# Patient Record
Sex: MALE | Race: WHITE | NOT HISPANIC OR LATINO | Employment: OTHER | URBAN - METROPOLITAN AREA
[De-identification: names, ages, dates, MRNs, and addresses within clinical notes are randomized per-mention and may not be internally consistent; named-entity substitution may affect disease eponyms.]

---

## 2017-01-04 RX ORDER — DILTIAZEM HYDROCHLORIDE 360 MG/1
TABLET, EXTENDED RELEASE ORAL
Qty: 90 TABLET | Refills: 3 | Status: SHIPPED | OUTPATIENT
Start: 2017-01-04 | End: 2019-04-11

## 2017-01-07 RX ORDER — ATORVASTATIN CALCIUM 10 MG/1
10 TABLET, FILM COATED ORAL DAILY
Qty: 90 TABLET | Refills: 3 | Status: SHIPPED | OUTPATIENT
Start: 2017-01-07 | End: 2017-12-13 | Stop reason: SDUPTHER

## 2017-01-12 ENCOUNTER — OFFICE VISIT (OUTPATIENT)
Dept: OPTOMETRY | Facility: CLINIC | Age: 77
End: 2017-01-12
Payer: MEDICARE

## 2017-01-12 DIAGNOSIS — H40.1131 PRIMARY OPEN ANGLE GLAUCOMA OF BOTH EYES, MILD STAGE: Primary | ICD-10-CM

## 2017-01-12 DIAGNOSIS — H25.13 NUCLEAR SCLEROSIS, BILATERAL: ICD-10-CM

## 2017-01-12 PROCEDURE — 99999 PR PBB SHADOW E&M-EST. PATIENT-LVL II: CPT | Mod: PBBFAC,,, | Performed by: OPTOMETRIST

## 2017-01-12 PROCEDURE — 92012 INTRM OPH EXAM EST PATIENT: CPT | Mod: S$PBB,,, | Performed by: OPTOMETRIST

## 2017-01-12 PROCEDURE — 99212 OFFICE O/P EST SF 10 MIN: CPT | Mod: PBBFAC,PO | Performed by: OPTOMETRIST

## 2017-01-12 NOTE — PROGRESS NOTES
HPI     DLS:  9/9/16  Will change drops if iop too high  Pt here for 4 month IOP check.  Pt states he does have allergies this   morning and has tearing OU.  Pt states he is bothered by glare OU at   times.     Latanoprost QHS OU       Last edited by Harjeet Irving, OD on 1/12/2017  9:37 AM.     ROS     Negative for: Constitutional, Gastrointestinal, Neurological, Skin,   Genitourinary, Musculoskeletal, HENT, Endocrine, Cardiovascular, Eyes,   Respiratory, Psychiatric, Allergic/Imm, Heme/Lymph    Last edited by Harjeet Irving, OD on 1/12/2017  9:28 AM. (History)        Assessment /Plan     For exam results, see Encounter Report.    Primary open angle glaucoma of both eyes, mild stage    Nuclear sclerosis, bilateral      1. IOP stable. Cont Latanaprost drops. Probably low tension. Continued good response to drops, Good response to Latanaprost.Mild glaucoma based on OCT changes, VF defects Ou and increased IOP. Pachy normal. Field may clean up at repeat. Pretreat IOP at 20. Family history of glaucoma. RTC 4 mos IOP ck  2. Educated pt on presence of cataracts and effects on vision. No surgery at this time. Recheck in one year.

## 2017-02-17 ENCOUNTER — TELEPHONE (OUTPATIENT)
Dept: GASTROENTEROLOGY | Facility: CLINIC | Age: 77
End: 2017-02-17

## 2017-02-20 ENCOUNTER — OFFICE VISIT (OUTPATIENT)
Dept: GASTROENTEROLOGY | Facility: CLINIC | Age: 77
End: 2017-02-20
Payer: MEDICARE

## 2017-02-20 VITALS
HEIGHT: 75 IN | BODY MASS INDEX: 25.99 KG/M2 | HEART RATE: 68 BPM | WEIGHT: 209 LBS | SYSTOLIC BLOOD PRESSURE: 137 MMHG | DIASTOLIC BLOOD PRESSURE: 81 MMHG

## 2017-02-20 DIAGNOSIS — K59.04 CHRONIC IDIOPATHIC CONSTIPATION: Primary | ICD-10-CM

## 2017-02-20 PROCEDURE — 99214 OFFICE O/P EST MOD 30 MIN: CPT | Mod: S$PBB,,, | Performed by: PHYSICIAN ASSISTANT

## 2017-02-20 PROCEDURE — 99999 PR PBB SHADOW E&M-EST. PATIENT-LVL III: CPT | Mod: PBBFAC,,, | Performed by: PHYSICIAN ASSISTANT

## 2017-02-20 PROCEDURE — 99213 OFFICE O/P EST LOW 20 MIN: CPT | Mod: PBBFAC | Performed by: PHYSICIAN ASSISTANT

## 2017-02-20 NOTE — MR AVS SNAPSHOT
[facility]  [address]  [facility] [address]  Phone: [phone]  Fax: [phone]                  Tung Chau   2017 10:00 AM   Office Visit    Description:  Male : 1940   Provider:  Gwen Ibarra PA-C   Department:  [facility]           Reason for Visit     Follow-up           Diagnoses this Visit        Comments    Chronic idiopathic constipation    -  Primary            To Do List           Future Appointments        Provider Department Dept Phone    3/21/2017 9:00 AM [facility] - Gastroenterology [phone]    2017 9:30 AM WALTER Sosa - Optometry [phone]      Goals (5 Years of Data)     None       These Medications        Disp Refills Start End    linaclotide (LINZESS) 145 mcg Cap capsule 30 capsule 2 2017     Take 1 capsule (145 mcg total) by mouth once daily. - Oral    Pharmacy: [id number] - [address] [facility] [address] AT [facility] OF [facility] [address] Ph #: [phone]         [facility] On Call     [facility] On Call Nurse [facility] Line -  Assistance  Registered nurses in the [facility] On Call Center provide clinical advisement, health education, appointment booking, and other advisory services.  Call for this free service at [phone].             Medications           Message regarding Medications     Verify the changes and/or additions to your medication regime listed below are the same as discussed with your clinician today.  If any of these changes or additions are incorrect, please notify your healthcare provider.        START taking these NEW medications        Refills    linaclotide (LINZESS) 145 mcg Cap capsule 2    Sig: Take 1 capsule (145 mcg total) by mouth once daily.    Class: Normal    Route: Oral      STOP taking these medications     lubiprostone (AMITIZA) 24 MCG Cap Take 1 capsule (24 mcg total) by mouth 2 (two) times daily with  "meals.           Verify that the below list of medications is an accurate representation of the medications you are currently taking.  If none reported, the list may be blank. If incorrect, please contact your healthcare provider. Carry this list with you in case of emergency.           Current Medications     atorvastatin (LIPITOR) 10 MG tablet Take 1 tablet (10 mg total) by mouth once daily.    benazepril (LOTENSIN) 10 MG tablet TAKE 1 TABLET BY MOUTH EVERY DAY    doxazosin (CARDURA) 4 MG tablet TAKE 1 TABLET BY MOUTH EVERY NIGHT    esomeprazole (NEXIUM) 40 MG capsule Take 1 capsule (40 mg total) by mouth once daily.    fluticasone (FLONASE) 50 mcg/actuation nasal spray INSTILL 2 SPRAYS IN EACH NOSTRIL EVERY DAY    GAVILYTE-G 236-22.74-6.74 -5.86 gram suspension     hydrochlorothiazide (HYDRODIURIL) 25 MG tablet TAKE 1 TABLET BY MOUTH EVERY DAY    latanoprost 0.005 % ophthalmic solution INSTILL 1 DROP IN BOTH EYES EVERY DAY    MATZIM  mg 24 hr tablet TAKE 1 TABLET BY MOUTH ONCE DAILY    peg-electrolyte soln (TRILYTE WITH FLAVOR PACKETS) 420 gram SolR Take 4,000 mLs by mouth as directed.    linaclotide (LINZESS) 145 mcg Cap capsule Take 1 capsule (145 mcg total) by mouth once daily.    tamsulosin (FLOMAX) 0.4 mg Cp24 Take 1 capsule (0.4 mg total) by mouth once daily.           Clinical Reference Information           Your Vitals Were     BP Pulse Height Weight BMI    137/81 68 6' 2.5" (1.892 m) 94.8 kg (208 lb 15.9 oz) 26.47 kg/m2      Blood Pressure          Most Recent Value    BP  137/81      Allergies as of 2/20/2017     No Known Allergies      Immunizations Administered on Date of Encounter - 2/20/2017     None      Instructions    Take sennakot daily until you get your prescription.    If the linzess is covered, take once daily in the morning. If no relief with increase to twice daily.    Get a squatty potty or use a stool under your feet when trying to have a BM       Language Assistance Services     " ATTENTION: Language assistance services are available, free of charge. Please call [phone].      ATENCIÓN: Si habla español, tiene a main disposición servicios gratuitos de asistencia lingüística. Llame al [phone].     CHÚ Ý: N?u b?n nói Ti?ng Vi?t, có các d?ch v? h? tr? ngôn ng? mi?n phí dành cho b?n. G?i s? [phone].         [facility] complies with applicable Federal civil rights laws and does not discriminate on the basis of race, color, national origin, age, disability, or sex.

## 2017-02-20 NOTE — PROGRESS NOTES
[facility] Consultation Note    Reason for Consult:  The encounter diagnosis was Chronic idiopathic constipation.    PCP:   Antonio Killian       Referring MD:  No referring provider defined for this encounter.    HPI:  This is a 76 y.o. male here to follow up on his constipation. Today he reports no relief with amitiza. Takes a herbal supplement that contains senna every 2-3 days. + incomplete emptying, having to strain. Also getting relief with MOM but he is worried about this or the herbal supplement every day.    Drinks 4-6 bottles of water a day. 8/2015 colonoscopy involved removal of an adenoma polyp and revealed diverticulosis.  Denies lactose intolerant. Denies rectal bleeding or melena.    ROS:  Constitutional: No fevers, chills, No weight loss  ENT: No allergies  CV: No chest pain  Pulm: No cough, No shortness of breath  Ophtho: No vision changes  GI: see HPI  Derm: No rash  Heme: No lymphadenopathy, No bruising  MSK: No arthritis  : No dysuria, No hematuria  Endo: No hot or cold intolerance  Neuro: No syncope, No seizure  Psych: No anxiety, No depression    Medical History:  has a past medical history of Cataract; Chronic constipation; GERD (gastroesophageal reflux disease); Hyperlipidemia; Hypertension; and LBBB (left bundle branch block).    Surgical History:  has a past surgical history that includes Inguinal hernia repair (Right); Colonoscopy (4/28/2008); and Esophagogastroduodenoscopy (2/1/2010).    Family History: family history includes Cancer in his father and sister; Cancer (age of onset: 60) in his paternal uncle; Heart disease in his daughter; Hypertension in his sister; Stroke in his father. There is no history of Amblyopia, Blindness, Cataracts, Diabetes, Glaucoma, Macular degeneration, Retinal detachment, Strabismus, or Thyroid disease..     Social History:  reports that he has never smoked. He has never used smokeless tobacco. He reports that he does not drink  "alcohol or use illicit drugs.    Review of patient's allergies indicates:  No Known Allergies    Current Outpatient Prescriptions on File Prior to Visit   Medication Sig Dispense Refill    atorvastatin (LIPITOR) 10 MG tablet Take 1 tablet (10 mg total) by mouth once daily. 90 tablet 3    benazepril (LOTENSIN) 10 MG tablet TAKE 1 TABLET BY MOUTH EVERY DAY 90 tablet 3    doxazosin (CARDURA) 4 MG tablet TAKE 1 TABLET BY MOUTH EVERY NIGHT 90 tablet 3    esomeprazole (NEXIUM) 40 MG capsule Take 1 capsule (40 mg total) by mouth once daily. 90 capsule 1    fluticasone (FLONASE) 50 mcg/actuation nasal spray INSTILL 2 SPRAYS IN EACH NOSTRIL EVERY DAY 48 g 5    GAVILYTE-G 236-22.74-6.74 -5.86 gram suspension   0    hydrochlorothiazide (HYDRODIURIL) 25 MG tablet TAKE 1 TABLET BY MOUTH EVERY DAY 90 tablet 3    latanoprost 0.005 % ophthalmic solution INSTILL 1 DROP IN BOTH EYES EVERY DAY 7.5 mL 12    MATZIM  mg 24 hr tablet TAKE 1 TABLET BY MOUTH ONCE DAILY 90 tablet 3    peg-electrolyte soln (TRILYTE WITH FLAVOR PACKETS) 420 gram SolR Take 4,000 mLs by mouth as directed. 1 Bottle 0    [DISCONTINUED] lubiprostone (AMITIZA) 24 MCG Cap Take 1 capsule (24 mcg total) by mouth 2 (two) times daily with meals. 60 capsule 0    tamsulosin (FLOMAX) 0.4 mg Cp24 Take 1 capsule (0.4 mg total) by mouth once daily. 10 capsule 0     No current facility-administered medications on file prior to visit.          Objective Findings:    Vital Signs:  Visit Vitals    /81    Pulse 68    Ht 6' 2.5" (1.892 m)    Wt 94.8 kg (208 lb 15.9 oz)    BMI 26.47 kg/m2     Body mass index is 26.47 kg/(m^2).    Physical Exam:  General Appearance: Well appearing in no acute distress  Head:   Normocephalic, without obvious abnormality  Eyes:    No scleral icterus  ENT: Neck supple, Lips, mucosa, and tongue normal  Lungs: CTA bilaterally in anterior and posterior fields, no wheezes, no crackles.  Heart:  Regular rate and rhythm, S1, S2 " normal, no murmurs heard  Abdomen: Soft, non tender, non distended with positive bowel sounds in all four quadrants.  Extremities: 2+ pulses, no edema  Skin: No rash  Neurologic: AAO x 3      Labs:  Lab Results   Component Value Date    WBC 5.08 09/16/2016    HGB 14.5 09/16/2016    HCT 42.1 09/16/2016     09/16/2016    CHOL 142 09/16/2016    TRIG 56 09/16/2016    HDL 44 09/16/2016    ALT 20 09/16/2016    AST 23 09/16/2016     09/16/2016    K 4.5 09/16/2016     09/16/2016    CREATININE 1.3 09/16/2016    BUN 18 09/16/2016    CO2 26 09/16/2016    TSH 2.122 03/17/2016    PSA 1.2 03/17/2016    INR 1.0 06/23/2013       Imaging:    Endoscopy:    colon 8/2015    Assessment:  1. Chronic idiopathic constipation      Constipation persists, no relief with amitiza    Recommendations:  1. stop amitiza. Start linzess 145mcg. If no relief he was told to take two linzess pills daily    2. Advise using a squatty potty    Consider anorectal manometry at next visit.     Return in about 1 month (around 3/20/2017).      Order summary:  Orders Placed This Encounter    linaclotide (LINZESS) 145 mcg Cap capsule         Thank you so much for allowing me to participate in the care of Tung Chau    Gwen Ibarra PA-C

## 2017-02-20 NOTE — PATIENT INSTRUCTIONS
Take sennakot daily until you get your prescription.    If the linzess is covered, take once daily in the morning. If no relief with increase to twice daily.    Get a squatty potty or use a stool under your feet when trying to have a BM

## 2017-03-03 ENCOUNTER — TELEPHONE (OUTPATIENT)
Dept: INTERNAL MEDICINE | Facility: CLINIC | Age: 77
End: 2017-03-03

## 2017-03-03 DIAGNOSIS — Z00.00 ANNUAL PHYSICAL EXAM: Primary | ICD-10-CM

## 2017-03-03 DIAGNOSIS — I10 ESSENTIAL HYPERTENSION: ICD-10-CM

## 2017-03-03 DIAGNOSIS — E78.00 PURE HYPERCHOLESTEROLEMIA: ICD-10-CM

## 2017-03-03 DIAGNOSIS — Z12.5 ENCOUNTER FOR PROSTATE CANCER SCREENING: ICD-10-CM

## 2017-03-03 NOTE — TELEPHONE ENCOUNTER
----- Message from Anna Sandoval sent at 3/3/2017 11:06 AM CST -----  Contact: self [phone] or [phone]  Type: Orders Request    What orders/ testing are being requested? Labs     Is there a future appointment scheduled for the patient with PCP?  Yes     When? 03/23/17      Comments: please advise , Thanks !

## 2017-03-17 ENCOUNTER — OFFICE VISIT (OUTPATIENT)
Dept: INTERNAL MEDICINE | Facility: CLINIC | Age: 77
End: 2017-03-17
Payer: MEDICARE

## 2017-03-17 ENCOUNTER — LAB VISIT (OUTPATIENT)
Dept: LAB | Facility: HOSPITAL | Age: 77
End: 2017-03-17
Attending: INTERNAL MEDICINE
Payer: MEDICARE

## 2017-03-17 VITALS
HEIGHT: 74 IN | DIASTOLIC BLOOD PRESSURE: 79 MMHG | SYSTOLIC BLOOD PRESSURE: 144 MMHG | HEART RATE: 61 BPM | BODY MASS INDEX: 26.44 KG/M2 | WEIGHT: 206 LBS

## 2017-03-17 DIAGNOSIS — E78.00 PURE HYPERCHOLESTEROLEMIA: ICD-10-CM

## 2017-03-17 DIAGNOSIS — K59.04 CHRONIC IDIOPATHIC CONSTIPATION: ICD-10-CM

## 2017-03-17 DIAGNOSIS — Z12.5 ENCOUNTER FOR PROSTATE CANCER SCREENING: ICD-10-CM

## 2017-03-17 DIAGNOSIS — J30.2 SEASONAL ALLERGIC RHINITIS, UNSPECIFIED ALLERGIC RHINITIS TRIGGER: ICD-10-CM

## 2017-03-17 DIAGNOSIS — N40.0 BENIGN NON-NODULAR PROSTATIC HYPERPLASIA WITHOUT LOWER URINARY TRACT SYMPTOMS: Chronic | ICD-10-CM

## 2017-03-17 DIAGNOSIS — I10 ESSENTIAL HYPERTENSION: Chronic | ICD-10-CM

## 2017-03-17 DIAGNOSIS — D12.6 ADENOVILLOUS POLYP OF COLON: ICD-10-CM

## 2017-03-17 DIAGNOSIS — I10 ESSENTIAL HYPERTENSION: ICD-10-CM

## 2017-03-17 DIAGNOSIS — Z00.00 ANNUAL PHYSICAL EXAM: ICD-10-CM

## 2017-03-17 DIAGNOSIS — Z00.00 ANNUAL PHYSICAL EXAM: Primary | ICD-10-CM

## 2017-03-17 DIAGNOSIS — K21.9 GERD WITHOUT ESOPHAGITIS: Chronic | ICD-10-CM

## 2017-03-17 LAB
ALBUMIN SERPL BCP-MCNC: 4.1 G/DL
ALP SERPL-CCNC: 77 U/L
ALT SERPL W/O P-5'-P-CCNC: 20 U/L
ANION GAP SERPL CALC-SCNC: 7 MMOL/L
AST SERPL-CCNC: 19 U/L
BASOPHILS # BLD AUTO: 0.03 K/UL
BASOPHILS NFR BLD: 0.6 %
BILIRUB SERPL-MCNC: 0.8 MG/DL
BUN SERPL-MCNC: 12 MG/DL
CALCIUM SERPL-MCNC: 10 MG/DL
CHLORIDE SERPL-SCNC: 106 MMOL/L
CHOLEST/HDLC SERPL: 2.8 {RATIO}
CO2 SERPL-SCNC: 28 MMOL/L
COMPLEXED PSA SERPL-MCNC: 0.99 NG/ML
CREAT SERPL-MCNC: 1.1 MG/DL
DIFFERENTIAL METHOD: ABNORMAL
EOSINOPHIL # BLD AUTO: 0.1 K/UL
EOSINOPHIL NFR BLD: 2.4 %
ERYTHROCYTE [DISTWIDTH] IN BLOOD BY AUTOMATED COUNT: 12.7 %
EST. GFR  (AFRICAN AMERICAN): >60 ML/MIN/1.73 M^2
EST. GFR  (NON AFRICAN AMERICAN): >60 ML/MIN/1.73 M^2
GLUCOSE SERPL-MCNC: 96 MG/DL
HCT VFR BLD AUTO: 43.3 %
HDL/CHOLESTEROL RATIO: 35.4 %
HDLC SERPL-MCNC: 130 MG/DL
HDLC SERPL-MCNC: 46 MG/DL
HGB BLD-MCNC: 15 G/DL
LDLC SERPL CALC-MCNC: 70.6 MG/DL
LYMPHOCYTES # BLD AUTO: 1.3 K/UL
LYMPHOCYTES NFR BLD: 26.5 %
MCH RBC QN AUTO: 31.1 PG
MCHC RBC AUTO-ENTMCNC: 34.6 %
MCV RBC AUTO: 90 FL
MONOCYTES # BLD AUTO: 0.5 K/UL
MONOCYTES NFR BLD: 10 %
NEUTROPHILS # BLD AUTO: 3 K/UL
NEUTROPHILS NFR BLD: 60.5 %
NONHDLC SERPL-MCNC: 84 MG/DL
PLATELET # BLD AUTO: 170 K/UL
PMV BLD AUTO: 11.2 FL
POTASSIUM SERPL-SCNC: 4.9 MMOL/L
PROT SERPL-MCNC: 7.3 G/DL
RBC # BLD AUTO: 4.82 M/UL
SODIUM SERPL-SCNC: 141 MMOL/L
TRIGL SERPL-MCNC: 67 MG/DL
TSH SERPL DL<=0.005 MIU/L-ACNC: 2.59 UIU/ML
WBC # BLD AUTO: 4.91 K/UL

## 2017-03-17 PROCEDURE — 99397 PER PM REEVAL EST PAT 65+ YR: CPT | Mod: S$PBB,,, | Performed by: INTERNAL MEDICINE

## 2017-03-17 PROCEDURE — 99213 OFFICE O/P EST LOW 20 MIN: CPT | Mod: PBBFAC,PO | Performed by: INTERNAL MEDICINE

## 2017-03-17 PROCEDURE — 99999 PR PBB SHADOW E&M-EST. PATIENT-LVL III: CPT | Mod: PBBFAC,,, | Performed by: INTERNAL MEDICINE

## 2017-03-17 RX ORDER — TRIAMCINOLONE ACETONIDE 0.25 MG/G
CREAM TOPICAL 2 TIMES DAILY
Qty: 30 G | Refills: 1 | Status: SHIPPED | OUTPATIENT
Start: 2017-03-17 | End: 2018-01-10

## 2017-03-17 NOTE — PROGRESS NOTES
PAST MEDICAL HISTORY:   Hypertension.  Hyperlipidemia.  Gastroesophageal reflux disease.  Chronic rhinitis.  BPH.  Left bundle-branch block.  Adenomatous colon polyp in the year  and in 2015.  Right inguinal hernia repair.  Epididymal cyst.  Fractured elbow and wrist.  Lung surgery at age 42 to remove an empyema.    SOCIAL HISTORY:  Tobacco and alcohol use - none.  .  Exercises by   walking, weight lifting, and working on his farm.  He volunteers regularly at   the [employer] and [employer].    FAMILY HISTORY:  Father is , complications from a fracture.  Mother is   , breast cancer and stroke.  One sister is alive, doing well, but   history of breast cancer and hypertension.    SCREENING:  Last colonoscopy in 2015.    MEDICATIONS:   Atorvastatin 10 mg.  Benazepril 10 mg.  Diltiazem 360 mg.  Doxazosin 4 mg.  Nexium 40 mg.  Hydrochlorothiazide 25 mg.    REASON FOR VISIT:  This is a 76-year-old male.  This is part of his annual   routine exam.  Overall in general, he feels good, feeling well.  He is now   volunteering at a local school, still volunteers at the [employer].  He still is   always involved in some type of community project and never rests.  Recent issue   is that he is having some nasal and head congestion, but being treated with   Flonase.  He recently  was seen by GI regarding constipation.  It has been a   chronic thing.  He is on now is Linzess that he might take one tab 145 mcg 2 or   3 times a week.  He says it works perfectly for him.  It is only the time he   will have a bowel movement.  The main thing is really in the act of defecating   where it might be hard for him to pass stool, but this has helped out and when   he does defecate then a lot of stool comes out.  He may be scheduled for an anal   manometry.  His past medical, social history, family history, screening history   and medications listed above with the exception now that he is on  Linzess 145   mcg a day.    REVIEW OF SYMPTOMS:  Reports no chest pain, palpitations, shortness of breath,   or abdominal pain.  Urination  is fine.  Urine flow is good.  No incomplete   emptying.  He is not having any heartburn or indigestion.  No headaches or   arthralgia.  Essentially, the issues that he has had gastroesophageal reflux   disease and BPH he is doing well with.    PHYSICAL EXAMINATION:  VITAL SIGNS:  Weight is 206 pounds, pulse 60, blood pressure by me 120/62.  HEENT:  Tympanic membranes normal.  Nasal mucosa is clear.  Oropharynx, no   abnormal findings.  NECK:  No thyromegaly.  LUNGS:  Clear.  HEART:  Regular rate and rhythm.  No murmurs.  ABDOMEN:  Active bowel sounds, soft, nontender.  No hepatosplenomegaly or   abdominal masses.  PULSES:  2+ carotid, 2+ pedal pulses.  EXTREMITIES:  No edema.  LYMPH GLAND:  No palpable adenopathy.  GENITALIA:  No scrotal masses, no hernias.  RECTAL:  Stool is brown, heme-negative.  Prostate minimally enlarged.    IMPRESSION:  1.  General exam.  2.  Hypertension.  3.  Hyperlipidemia.  4.  Gastroesophageal reflux disease.  5.  Allergic rhinitis.  6.  BPH.  7.  Adenomatous colon polyps.  8.  Constipation.    PLAN:    Continue to maintain proper diet and physical activity, which he is doing.    Increase water intake. Continue with his current medicine of Linzess.    We will arrange for routine labs.    Phone review to follow up.          /alex [id number] review        JAM/HN  dd: 03/17/2017 09:48:22 (CDT)  td: 03/18/2017 03:30:51 (CDT)  Doc ID   #[id number]  Job ID #[id number]    CC:

## 2017-03-17 NOTE — MR AVS SNAPSHOT
[facility] [address]  [address]  [address]  [facility] [address]  Phone: [phone]                  Tung Chau   3/17/2017 9:00 AM   Office Visit    Description:  Male : 1940   Provider:  Antonio Killian MD   Department:  [facility] [address]           Reason for Visit     Annual Exam           Diagnoses this Visit        Comments    Annual physical exam    -  Primary     Essential hypertension         Pure hypercholesterolemia         GERD without esophagitis         Chronic idiopathic constipation         Benign non-nodular prostatic hyperplasia without lower urinary tract symptoms         Seasonal allergic rhinitis, unspecified allergic rhinitis trigger         Adenovillous polyp of colon                To Do List           Future Appointments        Provider Department Dept Phone    3/21/2017 9:00 AM CASA Landers [facility] - Gastroenterology [phone]    2017 9:30 AM WALTER Sosairie - Optometry [phone]      Goals (5 Years of Data)     None      [facility] On [facility]     [facility] On Call Nurse Care Line -  Assistance  Registered nurses in the [facility] On Call Center provide clinical advisement, health education, appointment booking, and other advisory services.  Call for this free service at [phone].             Medications           Message regarding Medications     Verify the changes and/or additions to your medication regime listed below are the same as discussed with your clinician today.  If any of these changes or additions are incorrect, please notify your healthcare provider.        STOP taking these medications     tamsulosin (FLOMAX) 0.4 mg Cp24 Take 1 capsule (0.4 mg total) by mouth once daily.           Verify that the below list of medications is an accurate representation of the medications you are currently taking.  If none reported, the list may be blank. If incorrect, please contact  "your healthcare provider. Carry this list with you in case of emergency.           Current Medications     atorvastatin (LIPITOR) 10 MG tablet Take 1 tablet (10 mg total) by mouth once daily.    benazepril (LOTENSIN) 10 MG tablet TAKE 1 TABLET BY MOUTH EVERY DAY    doxazosin (CARDURA) 4 MG tablet TAKE 1 TABLET BY MOUTH EVERY NIGHT    esomeprazole (NEXIUM) 40 MG capsule Take 1 capsule (40 mg total) by mouth once daily.    fluticasone (FLONASE) 50 mcg/actuation nasal spray INSTILL 2 SPRAYS IN EACH NOSTRIL EVERY DAY    GAVILYTE-G 236-22.74-6.74 -5.86 gram suspension     hydrochlorothiazide (HYDRODIURIL) 25 MG tablet TAKE 1 TABLET BY MOUTH EVERY DAY    latanoprost 0.005 % ophthalmic solution INSTILL 1 DROP IN BOTH EYES EVERY DAY    linaclotide (LINZESS) 145 mcg Cap capsule Take 1 capsule (145 mcg total) by mouth once daily.    MATZIM  mg 24 hr tablet TAKE 1 TABLET BY MOUTH ONCE DAILY    peg-electrolyte soln (TRILYTE WITH FLAVOR PACKETS) 420 gram SolR Take 4,000 mLs by mouth as directed.           Clinical Reference Information           Your Vitals Were     BP Pulse Height Weight BMI    144/79 61 6' 2" (1.88 m) 93.4 kg (206 lb) 26.45 kg/m2      Blood Pressure          Most Recent Value    BP  (!)  144/79      Allergies as of 3/17/2017     No Known Allergies      Immunizations Administered on Date of Encounter - 3/17/2017     None      Language Assistance Services     ATTENTION: Language assistance services are available, free of charge. Please call [phone].      ATENCIÓN: Si gilla fabian, tiene a main disposición servicios gratuitos de asistencia lingüística. Llame al [phone].     [facility] Ý: N?u b?n nói Ti?ng Vi?t, có các d?ch v? h? tr? ngôn ng? mi?n phí dành cho b?n. G?i s? [phone].         [facility] [address] complies with applicable Federal civil rights laws and does not discriminate on the basis of race, color, national origin, age, disability, or sex.        "

## 2017-03-21 ENCOUNTER — OFFICE VISIT (OUTPATIENT)
Dept: GASTROENTEROLOGY | Facility: CLINIC | Age: 77
End: 2017-03-21
Payer: MEDICARE

## 2017-03-21 VITALS
BODY MASS INDEX: 26.71 KG/M2 | TEMPERATURE: 98 F | DIASTOLIC BLOOD PRESSURE: 83 MMHG | WEIGHT: 208.13 LBS | RESPIRATION RATE: 18 BRPM | HEIGHT: 74 IN | HEART RATE: 55 BPM | SYSTOLIC BLOOD PRESSURE: 145 MMHG

## 2017-03-21 DIAGNOSIS — K59.04 CHRONIC IDIOPATHIC CONSTIPATION: Primary | ICD-10-CM

## 2017-03-21 PROCEDURE — 99999 PR PBB SHADOW E&M-EST. PATIENT-LVL IV: CPT | Mod: PBBFAC,,, | Performed by: PHYSICIAN ASSISTANT

## 2017-03-21 PROCEDURE — 99214 OFFICE O/P EST MOD 30 MIN: CPT | Mod: PBBFAC | Performed by: PHYSICIAN ASSISTANT

## 2017-03-21 PROCEDURE — 99213 OFFICE O/P EST LOW 20 MIN: CPT | Mod: S$PBB,,, | Performed by: PHYSICIAN ASSISTANT

## 2017-03-21 NOTE — MR AVS SNAPSHOT
[facility]  [address]  [facility] [address]  Phone: [phone]  Fax: [phone]                  Tung Chau   3/21/2017 9:00 AM   Office Visit    Description:  Male : 1940   Provider:  Gwen Ibarra PA-C   Department:  [facility]           Reason for Visit     Follow-up           Diagnoses this Visit        Comments    Chronic idiopathic constipation    -  Primary            To Do List           Future Appointments        Provider Department Dept Phone    2017 9:30 AM WALTER Sosa - Optometry [phone]    2017 10:00 AM CASA [facility]ers - Gastroenterology [phone]      Goals (5 Years of Data)     None      Follow-Up and Disposition     Return in about 3 months (around 2017).       These Medications        Disp Refills Start End    linaclotide (LINZESS) 145 mcg Cap capsule 90 capsule 2 3/21/2017     Take 1 capsule (145 mcg total) by mouth once daily. - Oral    Pharmacy: [id number] - [address] [facility] [address] AT [facility] OF [facility] [address] Ph #: [phone]         [facility] On Call     [facility] On Call Nurse Care Line -  Assistance  Registered nurses in the [facility] On Call Center provide clinical advisement, health education, appointment booking, and other advisory services.  Call for this free service at [phone].             Medications           Message regarding Medications     Verify the changes and/or additions to your medication regime listed below are the same as discussed with your clinician today.  If any of these changes or additions are incorrect, please notify your healthcare provider.        STOP taking these medications     peg-electrolyte soln (TRILYTE WITH FLAVOR PACKETS) 420 gram SolR Take 4,000 mLs by mouth as directed.           Verify that the below list of medications is an accurate representation of the medications you are  "currently taking.  If none reported, the list may be blank. If incorrect, please contact your healthcare provider. Carry this list with you in case of emergency.           Current Medications     atorvastatin (LIPITOR) 10 MG tablet Take 1 tablet (10 mg total) by mouth once daily.    benazepril (LOTENSIN) 10 MG tablet TAKE 1 TABLET BY MOUTH EVERY DAY    doxazosin (CARDURA) 4 MG tablet TAKE 1 TABLET BY MOUTH EVERY NIGHT    fluticasone (FLONASE) 50 mcg/actuation nasal spray INSTILL 2 SPRAYS IN EACH NOSTRIL EVERY DAY    hydrochlorothiazide (HYDRODIURIL) 25 MG tablet TAKE 1 TABLET BY MOUTH EVERY DAY    latanoprost 0.005 % ophthalmic solution INSTILL 1 DROP IN BOTH EYES EVERY DAY    linaclotide (LINZESS) 145 mcg Cap capsule Take 1 capsule (145 mcg total) by mouth once daily.    MATZIM  mg 24 hr tablet TAKE 1 TABLET BY MOUTH ONCE DAILY    triamcinolone acetonide 0.025% (KENALOG) 0.025 % cream Apply topically 2 (two) times daily.    esomeprazole (NEXIUM) 40 MG capsule Take 1 capsule (40 mg total) by mouth once daily.    GAVILYTE-G 236-22.74-6.74 -5.86 gram suspension            Clinical Reference Information           Your Vitals Were     BP Pulse Temp Resp Height Weight    145/83 (BP Location: Left arm, Patient Position: Sitting) 55 97.9 °F (36.6 °C) (Oral) 18 6' 2" (1.88 m) 94.4 kg (208 lb 1.8 oz)    BMI                26.72 kg/m2          Blood Pressure          Most Recent Value    BP  (!)  145/83      Allergies as of 3/21/2017     No Known Allergies      Immunizations Administered on Date of Encounter - 3/21/2017     None      Language Assistance Services     ATTENTION: Language assistance services are available, free of charge. Please call [phone].      ATENCIÓN: Si gilla fabian, tiene a main disposición servicios gratuitos de asistencia lingüística. Llame al [phone].     CHÚ Ý: N?u b?n nói Ti?ng Vi?t, có các d?ch v? h? tr? ngôn ng? mi?n phí dành cho b?n. G?i s? [phone].         [facility] complies with applicable Federal civil rights laws and does not discriminate on the basis of race, color, national origin, age, disability, or sex.

## 2017-03-21 NOTE — PROGRESS NOTES
[facility] Consultation Note    Reason for Consult:  The encounter diagnosis was Chronic idiopathic constipation.    PCP:   Antonio Killian       Referring MD:  No referring provider defined for this encounter.    HPI:  This is a 76 y.o. male here to follow up on his constipation.  Today he is doing well  Taking the linzess once a week, otherwise takes sennakot  Having a BM daily     Failed meds: Amitiza     Drinks 4-6 bottles of water a day. 8/2015 colonoscopy involved removal of an adenoma polyp and revealed diverticulosis.  Denies lactose intolerant. Denies rectal bleeding or melena.    ROS:  Constitutional: No fevers, chills, No weight loss  ENT: No allergies  CV: No chest pain  Pulm: No cough, No shortness of breath  Ophtho: No vision changes  GI: see HPI  Derm: No rash  Heme: No lymphadenopathy, No bruising  MSK: No arthritis  : No dysuria, No hematuria  Endo: No hot or cold intolerance  Neuro: No syncope, No seizure  Psych: No anxiety, No depression    Medical History:  has a past medical history of Cataract; Chronic constipation; GERD (gastroesophageal reflux disease); Hyperlipidemia; Hypertension; and LBBB (left bundle branch block).    Surgical History:  has a past surgical history that includes Inguinal hernia repair (Right); Colonoscopy (4/28/2008); and Esophagogastroduodenoscopy (2/1/2010).    Family History: family history includes Cancer in his father; Cancer (age of onset: 60) in his paternal uncle; Colon polyps in his paternal uncle; Heart disease in his daughter; Hypertension in his sister; Stroke in his father. There is no history of Amblyopia, Blindness, Cataracts, Diabetes, Glaucoma, Macular degeneration, Retinal detachment, Strabismus, or Thyroid disease..     Social History:  reports that he has never smoked. He has never used smokeless tobacco. He reports that he does not drink alcohol or use illicit drugs.    Review of patient's allergies indicates:  No Known  "Allergies    Current Outpatient Prescriptions on File Prior to Visit   Medication Sig Dispense Refill    atorvastatin (LIPITOR) 10 MG tablet Take 1 tablet (10 mg total) by mouth once daily. 90 tablet 3    benazepril (LOTENSIN) 10 MG tablet TAKE 1 TABLET BY MOUTH EVERY DAY 90 tablet 3    doxazosin (CARDURA) 4 MG tablet TAKE 1 TABLET BY MOUTH EVERY NIGHT 90 tablet 3    fluticasone (FLONASE) 50 mcg/actuation nasal spray INSTILL 2 SPRAYS IN EACH NOSTRIL EVERY DAY 48 g 5    hydrochlorothiazide (HYDRODIURIL) 25 MG tablet TAKE 1 TABLET BY MOUTH EVERY DAY 90 tablet 3    latanoprost 0.005 % ophthalmic solution INSTILL 1 DROP IN BOTH EYES EVERY DAY 7.5 mL 12    MATZIM  mg 24 hr tablet TAKE 1 TABLET BY MOUTH ONCE DAILY 90 tablet 3    triamcinolone acetonide 0.025% (KENALOG) 0.025 % cream Apply topically 2 (two) times daily. 30 g 1    [DISCONTINUED] linaclotide (LINZESS) 145 mcg Cap capsule Take 1 capsule (145 mcg total) by mouth once daily. 30 capsule 2    esomeprazole (NEXIUM) 40 MG capsule Take 1 capsule (40 mg total) by mouth once daily. 90 capsule 1    GAVILYTE-G 236-22.74-6.74 -5.86 gram suspension   0    [DISCONTINUED] peg-electrolyte soln (TRILYTE WITH FLAVOR PACKETS) 420 gram SolR Take 4,000 mLs by mouth as directed. 1 Bottle 0     No current facility-administered medications on file prior to visit.          Objective Findings:    Vital Signs:  BP (!) 145/83 (BP Location: Left arm, Patient Position: Sitting)  Pulse (!) 55  Temp 97.9 °F (36.6 °C) (Oral)   Resp 18  Ht 6' 2" (1.88 m)  Wt 94.4 kg (208 lb 1.8 oz)  BMI 26.72 kg/m2  Body mass index is 26.72 kg/(m^2).    Physical Exam:  General Appearance: Well appearing in no acute distress  Head:   Normocephalic, without obvious abnormality  Eyes:    No scleral icterus  ENT: Neck supple, Lips, mucosa, and tongue normal  Lungs: CTA bilaterally in anterior and posterior fields, no wheezes, no crackles.  Heart:  Regular rate and rhythm, S1, S2 normal, no " murmurs heard  Abdomen: Soft, non tender, non distended with positive bowel sounds in all four quadrants.  Extremities: 2+ pulses, no edema  Skin: No rash  Neurologic: AAO x 3      Labs:  Lab Results   Component Value Date    WBC 4.91 03/17/2017    HGB 15.0 03/17/2017    HCT 43.3 03/17/2017     03/17/2017    CHOL 130 03/17/2017    TRIG 67 03/17/2017    HDL 46 03/17/2017    ALT 20 03/17/2017    AST 19 03/17/2017     03/17/2017    K 4.9 03/17/2017     03/17/2017    CREATININE 1.1 03/17/2017    BUN 12 03/17/2017    CO2 28 03/17/2017    TSH 2.588 03/17/2017    PSA 0.99 03/17/2017    INR 1.0 06/23/2013       Imaging:    Endoscopy:    colon 8/2015    Assessment:  1. Chronic idiopathic constipation      Stable on alternating sennakot and linzess 145mcg    Recommendations:  1.Refill linzess     Consider anorectal manometry if symptoms worsen    Return in about 3 months (around 6/21/2017).      Order summary:  Orders Placed This Encounter    linaclotide (LINZESS) 145 mcg Cap capsule         Thank you so much for allowing me to participate in the care of Tung Ibarra PA-C

## 2017-05-08 ENCOUNTER — HOSPITAL ENCOUNTER (EMERGENCY)
Facility: HOSPITAL | Age: 77
Discharge: HOME | End: 2017-05-08
Attending: EMERGENCY MEDICINE
Payer: MEDICARE

## 2017-05-08 VITALS
HEART RATE: 59 BPM | RESPIRATION RATE: 18 BRPM | BODY MASS INDEX: 26.31 KG/M2 | DIASTOLIC BLOOD PRESSURE: 64 MMHG | TEMPERATURE: 98 F | OXYGEN SATURATION: 98 % | HEIGHT: 74 IN | WEIGHT: 205 LBS | SYSTOLIC BLOOD PRESSURE: 132 MMHG

## 2017-05-08 DIAGNOSIS — K59.00 CONSTIPATION: ICD-10-CM

## 2017-05-08 DIAGNOSIS — K59.09 CHRONIC CONSTIPATION: Primary | ICD-10-CM

## 2017-05-08 PROCEDURE — 99283 EMERGENCY DEPT VISIT LOW MDM: CPT

## 2017-05-08 RX ORDER — DOCUSATE SODIUM 100 MG/1
100 CAPSULE, LIQUID FILLED ORAL 2 TIMES DAILY
Qty: 20 CAPSULE | Refills: 0 | Status: SHIPPED | OUTPATIENT
Start: 2017-05-08 | End: 2018-01-10

## 2017-05-08 NOTE — ED NOTES
Patient has had chronic constipation although had normal BM yesterday. States he has seen multiple providers for the same and has a Rx for Linzess and when he takes it is able to have BM with no problem. Denies any complaints at this time. States he just wants to know why he needs this medication to have BM and will he need it for the rest of his life. No n/v, no abdominal pain  APPEARANCE: Alert, oriented and in no acute distress.  CARDIAC: Normal rate and rhythm, no murmur heard.   PERIPHERAL VASCULAR: peripheral pulses present. Normal cap refill. No edema. Warm to touch.    RESPIRATORY:Normal rate and effort, breath sounds clear bilaterally throughout chest. Respirations are equal and unlabored no obvious signs of distress.  GASTRO: soft, bowel sounds normal, no tenderness, no abdominal distention.  MUSC: Full ROM. No bony tenderness or soft tissue tenderness. No obvious deformity.  SKIN: Skin is warm and dry, normal skin turgor, mucous membranes moist.  MENTAL STATUS: awake, alert and aware of environment.  EYE: PERRL, both eyes: pupils brisk and reactive to light. Normal size.  ENT: EARS: no obvious drainage. NOSE: no active bleeding.

## 2017-05-08 NOTE — DISCHARGE INSTRUCTIONS
Constipation (Adult)  Constipation means that you have bowel movements that are less frequent than usual. Stools often become very hard and difficult to pass.  Constipation is very common. At some point in life it affects almost everyone. Since everyone's bowel habits are different, what is constipation to one person may not be to another. Your healthcare provider may do tests to diagnose constipation. It depends on what he or she finds when evaluating you.    Symptoms of constipation include:  · Abdominal pain  · Bloating  · Vomiting  · Painful bowel movements  · Itching, swelling, bleeding, or pain around the anus  Causes  Constipation can have many causes. These include:  · Diet low in fiber  · Too much dairy  · Not drinking enough liquids  · Lack of exercise or physical activity. This is especially true for older adults.  · Changes in lifestyle or daily routine, including pregnancy, aging, work, and travel  · Frequent use or misuse of laxatives  · Ignoring the urge to have a bowel movement or delaying it until later  · Medicines, such as certain prescription pain medicines, iron supplements, antacids, certain antidepressants, and calcium supplements  · Diseases like irritable bowel syndrome, bowel obstructions, stroke, diabetes, thyroid disease, Parkinson disease, hemorrhoids, and colon cancer  Complications  Potential complications of constipation can include:  · Hemorrhoids  · Rectal bleeding from hemorrhoids or anal fissures (skin tears)  · Hernias  · Dependency on laxatives  · Chronic constipation  · Fecal impaction  · Bowel obstruction or perforation  Home care  All treatment should be done after talking with your healthcare provider. This is especially true if you have another medical problems, are taking prescription medicines, or are an older adult. Treatment most often involves lifestyle changes. You may also need medicines. Your healthcare provider will tell you which will work best for you. Follow  the advice below to help avoid this problem in the future.  Lifestyle changes  These lifestyle changes can help prevent constipation:  · Diet. Eat a high-fiber diet, with fresh fruit and vegetables, and reduce dairy intake, meats, and processed foods  · Fluids. It's important to get enough fluids each day. Drink plenty of water when you eat more fiber. If you are on diet that limits the amount of fluid you can have, talk about this with your healthcare provider.  · Regular exercise. Check with your healthcare provider first.  Medications  Take any medicines as directed. Some laxatives are safe to use only every now and then. Others can be taken on a regular basis. Talk with your doctor or pharmacist if you have questions.  Prescription pain medicines can cause constipation. If you are taking this kind of medicine, ask your healthcare provider if you should also take a stool softener.  Medicines you may take to treat constipation include:  · Fiber supplements  · Stool softeners  · Laxatives  · Enemas  · Rectal suppositories  Follow-up care  Follow up with your healthcare provider if symptoms don't get better in the next few days. You may need to have more tests or see a specialist.  Call 911  Call 911 if any of these occur:  · Trouble breathing  · Stiff, rigid abdomen that is severely painful to touch  · Confusion  · Fainting or loss of consciousness  · Rapid heart rate  · Chest pain  When to seek medical advice  Call your healthcare provider right away if any of these occur:  · Fever over 100.4°F (38°C)  · Failure to resume normal bowel movements  · Pain in your abdomen or back gets worse  · Nausea or vomiting  · Swelling in your abdomen  · Blood in the stool  · Black, tarry stool  · Involuntary weight loss  · Weakness  Date Last Reviewed: 12/30/2015  © 1721-7057 [employer]. [address] [place] [address], [place] [address]. All rights reserved. This information is not intended as a substitute for  professional medical care. Always follow your healthcare professional's instructions.          Treating Constipation    Constipation is a common and often uncomfortable problem. Constipation means you have bowel movements fewer than 3 times per week, or strain to pass hard, dry stool. It can last a short time. Or it can be a problem that never seems to go away. The good news is that it can often be treated and controlled.  Eat more fiber  One of the best ways to help treat constipation is to increase your fiber intake. You can do this either through diet or by using fiber supplements. Fiber (in whole grains, fruits, and vegetables) adds bulk and absorbs water to soften the stool. This helps the stool pass through the colon more easily. When you increase your fiber intake, do it slowly to avoid side effects such as bloating. Also increase the amount of water that you drink. Eating more of the following foods can add fiber to your diet.  · High-fiber cereals  · Whole grains, bran, and brown rice  · Vegetables such as carrots, broccoli, and greens  · Fresh fruits (especially apples, pears, and dried fruits like raisins and apricots)  · Nuts and legumes (especially beans such as lentils, kidney beans, and lima beans)  Get physically active  Exercise helps improve the working of your colon which helps ease constipation. Try to get some physical activity every day. If you havent been active for a while, talk to your healthcare provider before starting again.  Laxatives  Your healthcare provider may suggest an over-the-counter product to help ease your constipation. He or she may suggest the use of bulk-forming agents or laxatives. The use of laxatives, if used as directed, is common and safe. Follow directions carefully when using them. See your healthcare provider for new-onset constipation, or long-term constipation, to rule out other causes such as medicines or thyroid disease.  Date Last Reviewed: 7/1/2016  ©  3691-8548 [employer]. [address] [place] [address], [place] [address]. All rights reserved. This information is not intended as a substitute for professional medical care. Always follow your healthcare professional's instructions.

## 2017-05-08 NOTE — ED AVS SNAPSHOT
[facility] [address]  [place] [address]               Tung Chau   2017  7:52 AM   ED    Description:  Male : 1940   Department:  [facility]           Your Care was Coordinated By:     Provider Role From To    Sophy Glover MD Attending Provider 17 0802 --    HOSSEIN Mcintyre Nurse Practitioner 17 0800 --      Reason for Visit     Constipation           Diagnoses this Visit        Comments    Chronic constipation    -  Primary     Constipation           ED Disposition     None           To Do List           Follow-up Information     Follow up with Antonio Killian MD. Schedule an appointment as soon as possible for a visit in 3 days.    Specialty:  Internal Medicine    Contact information:    [address], [address]  [facility] [address] [phone]          Follow up with Your GI doctor. Schedule an appointment as soon as possible for a visit in 2 days.       These Medications        Disp Refills Start End    docusate sodium (COLACE) 100 MG capsule 20 capsule 0 2017     Take 1 capsule (100 mg total) by mouth 2 (two) times daily. - Oral    Pharmacy: [facility] [id number] AT [facility] [address] Ph #: [phone]         [facility]     [facility] Care [facility] -  Assistance  Unless otherwise directed by your provider, please contact [facility], our nurse care line that is available for  assistance.     Registered nurses in the [facility] provide: appointment scheduling, clinical advisement, health education, and other advisory services.  Call: [phone] (toll free)               Medications           Message regarding Medications     Verify the changes and/or additions to your medication regime listed below are the same as discussed with your clinician today.  If any of these changes or additions are  "incorrect, please notify your healthcare provider.        START taking these NEW medications        Refills    docusate sodium (COLACE) 100 MG capsule 0    Sig: Take 1 capsule (100 mg total) by mouth 2 (two) times daily.    Class: Print    Route: Oral           Verify that the below list of medications is an accurate representation of the medications you are currently taking.  If none reported, the list may be blank. If incorrect, please contact your healthcare provider. Carry this list with you in case of emergency.           Current Medications     atorvastatin (LIPITOR) 10 MG tablet Take 1 tablet (10 mg total) by mouth once daily.    benazepril (LOTENSIN) 10 MG tablet TAKE 1 TABLET BY MOUTH EVERY DAY    docusate sodium (COLACE) 100 MG capsule Take 1 capsule (100 mg total) by mouth 2 (two) times daily.    doxazosin (CARDURA) 4 MG tablet TAKE 1 TABLET BY MOUTH EVERY NIGHT    esomeprazole (NEXIUM) 40 MG capsule Take 1 capsule (40 mg total) by mouth once daily.    fluticasone (FLONASE) 50 mcg/actuation nasal spray INSTILL 2 SPRAYS IN EACH NOSTRIL EVERY DAY    GAVILYTE-G 236-22.74-6.74 -5.86 gram suspension     hydrochlorothiazide (HYDRODIURIL) 25 MG tablet TAKE 1 TABLET BY MOUTH EVERY DAY    latanoprost 0.005 % ophthalmic solution INSTILL 1 DROP IN BOTH EYES EVERY DAY    linaclotide (LINZESS) 145 mcg Cap capsule Take 1 capsule (145 mcg total) by mouth once daily.    MATZIM  mg 24 hr tablet TAKE 1 TABLET BY MOUTH ONCE DAILY    triamcinolone acetonide 0.025% (KENALOG) 0.025 % cream Apply topically 2 (two) times daily.           Clinical Reference Information           Your Vitals Were     BP Pulse Temp Resp Height Weight    133/92 (BP Location: Left arm, Patient Position: Sitting) 95 98.2 °F (36.8 °C) (Oral) 16 6' 2" (1.88 m) 93 kg (205 lb)    SpO2 BMI             99% 26.32 kg/m2         Allergies as of 5/8/2017     No Known Allergies      Immunizations Administered on Date of Encounter - 5/8/2017     None    "   ED Micro, Lab, POCT     None      ED Imaging Orders     Start Ordered       Status Ordering Provider    05/08/17 0824 05/08/17 0824  X-Ray Chest PA And Lateral  1 time imaging      Final result         Discharge Instructions         Constipation (Adult)  Constipation means that you have bowel movements that are less frequent than usual. Stools often become very hard and difficult to pass.  Constipation is very common. At some point in life it affects almost everyone. Since everyone's bowel habits are different, what is constipation to one person may not be to another. Your healthcare provider may do tests to diagnose constipation. It depends on what he or she finds when evaluating you.    Symptoms of constipation include:  · Abdominal pain  · Bloating  · Vomiting  · Painful bowel movements  · Itching, swelling, bleeding, or pain around the anus  Causes  Constipation can have many causes. These include:  · Diet low in fiber  · Too much dairy  · Not drinking enough liquids  · Lack of exercise or physical activity. This is especially true for older adults.  · Changes in lifestyle or daily routine, including pregnancy, aging, work, and travel  · Frequent use or misuse of laxatives  · Ignoring the urge to have a bowel movement or delaying it until later  · Medicines, such as certain prescription pain medicines, iron supplements, antacids, certain antidepressants, and calcium supplements  · Diseases like irritable bowel syndrome, bowel obstructions, stroke, diabetes, thyroid disease, Parkinson disease, hemorrhoids, and colon cancer  Complications  Potential complications of constipation can include:  · Hemorrhoids  · Rectal bleeding from hemorrhoids or anal fissures (skin tears)  · Hernias  · Dependency on laxatives  · Chronic constipation  · Fecal impaction  · Bowel obstruction or perforation  Home care  All treatment should be done after talking with your healthcare provider. This is especially true if you have  another medical problems, are taking prescription medicines, or are an older adult. Treatment most often involves lifestyle changes. You may also need medicines. Your healthcare provider will tell you which will work best for you. Follow the advice below to help avoid this problem in the future.  Lifestyle changes  These lifestyle changes can help prevent constipation:  · Diet. Eat a high-fiber diet, with fresh fruit and vegetables, and reduce dairy intake, meats, and processed foods  · Fluids. It's important to get enough fluids each day. Drink plenty of water when you eat more fiber. If you are on diet that limits the amount of fluid you can have, talk about this with your healthcare provider.  · Regular exercise. Check with your healthcare provider first.  Medications  Take any medicines as directed. Some laxatives are safe to use only every now and then. Others can be taken on a regular basis. Talk with your doctor or pharmacist if you have questions.  Prescription pain medicines can cause constipation. If you are taking this kind of medicine, ask your healthcare provider if you should also take a stool softener.  Medicines you may take to treat constipation include:  · Fiber supplements  · Stool softeners  · Laxatives  · Enemas  · Rectal suppositories  Follow-up care  Follow up with your healthcare provider if symptoms don't get better in the next few days. You may need to have more tests or see a specialist.  Call 911  Call 911 if any of these occur:  · Trouble breathing  · Stiff, rigid abdomen that is severely painful to touch  · Confusion  · Fainting or loss of consciousness  · Rapid heart rate  · Chest pain  When to seek medical advice  Call your healthcare provider right away if any of these occur:  · Fever over 100.4°F (38°C)  · Failure to resume normal bowel movements  · Pain in your abdomen or back gets worse  · Nausea or vomiting  · Swelling in your abdomen  · Blood in the stool  · Black, tarry  stool  · Involuntary weight loss  · Weakness  Date Last Reviewed: 12/30/2015  © 1132-0151 [employer]. [address] [place] [address], [place] [address]. All rights reserved. This information is not intended as a substitute for professional medical care. Always follow your healthcare professional's instructions.          Treating Constipation    Constipation is a common and often uncomfortable problem. Constipation means you have bowel movements fewer than 3 times per week, or strain to pass hard, dry stool. It can last a short time. Or it can be a problem that never seems to go away. The good news is that it can often be treated and controlled.  Eat more fiber  One of the best ways to help treat constipation is to increase your fiber intake. You can do this either through diet or by using fiber supplements. Fiber (in whole grains, fruits, and vegetables) adds bulk and absorbs water to soften the stool. This helps the stool pass through the colon more easily. When you increase your fiber intake, do it slowly to avoid side effects such as bloating. Also increase the amount of water that you drink. Eating more of the following foods can add fiber to your diet.  · High-fiber cereals  · Whole grains, bran, and brown rice  · Vegetables such as carrots, broccoli, and greens  · Fresh fruits (especially apples, pears, and dried fruits like raisins and apricots)  · Nuts and legumes (especially beans such as lentils, kidney beans, and lima beans)  Get physically active  Exercise helps improve the working of your colon which helps ease constipation. Try to get some physical activity every day. If you havent been active for a while, talk to your healthcare provider before starting again.  Laxatives  Your healthcare provider may suggest an over-the-counter product to help ease your constipation. He or she may suggest the use of bulk-forming agents or laxatives. The use of laxatives, if used as directed, is common and  safe. Follow directions carefully when using them. See your healthcare provider for new-onset constipation, or long-term constipation, to rule out other causes such as medicines or thyroid disease.  Date Last Reviewed: 7/1/2016  © 5206-1609 [employer]. [address] [place] [address], [place] [address]. All rights reserved. This information is not intended as a substitute for professional medical care. Always follow your healthcare professional's instructions.          Your Scheduled Appointments     May 12, 2017  9:30 AM CDT   Established Patient Visit with Harjeet Irving OD   [place] [facility] ([facility] [place])    [address]  [place] [address]   [phone]            Jun 20, 2017 10:00 AM CDT   GASTROENTEROLOGY ESTABLISHED PATIENT with [facility] - Gastroenterology ([facility] )    [address]  [facility] [id number] [phone]               [facility] complies with applicable Federal civil rights laws and does not discriminate on the basis of race, color, national origin, age, disability, or sex.        Language Assistance Services     ATTENTION: Language assistance services are available, free of charge. Please call [phone].      ATENCIÓN: Si habla fabian, tiene a main disposición servicios gratuitos de asistencia lingüística. Llame al [phone].     CHÚ Ý: N?u b?n nói Ti?ng Vi?t, có các d?ch v? h? tr? ngôn ng? mi?n phí dành cho b?n. G?i s? [phone].

## 2017-05-08 NOTE — ED PROVIDER NOTES
"Encounter Date: 5/8/2017       History     Chief Complaint   Patient presents with    Constipation     off/on for months.  Last BM was yesterday     Review of patient's allergies indicates:  No Known Allergies  HPI Comments: 75yo male with pmhx of HTN, HLD, LBB, GERD, cataract, and chronic constipation is here for constipation.  Pt reports his last BM was yesterday which was "normal."  Pt has been seen by GI for chronic constipation and has been given linzess, which the pt takes occasionally because he is "scared."  Pt denies abd pain, vomiting, diarrhea, rectal bleeding.  He reports feeling his stomach "bubble and turn" but he cannot have a BM today.  He has not taken his Linzess today which normally allows him to have a BM. No difficulty with urination, no trauma, or fever.  Pt states, "I am just here to make sure everything looks good.  Maybe I can get an x-ray?  The Linzess works great, but I just need to come to the realization that I need to take it everyday.  I know that if I take the medicine, everything will be released." Pt is passing gas as usual.  He reports that he is eating and drinking as normal.      Patient is a 76 y.o. male presenting with the following complaint: constipation. The history is provided by the patient.   Constipation    Illness onset: On and off for months. The problem occurs frequently. The problem has been unchanged. The pain is at a severity of 0/10. The stool is described as hard. Treatments tried: Linzess. Prior unsuccessful therapies include stool softeners, fiber and diet changes. Pertinent negatives include no anorexia, no fever, no abdominal pain, no diarrhea, no hematemesis, no hemorrhoids, no nausea, no rectal pain, no vomiting, no hematuria, no chest pain, no headaches and no difficulty breathing. He has been eating and drinking normally. Urine output has been normal. The last void occurred less than 6 hours ago. His past medical history does not include developmental " delay, inflammatory bowel disease, recent abdominal injury or recent antibiotic use. There were sick contacts none. He has received no recent medical care.     Past Medical History:   Diagnosis Date    Cataract     Chronic constipation     GERD (gastroesophageal reflux disease)     Hyperlipidemia     Hypertension     LBBB (left bundle branch block)      Past Surgical History:   Procedure Laterality Date    COLONOSCOPY  4/28/2008    hemorrhoids    ESOPHAGOGASTRODUODENOSCOPY  2/1/2010    INGUINAL HERNIA REPAIR Right      Family History   Problem Relation Age of Onset    Stroke Father     Cancer Father      breast    Hypertension Sister     Heart disease Daughter     Cancer Paternal Uncle 60     stomach CA    Colon polyps Paternal Uncle     Amblyopia Neg Hx     Blindness Neg Hx     Cataracts Neg Hx     Diabetes Neg Hx     Glaucoma Neg Hx     Macular degeneration Neg Hx     Retinal detachment Neg Hx     Strabismus Neg Hx     Thyroid disease Neg Hx      Social History   Substance Use Topics    Smoking status: Never Smoker    Smokeless tobacco: Never Used    Alcohol use No     Review of Systems   Constitutional: Negative for activity change, appetite change and fever.   HENT: Negative for congestion and sore throat.    Respiratory: Negative for shortness of breath.    Cardiovascular: Negative for chest pain.   Gastrointestinal: Positive for constipation. Negative for abdominal pain, anorexia, blood in stool, diarrhea, hematemesis, hemorrhoids, nausea, rectal pain and vomiting.   Genitourinary: Negative for dysuria and hematuria.   Musculoskeletal: Negative for back pain, joint swelling, myalgias and neck pain.   Skin: Negative.    Allergic/Immunologic: Negative for immunocompromised state.   Neurological: Negative for weakness and headaches.   Psychiatric/Behavioral: Negative for confusion.   All other systems reviewed and are negative.      Physical Exam   Initial Vitals   BP Pulse Resp Temp  SpO2   05/08/17 0722 05/08/17 0722 05/08/17 0722 05/08/17 0722 05/08/17 0722   133/92 95 16 98.2 °F (36.8 °C) 99 %     Physical Exam    Nursing note and vitals reviewed.  Constitutional: Vital signs are normal. He appears well-developed and well-nourished. He is active and cooperative. He is easily aroused.  Non-toxic appearance. He does not have a sickly appearance. He does not appear ill. No distress.   HENT:   Head: Normocephalic and atraumatic.   Eyes: Conjunctivae are normal.   Neck: Normal range of motion.   Cardiovascular: Normal rate, regular rhythm and normal heart sounds.   Pulmonary/Chest: Effort normal and breath sounds normal.   Abdominal: Soft. Normal appearance and bowel sounds are normal. He exhibits no distension. There is no tenderness. There is no rigidity, no rebound, no guarding and no CVA tenderness.   Neurological: He is alert, oriented to person, place, and time and easily aroused. GCS eye subscore is 4. GCS verbal subscore is 5. GCS motor subscore is 6.   Skin: Skin is warm, dry and intact. No bruising and no rash noted.   Psychiatric: He has a normal mood and affect. His speech is normal and behavior is normal. Judgment and thought content normal. Cognition and memory are normal.         ED Course   Procedures  Labs Reviewed - No data to display      Imaging Results         X-Ray Abdomen Flat And Erect (Final result) Result time:  05/08/17 10:37:24    Final result by Anatoly Beasley MD (05/08/17 10:37:24)    Impression:       No acute process.      Electronically signed by: ANATOLY BEASLEY MD  Date:     05/08/17  Time:    10:37     Narrative:    [id number]  Accession # [id number]    Study:  XR ABDOMEN FLAT AND ERECT    Indication: Constipation.    Comparison: Plain film from 08/13/2015.    Findings:   Abdomen flat and erect.    The most superior portion of the abdomen is not included in the field-of-view.    No dilated loops of small bowel.  Mild amount of stool within the colon.   Degenerative changes of the thoracic spine.            X-Ray Chest PA And Lateral (Final result) Result time:  05/08/17 09:03:44    Final result by Anatoly Beasley MD (05/08/17 09:03:44)    Impression:       No acute intrathoracic process.          Electronically signed by: ANATOLY BEASLEY MD  Date:     05/08/17  Time:    09:03     Narrative:    [id number]  Accession # [id number]      Study:  XR CHEST PA AND LATERAL    Indication: Constipation.    Comparison: Chest radiograph from 06/23/2013.    Findings:     XR CHEST PA AND LATERAL.    Note the most inferior edge of the right costophrenic angle is not included in the field-of-view.    Cardiac silhouette is normal in appearance.  Pulmonary vasculature is within normal limits.    Lungs well-aerated.  No focal consolidation.   No pleural effusion.  Osseous structures demonstrate no significant abnormalities.                   Medical Decision Making:   Initial Assessment:   77yo male here for no BM since yesterday.  History of chronic constipation.  He has been prescribed Linzess, which he takes occasionally.  Pt denies abd pain, vomiting, rectal bleeding, decreased oral intake, dysuria, hematuria.  Pt appears well, nontoxic.  Vitals stable.  HR RRR, lungs CTA.  Abd soft, non-tender, no r/r/g, no distention, bowel sounds normoactive.  Skin warm, dry, intact without signs of trauma.   Differential Diagnosis:   Chronic constipation, Ileus, Obstruction, impaction  Clinical Tests:   Radiological Study: Ordered and Reviewed  ED Management:  CXR, KUB  Xray negative for obstruction.  I feel this is the pt's chronic constipation.  I feel the pt needs to take his Linzess every day as prescribed. Pt remains free of abd pain while in the ED. I encouraged use of Linzess as directed.  Will RX Colace in case pt does not want to take Linzess.  Advised f/u with GI within 2 days and return to the ED if condition changes, progresses, or if there are concerns.  Pt verbalized  understanding and compliance.                    ED Course     Clinical Impression:   The primary encounter diagnosis was Chronic constipation. A diagnosis of Constipation was also pertinent to this visit.          Mary Timmons, HOSSEIN  05/08/17 1048

## 2017-05-09 ENCOUNTER — OFFICE VISIT (OUTPATIENT)
Dept: GASTROENTEROLOGY | Facility: CLINIC | Age: 77
End: 2017-05-09
Payer: MEDICARE

## 2017-05-09 ENCOUNTER — TELEPHONE (OUTPATIENT)
Dept: ENDOSCOPY | Facility: HOSPITAL | Age: 77
End: 2017-05-09

## 2017-05-09 VITALS
HEIGHT: 74 IN | HEART RATE: 76 BPM | BODY MASS INDEX: 25.8 KG/M2 | DIASTOLIC BLOOD PRESSURE: 78 MMHG | WEIGHT: 201.06 LBS | SYSTOLIC BLOOD PRESSURE: 138 MMHG

## 2017-05-09 DIAGNOSIS — Z12.11 SPECIAL SCREENING FOR MALIGNANT NEOPLASMS, COLON: Primary | ICD-10-CM

## 2017-05-09 DIAGNOSIS — Z12.11 COLON CANCER SCREENING: ICD-10-CM

## 2017-05-09 DIAGNOSIS — K59.04 CHRONIC IDIOPATHIC CONSTIPATION: Primary | ICD-10-CM

## 2017-05-09 PROCEDURE — 99213 OFFICE O/P EST LOW 20 MIN: CPT | Mod: S$PBB,,, | Performed by: PHYSICIAN ASSISTANT

## 2017-05-09 PROCEDURE — 99999 PR PBB SHADOW E&M-EST. PATIENT-LVL III: CPT | Mod: PBBFAC,,, | Performed by: PHYSICIAN ASSISTANT

## 2017-05-09 PROCEDURE — 99213 OFFICE O/P EST LOW 20 MIN: CPT | Mod: PBBFAC | Performed by: PHYSICIAN ASSISTANT

## 2017-05-09 RX ORDER — SODIUM, POTASSIUM,MAG SULFATES 17.5-3.13G
1 SOLUTION, RECONSTITUTED, ORAL ORAL ONCE
Qty: 1 BOTTLE | Refills: 0 | Status: SHIPPED | OUTPATIENT
Start: 2017-05-09 | End: 2017-05-09

## 2017-05-09 NOTE — PROGRESS NOTES
[facility] Consultation Note    Reason for Consult:  The primary encounter diagnosis was Chronic idiopathic constipation. A diagnosis of Colon cancer screening was also pertinent to this visit.    PCP:   Antonio Killian       Referring MD:  No referring provider defined for this encounter.    HPI:  This is a 76 y.o. male here to follow up on his constipation.    Seen in the ED yesterday because he had not had a BM that day yet. He did reports having a BM the day prior. He wanted to get an abdominal x-ray to make sure he did not have an obstruction.   5/8/17 ABD x-ray - mild amt of stool in colon, no signs of obstruction    He is here today with his wife for an ED f/u appt.   Taking the linzess every other night. He says he is scared to take it every day.   He feels like he has trouble pushing the stool out.   Sennakot was also working well in the past but he says he has not taken it in a while.      Failed meds: Amitiza     Drinks 4-6 bottles of water a day. 8/2015 colonoscopy involved removal of an adenoma polyp and revealed diverticulosis.  Denies lactose intolerant. Denies rectal bleeding or melena.    ROS:  Constitutional: No fevers, chills, No weight loss  ENT: No allergies  CV: No chest pain  Pulm: No cough, No shortness of breath  Ophtho: No vision changes  GI: see HPI  Derm: No rash  Heme: No lymphadenopathy, No bruising  MSK: No arthritis  : No dysuria, No hematuria  Endo: No hot or cold intolerance  Neuro: No syncope, No seizure  Psych: No anxiety, No depression    Medical History:  has a past medical history of Cataract; Chronic constipation; GERD (gastroesophageal reflux disease); Hyperlipidemia; Hypertension; and LBBB (left bundle branch block).    Surgical History:  has a past surgical history that includes Inguinal hernia repair (Right); Colonoscopy (4/28/2008); and Esophagogastroduodenoscopy (2/1/2010).    Family History: family history includes Cancer in his father; Cancer  "(age of onset: 60) in his paternal uncle; Colon polyps in his paternal uncle; Heart disease in his daughter; Hypertension in his sister; Stroke in his father. There is no history of Amblyopia, Blindness, Cataracts, Diabetes, Glaucoma, Macular degeneration, Retinal detachment, Strabismus, or Thyroid disease..     Social History:  reports that he has never smoked. He has never used smokeless tobacco. He reports that he does not drink alcohol or use illicit drugs.    Review of patient's allergies indicates:  No Known Allergies    Current Outpatient Prescriptions on File Prior to Visit   Medication Sig Dispense Refill    atorvastatin (LIPITOR) 10 MG tablet Take 1 tablet (10 mg total) by mouth once daily. 90 tablet 3    benazepril (LOTENSIN) 10 MG tablet TAKE 1 TABLET BY MOUTH EVERY DAY 90 tablet 3    docusate sodium (COLACE) 100 MG capsule Take 1 capsule (100 mg total) by mouth 2 (two) times daily. 20 capsule 0    doxazosin (CARDURA) 4 MG tablet TAKE 1 TABLET BY MOUTH EVERY NIGHT 90 tablet 3    esomeprazole (NEXIUM) 40 MG capsule Take 1 capsule (40 mg total) by mouth once daily. 90 capsule 1    fluticasone (FLONASE) 50 mcg/actuation nasal spray INSTILL 2 SPRAYS IN EACH NOSTRIL EVERY DAY 48 g 5    GAVILYTE-G 236-22.74-6.74 -5.86 gram suspension   0    hydrochlorothiazide (HYDRODIURIL) 25 MG tablet TAKE 1 TABLET BY MOUTH EVERY DAY 90 tablet 3    latanoprost 0.005 % ophthalmic solution INSTILL 1 DROP IN BOTH EYES EVERY DAY 7.5 mL 12    linaclotide (LINZESS) 145 mcg Cap capsule Take 1 capsule (145 mcg total) by mouth once daily. 90 capsule 2    MATZIM  mg 24 hr tablet TAKE 1 TABLET BY MOUTH ONCE DAILY 90 tablet 3    triamcinolone acetonide 0.025% (KENALOG) 0.025 % cream Apply topically 2 (two) times daily. 30 g 1     No current facility-administered medications on file prior to visit.          Objective Findings:    Vital Signs:  /78  Pulse 76  Ht 6' 2" (1.88 m)  Wt 91.2 kg (201 lb 1 oz)  BMI " 25.81 kg/m2  Body mass index is 25.81 kg/(m^2).    Physical Exam:  General Appearance: Well appearing in no acute distress  Head:   Normocephalic, without obvious abnormality  Eyes:    No scleral icterus  ENT: Neck supple, Lips, mucosa, and tongue normal  Lungs: CTA bilaterally in anterior and posterior fields, no wheezes, no crackles.  Heart:  Regular rate and rhythm, S1, S2 normal, no murmurs heard  Abdomen: Soft, non tender, non distended with positive bowel sounds in all four quadrants.  Extremities: 2+ pulses, no edema  Skin: No rash  Neurologic: AAO x 3      Labs:  Lab Results   Component Value Date    WBC 4.91 03/17/2017    HGB 15.0 03/17/2017    HCT 43.3 03/17/2017     03/17/2017    CHOL 130 03/17/2017    TRIG 67 03/17/2017    HDL 46 03/17/2017    ALT 20 03/17/2017    AST 19 03/17/2017     03/17/2017    K 4.9 03/17/2017     03/17/2017    CREATININE 1.1 03/17/2017    BUN 12 03/17/2017    CO2 28 03/17/2017    TSH 2.588 03/17/2017    PSA 0.99 03/17/2017    INR 1.0 06/23/2013       Imaging:    Endoscopy:    colon 8/2015- 10mm polyp, f/u in 3yrs    Assessment:  1. Chronic idiopathic constipation    2. Colon cancer screening      Constipation improved taking linzess but he is not taking it daily.    Recommendations:  1. Continue linzess. Advise that he take as directed daily.     2. Schedule screening colonoscopy to rule out malignancies.     Consider anorectal manometry if symptoms worsen    No Follow-up on file.      Order summary:  Orders Placed This Encounter    Case request GI: COLONOSCOPY         Thank you so much for allowing me to participate in the care of Tung Chau    Gwen Ibarra PA-C

## 2017-05-12 ENCOUNTER — OFFICE VISIT (OUTPATIENT)
Dept: OPTOMETRY | Facility: CLINIC | Age: 77
End: 2017-05-12
Payer: MEDICARE

## 2017-05-12 DIAGNOSIS — H25.13 NUCLEAR SCLEROSIS, BILATERAL: ICD-10-CM

## 2017-05-12 DIAGNOSIS — H40.1131 PRIMARY OPEN ANGLE GLAUCOMA OF BOTH EYES, MILD STAGE: Primary | ICD-10-CM

## 2017-05-12 PROCEDURE — 99212 OFFICE O/P EST SF 10 MIN: CPT | Mod: PBBFAC,PO | Performed by: OPTOMETRIST

## 2017-05-12 PROCEDURE — 92014 COMPRE OPH EXAM EST PT 1/>: CPT | Mod: S$PBB,,, | Performed by: OPTOMETRIST

## 2017-05-12 PROCEDURE — 99999 PR PBB SHADOW E&M-EST. PATIENT-LVL II: CPT | Mod: PBBFAC,,, | Performed by: OPTOMETRIST

## 2017-05-12 NOTE — PROGRESS NOTES
HPI     Concerns About Ocular Health    Additional comments: 4 month IOP check            Comments   Patient here for 4 month IOP check, Reports good compliance with drops and   not SE or changes in vision.        Last edited by Kyle Escobar, OD on 5/12/2017  9:56 AM. (History)        ROS     Negative for: Constitutional, Gastrointestinal, Neurological, Skin,   Genitourinary, Musculoskeletal, HENT, Endocrine, Cardiovascular, Eyes,   Respiratory, Psychiatric, Allergic/Imm, Heme/Lymph    Last edited by Harjeet Irving, OD on 5/12/2017 10:13 AM. (History)        Assessment /Plan     For exam results, see Encounter Report.    Primary open angle glaucoma of both eyes, mild stage    Nuclear sclerosis, bilateral      1. IOP stable, nerve eval stable, due for HVF,  IOP stable. Cont Latanaprost drops. Probably low tension. Continued good response to drops, Good response to Latanaprost.Mild glaucoma based on OCT changes, prev VF defects Ou and increased IOP. Pachy normal. Field may clean up at repeat. Pretreat IOP at 20. Family history of glaucoma. RTC 4 mos IOP ck, HVf(24-2)kavita std and OCt.   2. Educated pt on presence of cataracts and effects on vision. No surgery at this time. Recheck in one year.

## 2017-05-15 ENCOUNTER — HOSPITAL ENCOUNTER (OUTPATIENT)
Facility: HOSPITAL | Age: 77
Discharge: HOME | End: 2017-05-15
Attending: INTERNAL MEDICINE | Admitting: INTERNAL MEDICINE
Payer: MEDICARE

## 2017-05-15 ENCOUNTER — ANESTHESIA EVENT (OUTPATIENT)
Dept: ENDOSCOPY | Facility: HOSPITAL | Age: 77
End: 2017-05-15
Payer: MEDICARE

## 2017-05-15 ENCOUNTER — SURGERY (OUTPATIENT)
Age: 77
End: 2017-05-15

## 2017-05-15 ENCOUNTER — ANESTHESIA (OUTPATIENT)
Dept: ENDOSCOPY | Facility: HOSPITAL | Age: 77
End: 2017-05-15
Payer: MEDICARE

## 2017-05-15 VITALS
RESPIRATION RATE: 12 BRPM | HEIGHT: 74 IN | BODY MASS INDEX: 26.31 KG/M2 | DIASTOLIC BLOOD PRESSURE: 72 MMHG | OXYGEN SATURATION: 98 % | TEMPERATURE: 99 F | WEIGHT: 205 LBS | SYSTOLIC BLOOD PRESSURE: 122 MMHG | HEART RATE: 86 BPM

## 2017-05-15 VITALS — RESPIRATION RATE: 81 BRPM

## 2017-05-15 DIAGNOSIS — Z86.010 HISTORY OF ADENOMATOUS POLYP OF COLON: ICD-10-CM

## 2017-05-15 PROBLEM — Z86.0101 HISTORY OF ADENOMATOUS POLYP OF COLON: Status: ACTIVE | Noted: 2017-05-15

## 2017-05-15 PROCEDURE — 63600175 PHARM REV CODE 636 W HCPCS: Performed by: NURSE ANESTHETIST, CERTIFIED REGISTERED

## 2017-05-15 PROCEDURE — D9220A PRA ANESTHESIA: Mod: PT,ANES,, | Performed by: ANESTHESIOLOGY

## 2017-05-15 PROCEDURE — 25000003 PHARM REV CODE 250: Performed by: INTERNAL MEDICINE

## 2017-05-15 PROCEDURE — 88305 TISSUE EXAM BY PATHOLOGIST: CPT | Performed by: PATHOLOGY

## 2017-05-15 PROCEDURE — C1773 RET DEV, INSERTABLE: HCPCS | Performed by: INTERNAL MEDICINE

## 2017-05-15 PROCEDURE — 88305 TISSUE EXAM BY PATHOLOGIST: CPT | Mod: 26,,, | Performed by: PATHOLOGY

## 2017-05-15 PROCEDURE — D9220A PRA ANESTHESIA: Mod: PT,CRNA,, | Performed by: NURSE ANESTHETIST, CERTIFIED REGISTERED

## 2017-05-15 PROCEDURE — 45380 COLONOSCOPY AND BIOPSY: CPT | Mod: ,,, | Performed by: INTERNAL MEDICINE

## 2017-05-15 PROCEDURE — 45380 COLONOSCOPY AND BIOPSY: CPT | Performed by: INTERNAL MEDICINE

## 2017-05-15 PROCEDURE — 25000003 PHARM REV CODE 250: Performed by: NURSE ANESTHETIST, CERTIFIED REGISTERED

## 2017-05-15 PROCEDURE — 37000009 HC ANESTHESIA EA ADD 15 MINS: Performed by: INTERNAL MEDICINE

## 2017-05-15 PROCEDURE — 37000008 HC ANESTHESIA 1ST 15 MINUTES: Performed by: INTERNAL MEDICINE

## 2017-05-15 RX ORDER — SODIUM CHLORIDE 9 MG/ML
INJECTION, SOLUTION INTRAVENOUS CONTINUOUS
Status: DISCONTINUED | OUTPATIENT
Start: 2017-05-15 | End: 2017-05-15 | Stop reason: HOSPADM

## 2017-05-15 RX ORDER — GLYCOPYRROLATE 0.2 MG/ML
INJECTION INTRAMUSCULAR; INTRAVENOUS
Status: DISCONTINUED | OUTPATIENT
Start: 2017-05-15 | End: 2017-05-15

## 2017-05-15 RX ORDER — PROPOFOL 10 MG/ML
VIAL (ML) INTRAVENOUS
Status: DISCONTINUED | OUTPATIENT
Start: 2017-05-15 | End: 2017-05-15

## 2017-05-15 RX ORDER — LIDOCAINE HCL/PF 100 MG/5ML
SYRINGE (ML) INTRAVENOUS
Status: DISCONTINUED | OUTPATIENT
Start: 2017-05-15 | End: 2017-05-15

## 2017-05-15 RX ORDER — PROPOFOL 10 MG/ML
VIAL (ML) INTRAVENOUS CONTINUOUS PRN
Status: DISCONTINUED | OUTPATIENT
Start: 2017-05-15 | End: 2017-05-15

## 2017-05-15 RX ADMIN — LIDOCAINE HYDROCHLORIDE 75 MG: 20 INJECTION, SOLUTION INTRAVENOUS at 02:05

## 2017-05-15 RX ADMIN — PROPOFOL 200 MCG/KG/MIN: 10 INJECTION, EMULSION INTRAVENOUS at 02:05

## 2017-05-15 RX ADMIN — SODIUM CHLORIDE: 0.9 INJECTION, SOLUTION INTRAVENOUS at 01:05

## 2017-05-15 RX ADMIN — PROPOFOL 80 MG: 10 INJECTION, EMULSION INTRAVENOUS at 02:05

## 2017-05-15 RX ADMIN — GLYCOPYRROLATE 0.2 MG: 0.2 INJECTION, SOLUTION INTRAMUSCULAR; INTRAVENOUS at 02:05

## 2017-05-15 NOTE — ANESTHESIA PREPROCEDURE EVALUATION
05/15/2017  Tung Chau is a 76 y.o., male.    Anesthesia Evaluation    I have reviewed the Patient Summary Reports.    I have reviewed the Nursing Notes.   I have reviewed the Medications.     Review of Systems  Anesthesia Hx:  No problems with previous Anesthesia  History of prior surgery of interest to airway management or planning:  Denies Personal Hx of Anesthesia complications.   Social:  Non-Smoker, No Alcohol Use    Hematology/Oncology:  Hematology Normal   Oncology Normal     EENT/Dental:EENT/Dental Normal   Cardiovascular:   Exercise tolerance: good Hypertension Denies CAD.    Denies Dysrhythmias.   Denies Angina.    Pulmonary:   Denies Shortness of breath.    Hepatic/GI:   Denies GERD.    Musculoskeletal:  Musculoskeletal Normal    Neurological:  Neurology Normal    Endocrine:  Endocrine Normal    Dermatological:  Skin Normal    Psych:  Psychiatric Normal           Physical Exam  General:  Well nourished    Airway/Jaw/Neck:  Airway Findings: Mouth Opening: Normal Tongue: Normal  General Airway Assessment: Adult  Mallampati: II  Jaw/Neck Findings:  Neck ROM: Normal ROM      Dental:  Dental Findings: In tact   Chest/Lungs:  Chest/Lungs Findings: Clear to auscultation, Normal Respiratory Rate     Heart/Vascular:  Heart Findings: Rate: Normal  Rhythm: Regular Rhythm  Sounds: Normal        Mental Status:  Mental Status Findings:  Cooperative, Alert and Oriented         Anesthesia Plan  Type of Anesthesia, risks & benefits discussed:  Anesthesia Type:  general, MAC  Patient's Preference:   Intra-op Monitoring Plan: standard ASA monitors  Intra-op Monitoring Plan Comments:   Post Op Pain Control Plan:   Post Op Pain Control Plan Comments:   Induction:   IV  Beta Blocker:  Patient is not currently on a Beta-Blocker (No further documentation required).       Informed Consent: Patient understands risks  and agrees with Anesthesia plan.  Questions answered. Anesthesia consent signed with patient.  ASA Score: 2     Day of Surgery Review of History & Physical:    H&P update referred to the surgeon.         Ready For Surgery From Anesthesia Perspective.

## 2017-05-15 NOTE — ANESTHESIA RELEASE NOTE
Anesthesia Release from PACU Note    Patient: Tung Chau    Procedure(s) Performed: Procedure(s) (LRB):  COLONOSCOPY (N/A)    Anesthesia type: General    Post pain: Adequate analgesia    Post assessment: no apparent anesthetic complications    Last Vitals:   Vitals:    05/15/17 1445   BP: 108/63   Pulse: 74   Resp: 12   Temp: 37.1 °C (98.7 °F)   SpO2: 99%       Post vital signs: stable    Level of consciousness: awake    Complications: none    Airway Patency: patent    Respiratory: spontaneous    Cardiovascular: stable    Hydration: euvolemic

## 2017-05-15 NOTE — DISCHARGE INSTRUCTIONS
Colonoscopy     A camera attached to a flexible tube with a viewing lens is used to take video pictures.     Colonoscopy is a test to view the inside of your lower digestive tract (colon and rectum). Sometimes it can show the last part of the small intestine (ileum). During the test, small pieces of tissue may be removed for testing. This is called a biopsy. Small growths, such as polyps, may also be removed.   Why is colonoscopy done?  The test is done to help look for colon cancer. And it can help find the source of abdominal pain, bleeding, and changes in bowel habits. It may be needed once a year, depending on factors such as your:  · Age  · Health history  · Family health history  · Symptoms  · Results from any prior colonoscopy  Risks and possible complications  These include:  · Bleeding               · A puncture or tear in the colon   · Risks of anesthesia  · A cancer lesion not being seen  Getting ready   To prepare for the test:  · Talk with your healthcare provider about the risks of the test (see below). Also ask your healthcare provider about alternatives to the test.  · Tell your healthcare provider about any medicines you take. Also tell him or her about any health conditions you may have.  · Make sure your rectum and colon are empty for the test. Follow the diet and bowel prep instructions exactly. If you dont, the test may need to be rescheduled.  · Plan for a friend or family member to drive you home after the test.     Colonoscopy provides an inside view of the entire colon.     You may discuss the results with your doctor right away or at a future visit.  During the test   The test is usually done in the hospital on an outpatient basis. This means you go home the same day. The procedure takes about 30 minutes. During that time:  · You are given relaxing (sedating) medicine through an IV line. You may be drowsy, or fully asleep.  · The healthcare provider will first give you a physical exam to  check for anal and rectal problems.  · Then the anus is lubricated and the scope inserted.  · If you are awake, you may have a feeling similar to needing to have a bowel movement. You may also feel pressure as air is pumped into the colon. Its OK to pass gas during the procedure.  · Biopsy, polyp removal, or other treatments may be done during the test.  After the test   You may have gas right after the test. It can help to try to pass it to help prevent later bloating. Your healthcare provider may discuss the results with you right away. Or you may need to schedule a follow-up visit to talk about the results. After the test, you can go back to your normal eating and other activities. You may be tired from the sedation and need to rest for a few hours.  Date Last Reviewed: 11/1/2016  © 3271-1105 [employer]. [address] [place] [address], [place] [address]. All rights reserved. This information is not intended as a substitute for professional medical care. Always follow your healthcare professional's instructions.

## 2017-05-15 NOTE — TRANSFER OF CARE
"Anesthesia Transfer of Care Note    Patient: Tung Chau    Procedure(s) Performed: Procedure(s) (LRB):  COLONOSCOPY (N/A)    Patient location: OPS    Anesthesia Type: general    Transport from OR: Transported from OR on room air with adequate spontaneous ventilation    Post pain: adequate analgesia    Post assessment: no apparent anesthetic complications    Post vital signs: stable    Level of consciousness: awake    Nausea/Vomiting: no nausea/vomiting    Complications: none    Transfer of care protocol was followed      Last vitals:   Visit Vitals    /63 (BP Location: Left arm, Patient Position: Lying, BP Method: Automatic)    Pulse 74    Temp 37.1 °C (98.7 °F) (Oral)    Resp 12    Ht 6' 2" (1.88 m)    Wt 93 kg (205 lb)    SpO2 99%    BMI 26.32 kg/m2     "

## 2017-05-15 NOTE — H&P
[facility]  History & Physical    Subjective:      Chief Complaint/Reason for Admission:     colonoscopy    Tung Chau is a 76 y.o. male.    Past Medical History:   Diagnosis Date    Cataract     Chronic constipation     GERD (gastroesophageal reflux disease)     Hyperlipidemia     Hypertension     LBBB (left bundle branch block)      Past Surgical History:   Procedure Laterality Date    COLONOSCOPY  4/28/2008    hemorrhoids    ESOPHAGOGASTRODUODENOSCOPY  2/1/2010    INGUINAL HERNIA REPAIR Right      Family History   Problem Relation Age of Onset    Stroke Father     Cancer Father      breast    Hypertension Sister     Heart disease Daughter     Cancer Paternal Uncle 60     stomach CA    Colon polyps Paternal Uncle     Amblyopia Neg Hx     Blindness Neg Hx     Cataracts Neg Hx     Diabetes Neg Hx     Glaucoma Neg Hx     Macular degeneration Neg Hx     Retinal detachment Neg Hx     Strabismus Neg Hx     Thyroid disease Neg Hx      Social History   Substance Use Topics    Smoking status: Never Smoker    Smokeless tobacco: Never Used    Alcohol use No       PTA Medications   Medication Sig    hydrochlorothiazide (HYDRODIURIL) 25 MG tablet TAKE 1 TABLET BY MOUTH EVERY DAY    latanoprost 0.005 % ophthalmic solution INSTILL 1 DROP IN BOTH EYES EVERY DAY    linaclotide (LINZESS) 145 mcg Cap capsule Take 1 capsule (145 mcg total) by mouth once daily.    MATZIM  mg 24 hr tablet TAKE 1 TABLET BY MOUTH ONCE DAILY    atorvastatin (LIPITOR) 10 MG tablet Take 1 tablet (10 mg total) by mouth once daily.    benazepril (LOTENSIN) 10 MG tablet TAKE 1 TABLET BY MOUTH EVERY DAY    docusate sodium (COLACE) 100 MG capsule Take 1 capsule (100 mg total) by mouth 2 (two) times daily.    doxazosin (CARDURA) 4 MG tablet TAKE 1 TABLET BY MOUTH EVERY NIGHT    esomeprazole (NEXIUM) 40 MG capsule Take 1 capsule (40 mg total) by mouth once daily.    fluticasone (FLONASE) 50  mcg/actuation nasal spray INSTILL 2 SPRAYS IN EACH NOSTRIL EVERY DAY    GAVILYTE-G 236-22.74-6.74 -5.86 gram suspension     triamcinolone acetonide 0.025% (KENALOG) 0.025 % cream Apply topically 2 (two) times daily.     Review of patient's allergies indicates:  No Known Allergies     Review of Systems   Constitutional: Negative for chills, fever and weight loss.   Respiratory: Negative for shortness of breath and wheezing.    Cardiovascular: Negative for chest pain.   Gastrointestinal: Positive for constipation. Negative for abdominal pain, blood in stool, diarrhea and melena.       Objective:      Vital Signs (Most Recent)  Temp: 97.5 °F (36.4 °C) (05/15/17 1327)  Pulse: 77 (05/15/17 1327)  Resp: 14 (05/15/17 1327)  BP: (!) 146/74 (05/15/17 1327)  SpO2: 98 % (05/15/17 1327)    Vital Signs Range (Last 24H):  Temp:  [97.5 °F (36.4 °C)]   Pulse:  [77]   Resp:  [14-84]   BP: (146)/(74)   SpO2:  [98 %]     Physical Exam   Constitutional: He is oriented to person, place, and time. He appears well-developed and well-nourished.   Cardiovascular: Normal rate.    Pulmonary/Chest: Effort normal and breath sounds normal.   Abdominal: Soft. Bowel sounds are normal. He exhibits no distension. There is no tenderness.   Neurological: He is alert and oriented to person, place, and time.   Skin: Skin is warm and dry.   Psychiatric: He has a normal mood and affect. Judgment and thought content normal.           Assessment:      Active Hospital Problems    Diagnosis  POA    History of adenomatous polyp of colon [Z86.010]  Not Applicable      Resolved Hospital Problems    Diagnosis Date Resolved POA   No resolved problems to display.       Plan:    Colonoscopy for constipation and history of colon adenoma.

## 2017-05-15 NOTE — PATIENT INSTRUCTIONS
Discharge Summary/Instructions for after Colonoscopy with   Biopsy/Polypectomy  Patient Name: Tung Chau  Patient MRN: [id number]  Patient YOB: 1940  Monday, May 15, 2017    Dandy Cifuentes MD  RESTRICTIONS ON ACTIVITY:  - Do not drive a car or operate machinery until the day after the procedure.      - The following day: return to full activity including work.  - For  3 days: No heavy lifting, straining or running.  - Diet: Eat and drink normally unless instructed otherwise.  TREATMENT FOR COMMON SIDE EFFECTS:  - Mild abdominal pain and bloating or excessive gas: rest, eat lightly and   use a heating pad.  SYMPTOMS TO WATCH FOR AND REPORT TO YOUR PHYSICIAN:  1. Severe abdominal pain.  2. Fever within 24 hours after a procedure.  3. A large amount of rectal bleeding. (A small amount of blood from the   rectum is not serious, especially if hemorrhoids are present.  4. Because air was put into your colon during the procedure, expelling large   amounts of air from your rectum is normal.  5. You may not have a bowel movement for 1-3 days because of the colonoscopy   prep.  This is normal.  6. Go directly to the emergency room if you notice any of the following:   Chills and/or fever over 101   Persistent vomiting   Severe abdominal pain, other than gas cramps   Severe chest pain   Black, tarry stools   Any bleeding - exceeding one tablespoon  Your doctor recommends these additional instructions:  If any biopsies were performed, my office will call you in 5 to 6 business   days with any results.  - Discharge patient to home.   - Await pathology results.   - Telephone endoscopist for pathology results in 2 weeks.   - Repeat colonoscopy in 3 years for surveillance based on pathology results.     - The findings and recommendations were discussed with the patient.   - Return to physician assistant.  You are being discharged to home.   We are waiting for your pathology results.   Telephone your endoscopist for  pathology results in two weeks.   Your physician has recommended a repeat colonoscopy in three years for   surveillance based on pathology results.   The findings and recommendations have been discussed with you.   Return to your physician assistant.  None  If you have any questions or problems, please call your physician - Dandy Cifuentes MD at Work:  [phone].    LAB RESULTS: [phone]  [facility], EMERGENCY ROOM PHONE NUMBER: [phone]  IF A COMPLICATION OR EMERGENCY SITUATION ARISES AND YOU ARE UNABLE TO REACH   YOUR PHYSICIAN - GO TO THE EMERGENCY ROOM.  Dandy Cifuentes MD  5/15/2017 2:49:15 PM  This report has been verified and signed electronically.

## 2017-05-15 NOTE — ANESTHESIA POSTPROCEDURE EVALUATION
"Anesthesia Post Evaluation    Patient: Tung Chau    Procedure(s) Performed: Procedure(s) (LRB):  COLONOSCOPY (N/A)    Final Anesthesia Type: general  Patient location during evaluation: PACU  Patient participation: Yes- Able to Participate  Level of consciousness: awake and alert  Post-procedure vital signs: reviewed and stable  Pain management: adequate  Airway patency: patent  PONV status at discharge: No PONV  Anesthetic complications: no      Cardiovascular status: blood pressure returned to baseline and stable  Respiratory status: unassisted and nasal cannula  Hydration status: euvolemic  Follow-up not needed.        Visit Vitals    /63 (BP Location: Left arm, Patient Position: Lying, BP Method: Automatic)    Pulse 74    Temp 37.1 °C (98.7 °F) (Oral)    Resp 12    Ht 6' 2" (1.88 m)    Wt 93 kg (205 lb)    SpO2 99%    BMI 26.32 kg/m2       Pain/Katie Score: Pain Assessment Performed: Yes (5/15/2017  2:46 PM)  Presence of Pain: non-verbal indicators absent (5/15/2017  2:46 PM)  Pain Rating Prior to Med Admin: 0 (5/15/2017  2:46 PM)  Katie Score: 8 (5/15/2017  2:45 PM)      "

## 2017-05-15 NOTE — IP AVS SNAPSHOT
[facility]  [address]  [facility] [address]  Phone: [phone]           Patient Discharge Instructions   Our goal is to set you up for success. This packet includes information on your condition, medications, and your home care.  It will help you care for yourself to prevent having to return to the hospital.     Please ask your nurse if you have any questions.      There are many details to remember when preparing to leave the hospital. Here is what you will need to do:    1. Take your medicine. If you are prescribed medications, review your Medication List on the following pages. You may have new medications to  at the pharmacy and others that you'll need to stop taking. Review the instructions for how and when to take your medications. Talk with your doctor or nurses if you are unsure of what to do.     2. Go to your follow-up appointments. Specific follow-up information is listed in the following pages. Your may be contacted by a nurse or clinical provider about future appointments. Be sure we have all of the phone numbers to reach you. Please contact your provider's office if you are unable to make an appointment.     3. Watch for warning signs. Your doctor or nurse will give you detailed warning signs to watch for and when to call for assistance. These instructions may also include educational information about your condition. If you experience any of warning signs to your health, call your doctor.           [facility]  Unless otherwise directed by your provider, please   contact [facility], our nurse care line   that is available for 24/7 assistance.     [phone] (toll-free)     Registered nurses in the [facility]   provide: appointment scheduling, clinical advisement, health education, and other advisory services.                  ** Verify the list of medication(s) below is accurate and up to date. Carry this with you in case of  emergency. If your medications have changed, please notify your healthcare provider.             Medication List      CONTINUE taking these medications        Additional Info                      atorvastatin 10 MG tablet   Commonly known as:  LIPITOR   Quantity:  90 tablet   Refills:  3   Dose:  10 mg    Instructions:  Take 1 tablet (10 mg total) by mouth once daily.     Begin Date    AM    Noon    PM    Bedtime       benazepril 10 MG tablet   Commonly known as:  LOTENSIN   Quantity:  90 tablet   Refills:  3    Instructions:  TAKE 1 TABLET BY MOUTH EVERY DAY     Begin Date    AM    Noon    PM    Bedtime       docusate sodium 100 MG capsule   Commonly known as:  COLACE   Quantity:  20 capsule   Refills:  0   Dose:  100 mg    Instructions:  Take 1 capsule (100 mg total) by mouth 2 (two) times daily.     Begin Date    AM    Noon    PM    Bedtime       doxazosin 4 MG tablet   Commonly known as:  CARDURA   Quantity:  90 tablet   Refills:  3    Instructions:  TAKE 1 TABLET BY MOUTH EVERY NIGHT     Begin Date    AM    Noon    PM    Bedtime       esomeprazole 40 MG capsule   Commonly known as:  NEXIUM   Quantity:  90 capsule   Refills:  1   Dose:  40 mg    Instructions:  Take 1 capsule (40 mg total) by mouth once daily.     Begin Date    AM    Noon    PM    Bedtime       fluticasone 50 mcg/actuation nasal spray   Commonly known as:  FLONASE   Quantity:  48 g   Refills:  5   Comments:  **Patient requests 90 days supply**    Instructions:  INSTILL 2 SPRAYS IN EACH NOSTRIL EVERY DAY     Begin Date    AM    Noon    PM    Bedtime       GAVILYTE-G 236-22.74-6.74 -5.86 gram suspension   Refills:  0   Generic drug:  polyethylene glycol      Begin Date    AM    Noon    PM    Bedtime       hydrochlorothiazide 25 MG tablet   Commonly known as:  HYDRODIURIL   Quantity:  90 tablet   Refills:  3    Instructions:  TAKE 1 TABLET BY MOUTH EVERY DAY     Begin Date    AM    Noon    PM    Bedtime       latanoprost 0.005 % ophthalmic solution    Quantity:  7.5 mL   Refills:  12   Comments:  **Patient requests 90 days supply**    Instructions:  INSTILL 1 DROP IN BOTH EYES EVERY DAY     Begin Date    AM    Noon    PM    Bedtime       linaclotide 145 mcg Cap capsule   Commonly known as:  LINZESS   Quantity:  90 capsule   Refills:  2   Dose:  145 mcg    Instructions:  Take 1 capsule (145 mcg total) by mouth once daily.     Begin Date    AM    Noon    PM    Bedtime       MATZIM  mg 24 hr tablet   Quantity:  90 tablet   Refills:  3   Generic drug:  diltiaZEM    Instructions:  TAKE 1 TABLET BY MOUTH ONCE DAILY     Begin Date    AM    Noon    PM    Bedtime       triamcinolone acetonide 0.025% 0.025 % cream   Commonly known as:  KENALOG   Quantity:  30 g   Refills:  1    Instructions:  Apply topically 2 (two) times daily.     Begin Date    AM    Noon    PM    Bedtime                  Please bring to all follow up appointments:    1. A copy of your discharge instructions.  2. All medicines you are currently taking in their original bottles.  3. Identification and insurance card.    Please arrive 15 minutes ahead of scheduled appointment time.    Please call 24 hours in advance if you must reschedule your appointment and/or time.        Your Scheduled Appointments     Jun 20, 2017 10:00 AM CDT   GASTROENTEROLOGY ESTABLISHED PATIENT with [facility] - Gastroenterology ([facility] )    [address]  [facility] [id number] [phone]            Sep 05, 2017  9:45 AM CDT   [place] [employer] with [place], VISUAL FIELDS   [place] - Ophthalmology ([facility] [place])    [address]  [place] [address] [phone]            Sep 05, 2017 10:15 AM CDT   OCT with [place], VISUAL FIELDS   [place] - [facility]hthalmology ([facility] [place])    [address]  [place] [id number] [phone]            Sep 05, 2017 10:30 AM CDT   Established Patient Visit with Harjeet Irivng, WALTER    [place] - Optometry ([facility] [place] [facility])    [address]  [place] [address] [phone]                Discharge Instructions     Future Orders    Activity as tolerated     Diet general     Questions:    Total calories:      Fat restriction, if any:      Protein restriction, if any:      Na restriction, if any:      Fluid restriction:      Additional restrictions:          Discharge Instructions         Colonoscopy     A camera attached to a flexible tube with a viewing lens is used to take video pictures.     Colonoscopy is a test to view the inside of your lower digestive tract (colon and rectum). Sometimes it can show the last part of the small intestine (ileum). During the test, small pieces of tissue may be removed for testing. This is called a biopsy. Small growths, such as polyps, may also be removed.   Why is colonoscopy done?  The test is done to help look for colon cancer. And it can help find the source of abdominal pain, bleeding, and changes in bowel habits. It may be needed once a year, depending on factors such as your:  · Age  · Health history  · Family health history  · Symptoms  · Results from any prior colonoscopy  Risks and possible complications  These include:  · Bleeding               · A puncture or tear in the colon   · Risks of anesthesia  · A cancer lesion not being seen  Getting ready   To prepare for the test:  · Talk with your healthcare provider about the risks of the test (see below). Also ask your healthcare provider about alternatives to the test.  · Tell your healthcare provider about any medicines you take. Also tell him or her about any health conditions you may have.  · Make sure your rectum and colon are empty for the test. Follow the diet and bowel prep instructions exactly. If you dont, the test may need to be rescheduled.  · Plan for a friend or family member to drive you home after the test.     Colonoscopy provides an inside view of the entire  colon.     You may discuss the results with your doctor right away or at a future visit.  During the test   The test is usually done in the hospital on an outpatient basis. This means you go home the same day. The procedure takes about 30 minutes. During that time:  · You are given relaxing (sedating) medicine through an IV line. You may be drowsy, or fully asleep.  · The healthcare provider will first give you a physical exam to check for anal and rectal problems.  · Then the anus is lubricated and the scope inserted.  · If you are awake, you may have a feeling similar to needing to have a bowel movement. You may also feel pressure as air is pumped into the colon. Its OK to pass gas during the procedure.  · Biopsy, polyp removal, or other treatments may be done during the test.  After the test   You may have gas right after the test. It can help to try to pass it to help prevent later bloating. Your healthcare provider may discuss the results with you right away. Or you may need to schedule a follow-up visit to talk about the results. After the test, you can go back to your normal eating and other activities. You may be tired from the sedation and need to rest for a few hours.  Date Last Reviewed: 11/1/2016 © 2000-2016 [employer]. [address] [place] [address]. All rights reserved. This information is not intended as a substitute for professional medical care. Always follow your healthcare professional's instructions.          Instructions    Discharge Summary/Instructions for after Colonoscopy with   Biopsy/Polypectomy  Patient Name: Tung Chau  Patient MRN: [id number]  Patient YOB: 1940  Monday, May 15, 2017    Dandy Cifuentes MD  RESTRICTIONS ON ACTIVITY:  - Do not drive a car or operate machinery until the day after the procedure.      - The following day: return to full activity including work.  - For  3 days: No heavy lifting, straining or running.  - Diet: Eat and  drink normally unless instructed otherwise.  TREATMENT FOR COMMON SIDE EFFECTS:  - Mild abdominal pain and bloating or excessive gas: rest, eat lightly and   use a heating pad.  SYMPTOMS TO WATCH FOR AND REPORT TO YOUR PHYSICIAN:  1. Severe abdominal pain.  2. Fever within 24 hours after a procedure.  3. A large amount of rectal bleeding. (A small amount of blood from the   rectum is not serious, especially if hemorrhoids are present.  4. Because air was put into your colon during the procedure, expelling large   amounts of air from your rectum is normal.  5. You may not have a bowel movement for 1-3 days because of the colonoscopy   prep.  This is normal.  6. Go directly to the emergency room if you notice any of the following:   Chills and/or fever over 101   Persistent vomiting   Severe abdominal pain, other than gas cramps   Severe chest pain   Black, tarry stools   Any bleeding - exceeding one tablespoon  Your doctor recommends these additional instructions:  If any biopsies were performed, my office will call you in 5 to 6 business   days with any results.  - Discharge patient to home.   - Await pathology results.   - Telephone endoscopist for pathology results in 2 weeks.   - Repeat colonoscopy in 3 years for surveillance based on pathology results.     - The findings and recommendations were discussed with the patient.   - Return to physician assistant.  You are being discharged to home.   We are waiting for your pathology results.   Telephone your endoscopist for pathology results in two weeks.   Your physician has recommended a repeat colonoscopy in three years for   surveillance based on pathology results.   The findings and recommendations have been discussed with you.   Return to your physician assistant.  None  If you have any questions or problems, please call your physician - Dandy Cifuentes MD at Work:  [phone].    LAB RESULTS: [phone]  [facility],  "EMERGENCY ROOM PHONE NUMBER: [phone]  IF A COMPLICATION OR EMERGENCY SITUATION ARISES AND YOU ARE UNABLE TO REACH   YOUR PHYSICIAN - GO TO THE EMERGENCY ROOM.  Dandy Cifuentes MD  5/15/2017 2:49:15 PM  This report has been verified and signed electronically.         Admission Information     Date & Time Provider Department CSN    5/15/2017  1:02 PM Dandy Cifuentes MD [facility] [id number]      Care Providers     Provider Role Specialty Primary office phone    Dandy Cifuentes MD Attending Provider Gastroenterology [phone]    Dandy Cifuentes MD Surgeon  Gastroenterology [phone]      Your Vitals Were     BP Pulse Temp Resp Height Weight    122/72 (BP Location: Left arm, Patient Position: Lying, BP Method: Automatic) 86 98.7 °F (37.1 °C) (Oral) 12 6' 2" (1.88 m) 93 kg (205 lb)    SpO2 BMI             98% 26.32 kg/m2         Recent Lab Values     No lab values to display.      Pending Labs     Order Current Status    Specimen to Pathology - Surgery Collected (05/15/17 1864)      Allergies as of 5/15/2017     No Known Allergies      Advance Directives     An advance directive is a document which, in the event you are no longer able to make decisions for yourself, tells your healthcare team what kind of treatment you do or do not want to receive, or who you would like to make those decisions for you.  If you do not currently have an advance directive, [facility] encourages you to create one.  For more information call:  [phone]), [phone] ([phone]),  or log on to [email or web address].        Language Assistance Services     ATTENTION: Language assistance services are available, free of charge. Please call [phone].      ATENCIÓN: Si habla español, tiene a main disposición servicios gratuitos de asistencia lingüística. Llame al [phone].     CHÚ Ý: N?u b?n nói Ti?ng Vi?t, có các d?ch v? h? tr? ngôn ng? mi?n phí dành cho b?n. G?i s? " [phone].         [facility] complies with applicable Federal civil rights laws and does not discriminate on the basis of race, color, national origin, age, disability, or sex.

## 2017-05-22 ENCOUNTER — TELEPHONE (OUTPATIENT)
Dept: GASTROENTEROLOGY | Facility: CLINIC | Age: 77
End: 2017-05-22

## 2017-05-22 ENCOUNTER — TELEPHONE (OUTPATIENT)
Dept: ENDOSCOPY | Facility: HOSPITAL | Age: 77
End: 2017-05-22

## 2017-05-22 NOTE — TELEPHONE ENCOUNTER
----- Message from Dandy Cifuentes MD sent at 5/22/2017 11:39 AM CDT -----  Estela - please tell Tung that his colon polyp was benign and no dyplasia.    SPECIMEN  1) Ascending colon, 1 mm polyp.  FINAL PATHOLOGIC DIAGNOSIS  Ascending colon polyp:  Normal colonic mucosa with lymphoid aggregates.  Diagnosed by: Yaniv Jackson M.D.

## 2017-06-18 ENCOUNTER — PATIENT MESSAGE (OUTPATIENT)
Dept: ADMINISTRATIVE | Facility: OTHER | Age: 77
End: 2017-06-18

## 2017-06-20 ENCOUNTER — OFFICE VISIT (OUTPATIENT)
Dept: GASTROENTEROLOGY | Facility: CLINIC | Age: 77
End: 2017-06-20
Payer: MEDICARE

## 2017-06-20 VITALS
SYSTOLIC BLOOD PRESSURE: 144 MMHG | BODY MASS INDEX: 26.59 KG/M2 | HEIGHT: 75 IN | HEART RATE: 76 BPM | DIASTOLIC BLOOD PRESSURE: 79 MMHG | WEIGHT: 213.88 LBS

## 2017-06-20 DIAGNOSIS — Z86.010 HX OF COLONIC POLYPS: Primary | ICD-10-CM

## 2017-06-20 PROCEDURE — 99999 PR PBB SHADOW E&M-EST. PATIENT-LVL IV: CPT | Mod: PBBFAC,,, | Performed by: PHYSICIAN ASSISTANT

## 2017-06-20 PROCEDURE — 99213 OFFICE O/P EST LOW 20 MIN: CPT | Mod: S$PBB,,, | Performed by: PHYSICIAN ASSISTANT

## 2017-06-20 PROCEDURE — 99214 OFFICE O/P EST MOD 30 MIN: CPT | Mod: PBBFAC | Performed by: PHYSICIAN ASSISTANT

## 2017-06-20 PROCEDURE — 1126F AMNT PAIN NOTED NONE PRSNT: CPT | Mod: ,,, | Performed by: PHYSICIAN ASSISTANT

## 2017-06-20 PROCEDURE — 1159F MED LIST DOCD IN RCRD: CPT | Mod: ,,, | Performed by: PHYSICIAN ASSISTANT

## 2017-06-20 NOTE — PROGRESS NOTES
[facility] Consultation Note    Reason for Consult:  The encounter diagnosis was Hx of colonic polyps.    PCP:   Antonio Killian       Referring MD:  No referring provider defined for this encounter.    HPI:  This is a 76 y.o. male here to follow up on his constipation.  His 5/2017 colonoscopy involved removal of two polyps, hemorrhoids, and melanosis coli    Today he is doing well.  After the colonoscopy linzess caused cramping so he discontinued it and his bowels have been moving without medication since.   Today he has no complaints.      Failed meds: Amitiza     Drinks 4-6 bottles of water a day.      Denies lactose intolerant. Denies rectal bleeding or melena.    ROS:  Constitutional: No fevers, chills, No weight loss  ENT: No allergies  CV: No chest pain  Pulm: No cough, No shortness of breath  Ophtho: No vision changes  GI: see HPI  Derm: No rash  Heme: No lymphadenopathy, No bruising  MSK: No arthritis  : No dysuria, No hematuria  Endo: No hot or cold intolerance  Neuro: No syncope, No seizure  Psych: No anxiety, No depression    Medical History:  has a past medical history of Cataract; Chronic constipation; GERD (gastroesophageal reflux disease); Hyperlipidemia; Hypertension; and LBBB (left bundle branch block).    Surgical History:  has a past surgical history that includes Inguinal hernia repair (Right); Colonoscopy (4/28/2008); Esophagogastroduodenoscopy (2/1/2010); and Colonoscopy (N/A, 5/15/2017).    Family History: family history includes Cancer in his father; Cancer (age of onset: 60) in his paternal uncle; Colon polyps in his paternal uncle; Heart disease in his daughter; Hypertension in his sister; Stroke in his father..     Social History:  reports that he has never smoked. He has never used smokeless tobacco. He reports that he does not drink alcohol or use drugs.    Review of patient's allergies indicates:  No Known Allergies    Current Outpatient Prescriptions on File  "Prior to Visit   Medication Sig Dispense Refill    atorvastatin (LIPITOR) 10 MG tablet Take 1 tablet (10 mg total) by mouth once daily. 90 tablet 3    benazepril (LOTENSIN) 10 MG tablet TAKE 1 TABLET BY MOUTH EVERY DAY 90 tablet 3    doxazosin (CARDURA) 4 MG tablet TAKE 1 TABLET BY MOUTH EVERY NIGHT 90 tablet 3    esomeprazole (NEXIUM) 40 MG capsule Take 1 capsule (40 mg total) by mouth once daily. 90 capsule 1    hydrochlorothiazide (HYDRODIURIL) 25 MG tablet TAKE 1 TABLET BY MOUTH EVERY DAY 90 tablet 3    latanoprost 0.005 % ophthalmic solution INSTILL 1 DROP IN BOTH EYES EVERY DAY 7.5 mL 12    MATZIM  mg 24 hr tablet TAKE 1 TABLET BY MOUTH ONCE DAILY 90 tablet 3    docusate sodium (COLACE) 100 MG capsule Take 1 capsule (100 mg total) by mouth 2 (two) times daily. 20 capsule 0    fluticasone (FLONASE) 50 mcg/actuation nasal spray INSTILL 2 SPRAYS IN EACH NOSTRIL EVERY DAY 48 g 5    GAVILYTE-G 236-22.74-6.74 -5.86 gram suspension   0    linaclotide (LINZESS) 145 mcg Cap capsule Take 1 capsule (145 mcg total) by mouth once daily. 90 capsule 2    triamcinolone acetonide 0.025% (KENALOG) 0.025 % cream Apply topically 2 (two) times daily. 30 g 1     No current facility-administered medications on file prior to visit.          Objective Findings:    Vital Signs:  BP (!) 144/79   Pulse 76   Ht 6' 3" (1.905 m)   Wt 97 kg (213 lb 13.5 oz)   BMI 26.73 kg/m²   Body mass index is 26.73 kg/m².    Physical Exam:  General Appearance: Well appearing in no acute distress  Head:   Normocephalic, without obvious abnormality  Eyes:    No scleral icterus  ENT: Neck supple, Lips, mucosa, and tongue normal  Lungs: CTA bilaterally in anterior and posterior fields, no wheezes, no crackles.  Heart:  Regular rate and rhythm, S1, S2 normal, no murmurs heard  Abdomen: Soft, non tender, non distended with positive bowel sounds in all four quadrants.  Extremities: 2+ pulses, no edema  Skin: No rash  Neurologic: AAO x " 3      Labs:  Lab Results   Component Value Date    WBC 4.91 03/17/2017    HGB 15.0 03/17/2017    HCT 43.3 03/17/2017     03/17/2017    CHOL 130 03/17/2017    TRIG 67 03/17/2017    HDL 46 03/17/2017    ALT 20 03/17/2017    AST 19 03/17/2017     03/17/2017    K 4.9 03/17/2017     03/17/2017    CREATININE 1.1 03/17/2017    BUN 12 03/17/2017    CO2 28 03/17/2017    TSH 2.588 03/17/2017    PSA 0.99 03/17/2017    INR 1.0 06/23/2013       Imaging:    Endoscopy:    colon 8/2015- 10mm polyp, f/u in 3yrs    Assessment:  1. Hx of colonic polyps      Hx of chronic constipation requiring linzess. Bowels are currently moving without medication at this time      Recommendations:  1. Advised restarting linzess if constipation restarts    Return if symptoms worsen or fail to improve.      Order summary:         Thank you so much for allowing me to participate in the care of Tung Ibarra PA-C

## 2017-08-02 RX ORDER — DOXAZOSIN 4 MG/1
TABLET ORAL
Qty: 90 TABLET | Refills: 1 | Status: SHIPPED | OUTPATIENT
Start: 2017-08-02 | End: 2018-01-28 | Stop reason: SDUPTHER

## 2017-08-02 RX ORDER — BENAZEPRIL HYDROCHLORIDE 10 MG/1
TABLET ORAL
Qty: 90 TABLET | Refills: 1 | Status: SHIPPED | OUTPATIENT
Start: 2017-08-02 | End: 2018-06-14 | Stop reason: SDUPTHER

## 2017-09-05 ENCOUNTER — OFFICE VISIT (OUTPATIENT)
Dept: OPTOMETRY | Facility: CLINIC | Age: 77
End: 2017-09-05
Payer: MEDICARE

## 2017-09-05 ENCOUNTER — CLINICAL SUPPORT (OUTPATIENT)
Dept: OPHTHALMOLOGY | Facility: CLINIC | Age: 77
End: 2017-09-05
Payer: MEDICARE

## 2017-09-05 DIAGNOSIS — H52.7 REFRACTIVE ERROR: ICD-10-CM

## 2017-09-05 DIAGNOSIS — H40.1131 PRIMARY OPEN ANGLE GLAUCOMA OF BOTH EYES, MILD STAGE: Primary | ICD-10-CM

## 2017-09-05 PROCEDURE — 92012 INTRM OPH EXAM EST PATIENT: CPT | Mod: S$PBB,,, | Performed by: OPTOMETRIST

## 2017-09-05 PROCEDURE — 92133 CPTRZD OPH DX IMG PST SGM ON: CPT | Mod: PBBFAC,PO | Performed by: OPTOMETRIST

## 2017-09-05 PROCEDURE — 92015 DETERMINE REFRACTIVE STATE: CPT | Mod: ,,, | Performed by: OPTOMETRIST

## 2017-09-05 PROCEDURE — 99999 PR PBB SHADOW E&M-EST. PATIENT-LVL II: CPT | Mod: PBBFAC,,, | Performed by: OPTOMETRIST

## 2017-09-05 PROCEDURE — 99212 OFFICE O/P EST SF 10 MIN: CPT | Mod: PBBFAC,PO | Performed by: OPTOMETRIST

## 2017-09-05 PROCEDURE — 92082 INTERMEDIATE VISUAL FIELD XM: CPT | Mod: PBBFAC,PO | Performed by: OPTOMETRIST

## 2017-09-05 NOTE — PROGRESS NOTES
ELMO VALERA 05/2017 Her today for HVF, OCT and IOP ck.  Using Latanaprost OU QHS.   Patient has seasonal allergies eyes water and are red, not taking any   allergy medicine.    Last edited by Claudia Stanley on 9/5/2017 11:03 AM. (History)            Assessment /Plan     For exam results, see Encounter Report.    Primary open angle glaucoma of both eyes, mild stage  -     [place] Visual Field - OU - Intermediate - Both Eyes  -     OCT - Optic Nerve    Refractive error      1. IOP lower today.  IOP still stable, nerve eval stable, HVF improved with od inf defect and os normal. OCt stable.  Cont Latanaprost drops. Probably low tension. Continued good response to drops,Mild glaucoma based on OCT changes, prev VF defects Ou and increased IOP. Pachy normal. Field did clean up at repeat. Pretreat IOP at 20. Family history of glaucoma. RTC 4 mos IOP ck.  2. Spec Rx given. Different lens options discussed with patient. RTC 1 year full exam.

## 2017-10-03 ENCOUNTER — OFFICE VISIT (OUTPATIENT)
Dept: URGENT CARE | Facility: CLINIC | Age: 77
End: 2017-10-03
Payer: MEDICARE

## 2017-10-03 VITALS
HEART RATE: 78 BPM | SYSTOLIC BLOOD PRESSURE: 138 MMHG | RESPIRATION RATE: 16 BRPM | TEMPERATURE: 97 F | WEIGHT: 208 LBS | HEIGHT: 74 IN | OXYGEN SATURATION: 97 % | DIASTOLIC BLOOD PRESSURE: 78 MMHG | BODY MASS INDEX: 26.69 KG/M2

## 2017-10-03 DIAGNOSIS — J30.9 CHRONIC ALLERGIC RHINITIS, UNSPECIFIED SEASONALITY, UNSPECIFIED TRIGGER: Primary | ICD-10-CM

## 2017-10-03 PROCEDURE — 96372 THER/PROPH/DIAG INJ SC/IM: CPT | Mod: S$GLB,,, | Performed by: FAMILY MEDICINE

## 2017-10-03 PROCEDURE — 99214 OFFICE O/P EST MOD 30 MIN: CPT | Mod: 25,S$GLB,, | Performed by: FAMILY MEDICINE

## 2017-10-03 RX ORDER — AZELASTINE 1 MG/ML
2 SPRAY, METERED NASAL 2 TIMES DAILY
Qty: 30 ML | Refills: 2 | Status: SHIPPED | OUTPATIENT
Start: 2017-10-03 | End: 2018-01-10

## 2017-10-03 RX ORDER — MOMETASONE FUROATE 50 UG/1
2 SPRAY, METERED NASAL DAILY
Qty: 17 G | Refills: 2 | Status: SHIPPED | OUTPATIENT
Start: 2017-10-03 | End: 2018-03-19

## 2017-10-03 RX ORDER — METHYLPREDNISOLONE 4 MG/1
TABLET ORAL
Qty: 1 PACKAGE | Refills: 0 | Status: SHIPPED | OUTPATIENT
Start: 2017-10-03 | End: 2017-10-24

## 2017-10-03 RX ORDER — BETAMETHASONE SODIUM PHOSPHATE AND BETAMETHASONE ACETATE 3; 3 MG/ML; MG/ML
9 INJECTION, SUSPENSION INTRA-ARTICULAR; INTRALESIONAL; INTRAMUSCULAR; SOFT TISSUE
Status: COMPLETED | OUTPATIENT
Start: 2017-10-03 | End: 2017-10-03

## 2017-10-03 RX ADMIN — BETAMETHASONE SODIUM PHOSPHATE AND BETAMETHASONE ACETATE 9 MG: 3; 3 INJECTION, SUSPENSION INTRA-ARTICULAR; INTRALESIONAL; INTRAMUSCULAR; SOFT TISSUE at 10:10

## 2017-10-03 NOTE — PROGRESS NOTES
"Subjective:       Patient ID: Tung Chau is a 77 y.o. male.    Vitals:  height is 6' 2" (1.88 m) and weight is 94.3 kg (208 lb). His temperature is 97 °F (36.1 °C). His blood pressure is 138/78 and his pulse is 78. His respiration is 16 and oxygen saturation is 97%.     Chief Complaint: Cough    Cough   This is a recurrent problem. The current episode started 1 to 4 weeks ago. The problem has been gradually worsening. The problem occurs hourly. The cough is productive of sputum. Associated symptoms include eye redness, nasal congestion and postnasal drip. Pertinent negatives include no chest pain, chills, fever, headaches, myalgias, sore throat, shortness of breath or wheezing. Nothing aggravates the symptoms. He has tried nothing for the symptoms.     Review of Systems   Constitution: Negative for chills, fever and malaise/fatigue.   HENT: Positive for congestion and postnasal drip. Negative for hoarse voice and sore throat.    Eyes: Positive for redness. Negative for discharge.   Cardiovascular: Negative for chest pain, dyspnea on exertion and leg swelling.   Respiratory: Positive for cough and sputum production. Negative for shortness of breath and wheezing.    Musculoskeletal: Negative for myalgias.   Gastrointestinal: Negative for abdominal pain and nausea.   Neurological: Negative for headaches.       Objective:      Physical Exam   Constitutional: He is oriented to person, place, and time. He appears well-developed and well-nourished. He is cooperative.  Non-toxic appearance. He does not appear ill. No distress.   HENT:   Head: Normocephalic and atraumatic.   Right Ear: Hearing, tympanic membrane, external ear and ear canal normal.   Left Ear: Hearing, tympanic membrane, external ear and ear canal normal.   Nose: Mucosal edema and rhinorrhea present. No nasal deformity. No epistaxis. Right sinus exhibits no maxillary sinus tenderness and no frontal sinus tenderness. Left sinus exhibits no maxillary sinus " tenderness and no frontal sinus tenderness.   Mouth/Throat: Uvula is midline and mucous membranes are normal. No trismus in the jaw. Normal dentition. No uvula swelling. Posterior oropharyngeal edema present. No posterior oropharyngeal erythema.   Eyes: Conjunctivae and lids are normal. No scleral icterus.   Sclera clear bilat   Neck: Trachea normal, full passive range of motion without pain and phonation normal. Neck supple.   Cardiovascular: Normal rate, regular rhythm, normal heart sounds, intact distal pulses and normal pulses.    Pulmonary/Chest: Effort normal and breath sounds normal. No respiratory distress.   Abdominal: Soft. Normal appearance. He exhibits no distension.   Musculoskeletal: Normal range of motion. He exhibits no edema or deformity.   Neurological: He is alert and oriented to person, place, and time. He exhibits normal muscle tone. Coordination normal.   Skin: Skin is warm, dry and intact. He is not diaphoretic. No pallor.   Psychiatric: He has a normal mood and affect. His speech is normal and behavior is normal. Judgment and thought content normal. Cognition and memory are normal.   Nursing note and vitals reviewed.      Assessment:       1. Chronic allergic rhinitis, unspecified seasonality, unspecified trigger        Plan:         Chronic allergic rhinitis, unspecified seasonality, unspecified trigger  -     betamethasone acetate-betamethasone sodium phosphate injection 9 mg; Inject 1.5 mLs (9 mg total) into the muscle one time.  -     methylPREDNISolone (MEDROL DOSEPACK) 4 mg tablet; use as directed  Dispense: 1 Package; Refill: 0  -     azelastine (ASTELIN) 137 mcg (0.1 %) nasal spray; 2 sprays (274 mcg total) by Nasal route 2 (two) times daily.  Dispense: 30 mL; Refill: 2  -     mometasone (NASONEX) 50 mcg/actuation nasal spray; 2 sprays by Nasal route once daily.  Dispense: 17 g; Refill: 2      Tung was seen today for cough.    Diagnoses and all orders for this visit:    Chronic  allergic rhinitis, unspecified seasonality, unspecified trigger  -     betamethasone acetate-betamethasone sodium phosphate injection 9 mg; Inject 1.5 mLs (9 mg total) into the muscle one time.  -     methylPREDNISolone (MEDROL DOSEPACK) 4 mg tablet; use as directed  -     azelastine (ASTELIN) 137 mcg (0.1 %) nasal spray; 2 sprays (274 mcg total) by Nasal route 2 (two) times daily.  -     mometasone (NASONEX) 50 mcg/actuation nasal spray; 2 sprays by Nasal route once daily.            Follow Up Comments   Make sure that you follow up with your primary care doctor in the next 2-5 days if needed .  Return to the Urgent Care if signs or symptoms change and certainly if you have worsening symptoms go to the nearest emergency department for further evaluation.     Eufemia Gale MD

## 2017-10-03 NOTE — PATIENT INSTRUCTIONS
Allergic Rhinitis  Allergic rhinitis is an allergic reaction that affects the nose, and often the eyes. Its often known as nasal allergies. Nasal allergies are often due to things in the environment that are breathed in. Depending what you are sensitive to, nasal allergies may occur only during certain seasons. Or they may occur year round. Common indoor allergens include house dust mites, mold, cockroaches, and pet dander. Outdoor allergens include pollen from trees, grasses, and weeds.   Symptoms include a drippy, stuffy, and itchy nose. They also include sneezing and red and itchy eyes. You may feel tired more often. Severe allergies may also affect your breathing and trigger a condition called asthma.   Tests can be done to see what allergens are affecting you. You may be referred to an allergy specialist for testing and further evaluation.  Home care  Your healthcare provider may prescribe medicines to help relieve allergy symptoms. These may include oral medicines, nasal sprays, or eye drops.  Ask your provider for advice on how to avoid substances that you are allergic to. Below are a few tips for each type of allergen.  Pet dander:  · Do not have pets with fur and feathers.  · If you can't avoid having a pet, keep it out of your bedroom and off upholstered furniture.  Pollen:  · When pollen counts are high, keep windows of your car and home closed. If possible, use an air conditioner instead.  · Wear a filter mask when mowing or doing yard work.  House dust mites:  · Wash bedding every week in warm water and detergent and dry on a hot setting.  · Cover the mattress, box spring, and pillows with allergy covers.   · If possible, sleep in a room with no carpet, curtains, or upholstered furniture.  Cockroaches:  · Store food in sealed containers.  · Remove garbage from the home promptly.  · Fix water leaks  Mold:  · Keep humidity low by using a dehumidifier or air conditioner. Keep the dehumidifier and air  conditioner clean and free of mold.  · Clean moldy areas with bleach and water.  In general:  · Vacuum once or twice a week. If possible, use a vacuum with a high-efficiency particulate air (HEPA) filter.  · Do not smoke. Avoid cigarette smoke. Cigarette smoke is an irritant that can make symptoms worse.  Follow-up care  Follow up as advised by the healthcare provider or our staff. If you were referred to an allergy specialist, make this appointment promptly.  When to seek medical advice  Call your healthcare provider right away if the following occur:  · Coughing or wheezing  · Fever greater than 100.4°F (38°C)  · Hives (raised red bumps)  · Continuing symptoms, new symptoms, or worsening symptoms  Call 911 right away if you have:  · Trouble breathing  · Severe swelling of the face or severe itching of the eyes or mouth  Date Last Reviewed: 3/1/2017  © 6857-7622 "[employer]. [address] [place] [address]. All rights reserved. This information is not intended as a substitute for professional medical care. Always follow your healthcare professional's instructions.      Tung was seen today for cough.    Diagnoses and all orders for this visit:    Chronic allergic rhinitis, unspecified seasonality, unspecified trigger  -     betamethasone acetate-betamethasone sodium phosphate injection 9 mg; Inject 1.5 mLs (9 mg total) into the muscle one time.  -     methylPREDNISolone (MEDROL DOSEPACK) 4 mg tablet; use as directed  -     azelastine (ASTELIN) 137 mcg (0.1 %) nasal spray; 2 sprays (274 mcg total) by Nasal route 2 (two) times daily.  -     mometasone (NASONEX) 50 mcg/actuation nasal spray; 2 sprays by Nasal route once daily.            Follow Up Comments   Make sure that you follow up with your primary care doctor in the next 2-5 days if needed .  Return to the Urgent Care if signs or symptoms change and certainly if you have worsening symptoms go to the nearest emergency department for  further evaluation.     Eufemia Gale MD

## 2017-10-05 ENCOUNTER — TELEPHONE (OUTPATIENT)
Dept: URGENT CARE | Facility: CLINIC | Age: 77
End: 2017-10-05

## 2017-11-03 RX ORDER — HYDROCHLOROTHIAZIDE 25 MG/1
TABLET ORAL
Qty: 90 TABLET | Refills: 3 | Status: SHIPPED | OUTPATIENT
Start: 2017-11-03 | End: 2018-06-14

## 2017-12-13 RX ORDER — ATORVASTATIN CALCIUM 10 MG/1
TABLET, FILM COATED ORAL
Qty: 90 TABLET | Refills: 3 | Status: SHIPPED | OUTPATIENT
Start: 2017-12-13 | End: 2018-08-23 | Stop reason: SDUPTHER

## 2018-01-08 ENCOUNTER — OFFICE VISIT (OUTPATIENT)
Dept: OPTOMETRY | Facility: CLINIC | Age: 78
End: 2018-01-08
Payer: MEDICARE

## 2018-01-08 DIAGNOSIS — H25.13 NUCLEAR SCLEROSIS, BILATERAL: ICD-10-CM

## 2018-01-08 DIAGNOSIS — H40.1131 PRIMARY OPEN ANGLE GLAUCOMA OF BOTH EYES, MILD STAGE: Primary | ICD-10-CM

## 2018-01-08 PROCEDURE — 92012 INTRM OPH EXAM EST PATIENT: CPT | Mod: S$PBB,,, | Performed by: OPTOMETRIST

## 2018-01-08 PROCEDURE — 99999 PR PBB SHADOW E&M-EST. PATIENT-LVL II: CPT | Mod: PBBFAC,,, | Performed by: OPTOMETRIST

## 2018-01-08 PROCEDURE — 92020 GONIOSCOPY: CPT | Mod: S$PBB,,, | Performed by: OPTOMETRIST

## 2018-01-08 PROCEDURE — 99212 OFFICE O/P EST SF 10 MIN: CPT | Mod: PBBFAC,PO,25 | Performed by: OPTOMETRIST

## 2018-01-08 PROCEDURE — 92020 GONIOSCOPY: CPT | Mod: PBBFAC,PO | Performed by: OPTOMETRIST

## 2018-01-08 RX ORDER — PENICILLIN V POTASSIUM 500 MG/1
TABLET, FILM COATED ORAL
Refills: 0 | COMMUNITY
Start: 2017-10-16 | End: 2018-01-10

## 2018-01-08 NOTE — PROGRESS NOTES
ELMO VALERA 09/2017  Here for IOP ck. Today.  Using Latanaprost OU QHS. Hasn't   noticed any vision changes.    Last edited by Claudia Stanley on 1/8/2018  9:47 AM. (History)            Assessment /Plan     For exam results, see Encounter Report.    Primary open angle glaucoma of both eyes, mild stage    Nuclear sclerosis, bilateral      1. IOP stable and fine for CD, gonio open,  IOP still stable, nerve eval stable, prev HVF improved with od inf defect and os normal. OCt stable.  Cont Latanaprost drops. Probably low tension. Continued good response to drops,Mild glaucoma based on OCT changes, prev VF defects Ou and increased IOP. Pachy normal. Field did clean up at repeat. Pretreat IOP at 20. Family history of glaucoma. RTC 4 mos IOP ck. With DFE.   2. Educated pt on presence of cataracts and effects on vision. No surgery at this time. Recheck in one year.

## 2018-01-10 ENCOUNTER — OFFICE VISIT (OUTPATIENT)
Dept: INTERNAL MEDICINE | Facility: CLINIC | Age: 78
End: 2018-01-10
Payer: MEDICARE

## 2018-01-10 ENCOUNTER — IMMUNIZATION (OUTPATIENT)
Dept: INTERNAL MEDICINE | Facility: CLINIC | Age: 78
End: 2018-01-10
Payer: MEDICARE

## 2018-01-10 ENCOUNTER — LAB VISIT (OUTPATIENT)
Dept: LAB | Facility: HOSPITAL | Age: 78
End: 2018-01-10
Attending: INTERNAL MEDICINE
Payer: MEDICARE

## 2018-01-10 VITALS
DIASTOLIC BLOOD PRESSURE: 68 MMHG | BODY MASS INDEX: 25.93 KG/M2 | SYSTOLIC BLOOD PRESSURE: 136 MMHG | HEIGHT: 75 IN | HEART RATE: 83 BPM | WEIGHT: 208.56 LBS

## 2018-01-10 DIAGNOSIS — N40.0 BENIGN NON-NODULAR PROSTATIC HYPERPLASIA WITHOUT LOWER URINARY TRACT SYMPTOMS: ICD-10-CM

## 2018-01-10 DIAGNOSIS — I10 ESSENTIAL HYPERTENSION: Primary | ICD-10-CM

## 2018-01-10 DIAGNOSIS — E78.00 PURE HYPERCHOLESTEROLEMIA: ICD-10-CM

## 2018-01-10 DIAGNOSIS — I10 ESSENTIAL HYPERTENSION: ICD-10-CM

## 2018-01-10 DIAGNOSIS — J30.9 ALLERGIC RHINITIS, UNSPECIFIED CHRONICITY, UNSPECIFIED SEASONALITY, UNSPECIFIED TRIGGER: ICD-10-CM

## 2018-01-10 LAB
ALBUMIN SERPL BCP-MCNC: 3.9 G/DL
ALP SERPL-CCNC: 81 U/L
ALT SERPL W/O P-5'-P-CCNC: 22 U/L
ANION GAP SERPL CALC-SCNC: 9 MMOL/L
AST SERPL-CCNC: 22 U/L
BASOPHILS # BLD AUTO: 0.05 K/UL
BASOPHILS NFR BLD: 1 %
BILIRUB SERPL-MCNC: 0.8 MG/DL
BUN SERPL-MCNC: 14 MG/DL
CALCIUM SERPL-MCNC: 9.6 MG/DL
CHLORIDE SERPL-SCNC: 100 MMOL/L
CO2 SERPL-SCNC: 27 MMOL/L
CREAT SERPL-MCNC: 1 MG/DL
DIFFERENTIAL METHOD: ABNORMAL
EOSINOPHIL # BLD AUTO: 0 K/UL
EOSINOPHIL NFR BLD: 0.8 %
ERYTHROCYTE [DISTWIDTH] IN BLOOD BY AUTOMATED COUNT: 11.9 %
EST. GFR  (AFRICAN AMERICAN): >60 ML/MIN/1.73 M^2
EST. GFR  (NON AFRICAN AMERICAN): >60 ML/MIN/1.73 M^2
GLUCOSE SERPL-MCNC: 90 MG/DL
HCT VFR BLD AUTO: 40.6 %
HGB BLD-MCNC: 14.1 G/DL
LYMPHOCYTES # BLD AUTO: 0.9 K/UL
LYMPHOCYTES NFR BLD: 17.4 %
MCH RBC QN AUTO: 31.4 PG
MCHC RBC AUTO-ENTMCNC: 34.7 G/DL
MCV RBC AUTO: 90 FL
MONOCYTES # BLD AUTO: 0.4 K/UL
MONOCYTES NFR BLD: 8.9 %
NEUTROPHILS # BLD AUTO: 3.6 K/UL
NEUTROPHILS NFR BLD: 71.9 %
NRBC BLD-RTO: 0 /100 WBC
PLATELET # BLD AUTO: 161 K/UL
PMV BLD AUTO: 12 FL
POTASSIUM SERPL-SCNC: 3.8 MMOL/L
PROT SERPL-MCNC: 7 G/DL
RBC # BLD AUTO: 4.49 M/UL
SODIUM SERPL-SCNC: 136 MMOL/L
TSH SERPL DL<=0.005 MIU/L-ACNC: 2.06 UIU/ML
WBC # BLD AUTO: 4.95 K/UL

## 2018-01-10 PROCEDURE — G0009 ADMIN PNEUMOCOCCAL VACCINE: HCPCS | Mod: PBBFAC,PO

## 2018-01-10 PROCEDURE — 90662 IIV NO PRSV INCREASED AG IM: CPT | Mod: PBBFAC,PO

## 2018-01-10 PROCEDURE — 99213 OFFICE O/P EST LOW 20 MIN: CPT | Mod: PBBFAC,25,PO | Performed by: INTERNAL MEDICINE

## 2018-01-10 PROCEDURE — 80053 COMPREHEN METABOLIC PANEL: CPT

## 2018-01-10 PROCEDURE — 99214 OFFICE O/P EST MOD 30 MIN: CPT | Mod: S$PBB,,, | Performed by: INTERNAL MEDICINE

## 2018-01-10 PROCEDURE — 36415 COLL VENOUS BLD VENIPUNCTURE: CPT | Mod: PO

## 2018-01-10 PROCEDURE — 85025 COMPLETE CBC W/AUTO DIFF WBC: CPT

## 2018-01-10 PROCEDURE — 99999 PR PBB SHADOW E&M-EST. PATIENT-LVL III: CPT | Mod: PBBFAC,,, | Performed by: INTERNAL MEDICINE

## 2018-01-10 PROCEDURE — 84443 ASSAY THYROID STIM HORMONE: CPT

## 2018-01-10 NOTE — PROGRESS NOTES
REASON FOR VISIT:  This is a 77-year-old male who comes in for his routine   follow-up visit.  Overall in general, he has felt well.  The only thing he   brings up is that on occasion, he will get a numb feeling in the left maxillary   area of his face.  It can last for days when it happens and it usually occurs   under the circumstances when his allergies act up where he is having watery   eyes, sneezing, and rhinorrhea.  He has been using Nasonex as needed, and it has   been effective.    PAST MEDICAL HISTORY:  Hypertension.  Hyperlipidemia.  Gastroesophageal reflux disease.  Chronic rhinitis.  BPH.  Colon polyps.    SOCIAL HISTORY:  Tobacco and alcohol use - none.  Exercises regularly with   walking, weight lifting, and working on his form.    MEDICATIONS:  Atorvastatin 10 mg.  Benazepril 10 mg.  Diltiazem 360 mg.  Doxazosin 4 mg.  Hydrochlorothiazide 25 mg.  He is no longer on Nexium.  A prescription for Linzess was given to him, but he rarely uses it.  He uses MiraLax maybe once or twice a week.  He has a prescription of Astelin, which he does not use.    REVIEW OF SYMPTOMS:  Reports no pains in the chest, palpitations, shortness of   breath, or abdominal pain.  Bowel function is regular, almost every day.  No   difficulty urinating.  Nocturia x1.    PHYSICAL EXAMINATION:  VITAL SIGNS:  Weight is 208 pounds, pulse 80, blood pressure 136/68.  HEENT:  Nasal mucosa is clear.  Sensation over the face is normal.  NECK:  No thyromegaly.  LUNGS:  Clear breath sounds.  HEART:  Regular rate and rhythm.  No murmurs or gallops.  ABDOMEN:  Active bowel sounds, soft, nontender.  No hepatosplenomegaly or   abdominal masses.  PULSES:  2+ carotid pulses.  No bruits.  EXTREMITIES:  No edema.    IMPRESSION:  1.  Hypertension.  2.  Hyperlipidemia.  3.  BPH.  4.  Allergic rhinitis.    PLAN:  We will get routine labs today.  Continue with attention to proper diet   and physical activity, and flu vaccine discussed.  Also, today we  will recommend   a pneumo 22.      JAM/HN  dd: 01/10/2018 13:47:34 (CST)  td: 01/11/2018 04:46:37 (CST)  Doc ID   #[id number]  Job ID #[id number]    CC:

## 2018-01-23 ENCOUNTER — TELEPHONE (OUTPATIENT)
Dept: INTERNAL MEDICINE | Facility: CLINIC | Age: 78
End: 2018-01-23

## 2018-01-23 DIAGNOSIS — I10 ESSENTIAL HYPERTENSION: Chronic | ICD-10-CM

## 2018-01-23 DIAGNOSIS — Z12.5 ENCOUNTER FOR SCREENING FOR MALIGNANT NEOPLASM OF PROSTATE: ICD-10-CM

## 2018-01-23 DIAGNOSIS — Z00.00 ANNUAL PHYSICAL EXAM: Primary | ICD-10-CM

## 2018-01-23 DIAGNOSIS — E78.00 PURE HYPERCHOLESTEROLEMIA: ICD-10-CM

## 2018-01-23 NOTE — TELEPHONE ENCOUNTER
----- Message from Lizeth Alvarez sent at 1/23/2018  8:03 AM CST -----  Contact: patient [phone] cell   Pt asked to speak with  to discuss his recent labs, I scheduled his physical and tried to schedule labs but pt refused and said that the nurse will do this.

## 2018-01-23 NOTE — TELEPHONE ENCOUNTER
Called pt back and he states that he needs blood orders placed for his annual and he would like the results of his latest blood tests. Please advise

## 2018-01-29 RX ORDER — BENAZEPRIL HYDROCHLORIDE 10 MG/1
TABLET ORAL
Qty: 90 TABLET | Refills: 3 | Status: SHIPPED | OUTPATIENT
Start: 2018-01-29 | End: 2018-06-14

## 2018-01-29 RX ORDER — DOXAZOSIN 4 MG/1
TABLET ORAL
Qty: 90 TABLET | Refills: 3 | Status: SHIPPED | OUTPATIENT
Start: 2018-01-29 | End: 2019-02-07 | Stop reason: SDUPTHER

## 2018-02-14 ENCOUNTER — OFFICE VISIT (OUTPATIENT)
Dept: URGENT CARE | Facility: CLINIC | Age: 78
End: 2018-02-14
Payer: MEDICARE

## 2018-02-14 VITALS
TEMPERATURE: 98 F | HEIGHT: 74 IN | DIASTOLIC BLOOD PRESSURE: 94 MMHG | HEART RATE: 81 BPM | SYSTOLIC BLOOD PRESSURE: 163 MMHG | BODY MASS INDEX: 26.95 KG/M2 | RESPIRATION RATE: 16 BRPM | WEIGHT: 210 LBS | OXYGEN SATURATION: 96 %

## 2018-02-14 DIAGNOSIS — S99.922A FOOT INJURY, LEFT, INITIAL ENCOUNTER: ICD-10-CM

## 2018-02-14 DIAGNOSIS — J32.9 SINUSITIS, UNSPECIFIED CHRONICITY, UNSPECIFIED LOCATION: Primary | ICD-10-CM

## 2018-02-14 PROCEDURE — 1159F MED LIST DOCD IN RCRD: CPT | Mod: S$GLB,,, | Performed by: NURSE PRACTITIONER

## 2018-02-14 PROCEDURE — 1126F AMNT PAIN NOTED NONE PRSNT: CPT | Mod: S$GLB,,, | Performed by: NURSE PRACTITIONER

## 2018-02-14 PROCEDURE — 99214 OFFICE O/P EST MOD 30 MIN: CPT | Mod: S$GLB,,, | Performed by: NURSE PRACTITIONER

## 2018-02-14 RX ORDER — IBUPROFEN 200 MG
200 TABLET ORAL EVERY 6 HOURS PRN
Qty: 100 TABLET | Refills: 2 | Status: SHIPPED | OUTPATIENT
Start: 2018-02-14 | End: 2018-03-19

## 2018-02-14 RX ORDER — AMOXICILLIN AND CLAVULANATE POTASSIUM 875; 125 MG/1; MG/1
1 TABLET, FILM COATED ORAL 2 TIMES DAILY
Qty: 20 TABLET | Refills: 0 | Status: SHIPPED | OUTPATIENT
Start: 2018-02-14 | End: 2018-02-24

## 2018-02-14 NOTE — PATIENT INSTRUCTIONS
Sinusitis (Antibiotic Treatment)    The sinuses are air-filled spaces within the bones of the face. They connect to the inside of the nose. Sinusitis is an inflammation of the tissue lining the sinus cavity. Sinus inflammation can occur during a cold. It can also be due to allergies to pollens and other particles in the air. Sinusitis can cause symptoms of sinus congestion and fullness. A sinus infection causes fever, headache and facial pain. There is often green or yellow drainage from the nose or into the back of the throat (post-nasal drip). You have been given antibiotics to treat this condition.  Home care:  · Take the full course of antibiotics as instructed. Do not stop taking them, even if you feel better.  · Drink plenty of water, hot tea, and other liquids. This may help thin mucus. It also may promote sinus drainage.  · Heat may help soothe painful areas of the face. Use a towel soaked in hot water. Or,  the shower and direct the hot spray onto your face. Using a vaporizer along with a menthol rub at night may also help.   · An expectorant containing guaifenesin may help thin the mucus and promote drainage from the sinuses.  · Over-the-counter decongestants may be used unless a similar medicine was prescribed. Nasal sprays work the fastest. Use one that contains phenylephrine or oxymetazoline. First blow the nose gently. Then use the spray. Do not use these medicines more often than directed on the label or symptoms may get worse. You may also use tablets containing pseudoephedrine. Avoid products that combine ingredients, because side effects may be increased. Read labels. You can also ask the pharmacist for help. (NOTE: Persons with high blood pressure should not use decongestants. They can raise blood pressure.)  · Over-the-counter antihistamines may help if allergies contributed to your sinusitis.    · Do not use nasal rinses or irrigation during an acute sinus infection, unless told to by  your health care provider. Rinsing may spread the infection to other sinuses.  · Use acetaminophen or ibuprofen to control pain, unless another pain medicine was prescribed. (If you have chronic liver or kidney disease or ever had a stomach ulcer, talk with your doctor before using these medicines. Aspirin should never be used in anyone under 18 years of age who is ill with a fever. It may cause severe liver damage.)  · Don't smoke. This can worsen symptoms.  Follow-up care  Follow up with your healthcare provider or our staff if you are not improving within the next week.  When to seek medical advice  Call your healthcare provider if any of these occur:  · Facial pain or headache becoming more severe  · Stiff neck  · Unusual drowsiness or confusion  · Swelling of the forehead or eyelids  · Vision problems, including blurred or double vision  · Fever of 100.4ºF (38ºC) or higher, or as directed by your healthcare provider  · Seizure  · Breathing problems  · Symptoms not resolving within 10 days  Date Last Reviewed: 4/13/2015  © 4636-8481 [employer]. [address] [place] [address]. All rights reserved. This information is not intended as a substitute for professional medical care. Always follow your healthcare professional's instructions.    Please follow up with your Primary care provider within 2-5 days if your signs and symptoms have not resolved or worsen.  The usual course of cold symptoms are 10-14 days.     If your condition worsens or fails to improve we recommend that you receive another evaluation at the emergency room immediately or contact your primary medical clinic to discuss your concerns.     You must understand that you have received an Urgent Care treatment only and that you may be released before all of your medical problems are known or treated.   You, the patient, will arrange for follow up care as instructed.     Tylenol or Ibuprofen can also be used as directed for  "pain/fever unless you have an allergy to them or medical condition such as stomach ulcers, kidney or liver disease or blood thinners etc for which you should not be taking these type of medications.     Take over the counter cough medication as directed as needed for cough.  You should avoid medications with pseudoephedrine or phenylephrine (any medication with "D") if you have high blood pressure as this can cause an elevation in your blood pressure. Instead consider Corcidin HBP as needed to prevent an elevated blood pressure.     Natural remedies of symptoms (as needed) include humidification, saline nasal sprays, and/or steamy showers.  Increase fluids, warm tea with honey, cough drops as needed.  You may also use salt water gargles for sore throat.    You have been given an antibiotic to treat your condition today.  Please complete the antibiotic as directed on the bottle.   If you are female and on BCP use additional methods to prevent pregnancy while on antibiotics and for one cycle after.                     "

## 2018-02-14 NOTE — PROGRESS NOTES
"Subjective:       Patient ID: Tung Chau is a 77 y.o. male.    Vitals:  height is 6' 2" (1.88 m) and weight is 95.3 kg (210 lb). His oral temperature is 98.4 °F (36.9 °C). His blood pressure is 163/94 (abnormal) and his pulse is 81. His respiration is 16 and oxygen saturation is 96%.     Chief Complaint: Cough and Foot Injury    Patient states that he twisted his left ankle a week ago and wants it checked. States he is having no issues, just wanted someone to look at it.  He is having no pain.      Cough   This is a new problem. Episode onset: 10 days. The problem has been unchanged. The problem occurs every few minutes. The cough is productive of sputum. Associated symptoms include nasal congestion and postnasal drip. Pertinent negatives include no chest pain, chills, ear pain, eye redness, fever, headaches, myalgias, sore throat, shortness of breath or wheezing. Nothing aggravates the symptoms. Treatments tried: Mucinex. The treatment provided mild relief. His past medical history is significant for bronchitis and pneumonia.   Foot Injury    Incident onset: 7 days. The incident occurred at home. The injury mechanism was a twisting injury and an inversion injury. The pain is present in the left ankle. The pain is at a severity of 0/10. The patient is experiencing no pain. Pertinent negatives include no numbness. Exacerbated by: Prolonged walking. He has tried nothing for the symptoms.     Review of Systems   Constitution: Negative for chills, fever, weakness and malaise/fatigue.   HENT: Positive for congestion and postnasal drip. Negative for ear pain, hoarse voice, nosebleeds and sore throat.    Eyes: Negative for discharge and redness.   Cardiovascular: Negative for chest pain, dyspnea on exertion, leg swelling and syncope.   Respiratory: Positive for cough and sputum production. Negative for shortness of breath and wheezing.    Musculoskeletal: Negative for back pain, joint pain, myalgias and neck pain. "   Gastrointestinal: Negative for abdominal pain and nausea.   Genitourinary: Negative for hematuria.   Neurological: Negative for dizziness, headaches and numbness.       Objective:      Physical Exam   Constitutional: He is oriented to person, place, and time. He appears well-developed and well-nourished. He is active and cooperative.  Non-toxic appearance. He does not appear ill. No distress.   HENT:   Head: Normocephalic and atraumatic.   Right Ear: Hearing, tympanic membrane, external ear and ear canal normal.   Left Ear: Hearing, tympanic membrane, external ear and ear canal normal.   Nose: Nose normal. No mucosal edema, rhinorrhea or nasal deformity. No epistaxis. Right sinus exhibits no maxillary sinus tenderness and no frontal sinus tenderness. Left sinus exhibits no maxillary sinus tenderness and no frontal sinus tenderness.   Mouth/Throat: Uvula is midline, oropharynx is clear and moist and mucous membranes are normal. No trismus in the jaw. Normal dentition. No uvula swelling. No posterior oropharyngeal erythema.   Eyes: Conjunctivae and lids are normal. No scleral icterus.   Sclera clear bilat   Neck: Trachea normal, full passive range of motion without pain and phonation normal. Neck supple.   Cardiovascular: Normal rate, regular rhythm, normal heart sounds, intact distal pulses and normal pulses.    Pulmonary/Chest: Effort normal and breath sounds normal. No respiratory distress. He has no decreased breath sounds. He has no wheezes. He has no rhonchi. He has no rales.   Abdominal: Soft. Normal appearance and bowel sounds are normal. He exhibits no distension. There is no tenderness.   Musculoskeletal: Normal range of motion. He exhibits no edema or deformity.        Right ankle: Normal.        Left ankle: Normal.        Right foot: Normal.        Left foot: Normal.   Lymphadenopathy:     He has no cervical adenopathy.        Right cervical: No superficial cervical, no deep cervical and no posterior  cervical adenopathy present.       Left cervical: No superficial cervical, no deep cervical and no posterior cervical adenopathy present.   Neurological: He is alert and oriented to person, place, and time. He exhibits normal muscle tone. Coordination normal.   Skin: Skin is warm, dry and intact. He is not diaphoretic. No pallor.   Psychiatric: He has a normal mood and affect. His speech is normal and behavior is normal. Judgment and thought content normal. Cognition and memory are normal.   Nursing note and vitals reviewed.      Assessment:       1. Sinusitis, unspecified chronicity, unspecified location    2. Foot injury, left, initial encounter        Plan:         Sinusitis, unspecified chronicity, unspecified location    Foot injury, left, initial encounter    Other orders  -     ibuprofen (ADVIL) 200 MG tablet; Take 1 tablet (200 mg total) by mouth every 6 (six) hours as needed for Pain.  Dispense: 100 tablet; Refill: 2  -     amoxicillin-clavulanate 875-125mg (AUGMENTIN) 875-125 mg per tablet; Take 1 tablet by mouth 2 (two) times daily.  Dispense: 20 tablet; Refill: 0      Patient Instructions     Sinusitis (Antibiotic Treatment)    The sinuses are air-filled spaces within the bones of the face. They connect to the inside of the nose. Sinusitis is an inflammation of the tissue lining the sinus cavity. Sinus inflammation can occur during a cold. It can also be due to allergies to pollens and other particles in the air. Sinusitis can cause symptoms of sinus congestion and fullness. A sinus infection causes fever, headache and facial pain. There is often green or yellow drainage from the nose or into the back of the throat (post-nasal drip). You have been given antibiotics to treat this condition.  Home care:  · Take the full course of antibiotics as instructed. Do not stop taking them, even if you feel better.  · Drink plenty of water, hot tea, and other liquids. This may help thin mucus. It also may promote  sinus drainage.  · Heat may help soothe painful areas of the face. Use a towel soaked in hot water. Or,  the shower and direct the hot spray onto your face. Using a vaporizer along with a menthol rub at night may also help.   · An expectorant containing guaifenesin may help thin the mucus and promote drainage from the sinuses.  · Over-the-counter decongestants may be used unless a similar medicine was prescribed. Nasal sprays work the fastest. Use one that contains phenylephrine or oxymetazoline. First blow the nose gently. Then use the spray. Do not use these medicines more often than directed on the label or symptoms may get worse. You may also use tablets containing pseudoephedrine. Avoid products that combine ingredients, because side effects may be increased. Read labels. You can also ask the pharmacist for help. (NOTE: Persons with high blood pressure should not use decongestants. They can raise blood pressure.)  · Over-the-counter antihistamines may help if allergies contributed to your sinusitis.    · Do not use nasal rinses or irrigation during an acute sinus infection, unless told to by your health care provider. Rinsing may spread the infection to other sinuses.  · Use acetaminophen or ibuprofen to control pain, unless another pain medicine was prescribed. (If you have chronic liver or kidney disease or ever had a stomach ulcer, talk with your doctor before using these medicines. Aspirin should never be used in anyone under 18 years of age who is ill with a fever. It may cause severe liver damage.)  · Don't smoke. This can worsen symptoms.  Follow-up care  Follow up with your healthcare provider or our staff if you are not improving within the next week.  When to seek medical advice  Call your healthcare provider if any of these occur:  · Facial pain or headache becoming more severe  · Stiff neck  · Unusual drowsiness or confusion  · Swelling of the forehead or eyelids  · Vision problems,  "including blurred or double vision  · Fever of 100.4ºF (38ºC) or higher, or as directed by your healthcare provider  · Seizure  · Breathing problems  · Symptoms not resolving within 10 days  Date Last Reviewed: 4/13/2015  © 9468-5789 [employer]. [address] [place] [id number]. All rights reserved. This information is not intended as a substitute for professional medical care. Always follow your healthcare professional's instructions.    Please follow up with your Primary care provider within 2-5 days if your signs and symptoms have not resolved or worsen.  The usual course of cold symptoms are 10-14 days.     If your condition worsens or fails to improve we recommend that you receive another evaluation at the emergency room immediately or contact your primary medical clinic to discuss your concerns.     You must understand that you have received an Urgent Care treatment only and that you may be released before all of your medical problems are known or treated.   You, the patient, will arrange for follow up care as instructed.     Tylenol or Ibuprofen can also be used as directed for pain/fever unless you have an allergy to them or medical condition such as stomach ulcers, kidney or liver disease or blood thinners etc for which you should not be taking these type of medications.     Take over the counter cough medication as directed as needed for cough.  You should avoid medications with pseudoephedrine or phenylephrine (any medication with "D") if you have high blood pressure as this can cause an elevation in your blood pressure. Instead consider Corcidin HBP as needed to prevent an elevated blood pressure.     Natural remedies of symptoms (as needed) include humidification, saline nasal sprays, and/or steamy showers.  Increase fluids, warm tea with honey, cough drops as needed.  You may also use salt water gargles for sore throat.    You have been given an antibiotic to treat your " condition today.  Please complete the antibiotic as directed on the bottle.   If you are female and on BCP use additional methods to prevent pregnancy while on antibiotics and for one cycle after.

## 2018-02-24 RX ORDER — DILTIAZEM HYDROCHLORIDE 360 MG/1
TABLET, EXTENDED RELEASE ORAL
Qty: 90 TABLET | Refills: 1 | Status: SHIPPED | OUTPATIENT
Start: 2018-02-24 | End: 2018-06-14

## 2018-03-09 ENCOUNTER — PATIENT OUTREACH (OUTPATIENT)
Dept: ADMINISTRATIVE | Facility: HOSPITAL | Age: 78
End: 2018-03-09

## 2018-03-09 NOTE — PROGRESS NOTES
VM sent as reminder of upcoming [employer] with his PCP ON 3-19-18 @ 1 pm and [facility] on 3-12-18 @ 8:30 am, he was instructed of need for immunizations as well.

## 2018-03-12 ENCOUNTER — LAB VISIT (OUTPATIENT)
Dept: LAB | Facility: HOSPITAL | Age: 78
End: 2018-03-12
Attending: INTERNAL MEDICINE
Payer: MEDICARE

## 2018-03-12 DIAGNOSIS — E78.00 PURE HYPERCHOLESTEROLEMIA: ICD-10-CM

## 2018-03-12 DIAGNOSIS — Z12.5 ENCOUNTER FOR SCREENING FOR MALIGNANT NEOPLASM OF PROSTATE: ICD-10-CM

## 2018-03-12 DIAGNOSIS — Z00.00 ANNUAL PHYSICAL EXAM: ICD-10-CM

## 2018-03-12 DIAGNOSIS — I10 ESSENTIAL HYPERTENSION: Chronic | ICD-10-CM

## 2018-03-12 LAB
ANION GAP SERPL CALC-SCNC: 9 MMOL/L
BASOPHILS # BLD AUTO: 0.04 K/UL
BASOPHILS NFR BLD: 0.8 %
BUN SERPL-MCNC: 14 MG/DL
CALCIUM SERPL-MCNC: 10.1 MG/DL
CHLORIDE SERPL-SCNC: 103 MMOL/L
CHOLEST SERPL-MCNC: 147 MG/DL
CHOLEST/HDLC SERPL: 2.8 {RATIO}
CO2 SERPL-SCNC: 28 MMOL/L
COMPLEXED PSA SERPL-MCNC: 1.4 NG/ML
CREAT SERPL-MCNC: 1.1 MG/DL
DIFFERENTIAL METHOD: NORMAL
EOSINOPHIL # BLD AUTO: 0.1 K/UL
EOSINOPHIL NFR BLD: 1.7 %
ERYTHROCYTE [DISTWIDTH] IN BLOOD BY AUTOMATED COUNT: 12.3 %
EST. GFR  (AFRICAN AMERICAN): >60 ML/MIN/1.73 M^2
EST. GFR  (NON AFRICAN AMERICAN): >60 ML/MIN/1.73 M^2
GLUCOSE SERPL-MCNC: 83 MG/DL
HCT VFR BLD AUTO: 44.9 %
HDLC SERPL-MCNC: 53 MG/DL
HDLC SERPL: 36.1 %
HGB BLD-MCNC: 15.1 G/DL
IMM GRANULOCYTES # BLD AUTO: 0.01 K/UL
IMM GRANULOCYTES NFR BLD AUTO: 0.2 %
LDLC SERPL CALC-MCNC: 81.6 MG/DL
LYMPHOCYTES # BLD AUTO: 1.1 K/UL
LYMPHOCYTES NFR BLD: 21.4 %
MCH RBC QN AUTO: 31 PG
MCHC RBC AUTO-ENTMCNC: 33.6 G/DL
MCV RBC AUTO: 92 FL
MONOCYTES # BLD AUTO: 0.5 K/UL
MONOCYTES NFR BLD: 8.6 %
NEUTROPHILS # BLD AUTO: 3.5 K/UL
NEUTROPHILS NFR BLD: 67.3 %
NONHDLC SERPL-MCNC: 94 MG/DL
NRBC BLD-RTO: 0 /100 WBC
PLATELET # BLD AUTO: 168 K/UL
PMV BLD AUTO: 11.6 FL
POTASSIUM SERPL-SCNC: 3.9 MMOL/L
RBC # BLD AUTO: 4.87 M/UL
SODIUM SERPL-SCNC: 140 MMOL/L
TRIGL SERPL-MCNC: 62 MG/DL
WBC # BLD AUTO: 5.24 K/UL

## 2018-03-12 PROCEDURE — 80061 LIPID PANEL: CPT

## 2018-03-12 PROCEDURE — 85025 COMPLETE CBC W/AUTO DIFF WBC: CPT

## 2018-03-12 PROCEDURE — 80048 BASIC METABOLIC PNL TOTAL CA: CPT

## 2018-03-12 PROCEDURE — 36415 COLL VENOUS BLD VENIPUNCTURE: CPT | Mod: PO

## 2018-03-12 PROCEDURE — 84153 ASSAY OF PSA TOTAL: CPT

## 2018-03-19 ENCOUNTER — OFFICE VISIT (OUTPATIENT)
Dept: INTERNAL MEDICINE | Facility: CLINIC | Age: 78
End: 2018-03-19
Payer: MEDICARE

## 2018-03-19 VITALS
HEART RATE: 68 BPM | BODY MASS INDEX: 26.79 KG/M2 | DIASTOLIC BLOOD PRESSURE: 62 MMHG | WEIGHT: 208.75 LBS | SYSTOLIC BLOOD PRESSURE: 130 MMHG | HEIGHT: 74 IN

## 2018-03-19 DIAGNOSIS — N40.1 BENIGN NON-NODULAR PROSTATIC HYPERPLASIA WITH LOWER URINARY TRACT SYMPTOMS: ICD-10-CM

## 2018-03-19 DIAGNOSIS — Z00.00 ANNUAL PHYSICAL EXAM: Primary | ICD-10-CM

## 2018-03-19 DIAGNOSIS — I10 ESSENTIAL HYPERTENSION: ICD-10-CM

## 2018-03-19 DIAGNOSIS — E78.00 PURE HYPERCHOLESTEROLEMIA: ICD-10-CM

## 2018-03-19 PROCEDURE — 99213 OFFICE O/P EST LOW 20 MIN: CPT | Mod: PBBFAC,PO | Performed by: INTERNAL MEDICINE

## 2018-03-19 PROCEDURE — 99999 PR PBB SHADOW E&M-EST. PATIENT-LVL III: CPT | Mod: PBBFAC,,, | Performed by: INTERNAL MEDICINE

## 2018-03-19 PROCEDURE — 99397 PER PM REEVAL EST PAT 65+ YR: CPT | Mod: S$PBB,,, | Performed by: INTERNAL MEDICINE

## 2018-03-19 NOTE — PROGRESS NOTES
PAST MEDICAL HISTORY:   Hypertension.  Hyperlipidemia.  Gastroesophageal reflux disease.  Chronic rhinitis.  BPH.  Left bundle-branch block.  Adenomatous colon polyp in the year  and in 2015.  Right inguinal hernia repair.  Epididymal cyst.  Fractured elbow and wrist.  Lung surgery at age 42 to remove an empyema.    SOCIAL HISTORY:  Tobacco and alcohol use - none.  .  Exercises by   walking, weight lifting, and working on his farm.  He volunteers regularly at   the [employer] and [employer].    FAMILY HISTORY:  Father is , complications from a fracture.  Mother is   , breast cancer and stroke.  One sister is alive, doing well, but   history of breast cancer and hypertension.    SCREENING:  Last colonoscopy in 2017   Benign polyp    MEDICATIONS:   Atorvastatin 10 mg.  Benazepril 10 mg.  Diltiazem 360 mg.  Doxazosin 4 mg.  Hydrochlorothiazide 25 mg.      REASON FOR VISIT:  This is a 77-year-old male who comes in for his annual   routine visit.  Overall in general, he feels well.  Still at times he will get   congested in his head and feel stuffy in his frontal aspect blowing out clear   mucus.    RECENT LABS:  Chemistry, CBC was normal.  PSA 1.4.  Cholesterol 147 with HDL 53,   LDL 81.    REVIEW OF SYMPTOMS:  Reports no pains in the chest, palpitations, shortness of   breath, or abdominal pain.  The patient has regular bowel function, although he   has to strain, but this is much improved.  No difficulty urinating.  Urine flow   is good.  No incontinence.  Nocturia x2.  No arthralgias, chronic headaches or   heartburn.  Actually the heartburn has now passed.    PHYSICAL EXAMINATION:  VITAL SIGNS:  Weight is 208 pounds, pulse 68, blood pressure by me 130/60.  HEENT:  Tympanic membranes normal.  Nasal mucosa is clear.  Oropharynx, no   abnormal findings.  NECK:  No thyromegaly.  No masses.  LUNGS:  Clear breath sounds, good effort.  HEART:  Regular rate and rhythm.  No  murmurs or gallops.  ABDOMEN:  Active bowel sounds, soft, nontender.  No hepatosplenomegaly or   abdominal masses.  PULSES:  2+ carotid, 2+ pedal pulses.  EXTREMITIES:  No edema.  LYMPH GLAND:  No palpable adenopathy.  GENITALIA:  No scrotal masses, no hernias.  DIGITAL RECTAL:  Stool is brown, heme-negative.  Prostate is moderately   enlarged.    IMPRESSION:  1.  General exam.  2.  Hypertension.  3.  Hyperlipidemia.  4.  BPH.  5.  Chronic rhinitis.    PLAN:  Continue with attention to proper diet and physical activity.  We will   make a return appointment again in six months.          /alex [id number] review        JAM/HN  dd: 03/19/2018 13:29:41 (CDT)  td: 03/20/2018 04:42:42 (CDT)  Doc ID   #[id number]  Job ID #[id number]    CC:

## 2018-05-09 ENCOUNTER — OFFICE VISIT (OUTPATIENT)
Dept: OPTOMETRY | Facility: CLINIC | Age: 78
End: 2018-05-09
Payer: MEDICARE

## 2018-05-09 DIAGNOSIS — H40.1131 PRIMARY OPEN ANGLE GLAUCOMA OF BOTH EYES, MILD STAGE: Primary | ICD-10-CM

## 2018-05-09 DIAGNOSIS — H25.13 NUCLEAR SCLEROSIS, BILATERAL: ICD-10-CM

## 2018-05-09 PROCEDURE — 99999 PR PBB SHADOW E&M-EST. PATIENT-LVL II: CPT | Mod: PBBFAC,,, | Performed by: OPTOMETRIST

## 2018-05-09 PROCEDURE — 92014 COMPRE OPH EXAM EST PT 1/>: CPT | Mod: S$PBB,,, | Performed by: OPTOMETRIST

## 2018-05-09 PROCEDURE — 99212 OFFICE O/P EST SF 10 MIN: CPT | Mod: PBBFAC,PO | Performed by: OPTOMETRIST

## 2018-05-09 NOTE — PROGRESS NOTES
ELMO VALERA 01/2018  Here for DFE today.  Using Latanoprost OU QHS.  Hasn't   noticed any vision changes.  Used drops last night    Last edited by Harjeet Irving, OD on 5/9/2018 10:53 AM. (History)            Assessment /Plan     For exam results, see Encounter Report.    Primary open angle glaucoma of both eyes, mild stage    Nuclear sclerosis, bilateral      1. IOP lowest today and fine for nerve, no nerve changes, prev gonio open, prev HVF improved with od inf defect and os normal. OCt stable.  Cont Latanaprost drops. Probably low tension. Continued good response to drops,Mild glaucoma based on OCT changes, prev VF defects Ou and increased IOP. Pachy normal. Field did clean up at repeat. Pretreat IOP at 20. Family history of glaucoma. RTC 4 mos IOP ck. With HVf(24-2)kavita std and OCT.  2. Educated pt on presence of cataracts and effects on vision. No surgery at this time. Recheck in one year.

## 2018-06-14 ENCOUNTER — OFFICE VISIT (OUTPATIENT)
Dept: URGENT CARE | Facility: CLINIC | Age: 78
End: 2018-06-14
Payer: MEDICARE

## 2018-06-14 VITALS
DIASTOLIC BLOOD PRESSURE: 77 MMHG | WEIGHT: 208 LBS | HEART RATE: 76 BPM | RESPIRATION RATE: 16 BRPM | TEMPERATURE: 98 F | SYSTOLIC BLOOD PRESSURE: 139 MMHG | HEIGHT: 74 IN | OXYGEN SATURATION: 96 % | BODY MASS INDEX: 26.69 KG/M2

## 2018-06-14 DIAGNOSIS — J32.9 CHRONIC SINUSITIS, UNSPECIFIED LOCATION: Primary | ICD-10-CM

## 2018-06-14 PROCEDURE — 96372 THER/PROPH/DIAG INJ SC/IM: CPT | Mod: S$GLB,,, | Performed by: NURSE PRACTITIONER

## 2018-06-14 PROCEDURE — 99213 OFFICE O/P EST LOW 20 MIN: CPT | Mod: 25,S$GLB,, | Performed by: NURSE PRACTITIONER

## 2018-06-14 RX ORDER — PROMETHAZINE HYDROCHLORIDE 6.25 MG/5ML
6.25 SYRUP ORAL
Qty: 120 ML | Refills: 1 | Status: SHIPPED | OUTPATIENT
Start: 2018-06-14 | End: 2018-08-17

## 2018-06-14 RX ORDER — BENZONATATE 100 MG/1
100 CAPSULE ORAL EVERY 6 HOURS PRN
Qty: 30 CAPSULE | Refills: 1 | Status: SHIPPED | OUTPATIENT
Start: 2018-06-14 | End: 2018-07-11 | Stop reason: CLARIF

## 2018-06-14 RX ORDER — BETAMETHASONE SODIUM PHOSPHATE AND BETAMETHASONE ACETATE 3; 3 MG/ML; MG/ML
6 INJECTION, SUSPENSION INTRA-ARTICULAR; INTRALESIONAL; INTRAMUSCULAR; SOFT TISSUE
Status: COMPLETED | OUTPATIENT
Start: 2018-06-14 | End: 2018-06-14

## 2018-06-14 RX ORDER — AMOXICILLIN 875 MG/1
875 TABLET, FILM COATED ORAL 2 TIMES DAILY
Qty: 20 TABLET | Refills: 0 | Status: SHIPPED | OUTPATIENT
Start: 2018-06-14 | End: 2018-06-24

## 2018-06-14 RX ADMIN — BETAMETHASONE SODIUM PHOSPHATE AND BETAMETHASONE ACETATE 6 MG: 3; 3 INJECTION, SUSPENSION INTRA-ARTICULAR; INTRALESIONAL; INTRAMUSCULAR; SOFT TISSUE at 10:06

## 2018-06-14 NOTE — PATIENT INSTRUCTIONS
Continue the mucinex   Increase water intake   Continue the flonase   Start the zyrtec back   Take the tessalon for cough during the day   Phenergan is for cough at night, it can make you sleepy (so dont drive with it)  If you worsen come back into the clinic or go see your PCP     Symptomatic treatment: (consider the following unless you are allergic or cannot take the following suggestions)  Alternate Tylenol (not to exceed 4000 mg per 24 hours) and Ibuprofen (400 mg every 3 hours) for pain and fever   * do not take tylenol if you have liver disease or issues with your liver   *do not take motrin if you have kidney disease or on blood thinners   For a sore throat try salt water gargles and honey/lemon water to soothe throat  Cepachol spray helps to numb the discomfort in throat  Elderberry to reduce duration of URI symptoms  Warm face compresses/hot showers as often as you can to open sinuses   Vicks vapor rub at night  Flonase or Nasacort (nasal steroid over the counter, 1-2 squirts to each nare)  Nasal saline spray reduces inflammation and dryness  Stay hydrated with increased water intake and simple foods like chicken noodle soup help hydrate and soothe the throat  Drink pedialyte, gatorade or propel.   Delsym helps with coughing at night  Xyzal, Zyrtec, Allegra, or Claritin during the day for allergy relief  Benadryl at night may help if allergy component- do not use with phenergan because both medications can make you drowsy)  You can try breathe right strips at night to help you breathe  A cool mist humidifier in bedroom may help with cough and relieve stuffy nose.   Zantac will help if there is reflux from the post nasal drip  REST as much as you can    Sinus rinses DO NOT USE TAP WATER, if you must, water must be a rolling boil for 1 minute, let it cool, then use.  May use distilled water, or over the counter nasal saline rinses.  Vics vapor rub in shower to help open nasal passages.  May use nasal gel to  keep passages moisturized.  May use Nasal saline sprays during the day for added relief of congestion.   For those who go to the gym, please do not use the sauna or steam room now to clear sinuses.    During pollen season, change shirt if you are outside for a while when you go in.  Also wash your face.  Do not touch your face with your hands.  Wash your hands often in general while ill, avoid face contact with hands.     Your symptoms will likely last 5-7 days, maybe longer depending on how it affects your body.  You are contagious 5-7, so minimize contact with others to reduce the spread to others and stay home from work or school as we discussed. Dehydration is preventable but is one of the main reasons why you will feel so badly.  Antibiotics are not needed unless a complication (such as Otitis Media, Bacterial sinus infection or pneumonia). Taking antibiotics for Flu/Cold is not supported by evidence based medicine and can expose you to unnecessary side effects of the medication, such as anaphylaxis.   If you experience any:  Chest pain, shortness of breath, wheezing or difficulty breathing  Severe headache, face, neck or ear pain  New rash  Fever over 101.5º F (38.6 C) for more than three days  Confusion, behavior change or seizure  Severe weakness or dizziness  Go to ER

## 2018-06-14 NOTE — PROGRESS NOTES
"Subjective:       Patient ID: Tung Chau is a 77 y.o. male.    Vitals:  height is 6' 2" (1.88 m) and weight is 94.3 kg (208 lb). His temperature is 97.8 °F (36.6 °C). His blood pressure is 139/77 and his pulse is 76. His respiration is 16 and oxygen saturation is 96%.     Chief Complaint: Sinus Problem (W/ PRODUCTIVE COUGH)    PRODUCTIVE COUGH, SINUS DRAINAGE/CONGESTION, POST NASAL DRIP FOR 2 WEEKS.  Normally gets z uControl which works in the past  He has had Augmentin in the past      Sinus Problem   This is a new problem. The current episode started 1 to 4 weeks ago. The problem is unchanged. There has been no fever. Associated symptoms include congestion, coughing, a hoarse voice, sinus pressure and sneezing. Pertinent negatives include no chills, ear pain, headaches, shortness of breath or sore throat. (POST NASAL DRIP  Nasal congestion) Treatments tried: MUCINEX for the last 2-3 night  The treatment provided mild relief.     Review of Systems   Constitution: Negative for chills, fever and malaise/fatigue.   HENT: Positive for congestion, hoarse voice, sinus pressure and sneezing. Negative for ear pain and sore throat.    Eyes: Negative for discharge and redness.   Cardiovascular: Negative for chest pain, dyspnea on exertion and leg swelling.   Respiratory: Positive for cough and sputum production. Negative for shortness of breath and wheezing.    Musculoskeletal: Negative for myalgias.   Gastrointestinal: Negative for abdominal pain and nausea.   Neurological: Negative for headaches.       Objective:      Physical Exam   Constitutional: He is oriented to person, place, and time. He appears well-developed and well-nourished. He is cooperative.  Non-toxic appearance. He does not appear ill. No distress.   HENT:   Head: Normocephalic and atraumatic.   Right Ear: Hearing, tympanic membrane, external ear and ear canal normal.   Left Ear: Hearing, tympanic membrane, external ear and ear canal normal.   Nose: Mucosal " edema present. No rhinorrhea or nasal deformity. No epistaxis. Right sinus exhibits no maxillary sinus tenderness and no frontal sinus tenderness. Left sinus exhibits no maxillary sinus tenderness and no frontal sinus tenderness.   Mouth/Throat: Uvula is midline and mucous membranes are normal. No trismus in the jaw. Normal dentition. No uvula swelling. Posterior oropharyngeal erythema present.   Eyes: Conjunctivae and lids are normal. No scleral icterus.   Sclera clear bilat   Neck: Trachea normal, full passive range of motion without pain and phonation normal. Neck supple.   Cardiovascular: Normal rate, regular rhythm, normal heart sounds, intact distal pulses and normal pulses.    Pulmonary/Chest: Effort normal and breath sounds normal. No respiratory distress.   Abdominal: Normal appearance.   Musculoskeletal: Normal range of motion. He exhibits no edema or deformity.   Neurological: He is alert and oriented to person, place, and time. He exhibits normal muscle tone. Coordination normal.   Skin: Skin is warm, dry and intact. He is not diaphoretic. No pallor.   Psychiatric: He has a normal mood and affect. His speech is normal and behavior is normal. Judgment and thought content normal. Cognition and memory are normal.   Nursing note and vitals reviewed.      Assessment:       1. Chronic sinusitis, unspecified location        Plan:         Chronic sinusitis, unspecified location    Other orders  -     amoxicillin (AMOXIL) 875 MG tablet; Take 1 tablet (875 mg total) by mouth 2 (two) times daily.  Dispense: 20 tablet; Refill: 0  -     promethazine (PHENERGAN) 6.25 mg/5 mL syrup; Take 5 mLs (6.25 mg total) by mouth every 4 to 6 hours as needed for Nausea (cough).  Dispense: 120 mL; Refill: 1  -     benzonatate (TESSALON PERLES) 100 MG capsule; Take 1 capsule (100 mg total) by mouth every 6 (six) hours as needed for Cough.  Dispense: 30 capsule; Refill: 1  -     betamethasone acetate-betamethasone sodium phosphate  injection 6 mg; Inject 1 mL (6 mg total) into the muscle one time.    No results found for: LABA1C, HGBA1C    BMP  Lab Results   Component Value Date     03/12/2018    K 3.9 03/12/2018     03/12/2018    CO2 28 03/12/2018    BUN 14 03/12/2018    CREATININE 1.1 03/12/2018    CALCIUM 10.1 03/12/2018    ANIONGAP 9 03/12/2018    ESTGFRAFRICA >60.0 03/12/2018    EGFRNONAA >60.0 03/12/2018       Patient Instructions   Continue the mucinex   Increase water intake   Continue the flonase   Start the zyrtec back   Take the tessalon for cough during the day   Phenergan is for cough at night, it can make you sleepy (so dont drive with it)  If you worsen come back into the clinic or go see your PCP     Symptomatic treatment: (consider the following unless you are allergic or cannot take the following suggestions)  Alternate Tylenol (not to exceed 4000 mg per 24 hours) and Ibuprofen (400 mg every 3 hours) for pain and fever   * do not take tylenol if you have liver disease or issues with your liver   *do not take motrin if you have kidney disease or on blood thinners   For a sore throat try salt water gargles and honey/lemon water to soothe throat  Cepachol spray helps to numb the discomfort in throat  Elderberry to reduce duration of URI symptoms  Warm face compresses/hot showers as often as you can to open sinuses   Vicks vapor rub at night  Flonase or Nasacort (nasal steroid over the counter, 1-2 squirts to each nare)  Nasal saline spray reduces inflammation and dryness  Stay hydrated with increased water intake and simple foods like chicken noodle soup help hydrate and soothe the throat  Drink pedialyte, gatorade or propel.   Delsym helps with coughing at night  Xyzal, Zyrtec, Allegra, or Claritin during the day for allergy relief  Benadryl at night may help if allergy component- do not use with phenergan because both medications can make you drowsy)  You can try breathe right strips at night to help you breathe  A  cool mist humidifier in bedroom may help with cough and relieve stuffy nose.   Zantac will help if there is reflux from the post nasal drip  REST as much as you can    Sinus rinses DO NOT USE TAP WATER, if you must, water must be a rolling boil for 1 minute, let it cool, then use.  May use distilled water, or over the counter nasal saline rinses.  Vics vapor rub in shower to help open nasal passages.  May use nasal gel to keep passages moisturized.  May use Nasal saline sprays during the day for added relief of congestion.   For those who go to the gym, please do not use the sauna or steam room now to clear sinuses.    During pollen season, change shirt if you are outside for a while when you go in.  Also wash your face.  Do not touch your face with your hands.  Wash your hands often in general while ill, avoid face contact with hands.     Your symptoms will likely last 5-7 days, maybe longer depending on how it affects your body.  You are contagious 5-7, so minimize contact with others to reduce the spread to others and stay home from work or school as we discussed. Dehydration is preventable but is one of the main reasons why you will feel so badly.  Antibiotics are not needed unless a complication (such as Otitis Media, Bacterial sinus infection or pneumonia). Taking antibiotics for Flu/Cold is not supported by evidence based medicine and can expose you to unnecessary side effects of the medication, such as anaphylaxis.   If you experience any:  Chest pain, shortness of breath, wheezing or difficulty breathing  Severe headache, face, neck or ear pain  New rash  Fever over 101.5º F (38.6 C) for more than three days  Confusion, behavior change or seizure  Severe weakness or dizziness  Go to ER

## 2018-07-03 ENCOUNTER — OFFICE VISIT (OUTPATIENT)
Dept: INTERNAL MEDICINE | Facility: CLINIC | Age: 78
End: 2018-07-03
Payer: MEDICARE

## 2018-07-03 ENCOUNTER — PATIENT MESSAGE (OUTPATIENT)
Dept: INTERNAL MEDICINE | Facility: CLINIC | Age: 78
End: 2018-07-03

## 2018-07-03 ENCOUNTER — HOSPITAL ENCOUNTER (OUTPATIENT)
Dept: RADIOLOGY | Facility: HOSPITAL | Age: 78
Discharge: HOME | End: 2018-07-03
Attending: INTERNAL MEDICINE
Payer: MEDICARE

## 2018-07-03 VITALS
BODY MASS INDEX: 27.08 KG/M2 | HEART RATE: 71 BPM | DIASTOLIC BLOOD PRESSURE: 85 MMHG | SYSTOLIC BLOOD PRESSURE: 157 MMHG | HEIGHT: 74 IN | WEIGHT: 211 LBS

## 2018-07-03 DIAGNOSIS — J45.30 MILD PERSISTENT ASTHMATIC BRONCHITIS WITHOUT COMPLICATION: ICD-10-CM

## 2018-07-03 DIAGNOSIS — J98.4 PNEUMONITIS: Primary | ICD-10-CM

## 2018-07-03 DIAGNOSIS — J98.4 PNEUMONITIS: ICD-10-CM

## 2018-07-03 PROCEDURE — 71046 X-RAY EXAM CHEST 2 VIEWS: CPT | Mod: TC,FY,PO

## 2018-07-03 PROCEDURE — 71046 X-RAY EXAM CHEST 2 VIEWS: CPT | Mod: 26,,, | Performed by: RADIOLOGY

## 2018-07-03 PROCEDURE — 96372 THER/PROPH/DIAG INJ SC/IM: CPT | Mod: PBBFAC,PO

## 2018-07-03 PROCEDURE — 99999 PR PBB SHADOW E&M-EST. PATIENT-LVL IV: CPT | Mod: PBBFAC,,, | Performed by: INTERNAL MEDICINE

## 2018-07-03 PROCEDURE — 99214 OFFICE O/P EST MOD 30 MIN: CPT | Mod: PBBFAC,PO,25 | Performed by: INTERNAL MEDICINE

## 2018-07-03 PROCEDURE — 99214 OFFICE O/P EST MOD 30 MIN: CPT | Mod: S$PBB,,, | Performed by: INTERNAL MEDICINE

## 2018-07-03 RX ORDER — CIPROFLOXACIN 500 MG/1
TABLET ORAL
Refills: 0 | COMMUNITY
Start: 2018-06-27 | End: 2018-07-03

## 2018-07-03 RX ORDER — MOXIFLOXACIN HYDROCHLORIDE 400 MG/1
400 TABLET ORAL DAILY
Qty: 10 TABLET | Refills: 0 | Status: SHIPPED | OUTPATIENT
Start: 2018-07-03 | End: 2018-07-11 | Stop reason: CLARIF

## 2018-07-03 RX ORDER — PREDNISONE 20 MG/1
TABLET ORAL
Qty: 18 TABLET | Refills: 0 | Status: SHIPPED | OUTPATIENT
Start: 2018-07-03 | End: 2018-07-11 | Stop reason: CLARIF

## 2018-07-03 RX ORDER — CEFTRIAXONE 1 G/1
1 INJECTION, POWDER, FOR SOLUTION INTRAMUSCULAR; INTRAVENOUS
Status: COMPLETED | OUTPATIENT
Start: 2018-07-03 | End: 2018-07-03

## 2018-07-03 RX ORDER — ALBUTEROL SULFATE 90 UG/1
AEROSOL, METERED RESPIRATORY (INHALATION)
Refills: 0 | COMMUNITY
Start: 2018-06-27 | End: 2018-08-17

## 2018-07-03 RX ADMIN — CEFTRIAXONE SODIUM 1 G: 1 INJECTION, POWDER, FOR SOLUTION INTRAMUSCULAR; INTRAVENOUS at 11:07

## 2018-07-03 NOTE — PROGRESS NOTES
REASON FOR VISIT:  This is a 77-year-old male.  He is here because of ongoing   respiratory symptoms that he has had for about three weeks.    He was seen in Urgent Care on June 14th for congestion feeling in his chest,   coughing up yellow mucus, hear wheezing in his right upper chest, rhinorrhea,   postnasal drip, was put on amoxicillin for 10 days and Celestone injection was   given and also had promethazine and Tessalon Perles.  He took the medicine,   really did not felt improved, went to an ENT doctor on June 28th where he has   been given albuterol to use and ciprofloxacin, but again he has not seen much   improvement.  He still has the symptoms.    PAST MEDICAL HISTORY:  Hypertension.  Hyperlipidemia.  Gastroesophageal reflux disease.  Chronic rhinitis.  BPH.  Left bundle-branch block.    MEDICATIONS:  List as noted per MedCard.    PHYSICAL EXAMINATION:  VITAL SIGNS:  His weight is 211, pulse ox 96%, pulse 80, blood pressure 150/82.  HEENT:  Tympanic membranes normal.  Nasal mucosa is clear.  Oropharynx, slightly   hyperemic.  LUNGS:  Listening to the lungs, he has rales at the left base, some rhonchi in   the right mid lung.    IMPRESSION:  Pneumonitis with asthmatic bronchitis.    PLAN:  Today, a basic metabolic profile and chest x-ray.  Rocephin 1 g IM and   then phone review to follow up.      AMALIA  dd: 07/03/2018 11:18:40 (CDT)  td: 07/04/2018 06:33:07 (CDT)  Doc ID   #[id number]  Job ID #[id number]    CC:

## 2018-07-11 ENCOUNTER — OFFICE VISIT (OUTPATIENT)
Dept: INTERNAL MEDICINE | Facility: CLINIC | Age: 78
End: 2018-07-11
Payer: MEDICARE

## 2018-07-11 VITALS
BODY MASS INDEX: 26.56 KG/M2 | DIASTOLIC BLOOD PRESSURE: 68 MMHG | HEIGHT: 74 IN | WEIGHT: 207 LBS | SYSTOLIC BLOOD PRESSURE: 136 MMHG | HEART RATE: 80 BPM

## 2018-07-11 DIAGNOSIS — J02.9 PHARYNGITIS, UNSPECIFIED ETIOLOGY: ICD-10-CM

## 2018-07-11 DIAGNOSIS — J98.4 PNEUMONITIS: ICD-10-CM

## 2018-07-11 DIAGNOSIS — J31.0 CHRONIC RHINITIS: Primary | ICD-10-CM

## 2018-07-11 PROCEDURE — 99999 PR PBB SHADOW E&M-EST. PATIENT-LVL IV: CPT | Mod: PBBFAC,,, | Performed by: INTERNAL MEDICINE

## 2018-07-11 PROCEDURE — 99214 OFFICE O/P EST MOD 30 MIN: CPT | Mod: PBBFAC,PO | Performed by: INTERNAL MEDICINE

## 2018-07-11 PROCEDURE — 99214 OFFICE O/P EST MOD 30 MIN: CPT | Mod: S$PBB,,, | Performed by: INTERNAL MEDICINE

## 2018-07-11 RX ORDER — PREDNISONE 20 MG/1
TABLET ORAL
Qty: 18 TABLET | Refills: 0 | Status: SHIPPED | OUTPATIENT
Start: 2018-07-11 | End: 2018-08-17

## 2018-07-11 NOTE — PROGRESS NOTES
REASON FOR VISIT:  This is a 77-year-old male who comes in for a followup visit.    He was seen on July 3rd where he was still having ongoing respiratory symptoms   of asthmatic bronchitis, cough, wheezing.  A chest x-ray was confirming   bilateral pneumonitis.  He finished a course of Avelox.  He feels much better as   far as his chest and lungs are concerned.  He is not short of breath.  Air flow   is good.  No wheezing and does not feel tight.  He still has frontal sinus   headache with irritated throat and postnasal drip.  He is not blowing out or   coughing up any phlegm.    PAST MEDICAL HISTORY:  Hypertension.  Hyperlipidemia.  Chronic rhinitis.  Gastroesophageal reflux disease.    MEDICATIONS:  List per MedCard.    PHYSICAL EXAMINATION:  VITAL SIGNS:  Weight is 207 pounds, pulse 80, blood pressure by me 136/68.  HEENT:  Both tympanic membranes normal.  Nasal mucosa is slightly swollen.    Oropharynx is slightly hyperemic.  NECK:  No palpable adenopathy.  LUNGS:  Clear breath sounds, scant crackle at the left base.    IMPRESSION:  1.  Chronic rhinitis with pharyngitis.  2.  Pneumonitis, clinically resolving.    PLAN:  Course of prednisone to take over nine days and repeat a chest x-ray in   10 days.  During this time, he can take also the medicine Zantac 150 mg twice a   day.        /alex [id number] review        JAM/CARLOS  dd: 07/11/2018 11:13:53 (CDT)  td: 07/12/2018 04:42:46 (CDT)  Doc ID   #[id number]  Job ID #[id number]    CC:

## 2018-07-18 ENCOUNTER — HOSPITAL ENCOUNTER (OUTPATIENT)
Dept: RADIOLOGY | Facility: HOSPITAL | Age: 78
Discharge: HOME | End: 2018-07-18
Attending: INTERNAL MEDICINE
Payer: MEDICARE

## 2018-07-18 ENCOUNTER — OFFICE VISIT (OUTPATIENT)
Dept: INTERNAL MEDICINE | Facility: CLINIC | Age: 78
End: 2018-07-18
Payer: MEDICARE

## 2018-07-18 VITALS
BODY MASS INDEX: 27.08 KG/M2 | SYSTOLIC BLOOD PRESSURE: 122 MMHG | HEIGHT: 74 IN | WEIGHT: 211 LBS | DIASTOLIC BLOOD PRESSURE: 69 MMHG | HEART RATE: 76 BPM | OXYGEN SATURATION: 96 %

## 2018-07-18 DIAGNOSIS — J32.9 CHRONIC SINUSITIS, UNSPECIFIED LOCATION: Primary | ICD-10-CM

## 2018-07-18 DIAGNOSIS — J98.4 PNEUMONITIS: ICD-10-CM

## 2018-07-18 DIAGNOSIS — J32.9 CHRONIC SINUSITIS, UNSPECIFIED LOCATION: ICD-10-CM

## 2018-07-18 PROCEDURE — 70486 CT MAXILLOFACIAL W/O DYE: CPT | Mod: 26,,, | Performed by: RADIOLOGY

## 2018-07-18 PROCEDURE — 99214 OFFICE O/P EST MOD 30 MIN: CPT | Mod: PBBFAC,25,PO | Performed by: INTERNAL MEDICINE

## 2018-07-18 PROCEDURE — 71046 X-RAY EXAM CHEST 2 VIEWS: CPT | Mod: 26,,, | Performed by: RADIOLOGY

## 2018-07-18 PROCEDURE — 99214 OFFICE O/P EST MOD 30 MIN: CPT | Mod: S$PBB,,, | Performed by: INTERNAL MEDICINE

## 2018-07-18 PROCEDURE — 71046 X-RAY EXAM CHEST 2 VIEWS: CPT | Mod: TC,FY,PO

## 2018-07-18 PROCEDURE — 70486 CT MAXILLOFACIAL W/O DYE: CPT | Mod: TC

## 2018-07-18 PROCEDURE — 99999 PR PBB SHADOW E&M-EST. PATIENT-LVL IV: CPT | Mod: PBBFAC,,, | Performed by: INTERNAL MEDICINE

## 2018-07-18 NOTE — PROGRESS NOTES
REASON FOR VISIT:  This is a 77-year-old male who comes in for a follow-up   visit.  He was seen by me on July 3rd and July 11th.  The symptom that seems to   be ongoing is chronic pressure discomfort in his maxillary and frontal sinuses.    He still gets stuffy in his nose.  He is not able to blow out phlegm or mucus.    When he was seen on July 11th, he was started on 9-day course of prednisone,   but he has gotten no relief.    On the other hand, he feels fine in his lungs where he is not congested, short   of breath, or wheezing and an x-ray earlier today showed resolution of the   bibasilar opacities that were noted before.  He gets relief when taking saline   irrigation.    PAST MEDICAL HISTORY:  Hypertension.  Hyperlipidemia.  Gastroesophageal reflux disease.  Chronic rhinitis.    MEDICATIONS:  List per MedCard.    PHYSICAL EXAMINATION:  VITAL SIGNS:  Weight is 211, blood pressure reading 132/72.  LUNGS:  Clear.  HEART:  Regular rate and rhythm.  HEENT:  Tympanic membranes normal.  Nasal mucosa is slightly edematous.    Oropharynx, no abnormal findings.    IMPRESSION:  Chronic sinusitis.    PLAN:  We will arrange for CT of the sinus to see if there is any ongoing   infection.  We will give a prescription for Flonase to take two puffs once a day   and I am going to recommend the use of Mucinex twice a day to see if this may   help with any drainage.  He has to use saline irrigation.      JAM/HN  dd: 07/18/2018 13:58:29 (CDT)  td: 07/18/2018 23:55:55 (CDT)  Doc ID   #[id number]  Job ID #[id number]    CC:

## 2018-07-19 ENCOUNTER — TELEPHONE (OUTPATIENT)
Dept: INTERNAL MEDICINE | Facility: CLINIC | Age: 78
End: 2018-07-19

## 2018-07-19 ENCOUNTER — PATIENT MESSAGE (OUTPATIENT)
Dept: INTERNAL MEDICINE | Facility: CLINIC | Age: 78
End: 2018-07-19

## 2018-07-19 RX ORDER — AMOXICILLIN AND CLAVULANATE POTASSIUM 875; 125 MG/1; MG/1
1 TABLET, FILM COATED ORAL 2 TIMES DAILY
Qty: 20 TABLET | Refills: 0 | Status: CANCELLED | OUTPATIENT
Start: 2018-07-19 | End: 2018-07-29

## 2018-07-19 RX ORDER — AMOXICILLIN AND CLAVULANATE POTASSIUM 875; 125 MG/1; MG/1
1 TABLET, FILM COATED ORAL 2 TIMES DAILY
Qty: 20 TABLET | Refills: 0 | Status: SHIPPED | OUTPATIENT
Start: 2018-07-19 | End: 2018-07-29

## 2018-07-19 NOTE — TELEPHONE ENCOUNTER
Pt wife states you discussed a medication to be sent to pharmacy on pt last visit . Please advise thanks

## 2018-07-19 NOTE — TELEPHONE ENCOUNTER
----- Message from Lizeth mSith sent at 7/19/2018 10:27 AM CDT -----  Contact: wife/diamante/[phone]  Pt wife called in regards to the Rx that him and the dr talked about. They would like the Rx sent to [facility].      [facility] [phone]  Please advise

## 2018-07-20 ENCOUNTER — TELEPHONE (OUTPATIENT)
Dept: INTERNAL MEDICINE | Facility: CLINIC | Age: 78
End: 2018-07-20

## 2018-07-31 ENCOUNTER — EXTERNAL CHRONIC CARE MANAGEMENT (OUTPATIENT)
Dept: PRIMARY CARE CLINIC | Facility: CLINIC | Age: 78
End: 2018-07-31
Payer: MEDICARE

## 2018-07-31 PROCEDURE — 99490 CHRNC CARE MGMT STAFF 1ST 20: CPT | Mod: S$PBB,,, | Performed by: INTERNAL MEDICINE

## 2018-07-31 PROCEDURE — 99490 CHRNC CARE MGMT STAFF 1ST 20: CPT | Mod: PBBFAC | Performed by: INTERNAL MEDICINE

## 2018-08-15 ENCOUNTER — TELEPHONE (OUTPATIENT)
Dept: GASTROENTEROLOGY | Facility: CLINIC | Age: 78
End: 2018-08-15

## 2018-08-15 NOTE — TELEPHONE ENCOUNTER
----- Message from Inessa Rogel sent at 8/15/2018  4:26 PM CDT -----  Contact: self [phone]  Patient Requesting Sooner Appointment.     Reason for sooner appt.: Gas pain  When is the first available appointment? Unable to access  Communication Preference: self [phone]  Additional Information: Pt asked if there is anything available tomorrow or Friday 08/17

## 2018-08-17 ENCOUNTER — OFFICE VISIT (OUTPATIENT)
Dept: GASTROENTEROLOGY | Facility: CLINIC | Age: 78
End: 2018-08-17
Payer: MEDICARE

## 2018-08-17 VITALS
HEIGHT: 75 IN | DIASTOLIC BLOOD PRESSURE: 87 MMHG | BODY MASS INDEX: 25.79 KG/M2 | WEIGHT: 207.44 LBS | SYSTOLIC BLOOD PRESSURE: 155 MMHG | HEART RATE: 88 BPM

## 2018-08-17 DIAGNOSIS — R10.84 GENERALIZED ABDOMINAL PAIN: Primary | ICD-10-CM

## 2018-08-17 PROCEDURE — 99213 OFFICE O/P EST LOW 20 MIN: CPT | Mod: PBBFAC | Performed by: PHYSICIAN ASSISTANT

## 2018-08-17 PROCEDURE — 99213 OFFICE O/P EST LOW 20 MIN: CPT | Mod: S$PBB,,, | Performed by: PHYSICIAN ASSISTANT

## 2018-08-17 PROCEDURE — 99999 PR PBB SHADOW E&M-EST. PATIENT-LVL III: CPT | Mod: PBBFAC,,, | Performed by: PHYSICIAN ASSISTANT

## 2018-08-17 NOTE — PATIENT INSTRUCTIONS
"Start taking probiotics x 2 weeks  twice daily- get the probiotic call Culturelle. Then return to once daily    Avoid diary products  Ok to take gas-x as needed    Call me if no relief in 2 weeks        What to Eat and What Not to Eat       (to reduce bloating)    Avoid drinking from straws  Avoid eating excessively fast  Avoid eating excessively slow  Avoid gum chewing         Drinks  Cut out: Dairy, soda (regular and diet), sports drinks, fruit drinks, alcohol, caffeine, soy milk, carbonated beverages, kombucha    Drink: Water (1-2 Liters a day), coconut water, herbal tea, green juices (kale, spinach, green apple), smoothies (berries, bananas, amond milk, ice, flax seed).  Drink at the end of the meal; not during.         Food    Eliminate:  Eliminate all of these foods and ingredients for 10 days. Once the bloating has reduced, can add back one at a time to see which ones your body can tolerate.    Soy  Artificial sweeteners - sugar alcohols, splenda, aspartame  Dairy  Gluten  Alcohol  Sugar - no added sugars (glucose, fructose, maltose, dextrose, corn and maple syrup, honey, agave, stevia)    Limit:  Beans, broccoli, cabbage  Caffeine (1 small cup of coffee or tea a day)  Fatty meals  Meat  Processed foods (if it comes in a box, has more than 10 listed ingredients, has an expiration date)  Salt    Eat:  50% of daily intake should be food eaten in its natural, raw state.  Leafy greens - Kale, spinach, chard, collards, parsley, turnip and mustard greens, arugula, bok catherine, dionicio lettuce  Fiber - favor plant based sources, not processed fibers (cereals, fiber in a box)  Papaya and Pineapple  Brightly colored produce - strawberries, blueberries, bell peppers, lemon, spinach      Adapted from "Gutbliss" by Dr. Anu Pereyra    "

## 2018-08-17 NOTE — PROGRESS NOTES
[facility] Consultation Note    Reason for Consult:  The encounter diagnosis was Generalized abdominal pain.    PCP:   Antonio Killian       Referring MD:  Antonio Killian Md  [address] [place] [address]    HPI:  This is a 78 y.o. male referred by Dr Killian for evaluation of abdominal pain    Was recently treated for a sinus infection with amoxicillin.  When symptoms persited we was started on  augmentin about 3 weeks ago   He thereafter developed moderate abdominal cramping and bloating that has persisted  Some improvement with walking  No melena, rectal bleeding, N/V    He has a hx of chronic constipation  Now taking align, activia, and herbal supplement once a day  Having a BM daily    His 5/2017 colonoscopy involved removal of two polyps, hemorrhoids, and melanosis coli    ROS:  Constitutional: No fevers, chills, No weight loss  ENT: No allergies  CV: No chest pain  Pulm: No cough, No shortness of breath  Ophtho: No vision changes  GI: see HPI  Derm: No rash  Heme: No lymphadenopathy, No bruising  MSK: No arthritis  : No dysuria, No hematuria  Endo: No hot or cold intolerance  Neuro: No syncope, No seizure  Psych: No anxiety, No depression    Medical History:  has a past medical history of Cataract, Chronic constipation, GERD (gastroesophageal reflux disease), Hyperlipidemia, Hypertension, and LBBB (left bundle branch block).    Surgical History:  has a past surgical history that includes Inguinal hernia repair (Right); Colonoscopy (4/28/2008); Esophagogastroduodenoscopy (2/1/2010); COLONOSCOPY (N/A, 5/15/2017); and COLONOSCOPY (N/A, 8/17/2015).    Family History: family history includes Cancer in his sister; Cancer (age of onset: 60) in his paternal uncle; Colon polyps in his paternal uncle; Hypertension in his sister; Stroke in his father..     Social History:  reports that  has never smoked. he has never used smokeless tobacco. He reports that he does not drink  "alcohol or use drugs.    Review of patient's allergies indicates:  No Known Allergies    Current Outpatient Medications on File Prior to Visit   Medication Sig Dispense Refill    atorvastatin (LIPITOR) 10 MG tablet TAKE 1 TABLET(10 MG) BY MOUTH EVERY DAY 90 tablet 3    benazepril (LOTENSIN) 10 MG tablet TAKE 1 TABLET BY MOUTH EVERY DAY 90 tablet 3    doxazosin (CARDURA) 4 MG tablet TAKE 1 TABLET BY MOUTH EVERY NIGHT 90 tablet 3    hydrochlorothiazide (HYDRODIURIL) 25 MG tablet TAKE 1 TABLET BY MOUTH EVERY DAY 90 tablet 3    latanoprost 0.005 % ophthalmic solution INSTILL 1 DROP IN BOTH EYES EVERY DAY 7.5 mL 12    MATZIM  mg 24 hr tablet TAKE 1 TABLET BY MOUTH ONCE DAILY 90 tablet 3     No current facility-administered medications on file prior to visit.          Objective Findings:    Vital Signs:  BP (!) 155/87   Pulse 88   Ht 6' 3" (1.905 m)   Wt 94.1 kg (207 lb 7.3 oz)   BMI 25.93 kg/m²   Body mass index is 25.93 kg/m².    Physical Exam:  General Appearance: Well appearing in no acute distress  Head:   Normocephalic, without obvious abnormality  Eyes:    No scleral icterus  ENT: Neck supple, Lips, mucosa, and tongue normal  Lungs: CTA bilaterally in anterior and posterior fields, no wheezes, no crackles.  Heart:  Regular rate and rhythm, S1, S2 normal, no murmurs heard  Abdomen: Soft, non tender, non distended with positive bowel sounds in all four quadrants.  Extremities: 2+ pulses, no edema  Skin: No rash  Neurologic: AAO x 3      Labs:  Lab Results   Component Value Date    WBC 4.78 07/03/2018    HGB 14.7 07/03/2018    HCT 42.2 07/03/2018     07/03/2018    CHOL 147 03/12/2018    TRIG 62 03/12/2018    HDL 53 03/12/2018    ALT 22 01/10/2018    AST 22 01/10/2018     03/12/2018    K 3.9 03/12/2018     03/12/2018    CREATININE 1.1 03/12/2018    BUN 14 03/12/2018    CO2 28 03/12/2018    TSH 2.056 01/10/2018    PSA 1.4 03/12/2018    INR 1.0 06/23/2013       Imaging:    Endoscopy:  "   His 5/2017 colonoscopy involved removal of two polyps, hemorrhoids, and melanosis coli    colon 8/2015- 10mm polyp, f/u in 3yrs    Assessment:  1. Generalized abdominal pain      77yo M with Hx of chronic constipation present with abdominal cramping and bloating that has persisted 3 weeks after taking augmentin. Have a daily BM taking a probiotic and herbal supplement    I suspect his discomfort is related to the augmentin induced GI distress    Recommendations:  Increase probiotic to twice daily  2 week  Avoid dairy and gas producing foods for 2 weeks  Take gas-x as needed      Follow-up in about 4 weeks (around 9/14/2018).      Order summary:         Thank you so much for allowing me to participate in the care of Tung Sebastiánbronwyn Ibarra PA-C

## 2018-08-17 NOTE — LETTER
August 18, 2018      Antonio Killian MD  [address]  [place] [address]           [facility]  [address]  [place] [address]  Phone: [phone]  Fax: [phone]          Patient: Tung Chau   MR Number: [id number]   YOB: 1940   Date of Visit: 8/17/2018       Dear Dr. Antonio Killian:    Thank you for referring Tung Chau to me for evaluation. Attached you will find relevant portions of my assessment and plan of care.    If you have questions, please do not hesitate to call me. I look forward to following Tung Chau along with you.    Sincerely,    Gwen Ibarra PA-C    Enclosure  CC:  No Recipients    If you would like to receive this communication electronically, please contact [email or web address] or [phone] to request more information on [employer] access.    For providers and/or their staff who would like to refer a patient to [facility], please contact us through our one-stop-shop provider referral line, [facility], at [phone].    If you feel you have received this communication in error or would no longer like to receive these types of communications, please e-mail [email or web address]

## 2018-08-20 RX ORDER — DILTIAZEM HYDROCHLORIDE 360 MG/1
TABLET, EXTENDED RELEASE ORAL
Qty: 90 TABLET | Refills: 1 | Status: SHIPPED | OUTPATIENT
Start: 2018-08-20 | End: 2019-04-11

## 2018-08-23 RX ORDER — ATORVASTATIN CALCIUM 10 MG/1
TABLET, FILM COATED ORAL
Qty: 90 TABLET | Refills: 1 | Status: SHIPPED | OUTPATIENT
Start: 2018-08-23 | End: 2019-02-23 | Stop reason: SDUPTHER

## 2018-08-31 ENCOUNTER — EXTERNAL CHRONIC CARE MANAGEMENT (OUTPATIENT)
Dept: PRIMARY CARE CLINIC | Facility: CLINIC | Age: 78
End: 2018-08-31
Payer: MEDICARE

## 2018-08-31 PROCEDURE — 99490 CHRNC CARE MGMT STAFF 1ST 20: CPT | Mod: S$PBB,,, | Performed by: INTERNAL MEDICINE

## 2018-08-31 PROCEDURE — 99490 CHRNC CARE MGMT STAFF 1ST 20: CPT | Mod: PBBFAC | Performed by: INTERNAL MEDICINE

## 2018-09-11 ENCOUNTER — CLINICAL SUPPORT (OUTPATIENT)
Dept: OPHTHALMOLOGY | Facility: CLINIC | Age: 78
End: 2018-09-11
Payer: MEDICARE

## 2018-09-11 ENCOUNTER — OFFICE VISIT (OUTPATIENT)
Dept: OPTOMETRY | Facility: CLINIC | Age: 78
End: 2018-09-11
Payer: MEDICARE

## 2018-09-11 DIAGNOSIS — H40.1131 PRIMARY OPEN ANGLE GLAUCOMA OF BOTH EYES, MILD STAGE: Primary | ICD-10-CM

## 2018-09-11 DIAGNOSIS — H25.13 NUCLEAR SCLEROSIS, BILATERAL: ICD-10-CM

## 2018-09-11 PROCEDURE — 99999 PR PBB SHADOW E&M-EST. PATIENT-LVL II: CPT | Mod: PBBFAC,,, | Performed by: OPTOMETRIST

## 2018-09-11 PROCEDURE — 99212 OFFICE O/P EST SF 10 MIN: CPT | Mod: PBBFAC,PO | Performed by: OPTOMETRIST

## 2018-09-11 PROCEDURE — 92082 INTERMEDIATE VISUAL FIELD XM: CPT | Mod: PBBFAC,PO | Performed by: OPTOMETRIST

## 2018-09-11 PROCEDURE — 92012 INTRM OPH EXAM EST PATIENT: CPT | Mod: S$PBB,,, | Performed by: OPTOMETRIST

## 2018-09-11 PROCEDURE — 92133 CPTRZD OPH DX IMG PST SGM ON: CPT | Mod: PBBFAC,PO | Performed by: OPTOMETRIST

## 2018-09-11 RX ORDER — DIAZEPAM 5 MG/1
TABLET ORAL
Refills: 0 | COMMUNITY
Start: 2018-08-28 | End: 2018-09-12

## 2018-09-11 RX ORDER — HYDROCODONE BITARTRATE AND ACETAMINOPHEN 5; 325 MG/1; MG/1
TABLET ORAL
Refills: 0 | COMMUNITY
Start: 2018-08-28 | End: 2018-09-12

## 2018-09-11 RX ORDER — LATANOPROST 50 UG/ML
SOLUTION/ DROPS OPHTHALMIC
Qty: 7.5 ML | Refills: 12 | Status: SHIPPED | OUTPATIENT
Start: 2018-09-11 | End: 2019-01-11 | Stop reason: SDUPTHER

## 2018-09-11 RX ORDER — CEPHALEXIN 500 MG/1
CAPSULE ORAL
Refills: 0 | COMMUNITY
Start: 2018-08-28 | End: 2018-09-12

## 2018-09-11 RX ORDER — METHYLPREDNISOLONE 4 MG/1
TABLET ORAL
Refills: 0 | COMMUNITY
Start: 2018-08-28 | End: 2018-09-12

## 2018-09-11 NOTE — PROGRESS NOTES
ELMO VALERA 05/2018  Here for HVF,OCT and IOP ck. Today.  Using Latanoprost OU   QHS. Hasn't noticed any changes.    Last edited by Claudia Stanley on 9/11/2018 11:00 AM. (History)            Assessment /Plan     For exam results, see Encounter Report.    Primary open angle glaucoma of both eyes, mild stage  -     latanoprost 0.005 % ophthalmic solution; INSTILL 1 DROP IN BOTH EYES EVERY DAY  Dispense: 7.5 mL; Refill: 12  -     OCT - Optic Nerve  -     [facility] Visual Field - OU - Intermediate - Both Eyes    Nuclear sclerosis, bilateral      1. IOP fine for nerve, nerve eval stable, HVf and OCT stable , no nerve changes, prev gonio open.  Cont Latanaprost drops. Probably low tension. ,Mild glaucoma based on OCT changes, prev VF defects Ou and increased IOP. Pachy normal. Field did clean up at repeat. Pretreat IOP at 20. Family history of glaucoma. RTC 4 mos IOP ck.   2. Educated pt on presence of cataracts and effects on vision. No surgery at this time. Recheck in one year.

## 2018-09-12 ENCOUNTER — OFFICE VISIT (OUTPATIENT)
Dept: GASTROENTEROLOGY | Facility: CLINIC | Age: 78
End: 2018-09-12
Payer: MEDICARE

## 2018-09-12 VITALS
SYSTOLIC BLOOD PRESSURE: 124 MMHG | WEIGHT: 205.69 LBS | HEART RATE: 68 BPM | BODY MASS INDEX: 25.57 KG/M2 | DIASTOLIC BLOOD PRESSURE: 75 MMHG | HEIGHT: 75 IN

## 2018-09-12 DIAGNOSIS — K59.04 CHRONIC IDIOPATHIC CONSTIPATION: Primary | ICD-10-CM

## 2018-09-12 PROCEDURE — 99213 OFFICE O/P EST LOW 20 MIN: CPT | Mod: PBBFAC | Performed by: PHYSICIAN ASSISTANT

## 2018-09-12 PROCEDURE — 99999 PR PBB SHADOW E&M-EST. PATIENT-LVL III: CPT | Mod: PBBFAC,,, | Performed by: PHYSICIAN ASSISTANT

## 2018-09-12 PROCEDURE — 99213 OFFICE O/P EST LOW 20 MIN: CPT | Mod: S$PBB,,, | Performed by: PHYSICIAN ASSISTANT

## 2018-09-12 NOTE — PROGRESS NOTES
[facility] Consultation Note    Reason for Consult:  The encounter diagnosis was Chronic idiopathic constipation.    PCP:   Antonio Killian       Referring MD:  No referring provider defined for this encounter.    HPI:  This is a 78 y.o. male here to follow up on his abdominal pain  Last seen by myself 4 weeks ago.    Interval hx  Today he says he is doing well with no complaints.  His abdominal pain resolved after increasing his probiotic to twice daily and avoiding dairy    8/2018 visit notes  Was recently treated for a sinus infection with amoxicillin.  When symptoms persited we was started on  augmentin about 3 weeks ago   He thereafter developed moderate abdominal cramping and bloating that has persisted  Some improvement with walking  No melena, rectal bleeding, N/V    He has a hx of chronic constipation  Now taking align, activia, and herbal supplement once a day  Having a BM daily    His 5/2017 colonoscopy involved removal of two polyps, hemorrhoids, and melanosis coli    ROS:  Constitutional: No fevers, chills, No weight loss  ENT: No allergies  CV: No chest pain  Pulm: No cough, No shortness of breath  Ophtho: No vision changes  GI: see HPI  Derm: No rash  Heme: No lymphadenopathy, No bruising  MSK: No arthritis  : No dysuria, No hematuria  Endo: No hot or cold intolerance  Neuro: No syncope, No seizure  Psych: No anxiety, No depression    Medical History:  has a past medical history of Cataract, Chronic constipation, GERD (gastroesophageal reflux disease), Hyperlipidemia, Hypertension, and LBBB (left bundle branch block).    Surgical History:  has a past surgical history that includes Inguinal hernia repair (Right); Colonoscopy (4/28/2008); Esophagogastroduodenoscopy (2/1/2010); COLONOSCOPY (N/A, 5/15/2017); and COLONOSCOPY (N/A, 8/17/2015).    Family History: family history includes Cancer in his sister; Cancer (age of onset: 60) in his paternal uncle; Colon polyps in his  "paternal uncle; Hypertension in his sister; Stroke in his father..     Social History:  reports that  has never smoked. he has never used smokeless tobacco. He reports that he does not drink alcohol or use drugs.    Review of patient's allergies indicates:  No Known Allergies    Current Outpatient Medications on File Prior to Visit   Medication Sig Dispense Refill    atorvastatin (LIPITOR) 10 MG tablet TAKE 1 TABLET(10 MG) BY MOUTH EVERY DAY 90 tablet 1    benazepril (LOTENSIN) 10 MG tablet TAKE 1 TABLET BY MOUTH EVERY DAY 90 tablet 3    doxazosin (CARDURA) 4 MG tablet TAKE 1 TABLET BY MOUTH EVERY NIGHT 90 tablet 3    hydrochlorothiazide (HYDRODIURIL) 25 MG tablet TAKE 1 TABLET BY MOUTH EVERY DAY 90 tablet 3    latanoprost 0.005 % ophthalmic solution INSTILL 1 DROP IN BOTH EYES EVERY DAY 7.5 mL 12    MATZIM  mg 24 hr tablet TAKE 1 TABLET BY MOUTH ONCE DAILY 90 tablet 3    MATZIM  mg 24 hr tablet TAKE 1 TABLET BY MOUTH ONCE DAILY 90 tablet 1     No current facility-administered medications on file prior to visit.          Objective Findings:    Vital Signs:  /75   Pulse 68   Ht 6' 3" (1.905 m)   Wt 93.3 kg (205 lb 11 oz)   BMI 25.71 kg/m²   Body mass index is 25.71 kg/m².    Physical Exam:  General Appearance: Well appearing in no acute distress  Head:   Normocephalic, without obvious abnormality  Eyes:    No scleral icterus  ENT: Neck supple, Lips, mucosa, and tongue normal  Lungs: CTA bilaterally in anterior and posterior fields, no wheezes, no crackles.  Heart:  Regular rate and rhythm, S1, S2 normal, no murmurs heard  Abdomen: Soft, non tender, non distended with positive bowel sounds in all four quadrants.  Extremities: 2+ pulses, no edema  Skin: No rash  Neurologic: AAO x 3      Labs:  Lab Results   Component Value Date    WBC 4.78 07/03/2018    HGB 14.7 07/03/2018    HCT 42.2 07/03/2018     07/03/2018    CHOL 147 03/12/2018    TRIG 62 03/12/2018    HDL 53 03/12/2018    ALT " 22 01/10/2018    AST 22 01/10/2018     03/12/2018    K 3.9 03/12/2018     03/12/2018    CREATININE 1.1 03/12/2018    BUN 14 03/12/2018    CO2 28 03/12/2018    TSH 2.056 01/10/2018    PSA 1.4 03/12/2018    INR 1.0 06/23/2013       Imaging:    Endoscopy:    His 5/2017 colonoscopy involved removal of two polyps, hemorrhoids, and melanosis coli    colon 8/2015- 10mm polyp, f/u in 3yrs    Assessment:  1. Chronic idiopathic constipation      79yo M with Hx of chronic constipation presented 4 weeks ago with abdominal cramping and bloating that has persisted 3 weeks after taking augmentin. Have a daily BM taking a probiotic and herbal supplement    Today he is asymptomatic taking his probiotics    Recommendations:  1. Continue his probiotic    Follow-up if symptoms worsen or fail to improve.      Order summary:         Thank you so much for allowing me to participate in the care of Tung Chau    Gwen Ibarra PA-C

## 2018-09-25 ENCOUNTER — HOSPITAL ENCOUNTER (EMERGENCY)
Facility: HOSPITAL | Age: 78
Discharge: HOME | End: 2018-09-25
Attending: EMERGENCY MEDICINE
Payer: MEDICARE

## 2018-09-25 VITALS
BODY MASS INDEX: 25.49 KG/M2 | DIASTOLIC BLOOD PRESSURE: 71 MMHG | HEIGHT: 75 IN | OXYGEN SATURATION: 97 % | WEIGHT: 205 LBS | SYSTOLIC BLOOD PRESSURE: 148 MMHG | RESPIRATION RATE: 13 BRPM | HEART RATE: 59 BPM | TEMPERATURE: 98 F

## 2018-09-25 DIAGNOSIS — R07.9 CHEST PAIN: ICD-10-CM

## 2018-09-25 LAB
ALBUMIN SERPL BCP-MCNC: 3.9 G/DL
ALP SERPL-CCNC: 73 U/L
ALT SERPL W/O P-5'-P-CCNC: 19 U/L
ANION GAP SERPL CALC-SCNC: 11 MMOL/L
AST SERPL-CCNC: 24 U/L
BASOPHILS # BLD AUTO: 0.01 K/UL
BASOPHILS NFR BLD: 0.3 %
BILIRUB SERPL-MCNC: 0.6 MG/DL
BNP SERPL-MCNC: <10 PG/ML
BUN SERPL-MCNC: 19 MG/DL
CALCIUM SERPL-MCNC: 9.8 MG/DL
CHLORIDE SERPL-SCNC: 105 MMOL/L
CO2 SERPL-SCNC: 22 MMOL/L
CREAT SERPL-MCNC: 1.1 MG/DL
DIFFERENTIAL METHOD: ABNORMAL
EOSINOPHIL # BLD AUTO: 0.1 K/UL
EOSINOPHIL NFR BLD: 1.7 %
ERYTHROCYTE [DISTWIDTH] IN BLOOD BY AUTOMATED COUNT: 12.8 %
EST. GFR  (AFRICAN AMERICAN): >60 ML/MIN/1.73 M^2
EST. GFR  (NON AFRICAN AMERICAN): >60 ML/MIN/1.73 M^2
GLUCOSE SERPL-MCNC: 95 MG/DL
HCT VFR BLD AUTO: 38.3 %
HGB BLD-MCNC: 13 G/DL
LYMPHOCYTES # BLD AUTO: 1 K/UL
LYMPHOCYTES NFR BLD: 28.4 %
MCH RBC QN AUTO: 30.7 PG
MCHC RBC AUTO-ENTMCNC: 33.9 G/DL
MCV RBC AUTO: 90 FL
MONOCYTES # BLD AUTO: 0.3 K/UL
MONOCYTES NFR BLD: 9.6 %
NEUTROPHILS # BLD AUTO: 2.1 K/UL
NEUTROPHILS NFR BLD: 60 %
PLATELET # BLD AUTO: 149 K/UL
PMV BLD AUTO: 10.6 FL
POTASSIUM SERPL-SCNC: 4.1 MMOL/L
PROT SERPL-MCNC: 7.2 G/DL
RBC # BLD AUTO: 4.24 M/UL
SODIUM SERPL-SCNC: 138 MMOL/L
TROPONIN I SERPL DL<=0.01 NG/ML-MCNC: <0.006 NG/ML
TROPONIN I SERPL DL<=0.01 NG/ML-MCNC: <0.006 NG/ML
WBC # BLD AUTO: 3.45 K/UL

## 2018-09-25 PROCEDURE — 85025 COMPLETE CBC W/AUTO DIFF WBC: CPT

## 2018-09-25 PROCEDURE — 99284 EMERGENCY DEPT VISIT MOD MDM: CPT | Mod: 25

## 2018-09-25 PROCEDURE — 83880 ASSAY OF NATRIURETIC PEPTIDE: CPT

## 2018-09-25 PROCEDURE — 80053 COMPREHEN METABOLIC PANEL: CPT

## 2018-09-25 PROCEDURE — 93005 ELECTROCARDIOGRAM TRACING: CPT

## 2018-09-25 PROCEDURE — 84484 ASSAY OF TROPONIN QUANT: CPT

## 2018-09-25 PROCEDURE — 25000003 PHARM REV CODE 250: Performed by: EMERGENCY MEDICINE

## 2018-09-25 PROCEDURE — 93010 ELECTROCARDIOGRAM REPORT: CPT | Mod: ,,, | Performed by: STUDENT IN AN ORGANIZED HEALTH CARE EDUCATION/TRAINING PROGRAM

## 2018-09-25 RX ORDER — ASPIRIN 325 MG
325 TABLET ORAL
Status: COMPLETED | OUTPATIENT
Start: 2018-09-25 | End: 2018-09-25

## 2018-09-25 RX ADMIN — ASPIRIN 325 MG ORAL TABLET 325 MG: 325 PILL ORAL at 08:09

## 2018-09-25 NOTE — ED PROVIDER NOTES
Encounter Date: 9/25/2018    SCRIBE #1 NOTE: I, Kely Robles, am scribing for, and in the presence of, Dr. Hart.       History     Chief Complaint   Patient presents with    Chest Pain     Transient episode of left anterior chest pain this morning lasting less than 1 minute with no associated symptoms. Presents awake, alert with no c/o pain at this time.     Tung Chau is a 78 y.o. male who  has a past medical history of Cataract, Chronic constipation, GERD (gastroesophageal reflux disease), Hyperlipidemia, Hypertension, and LBBB (left bundle branch block).    The patient presents to the ED due to left sided CP that began at 0645. He states CP was an instantaneous pain, did not stay for longer than a few seconds. Pain did not radiate anywhere. CP was not accompanied by any SOB, nausea, vomiting, chills or sweating. No CP at this time. No pain or swelling to legs. Pt states he had a stress test done here at [facility] before. He states he's pretty active throughout the day and never experienced CP before. No hx of MI. However, pt reports family hx of heart disease in his cousins and uncles.      The history is provided by the patient.     Review of patient's allergies indicates:   Allergen Reactions    Penicillins      Stomach cramps     Past Medical History:   Diagnosis Date    Cataract     Chronic constipation     GERD (gastroesophageal reflux disease)     Hyperlipidemia     Hypertension     LBBB (left bundle branch block)      Past Surgical History:   Procedure Laterality Date    COLONOSCOPY  4/28/2008    hemorrhoids    COLONOSCOPY N/A 5/15/2017    Procedure: COLONOSCOPY;  Surgeon: Dandy Cifuentes MD;  Location: [facility] ([address] [place] [address]);  Service: Endoscopy;  Laterality: N/A;    COLONOSCOPY N/A 5/15/2017    Performed by Dandy Cifuentes MD at [facility] (4TH FLR)    COLONOSCOPY N/A 8/17/2015    Performed by Dandy Cifuentes MD at [facility] (4TH FLR)    ESOPHAGOGASTRODUODENOSCOPY  2/1/2010     INGUINAL HERNIA REPAIR Right      Family History   Problem Relation Age of Onset    Stroke Father     Hypertension Sister     Cancer Sister         breast    Cancer Paternal Uncle 60        stomach CA    Colon polyps Paternal Uncle     Amblyopia Neg Hx     Blindness Neg Hx     Cataracts Neg Hx     Diabetes Neg Hx     Glaucoma Neg Hx     Macular degeneration Neg Hx     Retinal detachment Neg Hx     Strabismus Neg Hx     Thyroid disease Neg Hx      Social History     Tobacco Use    Smoking status: Never Smoker    Smokeless tobacco: Never Used   Substance Use Topics    Alcohol use: No    Drug use: No     Review of Systems   Constitutional: Negative for chills, fatigue and fever.   HENT: Negative for congestion, sore throat and voice change.    Eyes: Negative for photophobia, pain and redness.   Respiratory: Negative for cough, choking and shortness of breath.    Cardiovascular: Positive for chest pain. Negative for palpitations and leg swelling.   Gastrointestinal: Negative for abdominal pain, diarrhea, nausea and vomiting.   Genitourinary: Negative for dysuria, frequency and urgency.   Musculoskeletal: Negative for back pain, neck pain and neck stiffness.   Skin: Negative for rash.   Neurological: Negative for seizures, speech difficulty, light-headedness, numbness and headaches.   Hematological: Does not bruise/bleed easily.       Physical Exam     Initial Vitals [09/25/18 0751]   BP Pulse Resp Temp SpO2   (!) 157/74 75 16 97.5 °F (36.4 °C) 98 %      MAP       --         Physical Exam    Nursing note and vitals reviewed.  Constitutional: He appears well-developed and well-nourished. He is not diaphoretic. No distress.   HENT:   Head: Normocephalic and atraumatic.   Right Ear: External ear normal.   Left Ear: External ear normal.   Nose: Nose normal.   Eyes: EOM are normal. Pupils are equal, round, and reactive to light.   Neck: Normal range of motion. Neck supple.   Cardiovascular: Normal rate,  regular rhythm, normal heart sounds and intact distal pulses. Exam reveals no friction rub.    No murmur heard.  Pulses:       Radial pulses are 2+ on the right side, and 2+ on the left side.   Pulmonary/Chest: Breath sounds normal. No respiratory distress. He has no wheezes. He has no rhonchi. He has no rales. He exhibits no tenderness.   Abdominal: Soft. He exhibits no distension. There is no tenderness. There is no rebound and no guarding.   Musculoskeletal: Normal range of motion. He exhibits no edema or tenderness.   No peripheral edema   Neurological: He is alert and oriented to person, place, and time. He has normal strength. No cranial nerve deficit or sensory deficit. GCS score is 15. GCS eye subscore is 4. GCS verbal subscore is 5. GCS motor subscore is 6.   Skin: Skin is warm and dry. Capillary refill takes less than 2 seconds.   Psychiatric: He has a normal mood and affect. Thought content normal.         ED Course   Procedures  Labs Reviewed   CBC W/ AUTO DIFFERENTIAL - Abnormal; Notable for the following components:       Result Value    WBC 3.45 (*)     RBC 4.24 (*)     Hemoglobin 13.0 (*)     Hematocrit 38.3 (*)     Platelets 149 (*)     All other components within normal limits   COMPREHENSIVE METABOLIC PANEL - Abnormal; Notable for the following components:    CO2 22 (*)     All other components within normal limits   TROPONIN I   B-TYPE NATRIURETIC PEPTIDE   TROPONIN I          Imaging Results          X-Ray Chest PA And Lateral (Final result)  Result time 09/25/18 08:26:26    Final result by Cayetano Zaragoza MD (09/25/18 08:26:26)                 Impression:      No acute infiltrate      Electronically signed by: Cayetano Zaragoza MD  Date:    09/25/2018  Time:    08:26             Narrative:    EXAMINATION:  XR CHEST PA AND LATERAL    CLINICAL HISTORY:  Chest Pain;    TECHNIQUE:  PA and lateral views of the chest were performed.    COMPARISON:  07/18/2018    FINDINGS:  Cardiac size is normal.   Lungs are clear.  No infiltrate or pleural effusion.  No vascular congestion.                                 Medical Decision Making:   History:   Old Medical Records: I decided to obtain old medical records.  Initial Assessment:   77 y/o male presents with left sided CP that began at 0645 this morning. CP was not accompanied by any other symptoms. His exam was benign. Plan to obtain cardiac workup to further evaluate. Will give aspirin.   Differential Diagnosis:   Myocardial ischemia, pericarditis, pulmonary embolus, chest wall pain, pleural inflammation and pulmonary infectious causes.    Independently Interpreted Test(s):   I have ordered and independently interpreted EKG Reading(s) - see prior notes  Clinical Tests:   Lab Tests: Reviewed and Ordered  Radiological Study: Ordered and Reviewed  Medical Tests: Reviewed and Ordered  ED Management:  Upon re-evaluation, the patient remained asymptomatic  After complete ED evaluation, clinical impression is most consistent with CP.  Heart score of 3.  2 sets of negative troponins.  No ischemic EKG changes.    At this time, I feel there is no emergent condition requiring further evaluation or admission. I believe the patient is stable for discharge from the ED. The patient and any additional family present were updated with test results, overall clinical impression, and recommended further plan of care. All questions were answered. The patient expressed understanding and agreed with current plan for discharge with PCP  follow-up this week.  Also given contact information for cardiologist to arrange follow up.   Strict return precautions were provided, including CP, SOB, return/worsening of current symptoms or any other concerns.       Additional MDM:   Heart Score:    History:          Slightly suspicious.  ECG:             Normal  Age:               >65 years  Risk factors: 1-2 risk factors  Troponin:       Less than or equal to normal limit  Final Score: 3                     ED Course as of Sep 25 1452   Tue Sep 25, 2018   0800 EKG with sinus rhythm, rate of 77 bpm.  RBBB, occasional PVCs.  No significant changes compared to previous EKG from 6/23/13  [LD]   1202 No recurrence of CP  [LD]      ED Course User Index  [LD] Davina Hart MD     Clinical Impression:   The encounter diagnosis was Chest pain.      Disposition:   Disposition: Discharged  Condition: Stable                        Davina Hart MD  09/25/18 [id number]

## 2018-09-25 NOTE — ED NOTES
Reports had one episode of left sided chest pain this am at 0630 that lasted only a minute or two; he denies taking any mds prior to arrival denies and nausea, vomiting or SOB. Pt is currently pain free, calm cooperative any playful throughout eval

## 2018-09-30 ENCOUNTER — EXTERNAL CHRONIC CARE MANAGEMENT (OUTPATIENT)
Dept: PRIMARY CARE CLINIC | Facility: CLINIC | Age: 78
End: 2018-09-30
Payer: MEDICARE

## 2018-09-30 PROCEDURE — 99490 CHRNC CARE MGMT STAFF 1ST 20: CPT | Mod: PBBFAC | Performed by: INTERNAL MEDICINE

## 2018-09-30 PROCEDURE — 99490 CHRNC CARE MGMT STAFF 1ST 20: CPT | Mod: S$PBB,,, | Performed by: INTERNAL MEDICINE

## 2018-10-31 ENCOUNTER — EXTERNAL CHRONIC CARE MANAGEMENT (OUTPATIENT)
Dept: PRIMARY CARE CLINIC | Facility: CLINIC | Age: 78
End: 2018-10-31
Payer: MEDICARE

## 2018-10-31 ENCOUNTER — LAB VISIT (OUTPATIENT)
Dept: LAB | Facility: HOSPITAL | Age: 78
End: 2018-10-31
Attending: INTERNAL MEDICINE
Payer: MEDICARE

## 2018-10-31 DIAGNOSIS — E78.00 PURE HYPERCHOLESTEROLEMIA: ICD-10-CM

## 2018-10-31 DIAGNOSIS — N40.1 BENIGN NON-NODULAR PROSTATIC HYPERPLASIA WITH LOWER URINARY TRACT SYMPTOMS: ICD-10-CM

## 2018-10-31 DIAGNOSIS — I10 ESSENTIAL HYPERTENSION: ICD-10-CM

## 2018-10-31 LAB
ANION GAP SERPL CALC-SCNC: 7 MMOL/L
BASOPHILS # BLD AUTO: 0.04 K/UL
BASOPHILS NFR BLD: 0.8 %
BUN SERPL-MCNC: 12 MG/DL
CALCIUM SERPL-MCNC: 10 MG/DL
CHLORIDE SERPL-SCNC: 104 MMOL/L
CHOLEST SERPL-MCNC: 129 MG/DL
CHOLEST/HDLC SERPL: 2.7 {RATIO}
CO2 SERPL-SCNC: 28 MMOL/L
CREAT SERPL-MCNC: 1.1 MG/DL
DIFFERENTIAL METHOD: ABNORMAL
EOSINOPHIL # BLD AUTO: 0.1 K/UL
EOSINOPHIL NFR BLD: 1 %
ERYTHROCYTE [DISTWIDTH] IN BLOOD BY AUTOMATED COUNT: 12 %
EST. GFR  (AFRICAN AMERICAN): >60 ML/MIN/1.73 M^2
EST. GFR  (NON AFRICAN AMERICAN): >60 ML/MIN/1.73 M^2
GLUCOSE SERPL-MCNC: 90 MG/DL
HCT VFR BLD AUTO: 41.9 %
HDLC SERPL-MCNC: 47 MG/DL
HDLC SERPL: 36.4 %
HGB BLD-MCNC: 14.4 G/DL
IMM GRANULOCYTES # BLD AUTO: 0.01 K/UL
IMM GRANULOCYTES NFR BLD AUTO: 0.2 %
LDLC SERPL CALC-MCNC: 66.4 MG/DL
LYMPHOCYTES # BLD AUTO: 1 K/UL
LYMPHOCYTES NFR BLD: 21 %
MCH RBC QN AUTO: 31.9 PG
MCHC RBC AUTO-ENTMCNC: 34.4 G/DL
MCV RBC AUTO: 93 FL
MONOCYTES # BLD AUTO: 0.4 K/UL
MONOCYTES NFR BLD: 8.2 %
NEUTROPHILS # BLD AUTO: 3.4 K/UL
NEUTROPHILS NFR BLD: 68.8 %
NONHDLC SERPL-MCNC: 82 MG/DL
NRBC BLD-RTO: 0 /100 WBC
PLATELET # BLD AUTO: 156 K/UL
PMV BLD AUTO: 12.3 FL
POTASSIUM SERPL-SCNC: 3.9 MMOL/L
RBC # BLD AUTO: 4.52 M/UL
SODIUM SERPL-SCNC: 139 MMOL/L
TRIGL SERPL-MCNC: 78 MG/DL
WBC # BLD AUTO: 4.9 K/UL

## 2018-10-31 PROCEDURE — 99490 CHRNC CARE MGMT STAFF 1ST 20: CPT | Mod: PBBFAC | Performed by: INTERNAL MEDICINE

## 2018-10-31 PROCEDURE — 36415 COLL VENOUS BLD VENIPUNCTURE: CPT | Mod: PO

## 2018-10-31 PROCEDURE — 80048 BASIC METABOLIC PNL TOTAL CA: CPT

## 2018-10-31 PROCEDURE — 85025 COMPLETE CBC W/AUTO DIFF WBC: CPT

## 2018-10-31 PROCEDURE — 99490 CHRNC CARE MGMT STAFF 1ST 20: CPT | Mod: S$PBB,,, | Performed by: INTERNAL MEDICINE

## 2018-10-31 PROCEDURE — 80061 LIPID PANEL: CPT

## 2018-11-01 ENCOUNTER — OFFICE VISIT (OUTPATIENT)
Dept: INTERNAL MEDICINE | Facility: CLINIC | Age: 78
End: 2018-11-01
Payer: MEDICARE

## 2018-11-01 ENCOUNTER — IMMUNIZATION (OUTPATIENT)
Dept: INTERNAL MEDICINE | Facility: CLINIC | Age: 78
End: 2018-11-01
Payer: MEDICARE

## 2018-11-01 VITALS
HEART RATE: 80 BPM | BODY MASS INDEX: 25.24 KG/M2 | WEIGHT: 203 LBS | SYSTOLIC BLOOD PRESSURE: 132 MMHG | HEIGHT: 75 IN | DIASTOLIC BLOOD PRESSURE: 70 MMHG | OXYGEN SATURATION: 98 %

## 2018-11-01 DIAGNOSIS — I10 ESSENTIAL HYPERTENSION: Primary | ICD-10-CM

## 2018-11-01 DIAGNOSIS — K21.9 GERD WITHOUT ESOPHAGITIS: ICD-10-CM

## 2018-11-01 DIAGNOSIS — R51.9 NONINTRACTABLE HEADACHE, UNSPECIFIED CHRONICITY PATTERN, UNSPECIFIED HEADACHE TYPE: ICD-10-CM

## 2018-11-01 DIAGNOSIS — N40.0 BENIGN NON-NODULAR PROSTATIC HYPERPLASIA WITHOUT LOWER URINARY TRACT SYMPTOMS: ICD-10-CM

## 2018-11-01 DIAGNOSIS — J31.0 CHRONIC RHINITIS: ICD-10-CM

## 2018-11-01 DIAGNOSIS — Z12.5 ENCOUNTER FOR SCREENING FOR MALIGNANT NEOPLASM OF PROSTATE: ICD-10-CM

## 2018-11-01 DIAGNOSIS — E78.00 PURE HYPERCHOLESTEROLEMIA: ICD-10-CM

## 2018-11-01 PROCEDURE — 99999 PR PBB SHADOW E&M-EST. PATIENT-LVL IV: CPT | Mod: PBBFAC,,, | Performed by: INTERNAL MEDICINE

## 2018-11-01 PROCEDURE — 99397 PER PM REEVAL EST PAT 65+ YR: CPT | Mod: S$PBB,,, | Performed by: INTERNAL MEDICINE

## 2018-11-01 PROCEDURE — 90662 IIV NO PRSV INCREASED AG IM: CPT | Mod: PBBFAC,PO

## 2018-11-01 PROCEDURE — 99214 OFFICE O/P EST MOD 30 MIN: CPT | Mod: PBBFAC,PO | Performed by: INTERNAL MEDICINE

## 2018-11-01 RX ORDER — MONTELUKAST SODIUM 10 MG/1
10 TABLET ORAL NIGHTLY
Qty: 30 TABLET | Refills: 0 | Status: SHIPPED | OUTPATIENT
Start: 2018-11-01 | End: 2018-12-04 | Stop reason: SDUPTHER

## 2018-11-01 RX ORDER — MONTELUKAST SODIUM 10 MG/1
TABLET ORAL
Qty: 90 TABLET | Refills: 0 | OUTPATIENT
Start: 2018-11-01

## 2018-11-01 RX ORDER — PREDNISONE 20 MG/1
TABLET ORAL
Qty: 18 TABLET | Refills: 0 | Status: SHIPPED | OUTPATIENT
Start: 2018-11-01 | End: 2019-04-11 | Stop reason: CLARIF

## 2018-11-01 NOTE — PROGRESS NOTES
PAST MEDICAL HISTORY:   Hypertension.  Hyperlipidemia.  Gastroesophageal reflux disease.  Chronic rhinitis.  BPH.  Adenomatous colon polyp in the year 2000 and in August 2015.  Right inguinal hernia repair.  Epididymal cyst.  Fractured elbow and wrist.  Lung surgery at age 42 to remove an empyema.    SOCIAL HISTORY:  Tobacco and alcohol use - none.      MEDICATIONS:   Atorvastatin 10 mg.  Benazepril 10 mg.  Diltiazem 360 mg.  Doxazosin 4 mg.  Hydrochlorothiazide 25 mg.        REASON FOR VISIT:  This is a 78-year-old male.  He comes in for a routine   followup visit.  In general sense he is feeling well, but what he brings up is   that he always tends to be stuffy in his nose.  He feels the stuffiness   extending up frontally and maybe a little more in the maxillary sinuses.  He   went to ENT doctor two months ago, Dr. Perla where it appears in the office   he may have done a balloon plasty.  He said a lot of mucus came out, but he   tends to feel stuffy.  He has been using a steroid nasal rinse which will tend   to help, but along with this and then four months, maybe twice a week, he gets   this burning, irritated sensation feeling headache that starts on the right side   of his frontal region goes across his parietal to his occipital.  It comes and   goes.  It may last maybe 10 minutes to an hour, maybe no more than twice a week.    Actually when he does the nasal rinse, it tends to get better.    In the interim a few months ago, he saw Gastroenterology where he was having   abdominal discomfort after taking antibiotics, but he is taking a probiotic and   a herbal supplement and he feels fine.    MEDICAL HISTORY:  Outlined above.    RECENT LABS:  Cholesterol was 129, LDL 66, HDL 47.  CBC, basic metabolic profile   is normal.    REVIEW OF SYMPTOMS:  He has no pains in the chest, shortness of breath,   palpitations or abdominal pain.  Bowel function is regular.  No difficulty   urinating.  Nocturia  x1-2.    PHYSICAL EXAMINATION:  VITAL SIGNS:  Weight is 203 pounds.  Pulse rate is 80.  Blood pressure 130/72.  HEENT:  Nasal mucosa right now appears to be clear.  Oropharynx, no abnormal   findings.  NECK:  No thyromegaly.  LUNGS:  Clear.  HEART:  Regular rate and rhythm.  ABDOMEN:  Active bowel sounds, soft, nontender.  No hepatosplenomegaly or   abdominal masses.  PULSES:  2+ carotid pulses, no bruits.  EXTREMITIES:  No edema.    IMPRESSION:  1.  Hypertension.  2.  Hyperlipidemia.  3.  BPH.  4.  Gastroesophageal reflux disease.  5.  Chronic rhinitis.  6.  Headache.    PLAN:  I will give him a trial of Singulair for one month and prednisone over   nine days and afterwards he is to let me know how he is feeling.  He is   wondering if he needed to see a neurologist and another ENT doctor here, we will   decide after a month.  Increase water fluid intake.  Flu vaccine today.  Return   in six months.        /alex [id number] review        FADY/CARLOS  dd: 11/01/2018 09:31:34 (CDT)  td: 11/01/2018 22:32:54 (CDT)  Doc ID   #[id number]  Job ID #[id number]    CC:

## 2018-11-01 NOTE — PROGRESS NOTES
Administered high dose flu vaccine to the left deltoid(0.5ml) tolerated well, no c/o pain noted, no redness or adverse reaction noted.

## 2018-11-30 ENCOUNTER — EXTERNAL CHRONIC CARE MANAGEMENT (OUTPATIENT)
Dept: PRIMARY CARE CLINIC | Facility: CLINIC | Age: 78
End: 2018-11-30
Payer: MEDICARE

## 2018-11-30 PROCEDURE — 99490 CHRNC CARE MGMT STAFF 1ST 20: CPT | Mod: PBBFAC | Performed by: INTERNAL MEDICINE

## 2018-11-30 PROCEDURE — 99490 CHRNC CARE MGMT STAFF 1ST 20: CPT | Mod: S$PBB,,, | Performed by: INTERNAL MEDICINE

## 2018-12-05 RX ORDER — MONTELUKAST SODIUM 10 MG/1
TABLET ORAL
Qty: 30 TABLET | Refills: 11 | Status: SHIPPED | OUTPATIENT
Start: 2018-12-05 | End: 2019-04-11 | Stop reason: CLARIF

## 2018-12-07 RX ORDER — HYDROCHLOROTHIAZIDE 25 MG/1
TABLET ORAL
Qty: 90 TABLET | Refills: 3 | Status: SHIPPED | OUTPATIENT
Start: 2018-12-07 | End: 2021-06-19

## 2019-01-11 ENCOUNTER — OFFICE VISIT (OUTPATIENT)
Dept: OPTOMETRY | Facility: CLINIC | Age: 79
End: 2019-01-11
Payer: MEDICARE

## 2019-01-11 DIAGNOSIS — H25.13 NUCLEAR SCLEROSIS, BILATERAL: ICD-10-CM

## 2019-01-11 DIAGNOSIS — H40.1131 PRIMARY OPEN ANGLE GLAUCOMA OF BOTH EYES, MILD STAGE: Primary | ICD-10-CM

## 2019-01-11 PROCEDURE — 99212 OFFICE O/P EST SF 10 MIN: CPT | Mod: PBBFAC,PO | Performed by: OPTOMETRIST

## 2019-01-11 PROCEDURE — 92012 INTRM OPH EXAM EST PATIENT: CPT | Mod: S$PBB,,, | Performed by: OPTOMETRIST

## 2019-01-11 PROCEDURE — 92012 PR EYE EXAM, EST PATIENT,INTERMED: ICD-10-PCS | Mod: S$PBB,,, | Performed by: OPTOMETRIST

## 2019-01-11 PROCEDURE — 99999 PR PBB SHADOW E&M-EST. PATIENT-LVL II: CPT | Mod: PBBFAC,,, | Performed by: OPTOMETRIST

## 2019-01-11 PROCEDURE — 99999 PR PBB SHADOW E&M-EST. PATIENT-LVL II: ICD-10-PCS | Mod: PBBFAC,,, | Performed by: OPTOMETRIST

## 2019-01-11 RX ORDER — LATANOPROST 50 UG/ML
SOLUTION/ DROPS OPHTHALMIC
Qty: 7.5 ML | Refills: 12 | Status: SHIPPED | OUTPATIENT
Start: 2019-01-11 | End: 2019-09-25 | Stop reason: SDUPTHER

## 2019-01-11 NOTE — MEDICAL/APP STUDENT
Pt is here for an IOP check. No changes in vision since last exam. Still notices some glare at night. Currently using latanoprost qhs OU. No side effects. Does not miss drop. May need refills today. Last drop used last night. Denies eye pain, redness or irritation.  SOTO:9/2018, current glasses (left at home) 8 months old

## 2019-01-11 NOTE — PROGRESS NOTES
HPI     Glare at night OU x mos, no relief over time, no associated pain  Latanaprost qhs OU  No side effects  Needs refills  No pain, no redness, no irritation    Last edited by Harjeet Irving, OD on 1/11/2019 10:00 AM. (History)            Assessment /Plan     For exam results, see Encounter Report.    Primary open angle glaucoma of both eyes, mild stage  -     latanoprost 0.005 % ophthalmic solution; INSTILL 1 DROP IN BOTH EYES EVERY DAY  Dispense: 7.5 mL; Refill: 12    Nuclear sclerosis, bilateral      1. IOP stable, nerve eval stable, Prev HVf and OCT stable, prev gonio open.  Cont Latanaprost drops. Probably low tension. ,Mild glaucoma based on OCT changes, prev VF defects Ou and increased IOP. Pachy normal. Field did clean up at repeat. Pretreat IOP at 20. Family history of glaucoma. RTC 4 mos IOP ck. With DFE.  2. Causing glare. Educated pt on presence of cataracts and effects on vision. No surgery at this time. Recheck in one year.

## 2019-02-07 RX ORDER — DOXAZOSIN 4 MG/1
TABLET ORAL
Qty: 90 TABLET | Refills: 2 | Status: SHIPPED | OUTPATIENT
Start: 2019-02-07 | End: 2019-11-16 | Stop reason: SDUPTHER

## 2019-02-07 RX ORDER — BENAZEPRIL HYDROCHLORIDE 10 MG/1
TABLET ORAL
Qty: 90 TABLET | Refills: 2 | Status: SHIPPED | OUTPATIENT
Start: 2019-02-07 | End: 2019-11-25 | Stop reason: SDUPTHER

## 2019-02-25 RX ORDER — ATORVASTATIN CALCIUM 10 MG/1
TABLET, FILM COATED ORAL
Qty: 90 TABLET | Refills: 3 | Status: SHIPPED | OUTPATIENT
Start: 2019-02-25 | End: 2020-02-04

## 2019-02-25 RX ORDER — DILTIAZEM HYDROCHLORIDE EXTENDED-RELEASE TABLETS 360 MG/1
TABLET, EXTENDED RELEASE ORAL
Qty: 90 TABLET | Refills: 3 | Status: SHIPPED | OUTPATIENT
Start: 2019-02-25 | End: 2019-04-11

## 2019-04-08 ENCOUNTER — TELEPHONE (OUTPATIENT)
Dept: INTERNAL MEDICINE | Facility: CLINIC | Age: 79
End: 2019-04-08

## 2019-04-08 NOTE — TELEPHONE ENCOUNTER
----- Message from Gay Kwok sent at 4/8/2019 11:28 AM CDT -----  Contact: [phone]  Patient is requesting if the office can schedule a blood chemistry test on Wednesday 4-.  Please send confirmation to his email.    Please advise, thank you

## 2019-04-10 ENCOUNTER — LAB VISIT (OUTPATIENT)
Dept: LAB | Facility: HOSPITAL | Age: 79
End: 2019-04-10
Attending: INTERNAL MEDICINE
Payer: MEDICARE

## 2019-04-10 DIAGNOSIS — I10 ESSENTIAL HYPERTENSION: ICD-10-CM

## 2019-04-10 DIAGNOSIS — N40.0 BENIGN NON-NODULAR PROSTATIC HYPERPLASIA WITHOUT LOWER URINARY TRACT SYMPTOMS: ICD-10-CM

## 2019-04-10 DIAGNOSIS — K21.9 GERD WITHOUT ESOPHAGITIS: ICD-10-CM

## 2019-04-10 DIAGNOSIS — E78.00 PURE HYPERCHOLESTEROLEMIA: ICD-10-CM

## 2019-04-10 DIAGNOSIS — J31.0 CHRONIC RHINITIS: ICD-10-CM

## 2019-04-10 DIAGNOSIS — R51.9 NONINTRACTABLE HEADACHE, UNSPECIFIED CHRONICITY PATTERN, UNSPECIFIED HEADACHE TYPE: ICD-10-CM

## 2019-04-10 DIAGNOSIS — Z12.5 ENCOUNTER FOR SCREENING FOR MALIGNANT NEOPLASM OF PROSTATE: ICD-10-CM

## 2019-04-10 LAB
ALBUMIN SERPL BCP-MCNC: 4.1 G/DL (ref 3.5–5.2)
ALP SERPL-CCNC: 76 U/L (ref 55–135)
ALT SERPL W/O P-5'-P-CCNC: 20 U/L (ref 10–44)
ANION GAP SERPL CALC-SCNC: 8 MMOL/L (ref 8–16)
AST SERPL-CCNC: 22 U/L (ref 10–40)
BASOPHILS # BLD AUTO: 0.04 K/UL (ref 0–0.2)
BASOPHILS NFR BLD: 0.9 % (ref 0–1.9)
BILIRUB SERPL-MCNC: 1 MG/DL (ref 0.1–1)
BUN SERPL-MCNC: 15 MG/DL (ref 8–23)
CALCIUM SERPL-MCNC: 10.1 MG/DL (ref 8.7–10.5)
CHLORIDE SERPL-SCNC: 102 MMOL/L (ref 95–110)
CHOLEST SERPL-MCNC: 135 MG/DL (ref 120–199)
CHOLEST/HDLC SERPL: 3.1 {RATIO} (ref 2–5)
CO2 SERPL-SCNC: 28 MMOL/L (ref 23–29)
COMPLEXED PSA SERPL-MCNC: 0.84 NG/ML (ref 0–4)
CREAT SERPL-MCNC: 1.2 MG/DL (ref 0.5–1.4)
DIFFERENTIAL METHOD: ABNORMAL
EOSINOPHIL # BLD AUTO: 0 K/UL (ref 0–0.5)
EOSINOPHIL NFR BLD: 0.9 % (ref 0–8)
ERYTHROCYTE [DISTWIDTH] IN BLOOD BY AUTOMATED COUNT: 12.3 % (ref 11.5–14.5)
EST. GFR  (AFRICAN AMERICAN): >60 ML/MIN/1.73 M^2
EST. GFR  (NON AFRICAN AMERICAN): 58.4 ML/MIN/1.73 M^2
GLUCOSE SERPL-MCNC: 92 MG/DL (ref 70–110)
HCT VFR BLD AUTO: 42.9 % (ref 40–54)
HDLC SERPL-MCNC: 44 MG/DL (ref 40–75)
HDLC SERPL: 32.6 % (ref 20–50)
HGB BLD-MCNC: 14.7 G/DL (ref 14–18)
IMM GRANULOCYTES # BLD AUTO: 0.01 K/UL (ref 0–0.04)
IMM GRANULOCYTES NFR BLD AUTO: 0.2 % (ref 0–0.5)
LDLC SERPL CALC-MCNC: 81.4 MG/DL (ref 63–159)
LYMPHOCYTES # BLD AUTO: 1 K/UL (ref 1–4.8)
LYMPHOCYTES NFR BLD: 22 % (ref 18–48)
MCH RBC QN AUTO: 31.5 PG (ref 27–31)
MCHC RBC AUTO-ENTMCNC: 34.3 G/DL (ref 32–36)
MCV RBC AUTO: 92 FL (ref 82–98)
MONOCYTES # BLD AUTO: 0.4 K/UL (ref 0.3–1)
MONOCYTES NFR BLD: 9 % (ref 4–15)
NEUTROPHILS # BLD AUTO: 2.9 K/UL (ref 1.8–7.7)
NEUTROPHILS NFR BLD: 67 % (ref 38–73)
NONHDLC SERPL-MCNC: 91 MG/DL
NRBC BLD-RTO: 0 /100 WBC
PLATELET # BLD AUTO: 161 K/UL (ref 150–350)
PMV BLD AUTO: 11.8 FL (ref 9.2–12.9)
POTASSIUM SERPL-SCNC: 4.8 MMOL/L (ref 3.5–5.1)
PROT SERPL-MCNC: 6.9 G/DL (ref 6–8.4)
RBC # BLD AUTO: 4.67 M/UL (ref 4.6–6.2)
SODIUM SERPL-SCNC: 138 MMOL/L (ref 136–145)
TRIGL SERPL-MCNC: 48 MG/DL (ref 30–150)
TSH SERPL DL<=0.005 MIU/L-ACNC: 2.42 UIU/ML (ref 0.4–4)
WBC # BLD AUTO: 4.31 K/UL (ref 3.9–12.7)

## 2019-04-10 PROCEDURE — 80061 LIPID PANEL: CPT

## 2019-04-10 PROCEDURE — 84443 ASSAY THYROID STIM HORMONE: CPT

## 2019-04-10 PROCEDURE — 80053 COMPREHEN METABOLIC PANEL: CPT

## 2019-04-10 PROCEDURE — 84153 ASSAY OF PSA TOTAL: CPT

## 2019-04-10 PROCEDURE — 85025 COMPLETE CBC W/AUTO DIFF WBC: CPT

## 2019-04-10 PROCEDURE — 36415 COLL VENOUS BLD VENIPUNCTURE: CPT | Mod: PO

## 2019-04-10 NOTE — PROGRESS NOTES
PAST MEDICAL HISTORY:   Hypertension.  Hyperlipidemia.  Gastroesophageal reflux disease.  Chronic rhinitis.  BPH.  Left bundle-branch block.  Adenomatous colon polyp in the year  and in 2015.  Right inguinal hernia repair.  Epididymal cyst.  Fractured elbow and wrist.  Lung surgery at age 42 to remove an empyema.     SOCIAL HISTORY:  Tobacco and alcohol use - none.  .  Exercises by   walking, weight lifting, and working on his farm.  He volunteers regularly at   the [employer] and [employer].     FAMILY HISTORY:  Father is , complications from a fracture.  Mother is   , breast cancer and stroke.  One sister is alive, doing well, but   history of breast cancer and hypertension.     SCREENING:  Last colonoscopy in 2017   Benign polyp     MEDICATIONS:   Atorvastatin 10 mg.  Benazepril 10 mg.  Diltiazem 360 mg.  Doxazosin 4 mg.  Hydrochlorothiazide 25 mg.        REASON FOR VISIT:  This is a 78-year-old male who is coming in for annual   routine visit.  Overall in general, he has been feeling well.    A technical issue is that he is not having his diltiazem  mg covered by   insurance.  He has been paying for it, but maybe five at a time for the past few   times.    MEDICAL HISTORY AND MEDICATIONS:  Outlined above.    SOCIAL HISTORY:  He is still very active socially and physically.    RECENT LABS:  TSH normal.  PSA 0.84.  Total cholesterol 135 with LDL 81.    Chemistry was normal.  Creatinine 1.2.  CBC normal.    REVIEW OF SYMPTOMS:  He reports no pains in the chest, palpitations, shortness   of breath, or abdominal pain.  Bowel function is regular.  There has been no   difficulty with urinating.  Urine flow is good.  Presently, no arthralgias,   headaches.  Occasional nasal congestion.    PHYSICAL EXAMINATION:  VITAL SIGNS:  Weight is 211 pounds, pulse 64, blood pressure ranged from 130 to   138/64.  HEENT:  Tympanic membranes normal.  Nasal mucosa is clear.   Oropharynx, no   abnormal findings.  NECK:  No thyromegaly.  No masses.  LUNGS:  Clear breath sounds, good effort.  HEART:  Regular rate and rhythm.  ABDOMEN:  Active bowel sounds, soft, nontender.  No hepatosplenomegaly or   abdominal masses.  PULSES:  2+ carotid pulses.  2+ pedal pulses.  EXTREMITIES:  No edema.  LYMPH GLAND:  No palpable adenopathy.  GENITALIA:  No scrotal masses, no hernias.  RECTAL:  Stool is brown.  Prostate is mildly enlarged.    IMPRESSION:  1. General exam.  2. Hypertension.  3. Hyperlipidemia.  4. BPH.  5. Chronic rhinitis.  6. History of colon polyps.    PLAN:  Continue with attention to proper diet and physical activity.  Regarding   the diltiazem, we will see if we can give 180 mg and to take two tablets a day.    Otherwise, we can also attempt 300 mg strength.        JAM/HN  dd: 04/11/2019 09:19:24 (CDT)  td: 04/11/2019 20:52:06 (CDT)  Doc ID   #[id number]  Job ID #[id number]    CC:

## 2019-04-11 ENCOUNTER — OFFICE VISIT (OUTPATIENT)
Dept: INTERNAL MEDICINE | Facility: CLINIC | Age: 79
End: 2019-04-11
Payer: MEDICARE

## 2019-04-11 VITALS
DIASTOLIC BLOOD PRESSURE: 86 MMHG | WEIGHT: 211 LBS | OXYGEN SATURATION: 97 % | SYSTOLIC BLOOD PRESSURE: 138 MMHG | HEART RATE: 62 BPM | BODY MASS INDEX: 26.24 KG/M2 | HEIGHT: 75 IN

## 2019-04-11 DIAGNOSIS — J31.0 CHRONIC RHINITIS: ICD-10-CM

## 2019-04-11 DIAGNOSIS — Z00.00 ANNUAL PHYSICAL EXAM: Primary | ICD-10-CM

## 2019-04-11 DIAGNOSIS — N40.0 BENIGN NON-NODULAR PROSTATIC HYPERPLASIA WITHOUT LOWER URINARY TRACT SYMPTOMS: ICD-10-CM

## 2019-04-11 DIAGNOSIS — E78.00 PURE HYPERCHOLESTEROLEMIA: ICD-10-CM

## 2019-04-11 DIAGNOSIS — I10 ESSENTIAL HYPERTENSION: ICD-10-CM

## 2019-04-11 PROCEDURE — 99999 PR PBB SHADOW E&M-EST. PATIENT-LVL III: CPT | Mod: PBBFAC,,, | Performed by: INTERNAL MEDICINE

## 2019-04-11 PROCEDURE — 99999 PR PBB SHADOW E&M-EST. PATIENT-LVL III: ICD-10-PCS | Mod: PBBFAC,,, | Performed by: INTERNAL MEDICINE

## 2019-04-11 PROCEDURE — 99213 OFFICE O/P EST LOW 20 MIN: CPT | Mod: PBBFAC,PO | Performed by: INTERNAL MEDICINE

## 2019-04-11 PROCEDURE — 99397 PER PM REEVAL EST PAT 65+ YR: CPT | Mod: S$PBB,,, | Performed by: INTERNAL MEDICINE

## 2019-04-11 PROCEDURE — 99397 PR PREVENTIVE VISIT,EST,65 & OVER: ICD-10-PCS | Mod: S$PBB,,, | Performed by: INTERNAL MEDICINE

## 2019-04-11 RX ORDER — DILTIAZEM HYDROCHLORIDE 180 MG/1
360 CAPSULE, COATED, EXTENDED RELEASE ORAL DAILY
Qty: 60 CAPSULE | Refills: 11 | Status: SHIPPED | OUTPATIENT
Start: 2019-04-11 | End: 2020-04-03 | Stop reason: SDUPTHER

## 2019-04-18 ENCOUNTER — PES CALL (OUTPATIENT)
Dept: ADMINISTRATIVE | Facility: CLINIC | Age: 79
End: 2019-04-18

## 2019-05-29 ENCOUNTER — OFFICE VISIT (OUTPATIENT)
Dept: OPTOMETRY | Facility: CLINIC | Age: 79
End: 2019-05-29
Payer: MEDICARE

## 2019-05-29 DIAGNOSIS — H40.1131 PRIMARY OPEN ANGLE GLAUCOMA OF BOTH EYES, MILD STAGE: Primary | ICD-10-CM

## 2019-05-29 DIAGNOSIS — H52.7 REFRACTIVE ERROR: ICD-10-CM

## 2019-05-29 DIAGNOSIS — H25.13 NUCLEAR SCLEROSIS, BILATERAL: ICD-10-CM

## 2019-05-29 PROCEDURE — 92014 PR EYE EXAM, EST PATIENT,COMPREHESV: ICD-10-PCS | Mod: S$PBB,,, | Performed by: OPTOMETRIST

## 2019-05-29 PROCEDURE — 92015 DETERMINE REFRACTIVE STATE: CPT | Mod: ,,, | Performed by: OPTOMETRIST

## 2019-05-29 PROCEDURE — 99999 PR PBB SHADOW E&M-EST. PATIENT-LVL II: CPT | Mod: PBBFAC,,, | Performed by: OPTOMETRIST

## 2019-05-29 PROCEDURE — 99212 OFFICE O/P EST SF 10 MIN: CPT | Mod: PBBFAC,PO | Performed by: OPTOMETRIST

## 2019-05-29 PROCEDURE — 99999 PR PBB SHADOW E&M-EST. PATIENT-LVL II: ICD-10-PCS | Mod: PBBFAC,,, | Performed by: OPTOMETRIST

## 2019-05-29 PROCEDURE — 92014 COMPRE OPH EXAM EST PT 1/>: CPT | Mod: S$PBB,,, | Performed by: OPTOMETRIST

## 2019-05-29 PROCEDURE — 92015 PR REFRACTION: ICD-10-PCS | Mod: ,,, | Performed by: OPTOMETRIST

## 2019-05-29 NOTE — PROGRESS NOTES
ELMO VALERA 01/2019  Here for IOP check and DFE today.  Using Latanoprost OU Q   HS. Patient hasn't noticed any vision changes.     Last edited by Claudia Stanley on 5/29/2019 10:10 AM. (History)            Assessment /Plan     For exam results, see Encounter Report.    Primary open angle glaucoma of both eyes, mild stage    Nuclear sclerosis, bilateral    Refractive error      1. IOP fine for nerve, cont Latanaprost qhs OU, Prev HVf and OCT stable, prev gonio open. Probably low tension. ,Mild glaucoma based on OCT changes, prev VF defects Ou and increased IOP. Pachy normal. Field did clean up at repeat. Pretreat IOP at 20. Family history of glaucoma. RTC 4 mos IOP ck.   2. Educated pt on presence of cataracts and effects on vision. No surgery at this time. Recheck in one year.  3. New Spec Rx given. Different lens options discussed with patient. RTC 1 year full exam.

## 2019-06-28 ENCOUNTER — OFFICE VISIT (OUTPATIENT)
Dept: INTERNAL MEDICINE | Facility: CLINIC | Age: 79
End: 2019-06-28
Payer: MEDICARE

## 2019-06-28 VITALS
BODY MASS INDEX: 25.99 KG/M2 | WEIGHT: 209 LBS | SYSTOLIC BLOOD PRESSURE: 130 MMHG | OXYGEN SATURATION: 98 % | HEIGHT: 75 IN | HEART RATE: 66 BPM | DIASTOLIC BLOOD PRESSURE: 70 MMHG

## 2019-06-28 DIAGNOSIS — K58.9 IRRITABLE BOWEL SYNDROME WITHOUT DIARRHEA: Primary | ICD-10-CM

## 2019-06-28 PROCEDURE — 99999 PR PBB SHADOW E&M-EST. PATIENT-LVL III: ICD-10-PCS | Mod: PBBFAC,,, | Performed by: INTERNAL MEDICINE

## 2019-06-28 PROCEDURE — 99214 PR OFFICE/OUTPT VISIT, EST, LEVL IV, 30-39 MIN: ICD-10-PCS | Mod: S$PBB,,, | Performed by: INTERNAL MEDICINE

## 2019-06-28 PROCEDURE — 99214 OFFICE O/P EST MOD 30 MIN: CPT | Mod: S$PBB,,, | Performed by: INTERNAL MEDICINE

## 2019-06-28 PROCEDURE — 99213 OFFICE O/P EST LOW 20 MIN: CPT | Mod: PBBFAC,PO | Performed by: INTERNAL MEDICINE

## 2019-06-28 PROCEDURE — 99999 PR PBB SHADOW E&M-EST. PATIENT-LVL III: CPT | Mod: PBBFAC,,, | Performed by: INTERNAL MEDICINE

## 2019-06-28 RX ORDER — DICYCLOMINE HYDROCHLORIDE 20 MG/1
20 TABLET ORAL 3 TIMES DAILY PRN
Qty: 12 TABLET | Refills: 0 | Status: SHIPPED | OUTPATIENT
Start: 2019-06-28 | End: 2019-09-04

## 2019-06-28 NOTE — PROGRESS NOTES
REASON FOR VISIT:  This is a 78-year-old male.  For the past three weeks, he has   been noticing reoccurring off and on cramps in his lower abdomen.  It lasts   momentarily, but just reoccurs during the day.  Only one time, it will wake him   up at night, but that is after eating a pear before going to bedtime.  There   have been no acute changes in his bowel function.  He will tend to have a bowel   movement once a day or every other day.  He knows he will have to strain at   times, but this is nothing unusual.  No difficulty urinating and pass little bit   of gas.  He states that he does not feel like it is indigestion.  There is no   sour stomach and no regurgitation.  He remembers having something similar after   taking a course of antibiotics.  He is already taking a probiotic Culturelle.    PAST MEDICAL HISTORY:  Hypertension.  Hyperlipidemia.  Gastroesophageal reflux disease.  BPH.  Adenomatous polyp in 2015.    MEDICATIONS:  List per MedCard.    PHYSICAL EXAMINATION:  VITAL SIGNS:  Per EPIC.  LUNGS:  Clear.  HEART:  Regular rate and rhythm.  ABDOMEN:  Active bowel sounds, soft.  It is nontender.  No hyperactive bowel   sounds.  It does not appear to be distended.    IMPRESSION:  Irritable bowel with abdominal cramps.    PLAN:  Trial of Bentyl 20 mg before each meal and bedtime if needed.  Let me   know if there is no improvement.  Continue with attention to diet, physical   activity and fluid intake.        JAM/HN  dd: 06/28/2019 16:19:55 (CDT)  td: 06/29/2019 01:10:06 (CDT)  Doc ID   #[id number]  Job ID #[id number]    CC:

## 2019-07-02 ENCOUNTER — HOSPITAL ENCOUNTER (EMERGENCY)
Facility: HOSPITAL | Age: 79
Discharge: HOME | End: 2019-07-02
Attending: EMERGENCY MEDICINE
Payer: MEDICARE

## 2019-07-02 VITALS
DIASTOLIC BLOOD PRESSURE: 72 MMHG | TEMPERATURE: 99 F | WEIGHT: 209 LBS | OXYGEN SATURATION: 100 % | HEART RATE: 61 BPM | SYSTOLIC BLOOD PRESSURE: 147 MMHG | RESPIRATION RATE: 18 BRPM | HEIGHT: 75 IN | BODY MASS INDEX: 25.99 KG/M2

## 2019-07-02 DIAGNOSIS — R14.0 ABDOMINAL BLOATING: Primary | ICD-10-CM

## 2019-07-02 LAB
ALBUMIN SERPL BCP-MCNC: 4.2 G/DL (ref 3.5–5.2)
ALP SERPL-CCNC: 70 U/L (ref 55–135)
ALT SERPL W/O P-5'-P-CCNC: 21 U/L (ref 10–44)
ANION GAP SERPL CALC-SCNC: 9 MMOL/L (ref 8–16)
AST SERPL-CCNC: 17 U/L (ref 10–40)
BASOPHILS # BLD AUTO: 0.04 K/UL (ref 0–0.2)
BASOPHILS NFR BLD: 1 % (ref 0–1.9)
BILIRUB SERPL-MCNC: 0.7 MG/DL (ref 0.1–1)
BILIRUB UR QL STRIP: NEGATIVE
BUN SERPL-MCNC: 13 MG/DL (ref 8–23)
CALCIUM SERPL-MCNC: 9.9 MG/DL (ref 8.7–10.5)
CHLORIDE SERPL-SCNC: 104 MMOL/L (ref 95–110)
CLARITY UR: CLEAR
CO2 SERPL-SCNC: 25 MMOL/L (ref 23–29)
COLOR UR: YELLOW
CREAT SERPL-MCNC: 1.1 MG/DL (ref 0.5–1.4)
DIFFERENTIAL METHOD: ABNORMAL
EOSINOPHIL # BLD AUTO: 0.1 K/UL (ref 0–0.5)
EOSINOPHIL NFR BLD: 1.3 % (ref 0–8)
ERYTHROCYTE [DISTWIDTH] IN BLOOD BY AUTOMATED COUNT: 12.4 % (ref 11.5–14.5)
EST. GFR  (AFRICAN AMERICAN): >60 ML/MIN/1.73 M^2
EST. GFR  (NON AFRICAN AMERICAN): >60 ML/MIN/1.73 M^2
GLUCOSE SERPL-MCNC: 107 MG/DL (ref 70–110)
GLUCOSE UR QL STRIP: NEGATIVE
HCT VFR BLD AUTO: 41.1 % (ref 40–54)
HGB BLD-MCNC: 14.4 G/DL (ref 14–18)
HGB UR QL STRIP: ABNORMAL
KETONES UR QL STRIP: NEGATIVE
LEUKOCYTE ESTERASE UR QL STRIP: NEGATIVE
LIPASE SERPL-CCNC: 12 U/L (ref 4–60)
LYMPHOCYTES # BLD AUTO: 1 K/UL (ref 1–4.8)
LYMPHOCYTES NFR BLD: 24.3 % (ref 18–48)
MCH RBC QN AUTO: 31.4 PG (ref 27–31)
MCHC RBC AUTO-ENTMCNC: 35 G/DL (ref 32–36)
MCV RBC AUTO: 90 FL (ref 82–98)
MONOCYTES # BLD AUTO: 0.3 K/UL (ref 0.3–1)
MONOCYTES NFR BLD: 7.9 % (ref 4–15)
NEUTROPHILS # BLD AUTO: 2.6 K/UL (ref 1.8–7.7)
NEUTROPHILS NFR BLD: 65.5 % (ref 38–73)
NITRITE UR QL STRIP: NEGATIVE
PH UR STRIP: 7 [PH] (ref 5–8)
PLATELET # BLD AUTO: 147 K/UL (ref 150–350)
PMV BLD AUTO: 11.1 FL (ref 9.2–12.9)
POTASSIUM SERPL-SCNC: 3.3 MMOL/L (ref 3.5–5.1)
PROT SERPL-MCNC: 6.9 G/DL (ref 6–8.4)
PROT UR QL STRIP: NEGATIVE
RBC # BLD AUTO: 4.58 M/UL (ref 4.6–6.2)
SODIUM SERPL-SCNC: 138 MMOL/L (ref 136–145)
SP GR UR STRIP: <=1.005 (ref 1–1.03)
URN SPEC COLLECT METH UR: ABNORMAL
UROBILINOGEN UR STRIP-ACNC: NEGATIVE EU/DL
WBC # BLD AUTO: 3.91 K/UL (ref 3.9–12.7)

## 2019-07-02 PROCEDURE — 80053 COMPREHEN METABOLIC PANEL: CPT

## 2019-07-02 PROCEDURE — 85025 COMPLETE CBC W/AUTO DIFF WBC: CPT

## 2019-07-02 PROCEDURE — 99285 EMERGENCY DEPT VISIT HI MDM: CPT | Mod: 25

## 2019-07-02 PROCEDURE — 81003 URINALYSIS AUTO W/O SCOPE: CPT

## 2019-07-02 PROCEDURE — 83690 ASSAY OF LIPASE: CPT

## 2019-07-02 PROCEDURE — 25500020 PHARM REV CODE 255: Performed by: EMERGENCY MEDICINE

## 2019-07-02 RX ADMIN — IOHEXOL 30 ML: 350 INJECTION, SOLUTION INTRAVENOUS at 09:07

## 2019-07-02 RX ADMIN — IOHEXOL 100 ML: 350 INJECTION, SOLUTION INTRAVENOUS at 11:07

## 2019-07-02 NOTE — ED TRIAGE NOTES
"Patient arrived from home c/o of abd pain that has worsened over the last two days. Denies n/v, states pain is "just a nuisance" Taking Bentyl times 2 days with no relief  "

## 2019-07-02 NOTE — ED PROVIDER NOTES
"Encounter Date: 7/2/2019    SCRIBE #1 NOTE: I, Jared Marie, am scribing for, and in the presence of,  Dr. Galo Shelton. I have scribed the entire note.       History     Chief Complaint   Patient presents with    Abdominal Cramping     78 year old male presents to ed cc of abdominal cramping x 2 weeks patient reports is currently taking bentyl with no relief.      Time seen by provider: 8:25 AM    Patient is a 78 y.o. male who presents to the ED for cc of abd pain x 2 weeks ago. Patient reports intermittent abd "gas" pain which feels like he is somewhat bloated. States he was ready to have a BM this morning but his stool was "not soft enough to come out". He admits to urinary hesitation but denies fever, chest pain, or SOB. States he has not been passing flatus but notes some belching. Patient denies surgical hx. He reports recent colonoscopy and was found to have diverticulosis and 1 polyp. He has been taking prescribed Bentyl without much improvement in his symptoms.     The history is provided by the patient.     Review of patient's allergies indicates:   Allergen Reactions    Penicillins      Stomach cramps     Past Medical History:   Diagnosis Date    Cataract     Chronic constipation     GERD (gastroesophageal reflux disease)     Hyperlipidemia     Hypertension     LBBB (left bundle branch block)      Past Surgical History:   Procedure Laterality Date    COLONOSCOPY  4/28/2008    hemorrhoids    COLONOSCOPY N/A 5/15/2017    Performed by [facility] at [facility] ENDO (4TH FLR)    COLONOSCOPY N/A 8/17/2015    Performed by [facility] at [facility] ENDO (4TH FLR)    ESOPHAGOGASTRODUODENOSCOPY  2/1/2010    INGUINAL HERNIA REPAIR Right      Family History   Problem Relation Age of Onset    Stroke Father     Hypertension Sister     Cancer Sister         breast    Cancer Paternal Uncle 60        stomach CA    Colon polyps Paternal Uncle     Amblyopia Neg Hx     Blindness Neg Hx     " Cataracts Neg Hx     Diabetes Neg Hx     Glaucoma Neg Hx     Macular degeneration Neg Hx     Retinal detachment Neg Hx     Strabismus Neg Hx     Thyroid disease Neg Hx      Social History     Tobacco Use    Smoking status: Never Smoker    Smokeless tobacco: Never Used   Substance Use Topics    Alcohol use: No    Drug use: No     Review of Systems   Constitutional: Negative for chills and fever.   HENT: Negative for congestion, ear pain, rhinorrhea and sore throat.    Respiratory: Negative for cough, shortness of breath and wheezing.    Cardiovascular: Negative for chest pain and palpitations.   Gastrointestinal: Positive for abdominal pain and constipation. Negative for diarrhea, nausea and vomiting.   Genitourinary: Negative for dysuria and hematuria.        + urinary hesitation   Musculoskeletal: Negative for back pain, myalgias and neck pain.   Skin: Negative for rash.   Neurological: Negative for dizziness, weakness, light-headedness and headaches.   Psychiatric/Behavioral: Negative for confusion.   All other systems reviewed and are negative.      Physical Exam     Initial Vitals [07/02/19 0817]   BP Pulse Resp Temp SpO2   (!) 146/70 80 20 98.4 °F (36.9 °C) 98 %      MAP       --         Physical Exam    Nursing note and vitals reviewed.  Constitutional: He appears well-developed and well-nourished. No distress.   HENT:   Head: Normocephalic and atraumatic.   Eyes: EOM are normal. Pupils are equal, round, and reactive to light.   Neck: Normal range of motion. Neck supple.   Cardiovascular: Normal rate, regular rhythm, normal heart sounds and intact distal pulses.   Pulmonary/Chest: Breath sounds normal. No respiratory distress. He has no wheezes.   Abdominal: Soft. Bowel sounds are normal. He exhibits no distension. There is no tenderness.   Abdomen is tympanitic to percussion   Musculoskeletal: Normal range of motion. He exhibits no edema.   Neurological: He is alert and oriented to person, place,  and time.   Skin: Skin is warm and dry.         ED Course   Procedures  Labs Reviewed   CBC W/ AUTO DIFFERENTIAL - Abnormal; Notable for the following components:       Result Value    RBC 4.58 (*)     Mean Corpuscular Hemoglobin 31.4 (*)     Platelets 147 (*)     All other components within normal limits   COMPREHENSIVE METABOLIC PANEL - Abnormal; Notable for the following components:    Potassium 3.3 (*)     All other components within normal limits   URINALYSIS - Abnormal; Notable for the following components:    Specific Gravity, UA <=1.005 (*)     Occult Blood UA Trace (*)     All other components within normal limits   LIPASE          Imaging Results          CT Abdomen Pelvis With Contrast (Final result)  Result time 07/02/19 11:22:04    Final result by Dutch Watt MD (07/02/19 11:22:04)                 Impression:      1. No acute intra-abdominal/pelvic CT abnormalities to account for the reported history of abdominal distension.  2. Bilateral cortical renal cysts.  3. Mild circumferential bladder wall thickening, presumably related to chronic outlet obstruction noting a markedly enlarged prostate gland.      Electronically signed by: Dutch Watt MD  Date:    07/02/2019  Time:    11:22             Narrative:    EXAMINATION:  CT ABDOMEN PELVIS WITH CONTRAST    CLINICAL HISTORY:  Abdominal distension;    TECHNIQUE:  5 mm axial images were obtained through the abdomen and pelvis following administration of 100 cc of Omnipaque 350 IV contrast and 30 cc of enteric contrast.  Coronal and sagittal reformats were performed.    COMPARISON:  None.    FINDINGS:  Visualized heart is normal.  No pericardial effusion.    Visualized lungs are clear.  No pleural effusions.    The liver is normal in size.  Apparent subcentimeter hypoattenuating lesion noted within segment 8, too small to accurately characterize.  No intrahepatic bile duct dilatation.  Portal vasculature is patent.    Gallbladder is normal.  Common  bile duct is normal in caliber.    There is a small sliding-type hiatal hernia.  The stomach is otherwise unremarkable.    Scattered calcifications noted within the spleen, likely granulomas.    Pancreas and adrenal glands are within normal limits.    Kidneys are normal in location.  There is mild bilateral cortical renal thinning with multiple hypoenhancing lesions, presumably cysts.  No definite cortical enhancing lesion.  No nephrolithiasis.  No hydronephrosis or hydroureter.  There is mild circumferential bladder wall thickening, likely related to chronic outlet obstruction noting an enlarged prostate gland.    The small bowel is normal in caliber.  There are a few cecal diverticula.  The appendix is normal.  No obstruction or inflammatory changes.    The abdominal aorta tapers normally with prominent atherosclerotic calcification.  Celiac artery, SMA, bilateral renal arteries and DIEUDONNE are patent.  IVC is patent.    No ascites or intra-abdominal free air.  No abdominal or pelvic lymph node enlargement.  No focal mesenteric masses.    There is a fat containing left inguinal hernia.  There are degenerative changes of the spine.  Chronic changes of the left 8th and 9th ribs noted.  No acute osseous abnormalities.  No suspicious lytic or blastic lesions.                                 Medical Decision Making:   Initial Assessment:   Patient presents with the 2-3 week history of colicky type abdominal pain and bloating.  He did have a colonoscopy in the past which showed diverticulosis and had a polypectomy.  He has seen his primary care physician who put him on Bentyl.  He says that it intermittently helps.  He is worried because he has hard time starting his stream of urine and occasionally has been constipated.  He does carry a diagnosis of prostatic hypertrophy.  He has had no fever nausea vomiting or diarrhea.  He has had no dyspepsia, increased eructation or flatus.  Differential Diagnosis:   Differential  diagnosis includes but is not limited to constipation, irritable bowel, bowel obstruction  Clinical Tests:   Lab Tests: Ordered and Reviewed  Radiological Study: Ordered and Reviewed  ED Management:  The patient was seen and examined.  He is tympanitic to percussion.  Positive bowel sounds. No evidence of peritonitis.  Lab work and CT will be obtained.    1132; had a short discussion with patient regarding his findings.  Diagnostic workup was really unremarkable.  I did give this information to him and told him that this should be reassuring.  He can take over-the-counter Zantac to see if that will help him with some of his abdominal bloating.  Again no evidence of peritonitis.              Attending Attestation:           Physician Attestation for Scribe:      Comments: eloise                 Clinical Impression:   No diagnosis found.        Disposition:   Disposition: Discharged  Condition: Stable                        Galo Shelton,   07/02/19 1132

## 2019-07-02 NOTE — DISCHARGE INSTRUCTIONS
May want to get an over-the-counter antacid such as Zantac.  Take that for couple weeks and see how you do.  Get some assurance that a CT scan of the abdomen and pelvis was negative.

## 2019-09-04 ENCOUNTER — OFFICE VISIT (OUTPATIENT)
Dept: INTERNAL MEDICINE | Facility: CLINIC | Age: 79
End: 2019-09-04
Payer: MEDICARE

## 2019-09-04 VITALS
HEART RATE: 68 BPM | WEIGHT: 213 LBS | HEIGHT: 75 IN | OXYGEN SATURATION: 98 % | DIASTOLIC BLOOD PRESSURE: 88 MMHG | SYSTOLIC BLOOD PRESSURE: 128 MMHG | BODY MASS INDEX: 26.49 KG/M2

## 2019-09-04 DIAGNOSIS — J30.9 ALLERGIC RHINITIS, UNSPECIFIED SEASONALITY, UNSPECIFIED TRIGGER: Primary | ICD-10-CM

## 2019-09-04 DIAGNOSIS — R39.12 BENIGN PROSTATIC HYPERPLASIA WITH WEAK URINARY STREAM: ICD-10-CM

## 2019-09-04 DIAGNOSIS — N40.1 BENIGN PROSTATIC HYPERPLASIA WITH WEAK URINARY STREAM: ICD-10-CM

## 2019-09-04 DIAGNOSIS — R15.0 INCOMPLETE DEFECATION: ICD-10-CM

## 2019-09-04 PROCEDURE — 99214 OFFICE O/P EST MOD 30 MIN: CPT | Mod: S$PBB,,, | Performed by: INTERNAL MEDICINE

## 2019-09-04 PROCEDURE — 99999 PR PBB SHADOW E&M-EST. PATIENT-LVL V: CPT | Mod: PBBFAC,,, | Performed by: INTERNAL MEDICINE

## 2019-09-04 PROCEDURE — 99214 PR OFFICE/OUTPT VISIT, EST, LEVL IV, 30-39 MIN: ICD-10-PCS | Mod: S$PBB,,, | Performed by: INTERNAL MEDICINE

## 2019-09-04 PROCEDURE — 99215 OFFICE O/P EST HI 40 MIN: CPT | Mod: PBBFAC,PO | Performed by: INTERNAL MEDICINE

## 2019-09-04 PROCEDURE — 99999 PR PBB SHADOW E&M-EST. PATIENT-LVL V: ICD-10-PCS | Mod: PBBFAC,,, | Performed by: INTERNAL MEDICINE

## 2019-09-04 RX ORDER — DOXYCYCLINE HYCLATE 100 MG
TABLET ORAL
Refills: 0 | COMMUNITY
Start: 2019-08-30 | End: 2020-04-21

## 2019-09-04 RX ORDER — AZELASTINE 1 MG/ML
SPRAY, METERED NASAL
Refills: 4 | COMMUNITY
Start: 2019-08-30 | End: 2023-03-10

## 2019-09-04 NOTE — PROGRESS NOTES
PAST MEDICAL HISTORY:   Hypertension.  Hyperlipidemia.  Gastroesophageal reflux disease.  Chronic rhinitis.  BPH.  Left bundle-branch block.  Adenomatous colon polyp in the year 2000 and in August 2015.  Right inguinal hernia repair.  Epididymal cyst.  Fractured elbow and wrist.  Lung surgery at age 42 to remove an empyema.     SOCIAL HISTORY:  Tobacco and alcohol use - none.       MEDICATIONS:   Atorvastatin 10 mg.  Benazepril 10 mg.  Diltiazem 360 mg.  Doxazosin 4 mg.  Hydrochlorothiazide 25 mg.      REASON FOR VISIT:  This is a 79-year-old male.    One reason for making the appointment is that a week ago, he went to go see his   ENT.  He was having nasal congestion, postnasal drip, occasional cough.  What   concern Mr. Chau was that when the ENT was listening his breath sounds, he was   told that he heard what may have been a abnormal sounding bronchitis and   because of such, doxycycline was prescribed as well as Astelin nasal spray.  He   was not running fever.  He did not feel short of breath.  There was no wheezing.    He was aware of sinus drainage going down back of his throat.  Astelin nasal   spray was prescribed also.  He would like just to be reassessed.  He has very   little cough.  He is not feeling short of breath, still has occasional postnasal   drip, but he says improved.    The other thing that he brings up is issues with defecation.  He does not feel   that he has constipation, but at times when he defecates he does not feel that   he evacuates all the way, but there might be some that may at times when he does   defecate he cannot complete the defecation, he might have stool remains in his   rectum and then later on the day he will defecate.  This happens periodically   and actually today when he attempted to defecate twice, he was not able to   complete it, so he is here just to be assessed, but he tends to have a bowel   function every day or every other day.  There is no abdominal  pain.    He had a colonoscopy in May 2017 that revealed an adenomatous polyp in the   ascending colon.  He also had a colon polyp in year 2015.    Also, in regard to above, he went to the Emergency Room on July 2nd because of   feeling a little bit of abdominal discomfort.  A CT of the abdomen at that time   showed no acute changes, but there was a markedly enlarged prostate gland with   bladder wall thickening that could be related to chronic outlet obstruction.  He   has nocturia x1 to 2.  He feels that at times his urine flow can be slow.    There is no incomplete emptying.  There is occasional urgency.  He is already on   the medicine Doxazosin 4 mg.    PHYSICAL EXAMINATION:  VITAL SIGNS:  His weight is 213 pounds, pulse 68, blood pressure 128/80.  HEENT:  Oropharynx, maybe slightly hyperemic.  LUNGS:  Clear, no wheezes or rales, abnormal breath sounds.  HEART:  Regular rate and rhythm.  ABDOMEN:  Active bowel sounds, soft, nontender.  RECTAL:  Digital rectal exam, the prostate is moderately enlarged, no nodules.    Stool was brown.    REVIEW OF THE CHART:  PSA test done in April 2019 was 0.74.    IMPRESSION:  1. Acute rhinitis.  2. Incomplete defecation.  3. BPH with symptoms of slow stream, urgency and nocturia.    PLAN:  We will put in a referral to meet with Urology regarding BPH, maybe is   possible that this could be a factor with incomplete defecation.  We will also   make arrangements to meet with Colon and Rectal Surgery just to be evaluated for   defecation disorder and may need to have a defecogram.  In regard to the   rhinitis, finish off the doxycycline since prescribing is started.  I am not   certain if it was needed and recommend four week to take over-the-counter   antihistamine such as Allegra or Zyrtec.        JAM/CARLOS  dd: 09/04/2019 15:47:27 (CDT)  td: 09/05/2019 04:52:50 (CDT)  Doc ID   #[id number]  Job ID #[id number]    CC:

## 2019-09-10 ENCOUNTER — OFFICE VISIT (OUTPATIENT)
Dept: SURGERY | Facility: CLINIC | Age: 79
End: 2019-09-10
Payer: MEDICARE

## 2019-09-10 ENCOUNTER — OFFICE VISIT (OUTPATIENT)
Dept: INTERNAL MEDICINE | Facility: CLINIC | Age: 79
End: 2019-09-10
Payer: MEDICARE

## 2019-09-10 VITALS
HEIGHT: 75 IN | SYSTOLIC BLOOD PRESSURE: 142 MMHG | HEART RATE: 62 BPM | BODY MASS INDEX: 26.4 KG/M2 | DIASTOLIC BLOOD PRESSURE: 80 MMHG | WEIGHT: 212.31 LBS

## 2019-09-10 VITALS
HEIGHT: 75 IN | WEIGHT: 211 LBS | BODY MASS INDEX: 26.24 KG/M2 | HEART RATE: 69 BPM | DIASTOLIC BLOOD PRESSURE: 88 MMHG | OXYGEN SATURATION: 99 % | SYSTOLIC BLOOD PRESSURE: 128 MMHG

## 2019-09-10 DIAGNOSIS — E04.1 THYROID NODULE: Primary | ICD-10-CM

## 2019-09-10 DIAGNOSIS — R15.0 INCOMPLETE DEFECATION: Primary | ICD-10-CM

## 2019-09-10 PROCEDURE — 99999 PR PBB SHADOW E&M-EST. PATIENT-LVL III: ICD-10-PCS | Mod: PBBFAC,,, | Performed by: INTERNAL MEDICINE

## 2019-09-10 PROCEDURE — 99203 PR OFFICE/OUTPT VISIT, NEW, LEVL III, 30-44 MIN: ICD-10-PCS | Mod: S$PBB,,, | Performed by: COLON & RECTAL SURGERY

## 2019-09-10 PROCEDURE — 99203 OFFICE O/P NEW LOW 30 MIN: CPT | Mod: S$PBB,,, | Performed by: COLON & RECTAL SURGERY

## 2019-09-10 PROCEDURE — 99213 PR OFFICE/OUTPT VISIT, EST, LEVL III, 20-29 MIN: ICD-10-PCS | Mod: S$PBB,,, | Performed by: INTERNAL MEDICINE

## 2019-09-10 PROCEDURE — 99213 OFFICE O/P EST LOW 20 MIN: CPT | Mod: PBBFAC,PO | Performed by: INTERNAL MEDICINE

## 2019-09-10 PROCEDURE — 99999 PR PBB SHADOW E&M-EST. PATIENT-LVL III: CPT | Mod: PBBFAC,,, | Performed by: INTERNAL MEDICINE

## 2019-09-10 PROCEDURE — 99213 OFFICE O/P EST LOW 20 MIN: CPT | Mod: S$PBB,,, | Performed by: INTERNAL MEDICINE

## 2019-09-10 PROCEDURE — 99999 PR PBB SHADOW E&M-EST. PATIENT-LVL III: ICD-10-PCS | Mod: PBBFAC,,, | Performed by: COLON & RECTAL SURGERY

## 2019-09-10 PROCEDURE — 99213 OFFICE O/P EST LOW 20 MIN: CPT | Mod: PBBFAC,27 | Performed by: COLON & RECTAL SURGERY

## 2019-09-10 PROCEDURE — 99999 PR PBB SHADOW E&M-EST. PATIENT-LVL III: CPT | Mod: PBBFAC,,, | Performed by: COLON & RECTAL SURGERY

## 2019-09-10 NOTE — LETTER
September 10, 2019      Antonio Killian MD  [address]  [facility] [address]           [facility]  [address]  [facility] [address]  Phone: [phone]          Patient: Tung Chau   MR Number: [id number]   YOB: 1940   Date of Visit: 9/10/2019       Dear Dr. Antonio Killian:    Thank you for referring Tung Chau to me for evaluation. Attached you will find relevant portions of my assessment and plan of care.    If you have questions, please do not hesitate to call me. I look forward to following Tung Chau along with you.    Sincerely,    BLANQUITA Salamanca MD    Enclosure  CC:  No Recipients    If you would like to receive this communication electronically, please contact [email or web address] or [phone] to request more information on [email or web address] Link access.    For providers and/or their staff who would like to refer a patient to [facility], please contact us through our one-stop-shop provider referral line, [facility], at [phone].    If you feel you have received this communication in error or would no longer like to receive these types of communications, please e-mail [email or web address]

## 2019-09-10 NOTE — PROGRESS NOTES
CRS Office Visit History and Physical    Referring Md:   Antonio Killian Md  [address] [place] [address]    SUBJECTIVE:     Chief Complaint:  Constipation    History of Present Illness:  Patient is a 79 y.o. male presents with increasing problems with defecation.  He complains of intermittent incomplete defecation where he feels the fecal material at the rectum but he can expel it.  This is not happen every day.  When he has this problem he eats apples or oranges and it resolves.  He denies any prolapse of tissue. Denies any bleeding.  He generally moves his bowels every day      Past Medical History:   Diagnosis Date    Cataract     Chronic constipation     GERD (gastroesophageal reflux disease)     Hyperlipidemia     Hypertension     LBBB (left bundle branch block)        Past Surgical History:   Procedure Laterality Date    COLONOSCOPY  4/28/2008    hemorrhoids    COLONOSCOPY N/A 5/15/2017    [facility] ENDO (4TH FLR)    COLONOSCOPY N/A 8/17/2015    [facility] ENDO (4TH FLR)    ESOPHAGOGASTRODUODENOSCOPY  2/1/2010    INGUINAL HERNIA REPAIR Right        Review of patient's allergies indicates:   Allergen Reactions    Penicillins      Stomach cramps       Current Outpatient Medications on File Prior to Visit   Medication Sig Dispense Refill    atorvastatin (LIPITOR) 10 MG tablet TAKE 1 TABLET(10 MG) BY MOUTH EVERY DAY 90 tablet 3    azelastine (ASTELIN) 137 mcg (0.1 %) nasal spray USE 2 SPRAYS IEN ONCE D  4    benazepril (LOTENSIN) 10 MG tablet TAKE 1 TABLET BY MOUTH EVERY DAY 90 tablet 3    benazepril (LOTENSIN) 10 MG tablet TAKE 1 TABLET BY MOUTH EVERY DAY 90 tablet 2    diltiaZEM (CARDIZEM CD) 180 MG 24 hr capsule Take 2 capsules (360 mg total) by mouth once daily. 60 capsule 11    doxazosin (CARDURA) 4 MG tablet TAKE 1 TABLET BY MOUTH EVERY NIGHT 90 tablet 2    doxycycline (VIBRA-TABS) 100 MG tablet TK 1 T PO  BID   "0    hydrochlorothiazide (HYDRODIURIL) 25 MG tablet TAKE 1 TABLET BY MOUTH EVERY DAY 90 tablet 3    hydroCHLOROthiazide (HYDRODIURIL) 25 MG tablet TAKE 1 TABLET BY MOUTH EVERY DAY 90 tablet 3    latanoprost 0.005 % ophthalmic solution INSTILL 1 DROP IN BOTH EYES EVERY DAY 7.5 mL 12     No current facility-administered medications on file prior to visit.        Family History   Problem Relation Age of Onset    Stroke Father     Hypertension Sister     Cancer Sister         breast    Cancer Paternal Uncle 60        stomach CA    Colon polyps Paternal Uncle     Amblyopia Neg Hx     Blindness Neg Hx     Cataracts Neg Hx     Diabetes Neg Hx     Glaucoma Neg Hx     Macular degeneration Neg Hx     Retinal detachment Neg Hx     Strabismus Neg Hx     Thyroid disease Neg Hx        Social History     Tobacco Use    Smoking status: Never Smoker    Smokeless tobacco: Never Used   Substance Use Topics    Alcohol use: No    Drug use: No        Review of Systems:  ROS:  GENERAL: No fever, chills, fatigability or weight loss.  Integument:  No rashes, redness, icterus  CHEST: Denies ABDI, cyanosis, wheezing, cough and sputum production.  CARDIOVASCULAR: Denies chest pain, PND, orthopnea or reduced exercise tolerance.  GI:  Denies abd pain, dysphagia, nausea, vomiting, no hematemesis, no rectal pain  : Denies burning on urination, no hematuria, no bacteriuria  MSK:  No deformities, swelling, joint pain swelling  Neurologic:  No HAs, seizures, weakness, paresthesias, gait problems      OBJECTIVE:     Vital Signs (Most Recent)  BP (!) 142/80 (BP Location: Right arm, Patient Position: Sitting, BP Method: Large (Automatic))   Pulse 62   Ht 6' 3" (1.905 m)   Wt 96.3 kg (212 lb 4.9 oz)   BMI 26.54 kg/m²   Physical Exam:  General: White male in no distress   Skin/ Sclera anicteric  HEENT: anicteric, normocephalic, extraocular movements intact   Neck trachea midline   Chest symmetric, nl excursions, no " retractions  Respiratory: respirations are even and unlabored     Anorectal:   Inspection nl  AQUILINO:  nl tone, no masses, good squeeze, nl relaxation with Valsalva  Extremities: Warm dry and intact.  NO CCE  Neuro: alert and oriented x 4.  Moves all extremities.         ASSESSMENT/PLAN:   Incomplete defecation      Recommend  High fiber diet - 25 grams per day.  Emphasis on whole grain breads such as double fiber wheat bread and high fiber cereals and bars.    Drink 8 glasses of water per day 64 - 96 oz per day  Fiber supplements such as KONSYL, METAMUCIL can be taken 1-2 x per day  Regular exercise - 30 min brisk walking 5 days per week  OV 6 weeks

## 2019-09-10 NOTE — PROGRESS NOTES
HISTORY OF PRESENT ILLNESS:  This is a 79-year-old male, reason for his visit is   to evaluate his neck and thyroid.    In reference to his visit with me on 09/05/2019 where one of his condition was   allergic rhinitis.  Two days later, he followed up with his ENT doctor.  He   noticed that when he was coughing he felt what he thought to be a bone in his   left lower neck.  Apparently, the ENT felt the notch/mass/cyst/nodule and later   today a diagnostic imaging has a thyroid scan, which I think is probably going   to be an ultrasound and then there might be talk of doing a biopsy.  This has   not been noted before.    Mr. Chau has no trouble swallowing food or liquids.  He is not aware of   regurgitation coming up sour or bitter taste.  He on occasion still has a   postnasal drip.  We might have to clear some mucus, and there have been no voice   changes.    PHYSICAL EXAMINATION:  VITAL SIGNS:  Per EPIC.  NECK:  When I palpate the thyroid, I can feel a small lump right below the left   thyroid.  I cannot tell if it is really part of the thyroid.  It is slightly   mobile.  It is not tender.  I do not palpate any other thyroid abnormalities.  There is no cervical or   submandibular adenopathy.    IMPRESSION:  Thyroid, outside lower neck cyst or nodule, possibly related to the thyroid.  Allergic rhinitis and continue with the use of antihistamine.    PLAN:  Await the results of the thyroid ultrasound.    DISPOSITION:  To follow.              JAM/HN  dd: 09/10/2019 12:09:01 (CDT)  td: 09/11/2019 01:15:48 (CDT)  Doc ID   #[id number]  Job ID #[id number]    CC:

## 2019-09-12 ENCOUNTER — OFFICE VISIT (OUTPATIENT)
Dept: UROLOGY | Facility: CLINIC | Age: 79
End: 2019-09-12
Payer: MEDICARE

## 2019-09-12 VITALS
DIASTOLIC BLOOD PRESSURE: 78 MMHG | HEIGHT: 75 IN | HEART RATE: 74 BPM | BODY MASS INDEX: 26.59 KG/M2 | SYSTOLIC BLOOD PRESSURE: 135 MMHG | WEIGHT: 213.88 LBS

## 2019-09-12 DIAGNOSIS — N40.1 BPH WITH URINARY OBSTRUCTION: Primary | ICD-10-CM

## 2019-09-12 DIAGNOSIS — N13.8 BPH WITH URINARY OBSTRUCTION: Primary | ICD-10-CM

## 2019-09-12 PROCEDURE — 99204 OFFICE O/P NEW MOD 45 MIN: CPT | Mod: S$PBB,,, | Performed by: UROLOGY

## 2019-09-12 PROCEDURE — 99999 PR PBB SHADOW E&M-EST. PATIENT-LVL III: CPT | Mod: PBBFAC,,, | Performed by: UROLOGY

## 2019-09-12 PROCEDURE — 99999 PR PBB SHADOW E&M-EST. PATIENT-LVL III: ICD-10-PCS | Mod: PBBFAC,,, | Performed by: UROLOGY

## 2019-09-12 PROCEDURE — 99204 PR OFFICE/OUTPT VISIT, NEW, LEVL IV, 45-59 MIN: ICD-10-PCS | Mod: S$PBB,,, | Performed by: UROLOGY

## 2019-09-12 PROCEDURE — 99213 OFFICE O/P EST LOW 20 MIN: CPT | Mod: PBBFAC | Performed by: UROLOGY

## 2019-09-12 NOTE — PROGRESS NOTES
Subjective:       Patient ID: Tung Chau is a 79 y.o. male.    Chief Complaint: No chief complaint on file.    HPI   Tung Chau is a 79 y.o. male with a PMHx of HTN and chronic constipation who presents to the clinic for evaluation and management of BPH with a weak urinary stream. His PSA 5 months ago was 0.84. AUA Sx score is a 10. Patient explains that he urinates often, sometimes with a slow and sometimes strong stream. Patient does not take Flomax.      Past Medical History:   Diagnosis Date    Cataract     Chronic constipation     GERD (gastroesophageal reflux disease)     Hyperlipidemia     Hypertension     LBBB (left bundle branch block)        Past Surgical History:   Procedure Laterality Date    COLONOSCOPY  4/28/2008    hemorrhoids    COLONOSCOPY N/A 5/15/2017    Performed by [facility]uentes [facility] at [facility] (4TH FLR)    COLONOSCOPY N/A 8/17/2015    Performed [facility] at [facility] END[facility] (4TH FLR)    ESOPHAGOGASTRODUODENOSCOPY  2/1/2010    INGUINAL HERNIA REPAIR Right        Family History   Problem Relation Age of Onset    Stroke Father     Hypertension Sister     Cancer Sister         breast    Cancer Paternal Uncle 60        stomach CA    Colon polyps Paternal Uncle     Amblyopia Neg Hx     Blindness Neg Hx     Cataracts Neg Hx     Diabetes Neg Hx     Glaucoma Neg Hx     Macular degeneration Neg Hx     Retinal detachment Neg Hx     Strabismus Neg Hx     Thyroid disease Neg Hx        Social History     Socioeconomic History    Marital status:      Spouse name: Not on file    Number of children: Not on file    Years of education: Not on file    Highest education level: Not on file   Occupational History    Not on file   Social Needs    Financial resource strain: Not on file    Food insecurity:     Worry: Not on file     Inability: Not on file    Transportation needs:     Medical: Not on file     Non-medical: Not on file   Tobacco Use    Smoking  status: Never Smoker    Smokeless tobacco: Never Used   Substance and Sexual Activity    Alcohol use: No    Drug use: No    Sexual activity: Not Currently   Lifestyle    Physical activity:     Days per week: Not on file     Minutes per session: Not on file    Stress: Not on file   Relationships    Social connections:     Talks on phone: Not on file     Gets together: Not on file     Attends Baptism service: Not on file     Active member of club or organization: Not on file     Attends meetings of clubs or organizations: Not on file     Relationship status: Not on file   Other Topics Concern    Not on file   Social History Narrative    Not on file       Allergies:  Penicillins    Medications:    Current Outpatient Medications:     atorvastatin (LIPITOR) 10 MG tablet, TAKE 1 TABLET(10 MG) BY MOUTH EVERY DAY, Disp: 90 tablet, Rfl: 3    azelastine (ASTELIN) 137 mcg (0.1 %) nasal spray, USE 2 SPRAYS IEN ONCE D, Disp: , Rfl: 4    benazepril (LOTENSIN) 10 MG tablet, TAKE 1 TABLET BY MOUTH EVERY DAY, Disp: 90 tablet, Rfl: 3    benazepril (LOTENSIN) 10 MG tablet, TAKE 1 TABLET BY MOUTH EVERY DAY, Disp: 90 tablet, Rfl: 2    diltiaZEM (CARDIZEM CD) 180 MG 24 hr capsule, Take 2 capsules (360 mg total) by mouth once daily., Disp: 60 capsule, Rfl: 11    doxazosin (CARDURA) 4 MG tablet, TAKE 1 TABLET BY MOUTH EVERY NIGHT, Disp: 90 tablet, Rfl: 2    doxycycline (VIBRA-TABS) 100 MG tablet, TK 1 T PO  BID, Disp: , Rfl: 0    hydrochlorothiazide (HYDRODIURIL) 25 MG tablet, TAKE 1 TABLET BY MOUTH EVERY DAY, Disp: 90 tablet, Rfl: 3    hydroCHLOROthiazide (HYDRODIURIL) 25 MG tablet, TAKE 1 TABLET BY MOUTH EVERY DAY, Disp: 90 tablet, Rfl: 3    latanoprost 0.005 % ophthalmic solution, INSTILL 1 DROP IN BOTH EYES EVERY DAY, Disp: 7.5 mL, Rfl: 12    Review of Systems   Constitutional: Negative for activity change, appetite change, chills, diaphoresis, fatigue, fever and unexpected weight change.   HENT: Negative for  congestion, dental problem, hearing loss, mouth sores, postnasal drip, rhinorrhea, sinus pressure and trouble swallowing.    Eyes: Negative for pain, discharge and itching.   Respiratory: Negative for apnea, cough, choking, chest tightness, shortness of breath and wheezing.    Cardiovascular: Negative for chest pain, palpitations and leg swelling.   Gastrointestinal: Negative for abdominal distention, abdominal pain, anal bleeding, blood in stool, constipation, diarrhea, nausea, rectal pain and vomiting.   Endocrine: Negative for polydipsia and polyuria.   Genitourinary: Negative for decreased urine volume, difficulty urinating, discharge, dysuria, enuresis, flank pain, frequency, genital sores, hematuria, penile pain, penile swelling, scrotal swelling and urgency.   Musculoskeletal: Negative for arthralgias and back pain.   Skin: Negative for color change, rash and wound.   Neurological: Negative for dizziness, syncope, speech difficulty, light-headedness and headaches.   Hematological: Negative for adenopathy. Does not bruise/bleed easily.   Psychiatric/Behavioral: Negative for behavioral problems and confusion.       Objective:      Physical Exam   Constitutional: He appears well-developed.   HENT:   Head: Normocephalic.   Neck: Neck supple.   Cardiovascular: Normal rate.    Pulmonary/Chest: Effort normal.   Abdominal: Soft.   Genitourinary:   Genitourinary Comments: Urine dip clear  PVR is 16 cc    Prostate was smooth without nodularity. No rectal masses.  30 grams, benign. Normal perineum.     Neurological: He is alert.   Skin: Skin is warm.     Psychiatric: He has a normal mood and affect.         Labs   Ref Range & Units 5mo ago   PSA, SCREEN 0.00 - 4.00 ng/mL 0.84      Imaging  CT Abd/Pelv with Contrast 7/2/19  Impression       1. No acute intra-abdominal/pelvic CT abnormalities to account for the reported history of abdominal distension.  2. Bilateral cortical renal cysts.  3. Mild circumferential bladder  wall thickening, presumably related to chronic outlet obstruction noting a markedly enlarged prostate gland.       Assessment:       No diagnosis found.    Plan:       There are no diagnoses linked to this encounter.      RTC in 1 year for AQUILINO    I, Paris Pack, am acting as a scribe on this patient encounter in the presence and under the supervision of Dr. Hurst.    09/12/2019 7:10 AM    I, Dr. Hurst, personally performed the services described in this documentation.   All medical record entries made by the scribe were at my direction and in my presence.   I have reviewed the chart and agree that the record is accurate and complete.   Syd Hurst MD.  7:10 AM 09/12/2019

## 2019-09-12 NOTE — LETTER
September 12, 2019      Antonio Killian MD  [address]  [facility] [address]           [facility] 4th [facility]  [address]  [facility] [address]  Phone: [phone]          Patient: Tung Chau   MR Number: [id number]   YOB: 1940   Date of Visit: 9/12/2019       Dear Dr. Antonio Killian:    Thank you for referring Tung Chau to me for evaluation. Attached you will find relevant portions of my assessment and plan of care.    If you have questions, please do not hesitate to call me. I look forward to following Tung Chau along with you.    Sincerely,    Syd Hurst Jr., MD    Enclosure  CC:  No Recipients    If you would like to receive this communication electronically, please contact [email or web address] or [phone] to request more information on [employer] Link access.    For providers and/or their staff who would like to refer a patient to [facility], please contact us through our one-stop-shop provider referral line, [facility], at [phone].    If you feel you have received this communication in error or would no longer like to receive these types of communications, please e-mail [email or web address]

## 2019-09-24 ENCOUNTER — PATIENT OUTREACH (OUTPATIENT)
Dept: ADMINISTRATIVE | Facility: OTHER | Age: 79
End: 2019-09-24

## 2019-09-25 ENCOUNTER — OFFICE VISIT (OUTPATIENT)
Dept: OPTOMETRY | Facility: CLINIC | Age: 79
End: 2019-09-25
Payer: MEDICARE

## 2019-09-25 DIAGNOSIS — H40.1131 PRIMARY OPEN ANGLE GLAUCOMA OF BOTH EYES, MILD STAGE: Primary | ICD-10-CM

## 2019-09-25 PROCEDURE — 99212 OFFICE O/P EST SF 10 MIN: CPT | Mod: PBBFAC,PO | Performed by: OPTOMETRIST

## 2019-09-25 PROCEDURE — 99999 PR PBB SHADOW E&M-EST. PATIENT-LVL II: CPT | Mod: PBBFAC,,, | Performed by: OPTOMETRIST

## 2019-09-25 PROCEDURE — 99999 PR PBB SHADOW E&M-EST. PATIENT-LVL II: ICD-10-PCS | Mod: PBBFAC,,, | Performed by: OPTOMETRIST

## 2019-09-25 PROCEDURE — 92012 INTRM OPH EXAM EST PATIENT: CPT | Mod: S$PBB,,, | Performed by: OPTOMETRIST

## 2019-09-25 PROCEDURE — 92012 PR EYE EXAM, EST PATIENT,INTERMED: ICD-10-PCS | Mod: S$PBB,,, | Performed by: OPTOMETRIST

## 2019-09-25 RX ORDER — LATANOPROST 50 UG/ML
SOLUTION/ DROPS OPHTHALMIC
Qty: 7.5 ML | Refills: 3 | Status: SHIPPED | OUTPATIENT
Start: 2019-09-25 | End: 2020-04-21 | Stop reason: SDUPTHER

## 2019-09-25 NOTE — PROGRESS NOTES
ELMO VALERA 05/2019  Here for 4 month IOP check today. Using Latanoprost OU Q HS,   last used last night.  Glasses about yrs. Old and broken, patient would   like updated RX for new glasses.  Eyes water in the morning.  Patient   hasn't tried any AT's.    Last edited by Claudia Stanley on 9/25/2019  9:50 AM. (History)            Assessment /Plan     For exam results, see Encounter Report.    Primary open angle glaucoma of both eyes, mild stage  -     OCT - Optic Nerve; Future  -     [facility] - OU - Intermediate - Both Eyes; Future  -     latanoprost 0.005 % ophthalmic solution; INSTILL 1 DROP IN BOTH EYES EVERY DAY  Dispense: 7.5 mL; Refill: 3      1. IOP stable, nerve eval stable, cont Latanaprost qhs OU, Prev HVf and OCT stable, prev gonio open. Probably low tension. ,Mild glaucoma based on OCT changes, prev VF defects Ou and increased IOP. Pachy normal. Field did clean up at repeat. Pretreat IOP at 20. Family history of glaucoma. RTC 4 mos IOP ck. With [id number] std and OCT of rnfl.

## 2019-10-08 ENCOUNTER — HOSPITAL ENCOUNTER (OUTPATIENT)
Dept: RADIOLOGY | Facility: OTHER | Age: 79
Discharge: HOME | End: 2019-10-08
Attending: OTOLARYNGOLOGY
Payer: MEDICARE

## 2019-10-08 DIAGNOSIS — E04.1 THYROID NODULE: ICD-10-CM

## 2019-10-08 PROCEDURE — 88172 CYTP DX EVAL FNA 1ST EA SITE: CPT | Mod: 26,,, | Performed by: PATHOLOGY

## 2019-10-08 PROCEDURE — 88173 TISSUE SPECIMEN TO PATHOLOGY: ICD-10-PCS | Mod: 26,,, | Performed by: PATHOLOGY

## 2019-10-08 PROCEDURE — 10005 FNA BX W/US GDN 1ST LES: CPT

## 2019-10-08 PROCEDURE — 10005 FNA BX W/US GDN 1ST LES: CPT | Mod: ,,, | Performed by: RADIOLOGY

## 2019-10-08 PROCEDURE — 88173 CYTOPATH EVAL FNA REPORT: CPT | Mod: 26,,, | Performed by: PATHOLOGY

## 2019-10-08 PROCEDURE — 88172 CYTP DX EVAL FNA 1ST EA SITE: CPT | Performed by: PATHOLOGY

## 2019-10-08 PROCEDURE — 10005 US FINE NEEDLE ASPIRATION BIOPSY, FIRST LESION: ICD-10-PCS | Mod: ,,, | Performed by: RADIOLOGY

## 2019-10-08 PROCEDURE — 88172 TISSUE SPECIMEN TO PATHOLOGY: ICD-10-PCS | Mod: 26,,, | Performed by: PATHOLOGY

## 2019-10-19 ENCOUNTER — PATIENT OUTREACH (OUTPATIENT)
Dept: ADMINISTRATIVE | Facility: OTHER | Age: 79
End: 2019-10-19

## 2019-10-22 ENCOUNTER — OFFICE VISIT (OUTPATIENT)
Dept: SURGERY | Facility: CLINIC | Age: 79
End: 2019-10-22
Payer: MEDICARE

## 2019-10-22 VITALS
HEIGHT: 75 IN | DIASTOLIC BLOOD PRESSURE: 74 MMHG | SYSTOLIC BLOOD PRESSURE: 137 MMHG | BODY MASS INDEX: 32.45 KG/M2 | HEART RATE: 76 BPM | WEIGHT: 261 LBS

## 2019-10-22 DIAGNOSIS — K59.04 CHRONIC IDIOPATHIC CONSTIPATION: Primary | ICD-10-CM

## 2019-10-22 PROCEDURE — 99213 OFFICE O/P EST LOW 20 MIN: CPT | Mod: PBBFAC | Performed by: COLON & RECTAL SURGERY

## 2019-10-22 PROCEDURE — 99999 PR PBB SHADOW E&M-EST. PATIENT-LVL III: CPT | Mod: PBBFAC,,, | Performed by: COLON & RECTAL SURGERY

## 2019-10-22 PROCEDURE — 99213 OFFICE O/P EST LOW 20 MIN: CPT | Mod: S$PBB,,, | Performed by: COLON & RECTAL SURGERY

## 2019-10-22 PROCEDURE — 99999 PR PBB SHADOW E&M-EST. PATIENT-LVL III: ICD-10-PCS | Mod: PBBFAC,,, | Performed by: COLON & RECTAL SURGERY

## 2019-10-22 PROCEDURE — 99213 PR OFFICE/OUTPT VISIT, EST, LEVL III, 20-29 MIN: ICD-10-PCS | Mod: S$PBB,,, | Performed by: COLON & RECTAL SURGERY

## 2019-10-22 NOTE — PROGRESS NOTES
HPI:  Tung Chau is a 79 y.o. male with history of straining with bowel movements, reports to clinic for follow-up visit. He reports that increased fiber diet, increased water and protein intake initially improved straining with BM, however continues to have problems every third day. He reports improvement since last clinic visit. Otherwise, no new symptoms since last visit. He continues to take daily herbal supplement with cellulose, senna, other bowel stimulants for the last few years with decreasing benefit.     Past Medical History:   Diagnosis Date    Cataract     Chronic constipation     GERD (gastroesophageal reflux disease)     Glaucoma     Hyperlipidemia     Hypertension     LBBB (left bundle branch block)         Past Surgical History:   Procedure Laterality Date    COLONOSCOPY  4/28/2008    hemorrhoids    COLONOSCOPY N/A 5/15/2017    Procedure: COLONOSCOPY;  Surgeon: Dandy Cifuentes MD;  Location: [facility] ([address] [place] [address]);  Service: Endoscopy;  Laterality: N/A;    ESOPHAGOGASTRODUODENOSCOPY  2/1/2010    INGUINAL HERNIA REPAIR Right        Review of patient's allergies indicates:   Allergen Reactions    Penicillins      Stomach cramps       Family History   Problem Relation Age of Onset    Stroke Father     Hypertension Sister     Cancer Sister         breast    Cancer Paternal Uncle 60        stomach CA    Colon polyps Paternal Uncle     Amblyopia Neg Hx     Blindness Neg Hx     Cataracts Neg Hx     Diabetes Neg Hx     Glaucoma Neg Hx     Macular degeneration Neg Hx     Retinal detachment Neg Hx     Strabismus Neg Hx     Thyroid disease Neg Hx        Social History     Socioeconomic History    Marital status:      Spouse name: Not on file    Number of children: Not on file    Years of education: Not on file    Highest education level: Not on file   Occupational History    Not on file   Social Needs    Financial resource strain: Not on file    Food insecurity:      "Worry: Not on file     Inability: Not on file    Transportation needs:     Medical: Not on file     Non-medical: Not on file   Tobacco Use    Smoking status: Never Smoker    Smokeless tobacco: Never Used   Substance and Sexual Activity    Alcohol use: No    Drug use: No    Sexual activity: Not Currently   Lifestyle    Physical activity:     Days per week: Not on file     Minutes per session: Not on file    Stress: Not on file   Relationships    Social connections:     Talks on phone: Not on file     Gets together: Not on file     Attends Scientologist service: Not on file     Active member of club or organization: Not on file     Attends meetings of clubs or organizations: Not on file     Relationship status: Not on file   Other Topics Concern    Not on file   Social History Narrative    Not on file       ROS:  GENERAL: No fever, chills, fatigability or weight loss.  Integument: No rashes, redness, icterus  CHEST: Denies ABDI, cyanosis, wheezing, cough and sputum production.  CARDIOVASCULAR: Denies chest pain, PND, orthopnea or reduced exercise tolerance.  GI: Denies abd pain, dysphagia, nausea, vomiting, no hematemesis   : Denies burning on urination, no hematuria, no bacteriuria  MSK: No deformities, swelling, joint pain swelling  Neurologic: No HAs, seizures, weakness, paresthesias, gait problems    PE:  General appearance - well appearing male sitting in chair in NAD  /74 (BP Location: Left arm, Patient Position: Sitting, BP Method: Large (Automatic))   Pulse 76   Ht 6' 3" (1.905 m)   Wt 118.4 kg (261 lb)   BMI 32.62 kg/m²   Sclera/ Skin anicteric  LN not palpable  AT NC EOMI  Neck supple trachea midline   Chest symmetric, nl excursion, no retractions, breathing comfortably  Abdomen  ND soft NT.  no masses, no organomegaly  EXT - no CCE  Neuro:  Mood/ affect nl, alert and oriented x 3, moves all ext's, gait nl        Assessment:  1. Straining with bowel movements      Counseled with patient " that long-term use of bowel stimulants with laxatives can over time cause constipation and straining with BM. Counseled patient to take Miralax twice daily, continue to have increased fiber and water intake in diet, and keep bowel journal.     Plan:  - Miralax BID  - Return to clinic in 6 weeks to see improvement    -Lexus Murry M.D  General Surgery PGY1

## 2019-10-22 NOTE — PROGRESS NOTES
I have personally obtained a history and performed a physical exam with and independent to my resident (see Dr Murry note) and discussed the findings and plan with the patient.  I agree with the above findings and plan with the following exceptions:  None    H, Joshua Salamanca MD, FACS, FASCRS  Staff Surgeon  Dept of Colon and Rectal Surgery  [facility] [place], [place]

## 2019-10-23 NOTE — PROGRESS NOTES
HPI:  Tung Chau is a 79 y.o. male with history of straining with bowel movements, reports to clinic for follow-up visit. He reports that increased fiber diet, increased water and protein intake initially improved straining with BM, however continues to have problems every third day. He reports improvement since last clinic visit. Otherwise, no new symptoms since last visit. He continues to take daily herbal supplement with cellulose, senna, other bowel stimulants for the last few years with decreasing benefit.     Past Medical History:   Diagnosis Date    Cataract     Chronic constipation     GERD (gastroesophageal reflux disease)     Glaucoma     Hyperlipidemia     Hypertension     LBBB (left bundle branch block)         Past Surgical History:   Procedure Laterality Date    COLONOSCOPY  4/28/2008    hemorrhoids    COLONOSCOPY N/A 5/15/2017    Procedure: COLONOSCOPY;  Surgeon: Dandy Cifuentes MD;  Location: [facility] ([address] [place] [address]);  Service: Endoscopy;  Laterality: N/A;    ESOPHAGOGASTRODUODENOSCOPY  2/1/2010    INGUINAL HERNIA REPAIR Right        Review of patient's allergies indicates:   Allergen Reactions    Penicillins      Stomach cramps       Family History   Problem Relation Age of Onset    Stroke Father     Hypertension Sister     Cancer Sister         breast    Cancer Paternal Uncle 60        stomach CA    Colon polyps Paternal Uncle     Amblyopia Neg Hx     Blindness Neg Hx     Cataracts Neg Hx     Diabetes Neg Hx     Glaucoma Neg Hx     Macular degeneration Neg Hx     Retinal detachment Neg Hx     Strabismus Neg Hx     Thyroid disease Neg Hx        Social History     Socioeconomic History    Marital status:      Spouse name: Not on file    Number of children: Not on file    Years of education: Not on file    Highest education level: Not on file   Occupational History    Not on file   Social Needs    Financial resource strain: Not on file    Food insecurity:      "Worry: Not on file     Inability: Not on file    Transportation needs:     Medical: Not on file     Non-medical: Not on file   Tobacco Use    Smoking status: Never Smoker    Smokeless tobacco: Never Used   Substance and Sexual Activity    Alcohol use: No    Drug use: No    Sexual activity: Not Currently   Lifestyle    Physical activity:     Days per week: Not on file     Minutes per session: Not on file    Stress: Not on file   Relationships    Social connections:     Talks on phone: Not on file     Gets together: Not on file     Attends Worship service: Not on file     Active member of club or organization: Not on file     Attends meetings of clubs or organizations: Not on file     Relationship status: Not on file   Other Topics Concern    Not on file   Social History Narrative    Not on file       ROS:  GENERAL: No fever, chills, fatigability or weight loss.  Integument: No rashes, redness, icterus  CHEST: Denies ABDI, cyanosis, wheezing, cough and sputum production.  CARDIOVASCULAR: Denies chest pain, PND, orthopnea or reduced exercise tolerance.  GI: Denies abd pain, dysphagia, nausea, vomiting, no hematemesis   : Denies burning on urination, no hematuria, no bacteriuria  MSK: No deformities, swelling, joint pain swelling  Neurologic: No HAs, seizures, weakness, paresthesias, gait problems    PE:  General appearance - well appearing male sitting in chair in NAD  /74 (BP Location: Left arm, Patient Position: Sitting, BP Method: Large (Automatic))   Pulse 76   Ht 6' 3" (1.905 m)   Wt 118.4 kg (261 lb)   BMI 32.62 kg/m²   Sclera/ Skin anicteric  LN not palpable  AT NC EOMI  Neck supple trachea midline   Chest symmetric, nl excursion, no retractions, breathing comfortably  Abdomen  ND soft NT.  no masses, no organomegaly  EXT - no CCE  Neuro:  Mood/ affect nl, alert and oriented x 3, moves all ext's, gait nl        Assessment:  1. Straining with bowel movements      Counseled with patient " that long-term use of bowel stimulants with laxatives can over time cause constipation and straining with BM. Counseled patient to take Miralax twice daily, continue to have increased fiber and water intake in diet, and keep bowel journal.     Plan:  - Miralax BID  - Return to clinic in 6 weeks to see improvement  - Continue high fiber diet.      -Lexus Murry M.D  General Surgery PGY1    I have personally obtained a history and performed a physical exam with and independent to my resident and discussed the findings and plan with the patient.  I agree with the above findings and plan with the following exceptions:  None    H, Joshua Salamanca MD, FACS, FASCRS  Staff Surgeon  Dept of Colon and Rectal Surgery  [facility] [place], [place]

## 2019-11-16 RX ORDER — BENAZEPRIL HYDROCHLORIDE 10 MG/1
TABLET ORAL
Qty: 90 TABLET | Refills: 1 | Status: SHIPPED | OUTPATIENT
Start: 2019-11-16 | End: 2020-08-22

## 2019-11-16 RX ORDER — DOXAZOSIN 4 MG/1
TABLET ORAL
Qty: 90 TABLET | Refills: 1 | Status: SHIPPED | OUTPATIENT
Start: 2019-11-16 | End: 2020-05-20

## 2019-11-25 RX ORDER — BENAZEPRIL HYDROCHLORIDE 10 MG/1
10 TABLET ORAL DAILY
Qty: 90 TABLET | Refills: 2 | Status: SHIPPED | OUTPATIENT
Start: 2019-11-25 | End: 2020-08-21 | Stop reason: SDUPTHER

## 2019-12-26 RX ORDER — HYDROCHLOROTHIAZIDE 25 MG/1
TABLET ORAL
Qty: 90 TABLET | Refills: 3 | Status: SHIPPED | OUTPATIENT
Start: 2019-12-26 | End: 2020-12-18 | Stop reason: SDUPTHER

## 2020-01-24 ENCOUNTER — PATIENT OUTREACH (OUTPATIENT)
Dept: ADMINISTRATIVE | Facility: OTHER | Age: 80
End: 2020-01-24

## 2020-01-27 ENCOUNTER — OFFICE VISIT (OUTPATIENT)
Dept: OPTOMETRY | Facility: CLINIC | Age: 80
End: 2020-01-27
Payer: MEDICARE

## 2020-01-27 ENCOUNTER — CLINICAL SUPPORT (OUTPATIENT)
Dept: OPHTHALMOLOGY | Facility: CLINIC | Age: 80
End: 2020-01-27
Payer: MEDICARE

## 2020-01-27 DIAGNOSIS — H40.1131 PRIMARY OPEN ANGLE GLAUCOMA OF BOTH EYES, MILD STAGE: Primary | ICD-10-CM

## 2020-01-27 DIAGNOSIS — H40.1131 PRIMARY OPEN ANGLE GLAUCOMA OF BOTH EYES, MILD STAGE: ICD-10-CM

## 2020-01-27 PROCEDURE — 92082 HUMPHREY VISUAL FIELD-OU-INTERMEDIATE-BOTH EYES: ICD-10-PCS | Mod: 26,S$PBB,, | Performed by: OPTOMETRIST

## 2020-01-27 PROCEDURE — 99999 PR PBB SHADOW E&M-EST. PATIENT-LVL II: ICD-10-PCS | Mod: PBBFAC,,, | Performed by: OPTOMETRIST

## 2020-01-27 PROCEDURE — 92082 INTERMEDIATE VISUAL FIELD XM: CPT | Mod: 26,S$PBB,, | Performed by: OPTOMETRIST

## 2020-01-27 PROCEDURE — 92133 POSTERIOR SEGMENT OCT OPTIC NERVE(OCULAR COHERENCE TOMOGRAPHY) - OU - BOTH EYES: ICD-10-PCS | Mod: 26,S$PBB,, | Performed by: OPTOMETRIST

## 2020-01-27 PROCEDURE — 99212 OFFICE O/P EST SF 10 MIN: CPT | Mod: PBBFAC,PO | Performed by: OPTOMETRIST

## 2020-01-27 PROCEDURE — 92012 PR EYE EXAM, EST PATIENT,INTERMED: ICD-10-PCS | Mod: S$PBB,,, | Performed by: OPTOMETRIST

## 2020-01-27 PROCEDURE — 92133 CPTRZD OPH DX IMG PST SGM ON: CPT | Mod: PBBFAC,PO

## 2020-01-27 PROCEDURE — 99999 PR PBB SHADOW E&M-EST. PATIENT-LVL II: CPT | Mod: PBBFAC,,, | Performed by: OPTOMETRIST

## 2020-01-27 PROCEDURE — 92012 INTRM OPH EXAM EST PATIENT: CPT | Mod: S$PBB,,, | Performed by: OPTOMETRIST

## 2020-01-27 PROCEDURE — 92133 CPTRZD OPH DX IMG PST SGM ON: CPT | Mod: 26,S$PBB,, | Performed by: OPTOMETRIST

## 2020-01-27 PROCEDURE — 92082 INTERMEDIATE VISUAL FIELD XM: CPT | Mod: PBBFAC,PO

## 2020-01-27 NOTE — PROGRESS NOTES
HPI     Blur os at dist, x mos, no assoc pain or red, no relief over time,   constant  Here for glaucoma testing to see if any worsening  Using drops as directed    Last edited by Harjeet Irving, OD on 1/27/2020 10:46 AM. (History)            Assessment /Plan     For exam results, see Encounter Report.    Primary open angle glaucoma of both eyes, mild stage      1. IOP stable and fine for nerve,  nerve eval stable, cont Latanaprost qhs OU, Today HVf and OCT stable, prev gonio open. Probably low tension. ,Mild glaucoma based on OCT changes. Pachy normal. Initial Field did clean up at repeat. Pretreat IOP at 20. Family history of glaucoma. RTC 4 mos IOP ck. With DFE.

## 2020-01-27 NOTE — PROGRESS NOTES
Oct done ou     24-2  Sf done ou     Rel & Fix =  Poor od                      Good os       Coop =   Fair     Patient has no allergies to eye patch/latex at this time   Patient had a lot of fixation loss with od today   Patient was following the flashing lights with od     Jthomas      DR MEAGAN PERRIN

## 2020-01-27 NOTE — MEDICAL/APP STUDENT
Pt is here for IOP check and HVF and OCT to monitor mild glaucoma OU.  Using latanoprost QHS OU with no issues with compliance.   Starting to notice a lot of glare at night and decreased vision OU for distance.   No pain, irritation or other issues at this time.

## 2020-02-04 RX ORDER — ATORVASTATIN CALCIUM 10 MG/1
TABLET, FILM COATED ORAL
Qty: 90 TABLET | Refills: 3 | Status: SHIPPED | OUTPATIENT
Start: 2020-02-04 | End: 2021-02-05

## 2020-02-17 ENCOUNTER — PATIENT OUTREACH (OUTPATIENT)
Dept: ADMINISTRATIVE | Facility: HOSPITAL | Age: 80
End: 2020-02-17

## 2020-02-18 ENCOUNTER — OFFICE VISIT (OUTPATIENT)
Dept: INTERNAL MEDICINE | Facility: CLINIC | Age: 80
End: 2020-02-18
Payer: MEDICARE

## 2020-02-18 ENCOUNTER — LAB VISIT (OUTPATIENT)
Dept: LAB | Facility: HOSPITAL | Age: 80
End: 2020-02-18
Attending: INTERNAL MEDICINE
Payer: MEDICARE

## 2020-02-18 VITALS
SYSTOLIC BLOOD PRESSURE: 132 MMHG | OXYGEN SATURATION: 99 % | HEIGHT: 75 IN | DIASTOLIC BLOOD PRESSURE: 80 MMHG | BODY MASS INDEX: 25.74 KG/M2 | WEIGHT: 207 LBS | HEART RATE: 80 BPM

## 2020-02-18 DIAGNOSIS — J30.9 ALLERGIC RHINITIS, UNSPECIFIED SEASONALITY, UNSPECIFIED TRIGGER: ICD-10-CM

## 2020-02-18 DIAGNOSIS — Z12.5 ENCOUNTER FOR SCREENING FOR MALIGNANT NEOPLASM OF PROSTATE: ICD-10-CM

## 2020-02-18 DIAGNOSIS — N40.0 BENIGN NON-NODULAR PROSTATIC HYPERPLASIA WITHOUT LOWER URINARY TRACT SYMPTOMS: ICD-10-CM

## 2020-02-18 DIAGNOSIS — K21.9 GERD WITHOUT ESOPHAGITIS: ICD-10-CM

## 2020-02-18 DIAGNOSIS — E78.5 HYPERLIPIDEMIA, UNSPECIFIED HYPERLIPIDEMIA TYPE: ICD-10-CM

## 2020-02-18 DIAGNOSIS — Z00.00 ANNUAL PHYSICAL EXAM: ICD-10-CM

## 2020-02-18 DIAGNOSIS — I10 ESSENTIAL HYPERTENSION: ICD-10-CM

## 2020-02-18 DIAGNOSIS — Z00.00 ANNUAL PHYSICAL EXAM: Primary | ICD-10-CM

## 2020-02-18 LAB
ALBUMIN SERPL BCP-MCNC: 4.4 G/DL (ref 3.5–5.2)
ALP SERPL-CCNC: 74 U/L (ref 55–135)
ALT SERPL W/O P-5'-P-CCNC: 25 U/L (ref 10–44)
ANION GAP SERPL CALC-SCNC: 12 MMOL/L (ref 8–16)
AST SERPL-CCNC: 21 U/L (ref 10–40)
BASOPHILS # BLD AUTO: 0.04 K/UL (ref 0–0.2)
BASOPHILS NFR BLD: 0.9 % (ref 0–1.9)
BILIRUB SERPL-MCNC: 0.7 MG/DL (ref 0.1–1)
BUN SERPL-MCNC: 13 MG/DL (ref 8–23)
CALCIUM SERPL-MCNC: 10.2 MG/DL (ref 8.7–10.5)
CHLORIDE SERPL-SCNC: 100 MMOL/L (ref 95–110)
CHOLEST SERPL-MCNC: 164 MG/DL (ref 120–199)
CHOLEST/HDLC SERPL: 2.8 {RATIO} (ref 2–5)
CO2 SERPL-SCNC: 27 MMOL/L (ref 23–29)
COMPLEXED PSA SERPL-MCNC: 1.2 NG/ML (ref 0–4)
CREAT SERPL-MCNC: 1.2 MG/DL (ref 0.5–1.4)
DIFFERENTIAL METHOD: ABNORMAL
EOSINOPHIL # BLD AUTO: 0 K/UL (ref 0–0.5)
EOSINOPHIL NFR BLD: 0.9 % (ref 0–8)
ERYTHROCYTE [DISTWIDTH] IN BLOOD BY AUTOMATED COUNT: 12.2 % (ref 11.5–14.5)
EST. GFR  (AFRICAN AMERICAN): >60 ML/MIN/1.73 M^2
EST. GFR  (NON AFRICAN AMERICAN): 58 ML/MIN/1.73 M^2
GLUCOSE SERPL-MCNC: 95 MG/DL (ref 70–110)
HCT VFR BLD AUTO: 49 % (ref 40–54)
HDLC SERPL-MCNC: 58 MG/DL (ref 40–75)
HDLC SERPL: 35.4 % (ref 20–50)
HGB BLD-MCNC: 16 G/DL (ref 14–18)
IMM GRANULOCYTES # BLD AUTO: 0.01 K/UL (ref 0–0.04)
IMM GRANULOCYTES NFR BLD AUTO: 0.2 % (ref 0–0.5)
LDLC SERPL CALC-MCNC: 92.4 MG/DL (ref 63–159)
LYMPHOCYTES # BLD AUTO: 0.8 K/UL (ref 1–4.8)
LYMPHOCYTES NFR BLD: 17.4 % (ref 18–48)
MCH RBC QN AUTO: 31.1 PG (ref 27–31)
MCHC RBC AUTO-ENTMCNC: 32.7 G/DL (ref 32–36)
MCV RBC AUTO: 95 FL (ref 82–98)
MONOCYTES # BLD AUTO: 0.4 K/UL (ref 0.3–1)
MONOCYTES NFR BLD: 9.5 % (ref 4–15)
NEUTROPHILS # BLD AUTO: 3.1 K/UL (ref 1.8–7.7)
NEUTROPHILS NFR BLD: 71.1 % (ref 38–73)
NONHDLC SERPL-MCNC: 106 MG/DL
NRBC BLD-RTO: 0 /100 WBC
PLATELET # BLD AUTO: 150 K/UL (ref 150–350)
PMV BLD AUTO: 11.2 FL (ref 9.2–12.9)
POTASSIUM SERPL-SCNC: 4.9 MMOL/L (ref 3.5–5.1)
PROT SERPL-MCNC: 7.4 G/DL (ref 6–8.4)
RBC # BLD AUTO: 5.14 M/UL (ref 4.6–6.2)
SODIUM SERPL-SCNC: 139 MMOL/L (ref 136–145)
TRIGL SERPL-MCNC: 68 MG/DL (ref 30–150)
TSH SERPL DL<=0.005 MIU/L-ACNC: 1.64 UIU/ML (ref 0.4–4)
WBC # BLD AUTO: 4.32 K/UL (ref 3.9–12.7)

## 2020-02-18 PROCEDURE — 99999 PR PBB SHADOW E&M-EST. PATIENT-LVL III: CPT | Mod: PBBFAC,,, | Performed by: INTERNAL MEDICINE

## 2020-02-18 PROCEDURE — 99397 PR PREVENTIVE VISIT,EST,65 & OVER: ICD-10-PCS | Mod: S$PBB,,, | Performed by: INTERNAL MEDICINE

## 2020-02-18 PROCEDURE — 36415 COLL VENOUS BLD VENIPUNCTURE: CPT

## 2020-02-18 PROCEDURE — 84153 ASSAY OF PSA TOTAL: CPT

## 2020-02-18 PROCEDURE — 99999 PR PBB SHADOW E&M-EST. PATIENT-LVL III: ICD-10-PCS | Mod: PBBFAC,,, | Performed by: INTERNAL MEDICINE

## 2020-02-18 PROCEDURE — 80061 LIPID PANEL: CPT

## 2020-02-18 PROCEDURE — 99213 OFFICE O/P EST LOW 20 MIN: CPT | Mod: PBBFAC | Performed by: INTERNAL MEDICINE

## 2020-02-18 PROCEDURE — 99397 PER PM REEVAL EST PAT 65+ YR: CPT | Mod: S$PBB,,, | Performed by: INTERNAL MEDICINE

## 2020-02-18 PROCEDURE — 84443 ASSAY THYROID STIM HORMONE: CPT

## 2020-02-18 PROCEDURE — 85025 COMPLETE CBC W/AUTO DIFF WBC: CPT

## 2020-02-18 PROCEDURE — 80053 COMPREHEN METABOLIC PANEL: CPT

## 2020-02-18 NOTE — PROGRESS NOTES
PAST MEDICAL HISTORY:   Hypertension.  Hyperlipidemia.  Gastroesophageal reflux disease.  Chronic rhinitis.  BPH.  Left bundle-branch block.  Adenomatous colon polyp in the year  and in 2015.  Right inguinal hernia repair.  Epididymal cyst.  Fractured elbow and wrist.  Lung surgery at age 42 to remove an empyema.     SOCIAL HISTORY:  Tobacco and alcohol use - none.  .  Exercises by   walking, weight lifting, and working on his farm.  He volunteers regularly at   the [email or web address] and [employer].     FAMILY HISTORY:  Father is , complications from a fracture.  Mother is   , breast cancer and stroke.  One sister is alive, doing well, but   history of breast cancer and hypertension.     SCREENING:  Last colonoscopy in 2017   Benign polyp     MEDICATIONS:   Atorvastatin 10 mg.  Benazepril 10 mg.  Diltiazem 360 mg.  Doxazosin 4 mg.  Hydrochlorothiazide 25 mg.          79-year-old male  Presents for his annual of exam  Feels well in general    Medical history is medications outlined above    In 2019 had a fine-needle aspirate of a left thyroid nodule    it was benign follicular cells    reportedly a follow-up visit did not take note of any thyroid nodule    System review  Negative for chest pain abdominal pain shortness of breath  Bowel function is regular  No difficulty urinating except with nocturia x2 or 3  No headaches arthralgias heartburn indigestion    Examination  Weight 262  Pulse 76  Blood pressure 128/72  HEENT exam no abnormal findings  Neck no thyromegaly no masses  Lungs clear breath sounds  Heart regular rate rhythm no murmurs  Abdominal exam is soft nontender no past splenomegaly abdominal masses  2+ carotid pedal pulses no bruits  Extremities no edema  Lymph:  No palpable adenopathy  Genitalia no swelling masses or hernias  Prostate is moderately enlarged    Impression   General exam  Hypertension  Hyperlipidemia  BPH  GERD    Plan  Routine labs  He is  due to have a colonoscopy later in the year  Continue with attention to diet physical activity

## 2020-03-19 ENCOUNTER — OFFICE VISIT (OUTPATIENT)
Dept: URGENT CARE | Facility: CLINIC | Age: 80
End: 2020-03-19
Payer: MEDICARE

## 2020-03-19 VITALS
RESPIRATION RATE: 18 BRPM | OXYGEN SATURATION: 97 % | HEIGHT: 75 IN | SYSTOLIC BLOOD PRESSURE: 158 MMHG | TEMPERATURE: 92 F | WEIGHT: 210 LBS | BODY MASS INDEX: 26.11 KG/M2 | DIASTOLIC BLOOD PRESSURE: 79 MMHG

## 2020-03-19 DIAGNOSIS — S20.221A BACK CONTUSION, RIGHT, INITIAL ENCOUNTER: Primary | ICD-10-CM

## 2020-03-19 DIAGNOSIS — M79.18 MUSCULOSKELETAL PAIN: ICD-10-CM

## 2020-03-19 PROCEDURE — 71100 X-RAY EXAM RIBS UNI 2 VIEWS: CPT | Mod: FY,RT,S$GLB, | Performed by: RADIOLOGY

## 2020-03-19 PROCEDURE — 99214 PR OFFICE/OUTPT VISIT, EST, LEVL IV, 30-39 MIN: ICD-10-PCS | Mod: S$GLB,,, | Performed by: FAMILY MEDICINE

## 2020-03-19 PROCEDURE — 99214 OFFICE O/P EST MOD 30 MIN: CPT | Mod: S$GLB,,, | Performed by: FAMILY MEDICINE

## 2020-03-19 PROCEDURE — 71100 XR RIBS 2 VIEW RIGHT: ICD-10-PCS | Mod: FY,RT,S$GLB, | Performed by: RADIOLOGY

## 2020-03-19 NOTE — PATIENT INSTRUCTIONS
Reducing Risk of Musculoskeletal Disorders (MSDs): Posture  Standing, sitting, and moving incorrectly all increase your risk of musculoskeletal disorders (MSDs). Why? Because posture problems overwork your body. They strain your muscles and tendons and stress your joints. With a little adjustment, however, you can correct most posture problems. Whatever you do, try to stay in a near neutral position and to work within easy reach. Tasks take less force when you work from a stable base. Take your knowledge of ergonomic principles home with you.    Stay near neutral  Whether you're standing or sitting, neutral posture places the least amount of stress on your body. To find neutral, line up your ears, shoulders, and hips. Keep your head upright and relax while you do this. If you're holding your breath or your shoulders are creeping toward your ears, try again. Your shoulders should be level, with your arms relaxed at your sides. You can rest your body by returning to neutral as often as possible. Other helpful positions include:  · Keep your hands, wrists, and forearms straight and parallel to the floor.  · Keep your head level, facing forward, and in line with your torso.  · Your feet should be supported by the floor, and your thighs and hips supported by a padded seat.  · If you are sitting, it is important to have your lower back supported.  Work within reach  Keep your work within 14 to 18 inches of your body, depending on your size. Reaching too far can be awkward. It also reduces your muscle power, so you need to use more force. Never lock a joint by extending it until it can't go any farther. Also, avoid reaching overhead or behind your back, if you can. If you can't, return to neutral as soon as possible.  Support your body off the job  Have you thought about your posture while you clean house or watch TV? Anytime you're not using a neutral posture, you might be straining muscles or joints. Just sitting on a  sagging sofa every evening may be enough to strain your back. Remember these tips:  · When relaxing, support your body so you're comfortable and not twisted. On a couch or chair, put a rolled-up pillow behind your back to support it.  · No matter what you're doing (cooking, cleaning, carpentry), work within reach.  · An old, sagging, lumpy mattress can be doing your body harm. A comfortable mattress that's firm but has enough cushion to support your body's natural curves provides better rest and opportunity for your body to recover the demands of the day.  Date Last Reviewed: 12/31/2015  © 8954-3260 [employer]. [address] [place] [address], [place] [address]. All rights reserved. This information is not intended as a substitute for professional medical care. Always follow your healthcare professional's instructions.

## 2020-03-19 NOTE — PROGRESS NOTES
"Subjective:       Patient ID: Tung Chau is a 79 y.o. male.    Vitals:  height is 6' 3" (1.905 m) and weight is 95.3 kg (210 lb). His temperature is 92 °F (33.3 °C) (abnormal). His blood pressure is 158/79 (abnormal). His respiration is 18 and oxygen saturation is 97%.     Chief Complaint: Back Pain (2 weeks)    79-year-old male with complaint of right-sided mid back area pain states slipped and fell at home about a week ago and hit the back on the floor, denies fever or chills denies any shortness of breath and pain is non radiating    Back Pain   This is a new problem. The current episode started 1 to 4 weeks ago. The problem occurs constantly. The problem is unchanged. The pain is at a severity of 2/10. The pain is mild. The pain is the same all the time. The symptoms are aggravated by bending and lying down. Stiffness is present all day. Pertinent negatives include no abdominal pain, bladder incontinence, bowel incontinence, dysuria or numbness. He has tried nothing for the symptoms. The treatment provided no relief.       Constitution: Negative for fatigue.   Gastrointestinal: Negative for abdominal pain and bowel incontinence.   Genitourinary: Negative for dysuria, urgency, bladder incontinence and hematuria.   Musculoskeletal: Positive for pain, trauma and back pain. Negative for muscle cramps and history of spine disorder.   Skin: Negative for rash.   Neurological: Negative for coordination disturbances, numbness and tingling.       Objective:      Physical Exam   Constitutional: He is oriented to person, place, and time. He appears well-developed and well-nourished. He is cooperative.  Non-toxic appearance. He does not appear ill. No distress.   HENT:   Head: Normocephalic and atraumatic.   Right Ear: Hearing, tympanic membrane, external ear and ear canal normal.   Left Ear: Hearing, tympanic membrane, external ear and ear canal normal.   Nose: Nose normal. No mucosal edema, rhinorrhea or nasal " deformity. No epistaxis. Right sinus exhibits no maxillary sinus tenderness and no frontal sinus tenderness. Left sinus exhibits no maxillary sinus tenderness and no frontal sinus tenderness.   Mouth/Throat: Uvula is midline, oropharynx is clear and moist and mucous membranes are normal. No trismus in the jaw. Normal dentition. No uvula swelling. No posterior oropharyngeal erythema.   Eyes: Conjunctivae and lids are normal. Right eye exhibits no discharge. Left eye exhibits no discharge. No scleral icterus.   Neck: Trachea normal, normal range of motion, full passive range of motion without pain and phonation normal. Neck supple.   Cardiovascular: Normal rate, regular rhythm, normal heart sounds, intact distal pulses and normal pulses.   Pulmonary/Chest: Effort normal and breath sounds normal. No respiratory distress.   Abdominal: Soft. Normal appearance and bowel sounds are normal. He exhibits no distension, no pulsatile midline mass and no mass. There is no tenderness.   Musculoskeletal: Normal range of motion. He exhibits no edema or deformity.   Right mid back area with the minimal tenderness otherwise lungs are clear and good a air movement back is flexion extension normal straight leg raise exam is negative   Neurological: He is alert and oriented to person, place, and time. He exhibits normal muscle tone. Coordination normal.   Skin: Skin is warm, dry, intact, not diaphoretic and not pale.   Psychiatric: He has a normal mood and affect. His speech is normal and behavior is normal. Judgment and thought content normal. Cognition and memory are normal.   Nursing note and vitals reviewed.        Assessment:       1. Back contusion, right, initial encounter    2. Musculoskeletal pain        Plan:         Back contusion, right, initial encounter  -     X-Ray Ribs 2 View Right; Future; Expected date: 03/19/2020    Musculoskeletal pain  -     X-Ray Ribs 2 View Right; Future; Expected date: 03/19/2020           Reassurance take Tylenol or Motrin every 6 hr p.r.n.

## 2020-04-03 RX ORDER — DILTIAZEM HYDROCHLORIDE 180 MG/1
360 CAPSULE, COATED, EXTENDED RELEASE ORAL DAILY
Qty: 60 CAPSULE | Refills: 11 | Status: SHIPPED | OUTPATIENT
Start: 2020-04-03 | End: 2021-03-22

## 2020-04-21 ENCOUNTER — OFFICE VISIT (OUTPATIENT)
Dept: INTERNAL MEDICINE | Facility: CLINIC | Age: 80
End: 2020-04-21
Payer: MEDICARE

## 2020-04-21 ENCOUNTER — TELEPHONE (OUTPATIENT)
Dept: INTERNAL MEDICINE | Facility: CLINIC | Age: 80
End: 2020-04-21

## 2020-04-21 VITALS
HEIGHT: 75 IN | HEART RATE: 99 BPM | WEIGHT: 216.06 LBS | BODY MASS INDEX: 26.86 KG/M2 | DIASTOLIC BLOOD PRESSURE: 82 MMHG | OXYGEN SATURATION: 97 % | SYSTOLIC BLOOD PRESSURE: 142 MMHG

## 2020-04-21 DIAGNOSIS — H40.1131 PRIMARY OPEN ANGLE GLAUCOMA OF BOTH EYES, MILD STAGE: ICD-10-CM

## 2020-04-21 DIAGNOSIS — N40.1 BENIGN PROSTATIC HYPERPLASIA WITH WEAK URINARY STREAM: ICD-10-CM

## 2020-04-21 DIAGNOSIS — R39.12 BENIGN PROSTATIC HYPERPLASIA WITH WEAK URINARY STREAM: ICD-10-CM

## 2020-04-21 DIAGNOSIS — K59.00 CONSTIPATION, UNSPECIFIED CONSTIPATION TYPE: Primary | ICD-10-CM

## 2020-04-21 LAB
BACTERIA #/AREA URNS AUTO: ABNORMAL /HPF
BILIRUB UR QL STRIP: NEGATIVE
CLARITY UR REFRACT.AUTO: CLEAR
COLOR UR AUTO: YELLOW
GLUCOSE UR QL STRIP: NEGATIVE
HGB UR QL STRIP: ABNORMAL
HYALINE CASTS UR QL AUTO: 6 /LPF
KETONES UR QL STRIP: NEGATIVE
LEUKOCYTE ESTERASE UR QL STRIP: NEGATIVE
MICROSCOPIC COMMENT: ABNORMAL
NITRITE UR QL STRIP: NEGATIVE
PH UR STRIP: 7 [PH] (ref 5–8)
PROT UR QL STRIP: NEGATIVE
RBC #/AREA URNS AUTO: 5 /HPF (ref 0–4)
SP GR UR STRIP: 1.01 (ref 1–1.03)
URN SPEC COLLECT METH UR: ABNORMAL
WBC #/AREA URNS AUTO: 1 /HPF (ref 0–5)

## 2020-04-21 PROCEDURE — 99999 PR PBB SHADOW E&M-EST. PATIENT-LVL III: CPT | Mod: PBBFAC,,, | Performed by: INTERNAL MEDICINE

## 2020-04-21 PROCEDURE — 99214 OFFICE O/P EST MOD 30 MIN: CPT | Mod: S$PBB,,, | Performed by: INTERNAL MEDICINE

## 2020-04-21 PROCEDURE — 99213 OFFICE O/P EST LOW 20 MIN: CPT | Mod: PBBFAC | Performed by: INTERNAL MEDICINE

## 2020-04-21 PROCEDURE — 99999 PR PBB SHADOW E&M-EST. PATIENT-LVL III: ICD-10-PCS | Mod: PBBFAC,,, | Performed by: INTERNAL MEDICINE

## 2020-04-21 PROCEDURE — 81001 URINALYSIS AUTO W/SCOPE: CPT

## 2020-04-21 PROCEDURE — 99214 PR OFFICE/OUTPT VISIT, EST, LEVL IV, 30-39 MIN: ICD-10-PCS | Mod: S$PBB,,, | Performed by: INTERNAL MEDICINE

## 2020-04-21 PROCEDURE — 87086 URINE CULTURE/COLONY COUNT: CPT

## 2020-04-21 RX ORDER — LATANOPROST 50 UG/ML
SOLUTION/ DROPS OPHTHALMIC
Qty: 7.5 ML | Refills: 3 | Status: SHIPPED | OUTPATIENT
Start: 2020-04-21 | End: 2020-06-17 | Stop reason: SDUPTHER

## 2020-04-21 NOTE — TELEPHONE ENCOUNTER
----- Message from Gay Kwok sent at 4/21/2020  9:50 AM CDT -----  Contact: [phone]  Patient is requesting a call from the doctor regarding his bowel issues.  Please advise, thank you.

## 2020-04-21 NOTE — PROGRESS NOTES
PAST MEDICAL HISTORY:   Hypertension.  Hyperlipidemia.  Gastroesophageal reflux disease.  Chronic rhinitis.  BPH.  Left bundle-branch block.  Adenomatous colon polyp in the year 2000 and in August 2015.  Right inguinal hernia repair.  Epididymal cyst.  Fractured elbow and wrist.  Lung surgery at age 42 to remove an empyema.        MEDICATIONS:   Atorvastatin 10 mg.  Benazepril 10 mg.  Diltiazem 360 mg.  Doxazosin 4 mg.  Hydrochlorothiazide 25 mg.      79-year-old male  For the past 4-6 weeks he has had issues of constipation    He mentions that he has been confined to home since March 12th  He still tries to maintain physical activity by walking 2 miles at home and increased fluid intake    The issue with his bowel function is that he hear rumbling abdomen, attempt to have a bowel function, does not feel that he can evacuate completely, that the stools can this be very stringy, and that he can feel his anus muscles locking up and spasming  There have been times when he took milk a magnesia, or use of culture real with Colace, in 4 days to have a regular bowel function will feel better    This is happen often on in the past    There has been no change urination other than it has slowed down slightly    Examination  Weight 216 lb  Blood pressure 142/82  Pulse 60  Lungs clear breath sounds  Heart regular rate and rhythm  Abdominal exam soft, nontender no hepatosplenomegaly or masses  Digital rectal exam prostate is moderately enlarged but smooth , can feel a lot of stool in the vault but is very soft and is brown    Impression  Constipation with incomplete evacuation and  Straining  BPH with diminished urine stream    Plan  Urinalysis and urine culture  Trial magnesium citrate for complete evacuation and then can consider use of MiraLax for week

## 2020-04-22 LAB — BACTERIA UR CULT: NO GROWTH

## 2020-04-27 ENCOUNTER — TELEPHONE (OUTPATIENT)
Dept: INTERNAL MEDICINE | Facility: CLINIC | Age: 80
End: 2020-04-27

## 2020-04-27 RX ORDER — LUBIPROSTONE 24 UG/1
24 CAPSULE ORAL 2 TIMES DAILY WITH MEALS
Qty: 14 CAPSULE | Refills: 0 | Status: SHIPPED | OUTPATIENT
Start: 2020-04-27 | End: 2020-05-01

## 2020-04-27 NOTE — TELEPHONE ENCOUNTER
Pt states he still have constipation he have taken the medication suggested , he states he want to know the what else can he do

## 2020-04-27 NOTE — TELEPHONE ENCOUNTER
----- Message from Iva Hurtado sent at 4/27/2020  8:33 AM CDT -----  Contact: Patient [phone]  Patient would like a callback.  All of his symptoms have not improved.

## 2020-04-28 ENCOUNTER — TELEPHONE (OUTPATIENT)
Dept: GASTROENTEROLOGY | Facility: CLINIC | Age: 80
End: 2020-04-28

## 2020-04-28 NOTE — TELEPHONE ENCOUNTER
----- Message from Yeni Hatch sent at 4/28/2020 11:41 AM CDT -----  Contact: pt: [phone]  Pt would like to speak with Dr. Cifuentes, had colonoscopy done by Dr. Cifuentes in the past       Please contact pt: [phone]

## 2020-04-28 NOTE — TELEPHONE ENCOUNTER
Called and spoke to pt.  Pt has been constipated for 2 weeks and is scheduled on 05/01/2020 at 3:00 pm for virtual visit.  Pt appreciated the call.

## 2020-04-29 ENCOUNTER — PATIENT OUTREACH (OUTPATIENT)
Dept: ADMINISTRATIVE | Facility: OTHER | Age: 80
End: 2020-04-29

## 2020-04-29 DIAGNOSIS — K59.00 CONSTIPATION, ACUTE: Primary | ICD-10-CM

## 2020-04-29 RX ORDER — POLYETHYLENE GLYCOL 3350, SODIUM SULFATE ANHYDROUS, SODIUM BICARBONATE, SODIUM CHLORIDE, POTASSIUM CHLORIDE 236; 22.74; 6.74; 5.86; 2.97 G/4L; G/4L; G/4L; G/4L; G/4L
4 POWDER, FOR SOLUTION ORAL ONCE
Qty: 1 BOTTLE | Refills: 0 | Status: SHIPPED | OUTPATIENT
Start: 2020-04-29 | End: 2020-04-29

## 2020-04-30 ENCOUNTER — PATIENT MESSAGE (OUTPATIENT)
Dept: GASTROENTEROLOGY | Facility: CLINIC | Age: 80
End: 2020-04-30

## 2020-04-30 NOTE — TELEPHONE ENCOUNTER
Called and spoke to pharmacist.  Prescription was not received, verbal given for medication.  Portal sent to pt.

## 2020-05-01 ENCOUNTER — PATIENT MESSAGE (OUTPATIENT)
Dept: GASTROENTEROLOGY | Facility: CLINIC | Age: 80
End: 2020-05-01

## 2020-05-01 ENCOUNTER — OFFICE VISIT (OUTPATIENT)
Dept: GASTROENTEROLOGY | Facility: CLINIC | Age: 80
End: 2020-05-01
Payer: MEDICARE

## 2020-05-01 DIAGNOSIS — Z80.0 FAMILY HISTORY OF STOMACH CANCER: ICD-10-CM

## 2020-05-01 DIAGNOSIS — Z86.010 HISTORY OF ADENOMATOUS POLYP OF COLON: ICD-10-CM

## 2020-05-01 DIAGNOSIS — K59.09 CONSTIPATION, CHRONIC: Primary | ICD-10-CM

## 2020-05-01 PROCEDURE — 99213 PR OFFICE/OUTPT VISIT, EST, LEVL III, 20-29 MIN: ICD-10-PCS | Mod: 95,,, | Performed by: INTERNAL MEDICINE

## 2020-05-01 PROCEDURE — 99213 OFFICE O/P EST LOW 20 MIN: CPT | Mod: 95,,, | Performed by: INTERNAL MEDICINE

## 2020-05-01 RX ORDER — LUBIPROSTONE 24 UG/1
CAPSULE, GELATIN COATED ORAL
Qty: 14 CAPSULE | Refills: 0 | Status: SHIPPED | OUTPATIENT
Start: 2020-05-01 | End: 2020-06-15

## 2020-05-01 NOTE — PROGRESS NOTES
The patient location is:  At home  The chief complaint leading to consultation is:  Constipation history of adenomas colon polyps family history of stomach cancer  Visit type:  Telemedicine video visit per [facility] and [place] department of Health COVID-19 pandemic mandates.  Total time spent with patient:  15 min  Each patient to whom he or she provides medical services by telemedicine is:  (1) informed of the relationship between the physician and patient and the respective role of any other health care provider with respect to management of the patient; and (2) notified that he or she may decline to receive medical services by telemedicine and may withdraw from such care at any time.    Notes:  See below          [facility] Consultation Note    Reason for Consult:  The primary encounter diagnosis was Constipation, chronic. Diagnoses of History of adenomatous polyp of colon and Family history of stomach cancer were also pertinent to this visit.    PCP:   Antonio Killian       Referring MD:  No referring provider defined for this encounter.    Initial History of Present Illness (HPI):  This is a 79 y.o. male telemedicine video visit per [facility] and the [facility] COVID-19 pandemic mandates.  Patient message me 3 days ago on 04/29/2020 for constipation starting several days ago.  He requested a bowel prep since over-the-counter medicine had not been very successful.  He went and got a duplex suppository and 4 L of GoLYTELY.  He only drank 2 or 3 glasses of the 1st half the prep and his bowel started moving the following morning he drank several more glasses to completely clean himself out.  He is now feeling back to baseline.  No blood in his stool no fever no chills no recent change in medication.  No cough no shortness of breath.  He is due for surveillance colonoscopy in May.  His primary care doctor has started him recently on Amitiza he will try that.  Abdominal pain -  no  Reflux - no  Dysphagia - no   Bowel habits - constipation  GI bleeding - none  NSAID usage - none    Interval HPI 05/01/2020:  The patient's last visit with me was on Visit date not found.      ROS:  Constitutional: No fevers, chills, No weight loss  ENT:  No heartburn no dysphagia no odynophagia no hoarseness  CV: No chest pain, no palpitation  Pulm: No cough, No shortness of breath, no wheezing  GI: see HPI  Derm: No rash, no itching  Heme: No lymphadenopathy, No easy bruising  MSK: No significant arthritis that he is taking NSAIDs for  : No dysuria, No hematuria  Neuro: No syncope, No seizure, no strokes  Psych: No uncontrolled anxiety, No uncontrolled depression    Medical History:  has a past medical history of Cataract, Chronic constipation, GERD (gastroesophageal reflux disease), Glaucoma, Hyperlipidemia, Hypertension, and LBBB (left bundle branch block).    Surgical History:  has a past surgical history that includes Inguinal hernia repair (Right); Colonoscopy (4/28/2008); Esophagogastroduodenoscopy (2/1/2010); and Colonoscopy (N/A, 5/15/2017).    Family History: family history includes Cancer in his sister; Cancer (age of onset: 60) in his paternal uncle; Colon polyps in his paternal uncle; Hypertension in his sister; Stomach cancer (age of onset: 56) in his paternal uncle; Stroke in his father..     Social History:  reports that he has never smoked. He has never used smokeless tobacco. He reports that he does not drink alcohol or use drugs.    Review of patient's allergies indicates:   Allergen Reactions    Penicillins      Stomach cramps       Medication List with Changes/Refills   Current Medications    AMITIZA 24 MCG CAP    TAKE 1 CAPSULE BY MOUTH TWICE DAILY WITH MEALS FOR 7 DAYS    ATORVASTATIN (LIPITOR) 10 MG TABLET    TAKE 1 TABLET(10 MG) BY MOUTH EVERY DAY    AZELASTINE (ASTELIN) 137 MCG (0.1 %) NASAL SPRAY    USE 2 SPRAYS IEN ONCE D    BENAZEPRIL (LOTENSIN) 10 MG TABLET    TAKE 1 TABLET BY  MOUTH EVERY DAY    BENAZEPRIL (LOTENSIN) 10 MG TABLET    TAKE 1 TABLET BY MOUTH EVERY DAY    BENAZEPRIL (LOTENSIN) 10 MG TABLET    Take 1 tablet (10 mg total) by mouth once daily.    DILTIAZEM (CARDIZEM CD) 180 MG 24 HR CAPSULE    Take 2 capsules (360 mg total) by mouth once daily.    DOXAZOSIN (CARDURA) 4 MG TABLET    TAKE 1 TABLET BY MOUTH EVERY NIGHT    HYDROCHLOROTHIAZIDE (HYDRODIURIL) 25 MG TABLET    TAKE 1 TABLET BY MOUTH EVERY DAY    HYDROCHLOROTHIAZIDE (HYDRODIURIL) 25 MG TABLET    TAKE 1 TABLET BY MOUTH EVERY DAY    HYDROCHLOROTHIAZIDE (HYDRODIURIL) 25 MG TABLET    TAKE 1 TABLET BY MOUTH EVERY DAY    LATANOPROST 0.005 % OPHTHALMIC SOLUTION    INSTILL 1 DROP IN BOTH EYES EVERY DAY         Objective Findings:    Vital Signs:  There were no vitals taken for this visit.  There is no height or weight on file to calculate BMI.    Physical Exam:  Telemedicine video visit per [facility] and the [facility] COVID 19 pandemic mandate  General Appearance: Well appearing in no acute distress  Neurologic:  Alert and oriented x4  Psychiatric:  Normal speech mentation and affect    Labs:  Lab Results   Component Value Date    WBC 4.32 02/18/2020    HGB 16.0 02/18/2020    HCT 49.0 02/18/2020     02/18/2020    CHOL 164 02/18/2020    TRIG 68 02/18/2020    HDL 58 02/18/2020    ALT 25 02/18/2020    AST 21 02/18/2020     02/18/2020    K 4.9 02/18/2020     02/18/2020    CREATININE 1.2 02/18/2020    BUN 13 02/18/2020    CO2 27 02/18/2020    TSH 1.643 02/18/2020    PSA 1.2 02/18/2020    INR 1.0 06/23/2013               Medical Decision Making:  Colonoscopy talk given  Chronic constipation bowel prep talk given  EGD talk given  Bowel regimen talk given  Recent lab work reviewed electrolytes look good not anemic      Assessment:  1. Constipation, chronic    2. History of adenomatous polyp of colon    3. Family history of stomach cancer         Recommendations:   1.  Chronic constipation now resolved  with bowel prep will schedule patient next available for colonoscopy for further evaluation  And surveillance for history of adenomas colon polyps.  Family history of stomach cancer will do EGD at the same time.  2.  Return to GI clinic p.r.n.    Follow up if symptoms worsen or fail to improve.      Order summary:  Orders Placed This Encounter    Case request GI: EGD (ESOPHAGOGASTRODUODENOSCOPY), COLONOSCOPY         Thank you so much for allowing me to participate in the care of Tunghorace Cifuentes MD

## 2020-05-04 ENCOUNTER — HOSPITAL ENCOUNTER (EMERGENCY)
Facility: HOSPITAL | Age: 80
Discharge: HOME | End: 2020-05-04
Attending: EMERGENCY MEDICINE
Payer: MEDICARE

## 2020-05-04 ENCOUNTER — PATIENT MESSAGE (OUTPATIENT)
Dept: GASTROENTEROLOGY | Facility: CLINIC | Age: 80
End: 2020-05-04

## 2020-05-04 VITALS
HEIGHT: 75 IN | OXYGEN SATURATION: 98 % | SYSTOLIC BLOOD PRESSURE: 131 MMHG | DIASTOLIC BLOOD PRESSURE: 67 MMHG | WEIGHT: 210 LBS | HEART RATE: 65 BPM | RESPIRATION RATE: 28 BRPM | TEMPERATURE: 98 F | BODY MASS INDEX: 26.11 KG/M2

## 2020-05-04 DIAGNOSIS — K59.09 CHRONIC CONSTIPATION: Primary | ICD-10-CM

## 2020-05-04 PROCEDURE — 99284 EMERGENCY DEPT VISIT MOD MDM: CPT | Mod: ,,, | Performed by: EMERGENCY MEDICINE

## 2020-05-04 PROCEDURE — 99284 PR EMERGENCY DEPT VISIT,LEVEL IV: ICD-10-PCS | Mod: ,,, | Performed by: EMERGENCY MEDICINE

## 2020-05-04 PROCEDURE — 99284 EMERGENCY DEPT VISIT MOD MDM: CPT | Mod: 25

## 2020-05-04 NOTE — ED PROVIDER NOTES
Encounter Date: 5/4/2020       History     Chief Complaint   Patient presents with    Constipation     78 yo M presents with constipation.  Symptoms worsening for three weeks.  Has seen pcp who gave miralax and then second agent.  GI saw and gave colonoscopy prep which caused multiple BM's on Friday and Saturday.  No BM since then.  Passed gas this a.m.  Used dulcolax laxative this a.m.  Pending colonoscopy with GI.  Patient denies nausea, vomiting, diarrhea, fever, cough, shortness of breath, chest pain, abdominal pain, or dysuria.  A ten point review of systems was completed and is negative except as documented above.  Patient denies any other acute medical complaint.  The patients available PMH, PSH, Social History, medications, allergies, and triage vital signs were reviewed just prior to their medical evaluation.        Review of patient's allergies indicates:   Allergen Reactions    Penicillins      Stomach cramps     Past Medical History:   Diagnosis Date    Cataract     Chronic constipation     GERD (gastroesophageal reflux disease)     Glaucoma     Hyperlipidemia     Hypertension     LBBB (left bundle branch block)      Past Surgical History:   Procedure Laterality Date    COLONOSCOPY  4/28/2008    hemorrhoids    COLONOSCOPY N/A 5/15/2017    Procedure: COLONOSCOPY;  Surgeon: Dandy Cifuentes MD;  Location: [facility] ([address] [place] [address]);  Service: Endoscopy;  Laterality: N/A;    ESOPHAGOGASTRODUODENOSCOPY  2/1/2010    INGUINAL HERNIA REPAIR Right      Family History   Problem Relation Age of Onset    Stroke Father     Hypertension Sister     Cancer Sister         breast    Cancer Paternal Uncle 60        stomach CA    Colon polyps Paternal Uncle     Stomach cancer Paternal Uncle 56    Amblyopia Neg Hx     Blindness Neg Hx     Cataracts Neg Hx     Diabetes Neg Hx     Glaucoma Neg Hx     Macular degeneration Neg Hx     Retinal detachment Neg Hx     Strabismus Neg Hx     Thyroid disease Neg  Hx     Colon cancer Neg Hx     Cirrhosis Neg Hx     Celiac disease Neg Hx     Crohn's disease Neg Hx     Esophageal cancer Neg Hx     Inflammatory bowel disease Neg Hx     Irritable bowel syndrome Neg Hx     Liver cancer Neg Hx     Liver disease Neg Hx     Rectal cancer Neg Hx     Ulcerative colitis Neg Hx     Jl's disease Neg Hx     Lymphoma Neg Hx     Tuberculosis Neg Hx     Scleroderma Neg Hx      Social History     Tobacco Use    Smoking status: Never Smoker    Smokeless tobacco: Never Used   Substance Use Topics    Alcohol use: No    Drug use: No     Review of Systems   Constitutional: Negative for fever.   HENT: Negative for sore throat.    Eyes: Negative for visual disturbance.   Respiratory: Negative for cough and shortness of breath.    Cardiovascular: Negative for chest pain.   Gastrointestinal: Positive for constipation. Negative for abdominal pain, diarrhea, nausea and vomiting.   Genitourinary: Negative for dysuria.   Musculoskeletal: Negative for neck pain.   Skin: Negative for rash and wound.   Allergic/Immunologic: Negative for immunocompromised state.   Neurological: Negative for syncope.   Psychiatric/Behavioral: Negative for confusion.       Physical Exam     Initial Vitals [05/04/20 0833]   BP Pulse Resp Temp SpO2   114/73 (!) 117 18 97.8 °F (36.6 °C) 97 %      MAP       --         Physical Exam    Nursing note and vitals reviewed.  Constitutional: He appears well-developed and well-nourished. He is not diaphoretic. No distress.   HENT:   Head: Normocephalic and atraumatic.   Nose: Nose normal.   Eyes: Conjunctivae are normal. Right eye exhibits no discharge. Left eye exhibits no discharge.   Neck: Normal range of motion. Neck supple.   Cardiovascular: Normal rate, regular rhythm and normal heart sounds. Exam reveals no gallop and no friction rub.    No murmur heard.  Pulmonary/Chest: Breath sounds normal. No respiratory distress. He has no wheezes. He has no rhonchi. He  has no rales.   Abdominal: Soft. He exhibits no distension. There is no tenderness. There is no rebound and no guarding.   Genitourinary: Rectum normal. Rectal exam shows no external hemorrhoid, no internal hemorrhoid, no fissure, no mass, no tenderness, anal tone normal and guaiac negative stool. Guaiac negative stool. : Acceptable.Prostate is enlarged.   Genitourinary Comments: Rectal vault empty, no impaction   Musculoskeletal: Normal range of motion. He exhibits no edema or tenderness.   Neurological: He is alert and oriented to person, place, and time. GCS score is 15. GCS eye subscore is 4. GCS verbal subscore is 5. GCS motor subscore is 6.   Skin: Skin is warm and dry. No rash noted. No erythema.   Psychiatric: He has a normal mood and affect. His behavior is normal. Judgment and thought content normal.         ED Course   Procedures  Labs Reviewed - No data to display       Imaging Results           CT Abdomen Pelvis  Without Contrast (Final result)  Result time 05/04/20 10:10:49    Final result by Nabeel Lackey Jr., MD (05/04/20 10:10:49)                 Impression:      No convincing evidence of bowel obstruction or acute intra-abdominal abnormality otherwise identified.    Partially visualized 0.3 cm right lower lobe pulmonary nodule.  For a solid nodule <6 mm, Fleischner Society 2017 guidelines recommend no routine follow up for a low risk patient, or follow-up with non-contrast chest CT at 12 months in a high risk patient.    Diverticulosis.  No evidence of diverticulitis.    Bilateral simple appearing renal cysts.    Prostatomegaly.    Additional findings detailed above.    This report was flagged in Epic as abnormal.    Electronically signed by resident: Jey Bergman MD  Date:    05/04/2020  Time:    09:21    Electronically signed by: Nabeel Lackey MD  Date:    05/04/2020  Time:    10:10             Narrative:    EXAMINATION:  CT ABDOMEN PELVIS WITHOUT CONTRAST    CLINICAL  HISTORY:  Bowel obstruction, low-grade;    TECHNIQUE:  The patient was surveyed from the lung bases through the pelvis without the administration of oral or intravenous contrast and data was reconstructed for coronal, sagittal, and axial images.    COMPARISON:  CT abdomen pelvis with contrast 07/02/2019    FINDINGS:  The heart does not appear enlarged and there is no pericardial effusion.    The lung bases are symmetrically expanded noting mild dependent atelectasis at the left lung base.  There is a partially visualized pulmonary nodule in the lateral or anterior basal segment right lower lobe measuring no greater than 0.3 cm on axial image 1.  No convincing evidence of consolidation, pleural thickening, pleural fluid, or pneumothorax.    The liver is normal in size and attenuation.  A subcentimeter hypodensity is present in the right hepatic lobe, too small to fully characterize.  There are scattered hepatic and splenic parenchymal calcifications, presumably related to old granulomatous disease.  The gallbladder shows no evidence of stones or pericholecystic fluid.  There is no intra-or extrahepatic biliary ductal dilatation.    The distal esophagus, stomach, spleen, pancreas, and adrenal glands are otherwise unremarkable.    The kidneys are normal in location.  There is renal cortical thinning and bilateral simple appearing renal cysts.  No evidence of hydronephrosis.  The ureters are normal in course and caliber with no evidence of asymmetric periureteral fat stranding.  Urinary bladder is unremarkable.  The prostate is enlarged.    The abdominal aorta is normal in course and caliber with mild atherosclerotic calcifications.    The visualized loops of small and large bowel show no evidence of obstruction or inflammation.  The appendix is unremarkable.  There scattered colonic diverticula without evidence of diverticulitis.  There is no ascites, free fluid, or intraperitoneal free air. There is no evidence of  lymph node enlargement in the abdomen or pelvis.    Osseous structures reveal age-appropriate degenerative change.  Remote left 9th and 10th rib fractures noted.  There is a fat containing left inguinal hernia.                              X-Rays:   Independently Interpreted Readings:   Other Readings:  Reviewed CT images, no air fluid levels seen    Medical Decision Making:   History:   Old Medical Records: I decided to obtain old medical records.  Independently Interpreted Test(s):   I have ordered and independently interpreted X-rays - see prior notes.  Clinical Tests:   Radiological Study: Ordered and Reviewed  ED Management:  80 yo M presents with chronic constipation.  Vitals normal.  PE normal.  CT without obstruction.  No labs indicated.  Has planned f/up with GI.  No impaction.  Counseled on hydration and fiber.  Doubt acute surgical emergency.  Patient will return to ED for worsening symptoms, inability to eat/drink, fever greater than 100.4, or any other concerns.  Did bedside teaching with return precautions.  All questions answered.  The patient acknowledges understanding.  Gave verbal discharge instructions.                                 Clinical Impression:   Final diagnoses:  [K59.09] Chronic constipation (Primary)      Disposition:   Disposition: Discharged  Condition: Stable     ED Disposition Condition    Discharge Stable        ED Prescriptions     None        Follow-up Information     Follow up With Specialties Details Why Contact Info Additional Information    [facility] Gastroenterology   [address] [facility] [address]  [phone] Atrium - 4th Floor    [facility] Emergency Medicine  Return to ED for worsening symptoms, inability to eat/drink, fever greater than 100.4, or any other concerns. [address] [facility] [address]  [phone]                                      Vikas Castañeda MD  05/04/20  0044

## 2020-05-04 NOTE — DISCHARGE INSTRUCTIONS
Continue on your current medications as ordered.    Start metamucil over the counter as directed on packaging.  Stay well hydrated.    Our goal in the emergency department is to always give you outstanding care and exceptional service. You may receive a survey by mail or e-mail in the next week regarding your experience in our ED. We would greatly appreciate your completing and returning the survey. Your feedback provides us with a way to recognize our staff who give very good care and it helps us learn how to improve when your experience was below our aspiration of excellence.

## 2020-05-04 NOTE — ED NOTES
Pt back from CT, resting comfortably. Bed rails are up and call light is within patient reach. Pt is on continuous cardiac monitoring, pulse oximetry and cycling blood pressure. Will continue hourly rounding.

## 2020-05-11 ENCOUNTER — PATIENT MESSAGE (OUTPATIENT)
Dept: GASTROENTEROLOGY | Facility: CLINIC | Age: 80
End: 2020-05-11

## 2020-05-13 ENCOUNTER — PATIENT OUTREACH (OUTPATIENT)
Dept: ADMINISTRATIVE | Facility: OTHER | Age: 80
End: 2020-05-13

## 2020-05-14 ENCOUNTER — OFFICE VISIT (OUTPATIENT)
Dept: GASTROENTEROLOGY | Facility: CLINIC | Age: 80
End: 2020-05-14
Payer: MEDICARE

## 2020-05-14 ENCOUNTER — PATIENT MESSAGE (OUTPATIENT)
Dept: GASTROENTEROLOGY | Facility: CLINIC | Age: 80
End: 2020-05-14

## 2020-05-14 DIAGNOSIS — K59.09 CONSTIPATION, CHRONIC: ICD-10-CM

## 2020-05-14 DIAGNOSIS — Z01.818 PRE-OP EXAM: Primary | ICD-10-CM

## 2020-05-14 DIAGNOSIS — K64.9 HEMORRHOIDS, UNSPECIFIED HEMORRHOID TYPE: Primary | ICD-10-CM

## 2020-05-14 DIAGNOSIS — Z12.11 SPECIAL SCREENING FOR MALIGNANT NEOPLASMS, COLON: Primary | ICD-10-CM

## 2020-05-14 DIAGNOSIS — Z01.818 PRE-OP TESTING: Primary | ICD-10-CM

## 2020-05-14 PROCEDURE — 99213 OFFICE O/P EST LOW 20 MIN: CPT | Mod: 95,,, | Performed by: INTERNAL MEDICINE

## 2020-05-14 PROCEDURE — 99213 PR OFFICE/OUTPT VISIT, EST, LEVL III, 20-29 MIN: ICD-10-PCS | Mod: 95,,, | Performed by: INTERNAL MEDICINE

## 2020-05-14 RX ORDER — POLYETHYLENE GLYCOL 3350 17 G/17G
17 POWDER, FOR SOLUTION ORAL DAILY
Qty: 1530 G | Refills: 1 | Status: SHIPPED | OUTPATIENT
Start: 2020-05-14 | End: 2020-11-10

## 2020-05-14 RX ORDER — LUBIPROSTONE 24 UG/1
CAPSULE, GELATIN COATED ORAL
Qty: 14 CAPSULE | Refills: 0 | OUTPATIENT
Start: 2020-05-14

## 2020-05-14 RX ORDER — HYDROCORTISONE ACETATE 25 MG/1
25 SUPPOSITORY RECTAL EVERY 12 HOURS PRN
Qty: 12 SUPPOSITORY | Refills: 0 | Status: SHIPPED | OUTPATIENT
Start: 2020-05-14 | End: 2020-05-20

## 2020-05-14 RX ORDER — POLYETHYLENE GLYCOL 3350, SODIUM SULFATE ANHYDROUS, SODIUM BICARBONATE, SODIUM CHLORIDE, POTASSIUM CHLORIDE 236; 22.74; 6.74; 5.86; 2.97 G/4L; G/4L; G/4L; G/4L; G/4L
4 POWDER, FOR SOLUTION ORAL ONCE
Qty: 4000 ML | Refills: 0 | Status: SHIPPED | OUTPATIENT
Start: 2020-05-14 | End: 2020-05-14

## 2020-05-14 NOTE — TELEPHONE ENCOUNTER
Called and spoke to pt's wife. Asked pt to call back at 3:00 pm for visit.  Pt agreed and appreciated the call.

## 2020-05-14 NOTE — PROGRESS NOTES
The patient location is:  At home  The chief complaint leading to consultation is:  Constipated  Visit type:  Telemedicine video visit per [facility] and was an apartment Health COVID-19 pandemic mandates  Total time spent with patient:  15  Each patient to whom he or she provides medical services by telemedicine is:  (1) informed of the relationship between the physician and patient and the respective role of any other health care provider with respect to management of the patient; and (2) notified that he or she may decline to receive medical services by telemedicine and may withdraw from such care at any time.    Notes:  See below          [facility] Consultation Note    Reason for Consult:  The primary encounter diagnosis was Hemorrhoids, unspecified hemorrhoid type. A diagnosis of Constipation, chronic was also pertinent to this visit.    PCP:   Antonio Killian       Referring MD:  No referring provider defined for this encounter.    Initial History of Present Illness (HPI):  This is a 79 y.o. male here for evaluation of chronic constipation telemedicine [facility]  Patient just recently seen has EGD and colonoscopy schedule.  Has been taking Amitiza once daily by his primary care doctor still straining a little bit but improved.  He feels much better now feels like he has had some good bowel movements he is going to restart Metamucil 1 tbsp in 12 oz water in the morning and start MiraLax 17 g in 12 oz water before dinner.  He will message me on Monday May 18, 2020 to see if he is feeling like he wants to get back on Amitiza he knows not to take Amitiza and MiraLax together.  His dose of Amitiza 24 micrograms every 12 hr p.r.n. constipation.  Patient has no abdominal pain no fever no chills no nausea vomiting no blood in his stools no chest pain or shortness of breath no diarrhea.  He is not sure if he has a hemorrhoid he feels like he has to  strain to push out his bowel movement we told him it would not be unreasonable to consider empirically treating him for possible hemorrhoid he has a colonoscopy scheduled on Monday the 25th of May 2020.  Along with his EGD.    Abdominal pain - no  Reflux - no  Dysphagia - no   Bowel habits - normal  GI bleeding - none  NSAID usage - none    Interval HPI 05/14/2020:  The patient's last visit with me was on 5/1/2020.      ROS:  Constitutional: No fevers, chills, No weight loss  ENT:  No heartburn no dysphagia no odynophagia no hoarseness  CV: No chest pain, no palpitation  Pulm: No cough, No shortness of breath, no wheezing  Ophtho: No vision changes  GI: see HPI  Derm: No rash, no itching  Heme: No lymphadenopathy, No easy bruising  MSK: No significant arthritis  : No dysuria, No hematuria  Endo: No hot or cold intolerance  Neuro: No syncope, No seizure, no strokes  Psych: No uncontrolled anxiety, No uncontrolled depression    Medical History:  has a past medical history of Cataract, Chronic constipation, GERD (gastroesophageal reflux disease), Glaucoma, Hyperlipidemia, Hypertension, and LBBB (left bundle branch block).    Surgical History:  has a past surgical history that includes Inguinal hernia repair (Right); Colonoscopy (4/28/2008); Esophagogastroduodenoscopy (2/1/2010); and Colonoscopy (N/A, 5/15/2017).    Family History: family history includes Cancer in his sister; Cancer (age of onset: 60) in his paternal uncle; Colon polyps in his paternal uncle; Hypertension in his sister; Stomach cancer (age of onset: 56) in his paternal uncle; Stroke in his father..     Social History:  reports that he has never smoked. He has never used smokeless tobacco. He reports that he does not drink alcohol or use drugs.    Review of patient's allergies indicates:   Allergen Reactions    Penicillins      Stomach cramps       Medication List with Changes/Refills   New Medications    HYDROCORTISONE (ANUSOL-HC) 25 MG SUPPOSITORY     Place 1 suppository (25 mg total) rectally every 12 (twelve) hours as needed for Hemorrhoids.    POLYETHYLENE GLYCOL (GLYCOLAX) 17 GRAM/DOSE POWDER    Take 17 g by mouth once daily.    POLYETHYLENE GLYCOL (GOLYTELY,NULYTELY) 236-22.74-6.74 -5.86 GRAM SUSPENSION    Take 4,000 mLs (4 L total) by mouth once. for 1 dose   Current Medications    AMITIZA 24 MCG CAP    TAKE 1 CAPSULE BY MOUTH TWICE DAILY WITH MEALS FOR 7 DAYS    ATORVASTATIN (LIPITOR) 10 MG TABLET    TAKE 1 TABLET(10 MG) BY MOUTH EVERY DAY    AZELASTINE (ASTELIN) 137 MCG (0.1 %) NASAL SPRAY    USE 2 SPRAYS IEN ONCE D    BENAZEPRIL (LOTENSIN) 10 MG TABLET    TAKE 1 TABLET BY MOUTH EVERY DAY    BENAZEPRIL (LOTENSIN) 10 MG TABLET    TAKE 1 TABLET BY MOUTH EVERY DAY    BENAZEPRIL (LOTENSIN) 10 MG TABLET    Take 1 tablet (10 mg total) by mouth once daily.    DILTIAZEM (CARDIZEM CD) 180 MG 24 HR CAPSULE    Take 2 capsules (360 mg total) by mouth once daily.    DOXAZOSIN (CARDURA) 4 MG TABLET    TAKE 1 TABLET BY MOUTH EVERY NIGHT    HYDROCHLOROTHIAZIDE (HYDRODIURIL) 25 MG TABLET    TAKE 1 TABLET BY MOUTH EVERY DAY    HYDROCHLOROTHIAZIDE (HYDRODIURIL) 25 MG TABLET    TAKE 1 TABLET BY MOUTH EVERY DAY    HYDROCHLOROTHIAZIDE (HYDRODIURIL) 25 MG TABLET    TAKE 1 TABLET BY MOUTH EVERY DAY    LATANOPROST 0.005 % OPHTHALMIC SOLUTION    INSTILL 1 DROP IN BOTH EYES EVERY DAY         Objective Findings:    Vital Signs:  There were no vitals taken for this visit.  There is no height or weight on file to calculate BMI.  Exam:  Physical telemedicine video visit per CDC and [facility] health COVID-19 pandemic mandates.    General Appearance: Well appearing in no acute distress  Neurologic:  Alert and oriented x4  Psychiatric:  Normal speech mentation and affect          Labs:  Lab Results   Component Value Date    WBC 4.32 02/18/2020    HGB 16.0 02/18/2020    HCT 49.0 02/18/2020     02/18/2020    CHOL 164 02/18/2020    TRIG 68 02/18/2020    HDL 58 02/18/2020     ALT 25 02/18/2020    AST 21 02/18/2020     02/18/2020    K 4.9 02/18/2020     02/18/2020    CREATININE 1.2 02/18/2020    BUN 13 02/18/2020    CO2 27 02/18/2020    TSH 1.643 02/18/2020    PSA 1.2 02/18/2020    INR 1.0 06/23/2013         Medical Decision Making:    CT scan images of his abdomen via the ER reviewed with patient and myself.  Patient knows to follow up his pulmonary nodule finding with his primary care doctor  To determine if he has lower high risk in if he needs further follow-up or not.    Impression       No convincing evidence of bowel obstruction or acute intra-abdominal abnormality otherwise identified.    Partially visualized 0.3 cm right lower lobe pulmonary nodule.  For a solid nodule <6 mm, Fleischner Society 2017 guidelines recommend no routine follow up for a low risk patient, or follow-up with non-contrast chest CT at 12 months in a high risk patient.    Diverticulosis.  No evidence of diverticulitis.    Bilateral simple appearing renal cysts.    Prostatomegaly.    Additional findings detailed above.    This report was flagged in Epic as abnormal.    Electronically signed by resident: Jey Bergman MD  Date: 05/04/2020  Time: 09:21           Assessment:  1. Hemorrhoids, unspecified hemorrhoid type    2. Constipation, chronic         Recommendations:  1.  Chronic constipation patient will do Metamucil 1 tbsp in 12 oz of water in the morning and MiraLax 17 g in 12 oz water before dinner  He will message me on Monday May 18, 2020 to see if he feels like he needs to get back on his Amitiza twice daily he has a colonoscopy  And EGD scheduled for Monday May 25, 2020.  2.  A follow-up his pulmonary nodule with his primary care doctor per his CT scan of his abdomen done in the ER to term  If he needs further follow-up or not.  3.  Return to GI clinic 6 weeks for follow-up via telemedicine    No follow-ups on file.      Order summary:  Orders Placed This Encounter    hydrocortisone  (ANUSOL-HC) 25 mg suppository    polyethylene glycol (GLYCOLAX) 17 gram/dose powder         Thank you so much for allowing me to participate in the care of Tung Cifuentes MD

## 2020-05-14 NOTE — PATIENT INSTRUCTIONS
Impression       No convincing evidence of bowel obstruction or acute intra-abdominal abnormality otherwise identified.    Partially visualized 0.3 cm right lower lobe pulmonary nodule.  For a solid nodule <6 mm, Fleischner Society 2017 guidelines recommend no routine follow up for a low risk patient, or follow-up with non-contrast chest CT at 12 months in a high risk patient.    Diverticulosis.  No evidence of diverticulitis.    Bilateral simple appearing renal cysts.    Prostatomegaly.    Additional findings detailed above.    This report was flagged in Epic as abnormal.    Electronically signed by resident: Jey Bergman MD  Date: 05/04/2020  Time: 09:21

## 2020-05-20 RX ORDER — DOXAZOSIN 4 MG/1
TABLET ORAL
Qty: 90 TABLET | Refills: 1 | Status: SHIPPED | OUTPATIENT
Start: 2020-05-20 | End: 2020-12-18 | Stop reason: SDUPTHER

## 2020-05-23 ENCOUNTER — LAB VISIT (OUTPATIENT)
Dept: INTERNAL MEDICINE | Facility: CLINIC | Age: 80
End: 2020-05-23
Payer: MEDICARE

## 2020-05-23 DIAGNOSIS — Z01.818 PRE-OP TESTING: ICD-10-CM

## 2020-05-23 LAB — SARS-COV-2 RNA RESP QL NAA+PROBE: NOT DETECTED

## 2020-05-23 PROCEDURE — U0003 INFECTIOUS AGENT DETECTION BY NUCLEIC ACID (DNA OR RNA); SEVERE ACUTE RESPIRATORY SYNDROME CORONAVIRUS 2 (SARS-COV-2) (CORONAVIRUS DISEASE [COVID-19]), AMPLIFIED PROBE TECHNIQUE, MAKING USE OF HIGH THROUGHPUT TECHNOLOGIES AS DESCRIBED BY CMS-2020-01-R: HCPCS

## 2020-05-25 ENCOUNTER — ANESTHESIA (OUTPATIENT)
Dept: ENDOSCOPY | Facility: HOSPITAL | Age: 80
End: 2020-05-25
Payer: MEDICARE

## 2020-05-25 ENCOUNTER — HOSPITAL ENCOUNTER (OUTPATIENT)
Facility: HOSPITAL | Age: 80
Discharge: HOME | End: 2020-05-25
Attending: INTERNAL MEDICINE | Admitting: INTERNAL MEDICINE
Payer: MEDICARE

## 2020-05-25 ENCOUNTER — ANESTHESIA EVENT (OUTPATIENT)
Dept: ENDOSCOPY | Facility: HOSPITAL | Age: 80
End: 2020-05-25
Payer: MEDICARE

## 2020-05-25 VITALS
HEIGHT: 75 IN | BODY MASS INDEX: 26.11 KG/M2 | OXYGEN SATURATION: 98 % | WEIGHT: 210 LBS | TEMPERATURE: 98 F | SYSTOLIC BLOOD PRESSURE: 144 MMHG | HEART RATE: 89 BPM | DIASTOLIC BLOOD PRESSURE: 84 MMHG | RESPIRATION RATE: 16 BRPM

## 2020-05-25 DIAGNOSIS — R19.4 CHANGE IN BOWEL HABIT: ICD-10-CM

## 2020-05-25 PROCEDURE — 37000008 HC ANESTHESIA 1ST 15 MINUTES: Performed by: INTERNAL MEDICINE

## 2020-05-25 PROCEDURE — 45380 PR COLONOSCOPY,BIOPSY: ICD-10-PCS | Mod: 59,,, | Performed by: INTERNAL MEDICINE

## 2020-05-25 PROCEDURE — 88342 IMHCHEM/IMCYTCHM 1ST ANTB: CPT | Performed by: PATHOLOGY

## 2020-05-25 PROCEDURE — 43239 PR EGD, FLEX, W/BIOPSY, SGL/MULTI: ICD-10-PCS | Mod: 51,GC,, | Performed by: INTERNAL MEDICINE

## 2020-05-25 PROCEDURE — 88305 TISSUE EXAM BY PATHOLOGIST: ICD-10-PCS | Mod: 26,,, | Performed by: PATHOLOGY

## 2020-05-25 PROCEDURE — 43239 EGD BIOPSY SINGLE/MULTIPLE: CPT | Mod: 51,GC,, | Performed by: INTERNAL MEDICINE

## 2020-05-25 PROCEDURE — 88342 IMHCHEM/IMCYTCHM 1ST ANTB: CPT | Mod: 26,,, | Performed by: PATHOLOGY

## 2020-05-25 PROCEDURE — 27201089 HC SNARE, DISP (ANY): Performed by: INTERNAL MEDICINE

## 2020-05-25 PROCEDURE — 45385 PR COLONOSCOPY,REMV LESN,SNARE: ICD-10-PCS | Mod: GC,,, | Performed by: INTERNAL MEDICINE

## 2020-05-25 PROCEDURE — 88342 CHG IMMUNOCYTOCHEMISTRY: ICD-10-PCS | Mod: 26,,, | Performed by: PATHOLOGY

## 2020-05-25 PROCEDURE — 63600175 PHARM REV CODE 636 W HCPCS: Performed by: NURSE ANESTHETIST, CERTIFIED REGISTERED

## 2020-05-25 PROCEDURE — 43239 EGD BIOPSY SINGLE/MULTIPLE: CPT | Performed by: INTERNAL MEDICINE

## 2020-05-25 PROCEDURE — 45385 COLONOSCOPY W/LESION REMOVAL: CPT | Mod: GC,,, | Performed by: INTERNAL MEDICINE

## 2020-05-25 PROCEDURE — 45380 COLONOSCOPY AND BIOPSY: CPT | Performed by: INTERNAL MEDICINE

## 2020-05-25 PROCEDURE — 27201012 HC FORCEPS, HOT/COLD, DISP: Performed by: INTERNAL MEDICINE

## 2020-05-25 PROCEDURE — E9220 PRA ENDO ANESTHESIA: HCPCS | Mod: ,,, | Performed by: NURSE ANESTHETIST, CERTIFIED REGISTERED

## 2020-05-25 PROCEDURE — 25000003 PHARM REV CODE 250: Performed by: NURSE ANESTHETIST, CERTIFIED REGISTERED

## 2020-05-25 PROCEDURE — 45380 COLONOSCOPY AND BIOPSY: CPT | Mod: 59,,, | Performed by: INTERNAL MEDICINE

## 2020-05-25 PROCEDURE — 88305 TISSUE EXAM BY PATHOLOGIST: CPT | Mod: 26,,, | Performed by: PATHOLOGY

## 2020-05-25 PROCEDURE — 88305 TISSUE EXAM BY PATHOLOGIST: CPT | Mod: 59 | Performed by: PATHOLOGY

## 2020-05-25 PROCEDURE — 25000003 PHARM REV CODE 250: Performed by: INTERNAL MEDICINE

## 2020-05-25 PROCEDURE — 45385 COLONOSCOPY W/LESION REMOVAL: CPT | Performed by: INTERNAL MEDICINE

## 2020-05-25 PROCEDURE — 37000009 HC ANESTHESIA EA ADD 15 MINS: Performed by: INTERNAL MEDICINE

## 2020-05-25 PROCEDURE — E9220 PRA ENDO ANESTHESIA: ICD-10-PCS | Mod: ,,, | Performed by: NURSE ANESTHETIST, CERTIFIED REGISTERED

## 2020-05-25 RX ORDER — PROPOFOL 10 MG/ML
INJECTION, EMULSION INTRAVENOUS CONTINUOUS PRN
Status: DISCONTINUED | OUTPATIENT
Start: 2020-05-25 | End: 2020-05-25

## 2020-05-25 RX ORDER — SODIUM CHLORIDE 0.9 % (FLUSH) 0.9 %
10 SYRINGE (ML) INJECTION
Status: DISCONTINUED | OUTPATIENT
Start: 2020-05-25 | End: 2020-05-25 | Stop reason: HOSPADM

## 2020-05-25 RX ORDER — PROPOFOL 10 MG/ML
INJECTION, EMULSION INTRAVENOUS
Status: DISCONTINUED | OUTPATIENT
Start: 2020-05-25 | End: 2020-05-25

## 2020-05-25 RX ORDER — SODIUM CHLORIDE 9 MG/ML
INJECTION, SOLUTION INTRAVENOUS CONTINUOUS PRN
Status: DISCONTINUED | OUTPATIENT
Start: 2020-05-25 | End: 2020-05-25

## 2020-05-25 RX ORDER — LIDOCAINE HCL/PF 100 MG/5ML
SYRINGE (ML) INTRAVENOUS
Status: DISCONTINUED | OUTPATIENT
Start: 2020-05-25 | End: 2020-05-25

## 2020-05-25 RX ORDER — GLYCOPYRROLATE 0.2 MG/ML
INJECTION INTRAMUSCULAR; INTRAVENOUS
Status: DISCONTINUED | OUTPATIENT
Start: 2020-05-25 | End: 2020-05-25

## 2020-05-25 RX ORDER — SODIUM CHLORIDE 9 MG/ML
INJECTION, SOLUTION INTRAVENOUS CONTINUOUS
Status: DISCONTINUED | OUTPATIENT
Start: 2020-05-25 | End: 2020-05-25 | Stop reason: HOSPADM

## 2020-05-25 RX ADMIN — PROPOFOL 150 MCG/KG/MIN: 10 INJECTION, EMULSION INTRAVENOUS at 12:05

## 2020-05-25 RX ADMIN — SODIUM CHLORIDE: 9 INJECTION, SOLUTION INTRAVENOUS at 12:05

## 2020-05-25 RX ADMIN — GLYCOPYRROLATE 0.1 MG: 0.2 INJECTION, SOLUTION INTRAMUSCULAR; INTRAVENOUS at 12:05

## 2020-05-25 RX ADMIN — SODIUM CHLORIDE: 0.9 INJECTION, SOLUTION INTRAVENOUS at 12:05

## 2020-05-25 RX ADMIN — Medication 100 MG: at 12:05

## 2020-05-25 RX ADMIN — PROPOFOL 100 MG: 10 INJECTION, EMULSION INTRAVENOUS at 12:05

## 2020-05-25 NOTE — ANESTHESIA PREPROCEDURE EVALUATION
05/25/2020  Tung Chau is a 79 y.o., male.  Past Medical History:   Diagnosis Date    Cataract     Chronic constipation     GERD (gastroesophageal reflux disease)     Glaucoma     Hyperlipidemia     Hypertension     LBBB (left bundle branch block)      Past Surgical History:   Procedure Laterality Date    COLONOSCOPY  4/28/2008    hemorrhoids    COLONOSCOPY N/A 5/15/2017    Procedure: COLONOSCOPY;  Surgeon: Dandy Cifuentes MD;  Location: [address]);  Service: Endoscopy;  Laterality: N/A;    ESOPHAGOGASTRODUODENOSCOPY  2/1/2010    INGUINAL HERNIA REPAIR Right          Pre-op Assessment    I have reviewed the Patient Summary Reports.     I have reviewed the Nursing Notes.   I have reviewed the Medications.     Review of Systems  Anesthesia Hx:  No problems with previous Anesthesia  History of prior surgery of interest to airway management or planning:  Denies Personal Hx of Anesthesia complications.   Social:  Non-Smoker, No Alcohol Use    Hematology/Oncology:  Hematology Normal   Oncology Normal     EENT/Dental:EENT/Dental Normal   Cardiovascular:   Exercise tolerance: good Hypertension Denies CAD.    Denies Dysrhythmias.   Denies Angina. hyperlipidemia ECG has been reviewed.    Pulmonary:   Denies Shortness of breath.    Hepatic/GI:   Bowel Prep. GERD Chronic constipation, pt reports GERD has resolved   Musculoskeletal:  Musculoskeletal Normal    Neurological:  Neurology Normal    Endocrine:  Endocrine Normal    Dermatological:  Skin Normal    Psych:  Psychiatric Normal           Physical Exam  General:  Well nourished    Airway/Jaw/Neck:  Airway Findings: Mouth Opening: Normal Tongue: Normal  General Airway Assessment: Adult  Mallampati: II  Jaw/Neck Findings:  Neck ROM: Normal ROM      Dental:  Dental Findings: In tact   Chest/Lungs:  Chest/Lungs Findings: Clear to auscultation,  Normal Respiratory Rate     Heart/Vascular:  Heart Findings: Rate: Normal  Rhythm: Regular Rhythm  Sounds: Normal        Mental Status:  Mental Status Findings:  Cooperative, Alert and Oriented         Anesthesia Plan  Type of Anesthesia, risks & benefits discussed:  Anesthesia Type:  general, MAC  Patient's Preference:   Intra-op Monitoring Plan: standard ASA monitors  Intra-op Monitoring Plan Comments:   Post Op Pain Control Plan:   Post Op Pain Control Plan Comments:   Induction:   IV  Beta Blocker:  Patient is not currently on a Beta-Blocker (No further documentation required).       Informed Consent: Patient understands risks and agrees with Anesthesia plan.  Questions answered. Anesthesia consent signed with patient.  ASA Score: 2     Day of Surgery Review of History & Physical: I have interviewed and examined the patient. I have reviewed the patient's H&P dated:    H&P update referred to the provider.         Ready For Surgery From Anesthesia Perspective.

## 2020-05-25 NOTE — TRANSFER OF CARE
"Anesthesia Transfer of Care Note    Patient: Tung Chau    Procedure(s) Performed: Procedure(s) (LRB):  EGD (ESOPHAGOGASTRODUODENOSCOPY) (N/A)  COLONOSCOPY (N/A)    Patient location: GI    Anesthesia Type: general    Transport from OR: Transported from OR on room air with adequate spontaneous ventilation    Post pain: adequate analgesia    Post assessment: no apparent anesthetic complications and tolerated procedure well    Post vital signs: stable    Level of consciousness: awake and alert    Nausea/Vomiting: no nausea/vomiting    Complications: none    Transfer of care protocol was followed      Last vitals:   Visit Vitals  /69 (BP Location: Left arm, Patient Position: Lying)   Pulse 85   Temp 36.4 °C (97.5 °F) (Temporal)   Resp 15   Ht 6' 3" (1.905 m)   Wt 95.3 kg (210 lb)   SpO2 96%   BMI 26.25 kg/m²     "

## 2020-05-25 NOTE — ANESTHESIA POSTPROCEDURE EVALUATION
Anesthesia Post Evaluation    Patient: Tung Chau    Procedure(s) Performed: Procedure(s) (LRB):  EGD (ESOPHAGOGASTRODUODENOSCOPY) (N/A)  COLONOSCOPY (N/A)    Final Anesthesia Type: general    Patient location during evaluation: PACU  Patient participation: Yes- Able to Participate  Level of consciousness: awake and alert  Post-procedure vital signs: reviewed and stable  Pain management: adequate  Airway patency: patent    PONV status at discharge: No PONV  Anesthetic complications: no      Cardiovascular status: blood pressure returned to baseline and stable  Respiratory status: unassisted  Hydration status: euvolemic  Follow-up not needed.          Vitals Value Taken Time   /84 5/25/2020  2:09 PM   Temp 36.4 °C (97.5 °F) 5/25/2020  1:45 PM   Pulse 89 5/25/2020  2:09 PM   Resp 16 5/25/2020  2:09 PM   SpO2 98 % 5/25/2020  2:09 PM         No case tracking events are documented in the log.      Pain/Katie Score: Katie Score: 9 (5/25/2020  2:33 PM)

## 2020-05-25 NOTE — [employer] PATIENT INSTRUCTIONS
Discharge Summary/Instructions after an Endoscopic Procedure  Patient Name: Tung Chau  Patient MRN: [id number]  Patient YOB: 1940  Monday, May 25, 2020  Dandy Cifuentes MD  RESTRICTIONS:  During your procedure today, you received medications for sedation.  These   medications may affect your judgment, balance and coordination.  Therefore,   for 24 hours, you have the following restrictions:   - DO NOT drive a car, operate machinery, make legal/financial decisions,   sign important papers or drink alcohol.    ACTIVITY:  Today: no heavy lifting, straining or running due to procedural   sedation/anesthesia.  The following day: return to full activity including work.  DIET:  Eat and drink normally unless instructed otherwise.     TREATMENT FOR COMMON SIDE EFFECTS:  - Mild abdominal pain, nausea, belching, bloating or excessive gas:  rest,   eat lightly and use a heating pad.  - Sore Throat: treat with throat lozenges and/or gargle with warm salt   water.  - Because air was used during the procedure, expelling large amounts of air   from your rectum or belching is normal.  - If a bowel prep was taken, you may not have a bowel movement for 1-3 days.    This is normal.  SYMPTOMS TO WATCH FOR AND REPORT TO YOUR PHYSICIAN:  1. Abdominal pain or bloating, other than gas cramps.  2. Chest pain.  3. Back pain.  4. Signs of infection such as: chills or fever occurring within 24 hours   after the procedure.  5. Rectal bleeding, which would show as bright red, maroon, or black stools.   (A tablespoon of blood from the rectum is not serious, especially if   hemorrhoids are present.)  6. Vomiting.  7. Weakness or dizziness.  GO DIRECTLY TO THE NEAREST EMERGENCY ROOM IF YOU HAVE ANY OF THE FOLLOWING:      Difficulty breathing              Chills and/or fever over 101 F   Persistent vomiting and/or vomiting blood   Severe abdominal pain   Severe chest pain   Black, tarry stools   Bleeding- more than one  tablespoon   Any other symptom or condition that you feel may need urgent attention  Your doctor recommends these additional instructions:  If any biopsies were taken, your doctors clinic will contact you in 1 to 2   weeks with any results.  - Discharge patient to home.   - Await pathology results.   - Telephone endoscopist for pathology results in 2 weeks.   - Return to GI clinic.   - The findings and recommendations were discussed with the patient.  For questions, problems or results please call your physician - Dandy Cifuentes MD at Work:  [phone].  [facility], EMERGENCY ROOM PHONE NUMBER: [phone]  IF A COMPLICATION OR EMERGENCY SITUATION ARISES AND YOU ARE UNABLE TO REACH   YOUR PHYSICIAN - GO DIRECTLY TO THE EMERGENCY ROOM.  Dandy Cifuentes MD  5/25/2020 1:49:26 PM  This report has been verified and signed electronically.  [employer]

## 2020-05-25 NOTE — PROVATION PATIENT INSTRUCTIONS
Discharge Summary/Instructions after an Endoscopic Procedure  Patient Name: Tung Chau  Patient MRN: [id number]  Patient YOB: 1940  Monday, May 25, 2020  Dandy Cifuentes MD  RESTRICTIONS:  During your procedure today, you received medications for sedation.  These   medications may affect your judgment, balance and coordination.  Therefore,   for 24 hours, you have the following restrictions:   - DO NOT drive a car, operate machinery, make legal/financial decisions,   sign important papers or drink alcohol.    ACTIVITY:  Today: no heavy lifting, straining or running due to procedural   sedation/anesthesia.  The following day: return to full activity including work.  DIET:  Eat and drink normally unless instructed otherwise.     TREATMENT FOR COMMON SIDE EFFECTS:  - Mild abdominal pain, nausea, belching, bloating or excessive gas:  rest,   eat lightly and use a heating pad.  - Sore Throat: treat with throat lozenges and/or gargle with warm salt   water.  - Because air was used during the procedure, expelling large amounts of air   from your rectum or belching is normal.  - If a bowel prep was taken, you may not have a bowel movement for 1-3 days.    This is normal.  SYMPTOMS TO WATCH FOR AND REPORT TO YOUR PHYSICIAN:  1. Abdominal pain or bloating, other than gas cramps.  2. Chest pain.  3. Back pain.  4. Signs of infection such as: chills or fever occurring within 24 hours   after the procedure.  5. Rectal bleeding, which would show as bright red, maroon, or black stools.   (A tablespoon of blood from the rectum is not serious, especially if   hemorrhoids are present.)  6. Vomiting.  7. Weakness or dizziness.  GO DIRECTLY TO THE NEAREST EMERGENCY ROOM IF YOU HAVE ANY OF THE FOLLOWING:      Difficulty breathing              Chills and/or fever over 101 F   Persistent vomiting and/or vomiting blood   Severe abdominal pain   Severe chest pain   Black, tarry stools   Bleeding- more than one  tablespoon   Any other symptom or condition that you feel may need urgent attention  Your doctor recommends these additional instructions:  If any biopsies were taken, your doctors clinic will contact you in 1 to 2   weeks with any results.  - Discharge patient to home.   - Await pathology results.   - Telephone endoscopist for pathology results in 2 weeks.   - Repeat colonoscopy in 3 years for surveillance.   - The findings and recommendations were discussed with the patient.   - Return to GI clinic.   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 Historically, laxatives, primarily from the anthranoid group   (i.e., senna and rhubarb derivatives), are the main culprits. As these   laxative supplements pass through the colon, they become active and cause   cell death and apoptosis in the lining of the colon, eventually causing   dark pigmentation of the colon. Diagnosis is typically made by colonoscopy.   Discontinuing laxative use usually leads to resolution of melanosis coli.   Melanosis coli is not associated with an increased risk of colon cancer  For questions, problems or results please call your physician - Dandy Cifuentes MD at Work:  [phone].  [facility], EMERGENCY ROOM PHONE NUMBER: [phone]  IF A COMPLICATION OR EMERGENCY SITUATION ARISES AND YOU ARE UNABLE TO REACH   YOUR PHYSICIAN - GO DIRECTLY TO THE EMERGENCY ROOM.  Dandy Cifuentes MD  5/25/2020 1:49:06 PM  This report has been verified and signed electronically.  [employer]

## 2020-05-25 NOTE — H&P
Short Stay Endoscopy History and Physical    PCP - Antonio Killian MD     Procedure - EGD, Colonoscopy  ASA - per anesthesia  Mallampati - per anesthesia  History of Anesthesia problems - no  Family history Anesthesia problems -  no   Plan of anesthesia - General    HPI:  This is a 79 y.o. male here for evaluation of change in bowel movements and history of colon polyps. No family history of colon cancer.  No abdominal pain, heartburn, blood in stools.          ROS:  Constitutional: No fevers, chills, No weight loss  CV: No chest pain  Pulm: No cough, No shortness of breath  Ophtho: No vision changes  GI: see HPI  Derm: No rash    Medical History:  has a past medical history of Cataract, Chronic constipation, GERD (gastroesophageal reflux disease), Glaucoma, Hyperlipidemia, Hypertension, and LBBB (left bundle branch block).    Surgical History:  has a past surgical history that includes Inguinal hernia repair (Right); Colonoscopy (4/28/2008); Esophagogastroduodenoscopy (2/1/2010); and Colonoscopy (N/A, 5/15/2017).    Family History: family history includes Cancer in his sister; Cancer (age of onset: 60) in his paternal uncle; Colon polyps in his paternal uncle; Hypertension in his sister; Stomach cancer (age of onset: 56) in his paternal uncle; Stroke in his father.. Otherwise no colon cancer, inflammatory bowel disease, or GI malignancies.    Social History:  reports that he has never smoked. He has never used smokeless tobacco. He reports that he does not drink alcohol or use drugs.    Review of patient's allergies indicates:   Allergen Reactions    Penicillins      Stomach cramps       Medications:   Medications Prior to Admission   Medication Sig Dispense Refill Last Dose    AMITIZA 24 mcg Cap TAKE 1 CAPSULE BY MOUTH TWICE DAILY WITH MEALS FOR 7 DAYS 14 capsule 0 Past Month at Unknown time    atorvastatin (LIPITOR) 10 MG tablet TAKE 1 TABLET(10 MG) BY MOUTH EVERY DAY 90 tablet 3 5/24/2020 at Unknown time     azelastine (ASTELIN) 137 mcg (0.1 %) nasal spray USE 2 SPRAYS IEN ONCE D  4 Past Week at Unknown time    benazepril (LOTENSIN) 10 MG tablet TAKE 1 TABLET BY MOUTH EVERY DAY 90 tablet 3 5/24/2020 at Unknown time    benazepril (LOTENSIN) 10 MG tablet TAKE 1 TABLET BY MOUTH EVERY DAY 90 tablet 1 5/24/2020 at Unknown time    benazepril (LOTENSIN) 10 MG tablet Take 1 tablet (10 mg total) by mouth once daily. 90 tablet 2 5/24/2020 at Unknown time    diltiaZEM (CARDIZEM CD) 180 MG 24 hr capsule Take 2 capsules (360 mg total) by mouth once daily. 60 capsule 11 5/25/2020 at 1030    doxazosin (CARDURA) 4 MG tablet TAKE 1 TABLET BY MOUTH EVERY NIGHT 90 tablet 1 5/24/2020 at Unknown time    hydrochlorothiazide (HYDRODIURIL) 25 MG tablet TAKE 1 TABLET BY MOUTH EVERY DAY 90 tablet 3 5/24/2020 at Unknown time    hydroCHLOROthiazide (HYDRODIURIL) 25 MG tablet TAKE 1 TABLET BY MOUTH EVERY DAY 90 tablet 3 5/24/2020 at Unknown time    hydroCHLOROthiazide (HYDRODIURIL) 25 MG tablet TAKE 1 TABLET BY MOUTH EVERY DAY 90 tablet 3 5/24/2020 at Unknown time    latanoprost 0.005 % ophthalmic solution INSTILL 1 DROP IN BOTH EYES EVERY DAY 7.5 mL 3 5/24/2020 at Unknown time    polyethylene glycol (GLYCOLAX) 17 gram/dose powder Take 17 g by mouth once daily. 1530 g 1 Past Month at Unknown time       Physical Exam:    Vital Signs:   Vitals:    05/25/20 1216   BP: (!) 169/90   Pulse: 97   Resp: 16   Temp: 98.1 °F (36.7 °C)       General Appearance: Well appearing in no acute distress  Eyes:    No scleral icterus  ENT: Neck supple, Lips, mucosa, and tongue normal; teeth and gums normal  Lungs: CTA anteriorly  Heart:  Regular rate, S1, S2 normal, no murmurs heard.  Abdomen: Soft, non tender, non distended with normal bowel sounds. No hepatosplenomegaly, ascites, or mass.  Extremities: No edema  Skin: No rash    Labs:  Lab Results   Component Value Date    WBC 4.32 02/18/2020    HGB 16.0 02/18/2020    HCT 49.0 02/18/2020      02/18/2020    CHOL 164 02/18/2020    TRIG 68 02/18/2020    HDL 58 02/18/2020    ALT 25 02/18/2020    AST 21 02/18/2020     02/18/2020    K 4.9 02/18/2020     02/18/2020    CREATININE 1.2 02/18/2020    BUN 13 02/18/2020    CO2 27 02/18/2020    TSH 1.643 02/18/2020    PSA 1.2 02/18/2020    INR 1.0 06/23/2013       I have explained the risks and benefits of endoscopy procedures to the patient including but not limited to bleeding, perforation, infection, and death.  The patient was asked if they understand and allowed to ask any further questions to their satisfaction.      Sharif Jackson MD PGY-VI  Gastroenterology Fellow  [facility]

## 2020-05-27 ENCOUNTER — OFFICE VISIT (OUTPATIENT)
Dept: INTERNAL MEDICINE | Facility: CLINIC | Age: 80
End: 2020-05-27
Payer: MEDICARE

## 2020-05-27 VITALS
BODY MASS INDEX: 26.59 KG/M2 | WEIGHT: 213.88 LBS | DIASTOLIC BLOOD PRESSURE: 82 MMHG | HEIGHT: 75 IN | OXYGEN SATURATION: 99 % | SYSTOLIC BLOOD PRESSURE: 126 MMHG | HEART RATE: 72 BPM

## 2020-05-27 DIAGNOSIS — R35.1 BENIGN PROSTATIC HYPERPLASIA WITH NOCTURIA: Primary | ICD-10-CM

## 2020-05-27 DIAGNOSIS — N41.9 PROSTATITIS, UNSPECIFIED PROSTATITIS TYPE: ICD-10-CM

## 2020-05-27 DIAGNOSIS — N40.1 BENIGN PROSTATIC HYPERPLASIA WITH NOCTURIA: Primary | ICD-10-CM

## 2020-05-27 LAB
BACTERIA #/AREA URNS AUTO: NORMAL /HPF
BILIRUB UR QL STRIP: NEGATIVE
CLARITY UR REFRACT.AUTO: CLEAR
COLOR UR AUTO: YELLOW
GLUCOSE UR QL STRIP: NEGATIVE
HGB UR QL STRIP: NEGATIVE
KETONES UR QL STRIP: NEGATIVE
LEUKOCYTE ESTERASE UR QL STRIP: NEGATIVE
MICROSCOPIC COMMENT: NORMAL
NITRITE UR QL STRIP: NEGATIVE
PH UR STRIP: 7 [PH] (ref 5–8)
PROT UR QL STRIP: NEGATIVE
RBC #/AREA URNS AUTO: 0 /HPF (ref 0–4)
SP GR UR STRIP: 1.01 (ref 1–1.03)
SQUAMOUS #/AREA URNS AUTO: 0 /HPF
URN SPEC COLLECT METH UR: NORMAL
WBC #/AREA URNS AUTO: 0 /HPF (ref 0–5)

## 2020-05-27 PROCEDURE — 99999 PR PBB SHADOW E&M-EST. PATIENT-LVL III: ICD-10-PCS | Mod: PBBFAC,,, | Performed by: INTERNAL MEDICINE

## 2020-05-27 PROCEDURE — 99213 OFFICE O/P EST LOW 20 MIN: CPT | Mod: PBBFAC | Performed by: INTERNAL MEDICINE

## 2020-05-27 PROCEDURE — 99214 OFFICE O/P EST MOD 30 MIN: CPT | Mod: S$PBB,,, | Performed by: INTERNAL MEDICINE

## 2020-05-27 PROCEDURE — 81001 URINALYSIS AUTO W/SCOPE: CPT

## 2020-05-27 PROCEDURE — 87086 URINE CULTURE/COLONY COUNT: CPT

## 2020-05-27 PROCEDURE — 99214 PR OFFICE/OUTPT VISIT, EST, LEVL IV, 30-39 MIN: ICD-10-PCS | Mod: S$PBB,,, | Performed by: INTERNAL MEDICINE

## 2020-05-27 PROCEDURE — 99999 PR PBB SHADOW E&M-EST. PATIENT-LVL III: CPT | Mod: PBBFAC,,, | Performed by: INTERNAL MEDICINE

## 2020-05-27 RX ORDER — CIPROFLOXACIN 500 MG/1
500 TABLET ORAL 2 TIMES DAILY
Qty: 28 TABLET | Refills: 0 | Status: SHIPPED | OUTPATIENT
Start: 2020-05-27 | End: 2020-06-10

## 2020-05-27 NOTE — PROGRESS NOTES
PAST MEDICAL HISTORY:   Hypertension.  Hyperlipidemia.  Gastroesophageal reflux disease.  Chronic rhinitis.  BPH.  Left bundle-branch block.  Adenomatous colon polyp in the year 2000 and in August 2015.  Right inguinal hernia repair.  Epididymal cyst.  Fractured elbow and wrist.  Lung surgery at age 42 to remove an empyema.        MEDICATIONS:   Atorvastatin 10 mg.  Benazepril 10 mg.  Diltiazem 360 mg.  Doxazosin 4 mg.  Hydrochlorothiazide 25 mg.    79-year-old male    For the past 2 days he noticed a definitive slowing down of his urination and had nocturia times 5 or 6 last night  He has notice a gradual slowing of the urination within the past 2 weeks but but since yesterday is spin more definitively more with stopping goes    No fever chills, no dysuria, no back or abdominal pain, no peroneal scrotal discomfort    Examination  Weight 213 lb  Pulse 72 blood pressure 122/80  Lungs clear breath sounds  Heart regular rate and rhythm  Abdominal exam is soft nontender no masses prostate exam is moderately enlarged, smooth    Dip urine was unrevealing    Impression  BPH  Possible prostatitis    Plan  Urine analysis and culture  Trial ciprofloxacin 500 mg b.i.d. for 2 weeks

## 2020-05-28 LAB
BACTERIA UR CULT: NO GROWTH
FINAL PATHOLOGIC DIAGNOSIS: NORMAL
GROSS: NORMAL

## 2020-06-07 ENCOUNTER — NURSE TRIAGE (OUTPATIENT)
Dept: ADMINISTRATIVE | Facility: CLINIC | Age: 80
End: 2020-06-07

## 2020-06-07 NOTE — TELEPHONE ENCOUNTER
Contacted pt on behalf of Post Procedural Symptom Tracker. Spoke with pt's  Wife. Pt denies any fever, cough, or difficulty breathing since procedure. Advised pt if these symptoms do arise to contact [facility] or PCP. No follow up needed.    Reason for Disposition   Health Information question, no triage required and triager able to answer question    Additional Information   Negative: [1] Caller is not with the adult (patient) AND [2] reporting urgent symptoms   Negative: Lab result questions   Negative: Medication questions   Negative: Caller can't be reached by phone   Negative: Caller has already spoken to PCP or another triager   Negative: RN needs further essential information from caller in order to complete triage   Negative: Requesting regular office appointment   Negative: [1] Caller requesting NON-URGENT health information AND [2] PCP's office is the best resource    Protocols used: INFORMATION ONLY CALL-A-

## 2020-06-15 DIAGNOSIS — K59.04 CHRONIC IDIOPATHIC CONSTIPATION: Primary | ICD-10-CM

## 2020-06-17 ENCOUNTER — PATIENT OUTREACH (OUTPATIENT)
Dept: ADMINISTRATIVE | Facility: OTHER | Age: 80
End: 2020-06-17

## 2020-06-17 ENCOUNTER — OFFICE VISIT (OUTPATIENT)
Dept: OPTOMETRY | Facility: CLINIC | Age: 80
End: 2020-06-17
Payer: MEDICARE

## 2020-06-17 DIAGNOSIS — H25.13 NUCLEAR SCLEROSIS, BILATERAL: Primary | ICD-10-CM

## 2020-06-17 DIAGNOSIS — H40.1131 PRIMARY OPEN ANGLE GLAUCOMA OF BOTH EYES, MILD STAGE: ICD-10-CM

## 2020-06-17 PROCEDURE — 92012 INTRM OPH EXAM EST PATIENT: CPT | Mod: S$PBB,,, | Performed by: OPTOMETRIST

## 2020-06-17 PROCEDURE — 99213 OFFICE O/P EST LOW 20 MIN: CPT | Mod: PBBFAC,PO | Performed by: OPTOMETRIST

## 2020-06-17 PROCEDURE — 99999 PR PBB SHADOW E&M-EST. PATIENT-LVL III: CPT | Mod: PBBFAC,,, | Performed by: OPTOMETRIST

## 2020-06-17 PROCEDURE — 92012 PR EYE EXAM, EST PATIENT,INTERMED: ICD-10-PCS | Mod: S$PBB,,, | Performed by: OPTOMETRIST

## 2020-06-17 PROCEDURE — 99999 PR PBB SHADOW E&M-EST. PATIENT-LVL III: ICD-10-PCS | Mod: PBBFAC,,, | Performed by: OPTOMETRIST

## 2020-06-17 RX ORDER — LATANOPROST 50 UG/ML
SOLUTION/ DROPS OPHTHALMIC
Qty: 7.5 ML | Refills: 3 | Status: SHIPPED | OUTPATIENT
Start: 2020-06-17 | End: 2021-06-18 | Stop reason: SDUPTHER

## 2020-06-17 RX ORDER — METHYLPREDNISOLONE SODIUM SUCCINATE 1 G/16ML
INJECTION INTRAMUSCULAR; INTRAVENOUS
COMMUNITY
Start: 2020-06-09 | End: 2021-06-18

## 2020-06-17 NOTE — PROGRESS NOTES
"ELMO VALERA 01/20  Here for IOP check with DFE today.  Using Latanoprost OU Q HS,   last used last night.  Patient has seasonal allergies and eyes itch and   get "glazed".  Patient has not tried any allergy drops.  Glasses are less   than a year old and vision seems worse than it was at last visit.     Patient has more trouble with glare.    Last edited by Claudia Stanley on 6/17/2020 11:03 AM. (History)            Assessment /Plan     For exam results, see Encounter Report.    Nuclear sclerosis, bilateral    Primary open angle glaucoma of both eyes, mild stage  -     latanoprost 0.005 % ophthalmic solution; INSTILL 1 DROP IN BOTH EYES EVERY DAY  Dispense: 7.5 mL; Refill: 3      1. Some worsening, Monitor condition. Patient to report any changes. RTC 6 mos, probs with glare.  2. IOP stable, nerve eval stable, cont Latanaprost qhs OU, Prev HVf and OCT stable, prev gonio open. Probably low tension. ,Mild glaucoma based on OCT changes. Pachy normal. Initial Field did clean up at repeat. Pretreat IOP at 20. Family history of glaucoma. RTC 6 mos IOP ck., consider cataract eval.                 "

## 2020-07-17 DIAGNOSIS — Z71.89 COMPLEX CARE COORDINATION: ICD-10-CM

## 2020-08-15 ENCOUNTER — OFFICE VISIT (OUTPATIENT)
Dept: INTERNAL MEDICINE | Facility: CLINIC | Age: 80
End: 2020-08-15
Payer: MEDICARE

## 2020-08-15 ENCOUNTER — LAB VISIT (OUTPATIENT)
Dept: LAB | Facility: HOSPITAL | Age: 80
End: 2020-08-15
Attending: INTERNAL MEDICINE
Payer: MEDICARE

## 2020-08-15 VITALS
OXYGEN SATURATION: 98 % | WEIGHT: 211 LBS | SYSTOLIC BLOOD PRESSURE: 118 MMHG | HEIGHT: 75 IN | BODY MASS INDEX: 26.24 KG/M2 | HEART RATE: 70 BPM | DIASTOLIC BLOOD PRESSURE: 78 MMHG

## 2020-08-15 DIAGNOSIS — R35.0 FREQUENCY OF MICTURITION: ICD-10-CM

## 2020-08-15 DIAGNOSIS — L29.3 PERINEAL IRRITATION: ICD-10-CM

## 2020-08-15 DIAGNOSIS — E78.5 HYPERLIPIDEMIA, UNSPECIFIED HYPERLIPIDEMIA TYPE: ICD-10-CM

## 2020-08-15 DIAGNOSIS — N40.0 BENIGN NON-NODULAR PROSTATIC HYPERPLASIA WITHOUT LOWER URINARY TRACT SYMPTOMS: ICD-10-CM

## 2020-08-15 DIAGNOSIS — I10 ESSENTIAL HYPERTENSION: ICD-10-CM

## 2020-08-15 DIAGNOSIS — I10 ESSENTIAL HYPERTENSION: Primary | ICD-10-CM

## 2020-08-15 LAB
ALBUMIN SERPL BCP-MCNC: 4.4 G/DL (ref 3.5–5.2)
ALP SERPL-CCNC: 83 U/L (ref 55–135)
ALT SERPL W/O P-5'-P-CCNC: 28 U/L (ref 10–44)
ANION GAP SERPL CALC-SCNC: 7 MMOL/L (ref 8–16)
AST SERPL-CCNC: 24 U/L (ref 10–40)
BILIRUB SERPL-MCNC: 0.7 MG/DL (ref 0.1–1)
BUN SERPL-MCNC: 12 MG/DL (ref 8–23)
CALCIUM SERPL-MCNC: 10.2 MG/DL (ref 8.7–10.5)
CHLORIDE SERPL-SCNC: 100 MMOL/L (ref 95–110)
CHOLEST SERPL-MCNC: 148 MG/DL (ref 120–199)
CHOLEST/HDLC SERPL: 3.1 {RATIO} (ref 2–5)
CO2 SERPL-SCNC: 28 MMOL/L (ref 23–29)
COMPLEXED PSA SERPL-MCNC: 1 NG/ML (ref 0–4)
CREAT SERPL-MCNC: 1.2 MG/DL (ref 0.5–1.4)
EST. GFR  (AFRICAN AMERICAN): >60 ML/MIN/1.73 M^2
EST. GFR  (NON AFRICAN AMERICAN): 57.6 ML/MIN/1.73 M^2
GLUCOSE SERPL-MCNC: 93 MG/DL (ref 70–110)
HDLC SERPL-MCNC: 48 MG/DL (ref 40–75)
HDLC SERPL: 32.4 % (ref 20–50)
LDLC SERPL CALC-MCNC: 87.6 MG/DL (ref 63–159)
NONHDLC SERPL-MCNC: 100 MG/DL
POTASSIUM SERPL-SCNC: 4.1 MMOL/L (ref 3.5–5.1)
PROT SERPL-MCNC: 7.4 G/DL (ref 6–8.4)
SODIUM SERPL-SCNC: 135 MMOL/L (ref 136–145)
TRIGL SERPL-MCNC: 62 MG/DL (ref 30–150)

## 2020-08-15 PROCEDURE — 90471 IMMUNIZATION ADMIN: CPT | Mod: PBBFAC

## 2020-08-15 PROCEDURE — 80061 LIPID PANEL: CPT

## 2020-08-15 PROCEDURE — 99999 PR PBB SHADOW E&M-EST. PATIENT-LVL IV: CPT | Mod: PBBFAC,,, | Performed by: INTERNAL MEDICINE

## 2020-08-15 PROCEDURE — 36415 COLL VENOUS BLD VENIPUNCTURE: CPT

## 2020-08-15 PROCEDURE — 80053 COMPREHEN METABOLIC PANEL: CPT

## 2020-08-15 PROCEDURE — 99214 OFFICE O/P EST MOD 30 MIN: CPT | Mod: S$PBB,,, | Performed by: INTERNAL MEDICINE

## 2020-08-15 PROCEDURE — 99999 PR PBB SHADOW E&M-EST. PATIENT-LVL IV: ICD-10-PCS | Mod: PBBFAC,,, | Performed by: INTERNAL MEDICINE

## 2020-08-15 PROCEDURE — 99214 OFFICE O/P EST MOD 30 MIN: CPT | Mod: PBBFAC | Performed by: INTERNAL MEDICINE

## 2020-08-15 PROCEDURE — 99214 PR OFFICE/OUTPT VISIT, EST, LEVL IV, 30-39 MIN: ICD-10-PCS | Mod: S$PBB,,, | Performed by: INTERNAL MEDICINE

## 2020-08-15 PROCEDURE — 84153 ASSAY OF PSA TOTAL: CPT

## 2020-08-17 ENCOUNTER — IMMUNIZATION (OUTPATIENT)
Dept: PHARMACY | Facility: CLINIC | Age: 80
End: 2020-08-17
Payer: MEDICARE

## 2020-08-19 ENCOUNTER — HOSPITAL ENCOUNTER (EMERGENCY)
Facility: HOSPITAL | Age: 80
Discharge: HOME | End: 2020-08-19
Attending: EMERGENCY MEDICINE
Payer: MEDICARE

## 2020-08-19 VITALS
SYSTOLIC BLOOD PRESSURE: 131 MMHG | HEIGHT: 75 IN | OXYGEN SATURATION: 96 % | DIASTOLIC BLOOD PRESSURE: 70 MMHG | BODY MASS INDEX: 26.11 KG/M2 | RESPIRATION RATE: 10 BRPM | WEIGHT: 210 LBS | TEMPERATURE: 98 F | HEART RATE: 57 BPM

## 2020-08-19 DIAGNOSIS — M79.603 ARM PAIN: ICD-10-CM

## 2020-08-19 DIAGNOSIS — R07.9 CHEST PAIN: Primary | ICD-10-CM

## 2020-08-19 LAB
ALBUMIN SERPL BCP-MCNC: 4.3 G/DL (ref 3.5–5.2)
ALP SERPL-CCNC: 88 U/L (ref 55–135)
ALT SERPL W/O P-5'-P-CCNC: 22 U/L (ref 10–44)
ANION GAP SERPL CALC-SCNC: 6 MMOL/L (ref 8–16)
AST SERPL-CCNC: 21 U/L (ref 10–40)
BASOPHILS # BLD AUTO: 0.05 K/UL (ref 0–0.2)
BASOPHILS NFR BLD: 0.8 % (ref 0–1.9)
BILIRUB SERPL-MCNC: 0.8 MG/DL (ref 0.1–1)
BUN SERPL-MCNC: 12 MG/DL (ref 8–23)
CALCIUM SERPL-MCNC: 9.5 MG/DL (ref 8.7–10.5)
CHLORIDE SERPL-SCNC: 102 MMOL/L (ref 95–110)
CO2 SERPL-SCNC: 28 MMOL/L (ref 23–29)
CREAT SERPL-MCNC: 1.1 MG/DL (ref 0.5–1.4)
DIFFERENTIAL METHOD: ABNORMAL
EOSINOPHIL # BLD AUTO: 0.2 K/UL (ref 0–0.5)
EOSINOPHIL NFR BLD: 2.7 % (ref 0–8)
ERYTHROCYTE [DISTWIDTH] IN BLOOD BY AUTOMATED COUNT: 11.9 % (ref 11.5–14.5)
EST. GFR  (AFRICAN AMERICAN): >60 ML/MIN/1.73 M^2
EST. GFR  (NON AFRICAN AMERICAN): >60 ML/MIN/1.73 M^2
GLUCOSE SERPL-MCNC: 95 MG/DL (ref 70–110)
HCT VFR BLD AUTO: 42.2 % (ref 40–54)
HGB BLD-MCNC: 15.1 G/DL (ref 14–18)
IMM GRANULOCYTES # BLD AUTO: 0.01 K/UL (ref 0–0.04)
IMM GRANULOCYTES NFR BLD AUTO: 0.2 % (ref 0–0.5)
LYMPHOCYTES # BLD AUTO: 1.4 K/UL (ref 1–4.8)
LYMPHOCYTES NFR BLD: 21.1 % (ref 18–48)
MCH RBC QN AUTO: 31.8 PG (ref 27–31)
MCHC RBC AUTO-ENTMCNC: 35.8 G/DL (ref 32–36)
MCV RBC AUTO: 89 FL (ref 82–98)
MONOCYTES # BLD AUTO: 0.6 K/UL (ref 0.3–1)
MONOCYTES NFR BLD: 9.6 % (ref 4–15)
NEUTROPHILS # BLD AUTO: 4.4 K/UL (ref 1.8–7.7)
NEUTROPHILS NFR BLD: 65.6 % (ref 38–73)
NRBC BLD-RTO: 0 /100 WBC
PLATELET # BLD AUTO: 153 K/UL (ref 150–350)
PMV BLD AUTO: 11.4 FL (ref 9.2–12.9)
POTASSIUM SERPL-SCNC: 3.4 MMOL/L (ref 3.5–5.1)
PROT SERPL-MCNC: 7.3 G/DL (ref 6–8.4)
RBC # BLD AUTO: 4.75 M/UL (ref 4.6–6.2)
SODIUM SERPL-SCNC: 136 MMOL/L (ref 136–145)
TROPONIN I SERPL DL<=0.01 NG/ML-MCNC: 0.01 NG/ML (ref 0–0.03)
WBC # BLD AUTO: 6.65 K/UL (ref 3.9–12.7)

## 2020-08-19 PROCEDURE — 99285 EMERGENCY DEPT VISIT HI MDM: CPT | Mod: 25

## 2020-08-19 PROCEDURE — 80053 COMPREHEN METABOLIC PANEL: CPT

## 2020-08-19 PROCEDURE — 36000 PLACE NEEDLE IN VEIN: CPT

## 2020-08-19 PROCEDURE — 93005 ELECTROCARDIOGRAM TRACING: CPT

## 2020-08-19 PROCEDURE — 84484 ASSAY OF TROPONIN QUANT: CPT

## 2020-08-19 PROCEDURE — 85025 COMPLETE CBC W/AUTO DIFF WBC: CPT

## 2020-08-19 PROCEDURE — 25000003 PHARM REV CODE 250: Performed by: PHYSICIAN ASSISTANT

## 2020-08-19 RX ORDER — ASPIRIN 325 MG
325 TABLET, DELAYED RELEASE (ENTERIC COATED) ORAL
Status: COMPLETED | OUTPATIENT
Start: 2020-08-19 | End: 2020-08-19

## 2020-08-19 RX ADMIN — ASPIRIN 325 MG: 325 TABLET, COATED ORAL at 01:08

## 2020-08-19 NOTE — ED NOTES
Patient placed on continuous cardiac monitor, automatic blood pressure cuff and continuous pulse oximeter. Patient stated that he was working In the yard yesterday and started to feel left shoulder ache and pain. Patient stated that the pain was also in his left upper chest and was concerned that it may be his heart. Patient denies N/V, sob and chest pain at present time. Patient changed into gown

## 2020-08-19 NOTE — ED PROVIDER NOTES
"Encounter Date: 8/19/2020       History     Chief Complaint   Patient presents with    Arm Pain     pt felt like he had a muscle spasm in his midsternal chest, and then began having pain to his left shoulder and left arm. C/o continued pain to left shoulder today. Pt got a tdap injection to left deltoid on Saturday.     79 y/o M presenting to ED with mild left shoulder discomfort that began yesterday afternoon. He states the shoulder discomfort began shortly after having sudden onset and brief episode of mid sternal chest tightness described as "muscle spasm" that quickly resolved with no associated chest pain, sweating, SOB, nausea. He reports the left shoulder discomfort continued this morning, prompting him to report to the ED for concern of his heart. He reports no cardiac history besides HTN. In ED, he has no complaint of chest or shoulder pain. He has not taken any medications for his symptoms. He denies any recent sickness, injury or trauma, numbness, focal weakness, visual changes, palpitations, fever, chills, vomiting, diarrhea, constipation. No other acute complaints at this time.       The history is provided by the patient.     Review of patient's allergies indicates:   Allergen Reactions    Penicillins      Stomach cramps     Past Medical History:   Diagnosis Date    Cataract     Chronic constipation     GERD (gastroesophageal reflux disease)     Glaucoma     Hyperlipidemia     Hypertension     LBBB (left bundle branch block)      Past Surgical History:   Procedure Laterality Date    COLONOSCOPY  4/28/2008    hemorrhoids    COLONOSCOPY N/A 5/15/2017    Procedure: COLONOSCOPY;  Surgeon: Dandy Cifuentes MD;  Location: [address]);  Service: Endoscopy;  Laterality: N/A;    COLONOSCOPY N/A 5/25/2020    Procedure: COLONOSCOPY;  Surgeon: Dandy Cifuentes MD;  Location: [address]);  Service: Endoscopy;  Laterality: N/A;  Chronic history of constipation please use constipation " bowel prep  Recommend MiraLax 17 g in 12 oz water once daily starting 5 days prior to colonoscopy.  Recommend full liquid diet starting 2 days prior to colonoscopy clear liquid diet the day before the colonoscopy in s    ESOPHAGOGASTRODUODENOSCOPY  2/1/2010    ESOPHAGOGASTRODUODENOSCOPY N/A 5/25/2020    Procedure: EGD (ESOPHAGOGASTRODUODENOSCOPY);  Surgeon: Dandy Cifuentes MD;  Location: [address];  Service: Endoscopy;  Laterality: N/A;    INGUINAL HERNIA REPAIR Right      Family History   Problem Relation Age of Onset    Stroke Father     Hypertension Sister     Cancer Sister         breast    Cancer Paternal Uncle 60        stomach CA    Colon polyps Paternal Uncle     Stomach cancer Paternal Uncle 56    Amblyopia Neg Hx     Blindness Neg Hx     Cataracts Neg Hx     Diabetes Neg Hx     Glaucoma Neg Hx     Macular degeneration Neg Hx     Retinal detachment Neg Hx     Strabismus Neg Hx     Thyroid disease Neg Hx     Colon cancer Neg Hx     Cirrhosis Neg Hx     Celiac disease Neg Hx     Crohn's disease Neg Hx     Esophageal cancer Neg Hx     Inflammatory bowel disease Neg Hx     Irritable bowel syndrome Neg Hx     Liver cancer Neg Hx     Liver disease Neg Hx     Rectal cancer Neg Hx     Ulcerative colitis Neg Hx     Jl's disease Neg Hx     Lymphoma Neg Hx     Tuberculosis Neg Hx     Scleroderma Neg Hx      Social History     Tobacco Use    Smoking status: Never Smoker    Smokeless tobacco: Never Used   Substance Use Topics    Alcohol use: No    Drug use: No     Review of Systems   Constitutional: Negative for activity change, chills and fever.   HENT: Negative for congestion and sore throat.    Eyes: Negative for visual disturbance.   Respiratory: Negative for cough and shortness of breath.    Cardiovascular: Positive for chest pain. Negative for palpitations and leg swelling.   Gastrointestinal: Negative for abdominal pain, nausea and vomiting.   Genitourinary:  Negative for difficulty urinating.   Musculoskeletal: Positive for arthralgias. Negative for back pain, gait problem, joint swelling, neck pain and neck stiffness.   Neurological: Negative for syncope, weakness, light-headedness and headaches.   Hematological: Does not bruise/bleed easily.       Physical Exam     Initial Vitals [08/19/20 1313]   BP Pulse Resp Temp SpO2   (!) 166/80 99 18 98.1 °F (36.7 °C) 97 %      MAP       --         Physical Exam    Nursing note and vitals reviewed.  Constitutional: He appears well-developed and well-nourished. He is cooperative.  Non-toxic appearance. He does not have a sickly appearance. He does not appear ill. No distress.   Lying comfortably on bed   HENT:   Head: Normocephalic and atraumatic.   Eyes: Conjunctivae and EOM are normal.   Neck: Normal range of motion. Neck supple.   Cardiovascular: Normal rate, regular rhythm and normal heart sounds.   Pulses:       Radial pulses are 1+ on the right side and 1+ on the left side.        Dorsalis pedis pulses are 1+ on the right side and 1+ on the left side.   Pulmonary/Chest: Effort normal and breath sounds normal.   Abdominal: Soft. Normal appearance. There is no abdominal tenderness.   Musculoskeletal: No tenderness.      Comments: No palpable left shoulder tenderness with full ROM and appropriate strength when compared to right shoulder.   Neurological: He is alert and oriented to person, place, and time. No sensory deficit. GCS score is 15. GCS eye subscore is 4. GCS verbal subscore is 5. GCS motor subscore is 6.   Skin: Skin is warm and dry.   Psychiatric: He has a normal mood and affect. His speech is normal and behavior is normal. Judgment and thought content normal. Cognition and memory are normal.         ED Course   Procedures  Labs Reviewed   CBC W/ AUTO DIFFERENTIAL - Abnormal; Notable for the following components:       Result Value    Mean Corpuscular Hemoglobin 31.8 (*)     All other components within normal limits    COMPREHENSIVE METABOLIC PANEL - Abnormal; Notable for the following components:    Potassium 3.4 (*)     Anion Gap 6 (*)     All other components within normal limits   TROPONIN I        ECG Results          EKG 12-lead (In process)  Result time 08/19/20 13:49:06    In process by Interface, Lab In [facility] (08/19/20 13:49:06)                 Narrative:    Test Reason : M79.603,    Vent. Rate : 085 BPM     Atrial Rate : 085 BPM     P-R Int : 190 ms          QRS Dur : 144 ms      QT Int : 406 ms       P-R-T Axes : 064 -47 014 degrees     QTc Int : 483 ms    Normal sinus rhythm  Right bundle branch block  Left anterior fascicular block   Bifascicular block   Abnormal ECG  When compared with ECG of 25-SEP-2018 07:53,  Premature ventricular complexes are no longer Present  T wave inversion more evident in Anterior leads    Referred By: [email or web address]   SELF           Confirmed By:                             Imaging Results          X-Ray Chest PA And Lateral (Final result)  Result time 08/19/20 14:47:16    Final result by Yaniv Garza MD (08/19/20 14:47:16)                 Impression:      1. No acute cardiopulmonary process, hypoventilatory exam.      Electronically signed by: Yaniv Garza MD  Date:    08/19/2020  Time:    14:47             Narrative:    EXAMINATION:  XR CHEST PA AND LATERAL    CLINICAL HISTORY:  Chest pain, unspecified    TECHNIQUE:  PA and lateral views of the chest were performed.    COMPARISON:  03/19/2020    FINDINGS:  The cardiomediastinal silhouette is not enlarged.  There is no pleural effusion.  The trachea is midline.  The lungs are symmetrically expanded bilaterally with minimally coarse interstitial attenuation and left basilar subsegmental atelectasis.  No large focal consolidation seen.  There is no pneumothorax.  The osseous structures are remarkable for degenerative changes noting remote appearing left rib injuries..                                 Medical Decision Making:    Differential Diagnosis:   ACS, pneumothorax, PE, musculoskeletal, costochondritis, strain, sprain, spasm  ED Management:  EKG, CXR, Labs, Trop, ASA    Patient presents with cardiac concerns following brief episode of chest tightness follow by left shoulder discomfort that began shortly after and continued into this morning. In ED, he has no current complaints of chest or left shoulder pain. On exam, he appears comfortable with no chest or left shoulder tenderness and appropriate ROM.  EKG reviewed by Dr. Hinkle; similar to past EKG. Trop WNL, Labs grossly WNL. I do not suspect ACS event. He has no signs or symptoms concerning PE. Patient was discussed with and cleared by Dr. Hinkle prior to discharging home. Patient understands to monitor symptoms very closely with close PCP follow up and return to closest emergency department if symptoms do not improve, change, worsen, or for any new concerns.                                   Clinical Impression:       ICD-10-CM ICD-9-CM   1. Chest pain  R07.9 786.50   2. Arm pain  M79.603 729.5             ED Disposition Condition    Discharge Stable        ED Prescriptions     None        Follow-up Information     Follow up With Specialties Details Why Contact Info    Antonio Killian MD Internal Medicine In 3 days  [address]  [address], [address]  [facility] [address]  [phone]                                       Florian Colon PA-C  08/19/20 [id number]

## 2020-08-19 NOTE — FIRST PROVIDER EVALUATION
Emergency Department TeleTRIAGE Encounter Note      CHIEF COMPLAINT    Chief Complaint   Patient presents with    Arm Pain     pt felt like he had a muscle spasm in his midsternal chest, and then began having pain to his left shoulder and left arm. C/o continued pain to left shoulder today. Pt got a tdap injection to left deltoid on Saturday.       VITAL SIGNS   Initial Vitals [08/19/20 1313]   BP Pulse Resp Temp SpO2   (!) 166/80 99 18 98.1 °F (36.7 °C) 97 %      MAP       --            ALLERGIES    Review of patient's allergies indicates:   Allergen Reactions    Penicillins      Stomach cramps       PROVIDER TRIAGE NOTE  Pt is a 80 yr old male with hx of HTN, HLD, and acid reflux who presents to the ED with chest pain and left sided arm pain.  Pt states yesterday midday, he developed a muscle type pain in the middle of his chest radiating into his left shoulder.  He states since then, he has developed left shoulder pain.  Denies shortness of breath.  Reports having two vaccinations on Saturday/Monday, but states he did not develop pain right after injection or even the next day.  Pt does have CAD risk factors and with age, feel that cardiac workup is warranted.      ORDERS  Labs Reviewed   CBC W/ AUTO DIFFERENTIAL   COMPREHENSIVE METABOLIC PANEL   TROPONIN I       ED Orders (720h ago, onward)    Start Ordered     Status Ordering Provider    08/19/20 1330 08/19/20 1322  aspirin EC tablet 325 mg  ED 1 Time      Ordered RIKY ZAVALA    08/19/20 1323 08/19/20 1322  X-Ray Chest PA And Lateral  1 time imaging      Ordered RIKY ZAVALA    08/19/20 1323 08/19/20 1322  EKG 12-lead  Once      Ordered RIKY ZAVALA    08/19/20 1323 08/19/20 1322  Cardiac Monitoring - Adult  Continuous     Comments: Notify Physician If:    Ordered RIKY ZAVALA    08/19/20 1322 08/19/20 1322  Saline lock IV  Once      Ordered RIKY ZAVALA    08/19/20 1322  08/19/20 1322  CBC auto differential  STAT  Collect    Ordered RIKY ZAVALA.    08/19/20 1322 08/19/20 1322  Comprehensive metabolic panel  STAT  Collect    Ordered IRKY ZAVALA.    08/19/20 1322 08/19/20 1322  Troponin I  STAT  Collect    Ordered RIKY ZAVALA.            Virtual Visit Note: The provider triage portion of this emergency department evaluation and documentation was performed via FreeDrive, a HIPAA-compliant telemedicine application, in concert with a tele-presenter in the room. A face to face patient evaluation with one of my colleagues will occur once the patient is placed in an emergency department room.      DISCLAIMER: This note was prepared with Smartio voice recognition transcription software. Garbled syntax, mangled pronouns, and other bizarre constructions may be attributed to that software system.

## 2020-08-19 NOTE — DISCHARGE INSTRUCTIONS
Monitor symptoms very closely. Tylenol/ibuprofen as needed. Follow up with PCP  Return to closest emergency department if symptoms do not improve, change, worsen, or for any new concerns.

## 2020-08-20 RX ORDER — BENAZEPRIL HYDROCHLORIDE 10 MG/1
10 TABLET ORAL DAILY
Qty: 90 TABLET | Refills: 2 | Status: CANCELLED | OUTPATIENT
Start: 2020-08-20

## 2020-09-03 ENCOUNTER — LAB VISIT (OUTPATIENT)
Dept: LAB | Facility: HOSPITAL | Age: 80
End: 2020-09-03
Attending: INTERNAL MEDICINE
Payer: MEDICARE

## 2020-09-03 ENCOUNTER — OFFICE VISIT (OUTPATIENT)
Dept: INTERNAL MEDICINE | Facility: CLINIC | Age: 80
End: 2020-09-03
Payer: MEDICARE

## 2020-09-03 VITALS
SYSTOLIC BLOOD PRESSURE: 130 MMHG | HEIGHT: 75 IN | HEART RATE: 63 BPM | DIASTOLIC BLOOD PRESSURE: 82 MMHG | OXYGEN SATURATION: 97 % | WEIGHT: 220.44 LBS | BODY MASS INDEX: 27.41 KG/M2

## 2020-09-03 DIAGNOSIS — E87.6 LOW BLOOD POTASSIUM: ICD-10-CM

## 2020-09-03 DIAGNOSIS — N41.9 PROSTATITIS, UNSPECIFIED PROSTATITIS TYPE: Primary | ICD-10-CM

## 2020-09-03 LAB
BILIRUB UR QL STRIP: NEGATIVE
CLARITY UR REFRACT.AUTO: ABNORMAL
COLOR UR AUTO: ABNORMAL
GLUCOSE UR QL STRIP: NEGATIVE
HGB UR QL STRIP: ABNORMAL
KETONES UR QL STRIP: NEGATIVE
LEUKOCYTE ESTERASE UR QL STRIP: NEGATIVE
MAGNESIUM SERPL-MCNC: 2 MG/DL (ref 1.6–2.6)
MICROSCOPIC COMMENT: NORMAL
NITRITE UR QL STRIP: NEGATIVE
PH UR STRIP: 5 [PH] (ref 5–8)
POTASSIUM SERPL-SCNC: 4.5 MMOL/L (ref 3.5–5.1)
PROT UR QL STRIP: NEGATIVE
RBC #/AREA URNS AUTO: 3 /HPF (ref 0–4)
SP GR UR STRIP: 1.01 (ref 1–1.03)
URN SPEC COLLECT METH UR: ABNORMAL
WBC #/AREA URNS AUTO: 1 /HPF (ref 0–5)

## 2020-09-03 PROCEDURE — 99213 OFFICE O/P EST LOW 20 MIN: CPT | Mod: PBBFAC | Performed by: INTERNAL MEDICINE

## 2020-09-03 PROCEDURE — 99999 PR PBB SHADOW E&M-EST. PATIENT-LVL III: CPT | Mod: PBBFAC,,, | Performed by: INTERNAL MEDICINE

## 2020-09-03 PROCEDURE — 36415 COLL VENOUS BLD VENIPUNCTURE: CPT

## 2020-09-03 PROCEDURE — 83735 ASSAY OF MAGNESIUM: CPT

## 2020-09-03 PROCEDURE — 99214 PR OFFICE/OUTPT VISIT, EST, LEVL IV, 30-39 MIN: ICD-10-PCS | Mod: S$PBB,,, | Performed by: INTERNAL MEDICINE

## 2020-09-03 PROCEDURE — 99214 OFFICE O/P EST MOD 30 MIN: CPT | Mod: S$PBB,,, | Performed by: INTERNAL MEDICINE

## 2020-09-03 PROCEDURE — 87086 URINE CULTURE/COLONY COUNT: CPT

## 2020-09-03 PROCEDURE — 84132 ASSAY OF SERUM POTASSIUM: CPT

## 2020-09-03 PROCEDURE — 81001 URINALYSIS AUTO W/SCOPE: CPT

## 2020-09-03 PROCEDURE — 99999 PR PBB SHADOW E&M-EST. PATIENT-LVL III: ICD-10-PCS | Mod: PBBFAC,,, | Performed by: INTERNAL MEDICINE

## 2020-09-03 RX ORDER — CIPROFLOXACIN 500 MG/1
500 TABLET ORAL 2 TIMES DAILY
Qty: 28 TABLET | Refills: 0 | Status: SHIPPED | OUTPATIENT
Start: 2020-09-03 | End: 2020-09-17

## 2020-09-03 NOTE — PROGRESS NOTES
PAST MEDICAL HISTORY:   Hypertension.  Hyperlipidemia.  Gastroesophageal reflux disease.  Chronic rhinitis.  BPH.  Left bundle-branch block.  Adenomatous colon polyp in the year 2000 and in August 2015.  Right inguinal hernia repair.  Epididymal cyst.  Fractured elbow and wrist.  Lung surgery at age 42 to remove an empyema.        MEDICATIONS:   Atorvastatin 10 mg.  Benazepril 10 mg.  Diltiazem 360 mg.  Doxazosin 4 mg.  Hydrochlorothiazide 25 mg.      80-year-old male  For the past 3 weeks he is noticing that the urine flow is diminished, that it takes a while to initiate a urine stream, and for the past 2 days he urine was very slow  No fever chills no lower back abdominal pain no perineal pain or scrotal pain  Has not noticed dysuria or painful urination or frequency  It is similar to when he was seen May 27th which then he responded well to ciprofloxacin    It is noted also during this time he started drinking half a cup of coffee in the morning which he has never done before    Examination  Weight 220 lb  Pulse 64  Blood pressure 130/82  Abdominal exam is bowel sounds soft nontender  Negative flank or CVA tenderness  Digital rectal exam prostate is moderately enlarged possibly minimally bulky, not able to express any secretions but did feel a burning pain in the penis  Urine dip was unrevealing    Impression  Prostatitis with urinary obstruction    Plan  Urine for UA urine C&S  Ciprofloxacin twice a day for  The next 2 weeks for couple days he can also try doxazosin 1 twice a day into the urine flows better

## 2020-09-04 LAB — BACTERIA UR CULT: NO GROWTH

## 2020-09-05 NOTE — PROGRESS NOTES
Urinalysis was fine as well as a culture but continue with the antibiotic and hoping that there is improvement with your urination

## 2020-09-29 ENCOUNTER — PATIENT MESSAGE (OUTPATIENT)
Dept: OTHER | Facility: OTHER | Age: 80
End: 2020-09-29

## 2020-12-10 ENCOUNTER — HOSPITAL ENCOUNTER (OUTPATIENT)
Dept: RADIOLOGY | Facility: HOSPITAL | Age: 80
Discharge: HOME | End: 2020-12-10
Attending: INTERNAL MEDICINE
Payer: MEDICARE

## 2020-12-10 ENCOUNTER — OFFICE VISIT (OUTPATIENT)
Dept: INTERNAL MEDICINE | Facility: CLINIC | Age: 80
End: 2020-12-10
Payer: MEDICARE

## 2020-12-10 ENCOUNTER — IMMUNIZATION (OUTPATIENT)
Dept: INTERNAL MEDICINE | Facility: CLINIC | Age: 80
End: 2020-12-10
Payer: MEDICARE

## 2020-12-10 ENCOUNTER — PATIENT MESSAGE (OUTPATIENT)
Dept: INTERNAL MEDICINE | Facility: CLINIC | Age: 80
End: 2020-12-10

## 2020-12-10 VITALS
WEIGHT: 219 LBS | SYSTOLIC BLOOD PRESSURE: 124 MMHG | OXYGEN SATURATION: 97 % | HEART RATE: 95 BPM | HEIGHT: 75 IN | DIASTOLIC BLOOD PRESSURE: 78 MMHG | BODY MASS INDEX: 27.23 KG/M2

## 2020-12-10 DIAGNOSIS — N50.811 PAIN IN BOTH TESTICLES: ICD-10-CM

## 2020-12-10 DIAGNOSIS — R10.2 PERINEAL PAIN IN MALE: ICD-10-CM

## 2020-12-10 DIAGNOSIS — R10.30 LOWER ABDOMINAL PAIN: Primary | ICD-10-CM

## 2020-12-10 DIAGNOSIS — N50.811 PAIN IN BOTH TESTICLES: Primary | ICD-10-CM

## 2020-12-10 DIAGNOSIS — N50.812 PAIN IN BOTH TESTICLES: Primary | ICD-10-CM

## 2020-12-10 DIAGNOSIS — Z23 NEED FOR INFLUENZA VACCINATION: ICD-10-CM

## 2020-12-10 DIAGNOSIS — I86.1 RIGHT VARICOCELE: ICD-10-CM

## 2020-12-10 DIAGNOSIS — N50.812 PAIN IN BOTH TESTICLES: ICD-10-CM

## 2020-12-10 PROCEDURE — 90694 VACC AIIV4 NO PRSRV 0.5ML IM: CPT | Mod: PBBFAC

## 2020-12-10 PROCEDURE — 76870 US SCROTUM AND TESTICLES: ICD-10-PCS | Mod: 26,,, | Performed by: RADIOLOGY

## 2020-12-10 PROCEDURE — 99213 OFFICE O/P EST LOW 20 MIN: CPT | Mod: S$PBB,,, | Performed by: INTERNAL MEDICINE

## 2020-12-10 PROCEDURE — 76870 US EXAM SCROTUM: CPT | Mod: 26,,, | Performed by: RADIOLOGY

## 2020-12-10 PROCEDURE — 99999 PR PBB SHADOW E&M-EST. PATIENT-LVL IV: ICD-10-PCS | Mod: PBBFAC,,, | Performed by: INTERNAL MEDICINE

## 2020-12-10 PROCEDURE — G0008 ADMIN INFLUENZA VIRUS VAC: HCPCS | Mod: PBBFAC

## 2020-12-10 PROCEDURE — 99999 PR PBB SHADOW E&M-EST. PATIENT-LVL IV: CPT | Mod: PBBFAC,,, | Performed by: INTERNAL MEDICINE

## 2020-12-10 PROCEDURE — 99214 OFFICE O/P EST MOD 30 MIN: CPT | Mod: PBBFAC | Performed by: INTERNAL MEDICINE

## 2020-12-10 PROCEDURE — 76870 US EXAM SCROTUM: CPT | Mod: TC

## 2020-12-10 PROCEDURE — 99213 PR OFFICE/OUTPT VISIT, EST, LEVL III, 20-29 MIN: ICD-10-PCS | Mod: S$PBB,,, | Performed by: INTERNAL MEDICINE

## 2020-12-10 RX ORDER — NYSTATIN AND TRIAMCINOLONE ACETONIDE 100000; 1 [USP'U]/G; MG/G
CREAM TOPICAL 2 TIMES DAILY
Qty: 30 G | Refills: 0 | Status: SHIPPED | OUTPATIENT
Start: 2020-12-10 | End: 2021-04-21

## 2020-12-10 NOTE — PROGRESS NOTES
Patient 2 identifier use. Patient tolerated injection well. Advised patient to wait in the clinic 15 minutes post injection.

## 2020-12-10 NOTE — PROGRESS NOTES
MEDICAL HISTORY:   Hypertension.  Hyperlipidemia.  Gastroesophageal reflux disease.  Chronic rhinitis.  BPH.  Left bundle-branch block.  Adenomatous colon polyp in the year 2000 and in August 2015.  Right inguinal hernia repair.  Epididymal cyst.  Fractured elbow and wrist.  Lung surgery at age 42 to remove an empyema.        MEDICATIONS:   Atorvastatin 10 mg.  Benazepril 10 mg.  Diltiazem 360 mg.  Doxazosin 4 mg.  Hydrochlorothiazide 25 mg.      80-year-old male  For a while, he states that he will feel an abnormal sensation that could be anywhere along his scrotum, in the groin, a perineum.  He describes this is experiencing staying is which is an very transient split second  Sensation that he can feel superficially on the skin of his scrotum, right a left side, or in his perineum on is in the groin.  He is having regular bowel movements and has no difficulty urinating in is no dysuria.  No back pain or abdominal pain.    He has been treated for prostatitis in the past    Examination  Weight 119 lb  Blood pressure 124/75  Pulse 92  Not tender over the back  Abdominal exam is bowel sounds soft nontender no suprapubic pain  This skin the scrotum might be hyperemic  He is not tender to palpation within the perineum  Bilateral prominent epididymis but is not tender.  No other scrotal findings.  No evidence for inguinal hernia    Impression  Episodic test allergy a/perineal discomfort    Plan   consider scrotal ultrasound  Trial Mycolog-II cream twice a day for next 7 days

## 2020-12-11 ENCOUNTER — PATIENT MESSAGE (OUTPATIENT)
Dept: OTHER | Facility: OTHER | Age: 80
End: 2020-12-11

## 2020-12-15 ENCOUNTER — LAB VISIT (OUTPATIENT)
Dept: LAB | Facility: HOSPITAL | Age: 80
End: 2020-12-15
Attending: INTERNAL MEDICINE
Payer: MEDICARE

## 2020-12-15 DIAGNOSIS — I86.1 RIGHT VARICOCELE: ICD-10-CM

## 2020-12-15 DIAGNOSIS — R10.30 LOWER ABDOMINAL PAIN: ICD-10-CM

## 2020-12-15 LAB
CREAT SERPL-MCNC: 1.1 MG/DL (ref 0.5–1.4)
EST. GFR  (AFRICAN AMERICAN): >60 ML/MIN/1.73 M^2
EST. GFR  (NON AFRICAN AMERICAN): >60 ML/MIN/1.73 M^2

## 2020-12-15 PROCEDURE — 36415 COLL VENOUS BLD VENIPUNCTURE: CPT

## 2020-12-15 PROCEDURE — 82565 ASSAY OF CREATININE: CPT

## 2020-12-17 ENCOUNTER — HOSPITAL ENCOUNTER (OUTPATIENT)
Dept: RADIOLOGY | Facility: HOSPITAL | Age: 80
Discharge: HOME | End: 2020-12-17
Attending: INTERNAL MEDICINE
Payer: MEDICARE

## 2020-12-17 DIAGNOSIS — I86.1 RIGHT VARICOCELE: ICD-10-CM

## 2020-12-17 DIAGNOSIS — R10.30 LOWER ABDOMINAL PAIN: ICD-10-CM

## 2020-12-17 PROCEDURE — 25500020 PHARM REV CODE 255: Performed by: INTERNAL MEDICINE

## 2020-12-17 PROCEDURE — 74177 CT ABDOMEN PELVIS WITH CONTRAST: ICD-10-PCS | Mod: 26,,, | Performed by: RADIOLOGY

## 2020-12-17 PROCEDURE — 74177 CT ABD & PELVIS W/CONTRAST: CPT | Mod: 26,,, | Performed by: RADIOLOGY

## 2020-12-17 PROCEDURE — 74177 CT ABD & PELVIS W/CONTRAST: CPT | Mod: TC

## 2020-12-17 RX ADMIN — IOHEXOL 75 ML: 350 INJECTION, SOLUTION INTRAVENOUS at 03:12

## 2020-12-17 RX ADMIN — IOHEXOL 15 ML: 350 INJECTION, SOLUTION INTRAVENOUS at 01:12

## 2020-12-17 RX ADMIN — IOHEXOL 15 ML: 350 INJECTION, SOLUTION INTRAVENOUS at 02:12

## 2020-12-17 NOTE — PROGRESS NOTES
CT of the abdomen did not show any acute changes, this was done because of the varicoceles that was noted on the left scrotum.  There was evidence of fat containing inguinal hernia, prostate enlargement stable right lower lobe nodule    He is doing well

## 2020-12-21 RX ORDER — DOXAZOSIN 4 MG/1
4 TABLET ORAL NIGHTLY
Qty: 90 TABLET | Refills: 1 | Status: SHIPPED | OUTPATIENT
Start: 2020-12-21 | End: 2021-06-19

## 2020-12-21 RX ORDER — HYDROCHLOROTHIAZIDE 25 MG/1
25 TABLET ORAL DAILY
Qty: 90 TABLET | Refills: 1 | Status: SHIPPED | OUTPATIENT
Start: 2020-12-21 | End: 2021-06-19

## 2021-01-04 ENCOUNTER — IMMUNIZATION (OUTPATIENT)
Dept: PHARMACY | Facility: CLINIC | Age: 81
End: 2021-01-04
Payer: MEDICARE

## 2021-01-12 ENCOUNTER — IMMUNIZATION (OUTPATIENT)
Dept: INTERNAL MEDICINE | Facility: CLINIC | Age: 81
End: 2021-01-12
Payer: MEDICARE

## 2021-01-12 DIAGNOSIS — Z23 NEED FOR VACCINATION: ICD-10-CM

## 2021-01-12 PROCEDURE — 91300 COVID-19, MRNA, LNP-S, PF, 30 MCG/0.3 ML DOSE VACCINE: CPT | Mod: PBBFAC | Performed by: INTERNAL MEDICINE

## 2021-01-25 ENCOUNTER — OFFICE VISIT (OUTPATIENT)
Dept: OPTOMETRY | Facility: CLINIC | Age: 81
End: 2021-01-25
Payer: MEDICARE

## 2021-01-25 DIAGNOSIS — H40.1131 PRIMARY OPEN ANGLE GLAUCOMA OF BOTH EYES, MILD STAGE: ICD-10-CM

## 2021-01-25 DIAGNOSIS — H25.13 NUCLEAR SCLEROSIS, BILATERAL: Primary | ICD-10-CM

## 2021-01-25 PROCEDURE — 99213 OFFICE O/P EST LOW 20 MIN: CPT | Mod: PBBFAC,PO | Performed by: OPTOMETRIST

## 2021-01-25 PROCEDURE — 92012 INTRM OPH EXAM EST PATIENT: CPT | Mod: S$PBB,,, | Performed by: OPTOMETRIST

## 2021-01-25 PROCEDURE — 92012 PR EYE EXAM, EST PATIENT,INTERMED: ICD-10-PCS | Mod: S$PBB,,, | Performed by: OPTOMETRIST

## 2021-01-25 PROCEDURE — 99999 PR PBB SHADOW E&M-EST. PATIENT-LVL III: ICD-10-PCS | Mod: PBBFAC,,, | Performed by: OPTOMETRIST

## 2021-01-25 PROCEDURE — 99999 PR PBB SHADOW E&M-EST. PATIENT-LVL III: CPT | Mod: PBBFAC,,, | Performed by: OPTOMETRIST

## 2021-01-26 ENCOUNTER — PATIENT OUTREACH (OUTPATIENT)
Dept: ADMINISTRATIVE | Facility: HOSPITAL | Age: 81
End: 2021-01-26

## 2021-01-28 ENCOUNTER — OFFICE VISIT (OUTPATIENT)
Dept: INTERNAL MEDICINE | Facility: CLINIC | Age: 81
End: 2021-01-28
Payer: MEDICARE

## 2021-01-28 VITALS
BODY MASS INDEX: 26.49 KG/M2 | WEIGHT: 213 LBS | OXYGEN SATURATION: 98 % | DIASTOLIC BLOOD PRESSURE: 76 MMHG | HEART RATE: 80 BPM | HEIGHT: 75 IN | SYSTOLIC BLOOD PRESSURE: 118 MMHG

## 2021-01-28 DIAGNOSIS — M79.18 GLUTEAL PAIN: Primary | ICD-10-CM

## 2021-01-28 PROCEDURE — 99214 OFFICE O/P EST MOD 30 MIN: CPT | Mod: S$PBB,,, | Performed by: INTERNAL MEDICINE

## 2021-01-28 PROCEDURE — 99999 PR PBB SHADOW E&M-EST. PATIENT-LVL III: ICD-10-PCS | Mod: PBBFAC,,, | Performed by: INTERNAL MEDICINE

## 2021-01-28 PROCEDURE — 99213 OFFICE O/P EST LOW 20 MIN: CPT | Mod: PBBFAC | Performed by: INTERNAL MEDICINE

## 2021-01-28 PROCEDURE — 99214 PR OFFICE/OUTPT VISIT, EST, LEVL IV, 30-39 MIN: ICD-10-PCS | Mod: S$PBB,,, | Performed by: INTERNAL MEDICINE

## 2021-01-28 PROCEDURE — 99999 PR PBB SHADOW E&M-EST. PATIENT-LVL III: CPT | Mod: PBBFAC,,, | Performed by: INTERNAL MEDICINE

## 2021-01-28 RX ORDER — DICLOFENAC SODIUM 75 MG/1
75 TABLET, DELAYED RELEASE ORAL 2 TIMES DAILY
Qty: 14 TABLET | Refills: 0 | Status: SHIPPED | OUTPATIENT
Start: 2021-01-28 | End: 2021-02-01

## 2021-02-02 ENCOUNTER — IMMUNIZATION (OUTPATIENT)
Dept: INTERNAL MEDICINE | Facility: CLINIC | Age: 81
End: 2021-02-02
Payer: MEDICARE

## 2021-02-02 DIAGNOSIS — Z23 NEED FOR VACCINATION: Primary | ICD-10-CM

## 2021-02-02 PROCEDURE — 0002A PR IMMUNIZ ADMIN, SARS-COV-2 COVID-19 VACC, 30MCG/0.3ML, 2ND DOSE: CPT | Mod: PBBFAC

## 2021-02-02 PROCEDURE — 91300 PR SARS-COV- 2 COVID-19 VACCINE, NO PRSV, 30MCG/0.3ML, IM: CPT | Mod: PBBFAC

## 2021-02-02 RX ADMIN — RNA INGREDIENT BNT-162B2 0.3 ML: 0.23 INJECTION, SUSPENSION INTRAMUSCULAR at 10:02

## 2021-03-08 ENCOUNTER — OFFICE VISIT (OUTPATIENT)
Dept: OPHTHALMOLOGY | Facility: CLINIC | Age: 81
End: 2021-03-08
Payer: MEDICARE

## 2021-03-08 DIAGNOSIS — H26.9 CORTICAL CATARACT OF BOTH EYES: ICD-10-CM

## 2021-03-08 DIAGNOSIS — I10 ESSENTIAL HYPERTENSION: ICD-10-CM

## 2021-03-08 DIAGNOSIS — H40.1131 PRIMARY OPEN ANGLE GLAUCOMA OF BOTH EYES, MILD STAGE: ICD-10-CM

## 2021-03-08 DIAGNOSIS — H52.7 REFRACTIVE ERROR: ICD-10-CM

## 2021-03-08 DIAGNOSIS — H43.812 VITREOUS DETACHMENT OF LEFT EYE: ICD-10-CM

## 2021-03-08 DIAGNOSIS — H25.13 NUCLEAR SCLEROSIS, BILATERAL: Primary | ICD-10-CM

## 2021-03-08 PROCEDURE — 99999 PR PBB SHADOW E&M-EST. PATIENT-LVL III: ICD-10-PCS | Mod: PBBFAC,,, | Performed by: OPHTHALMOLOGY

## 2021-03-08 PROCEDURE — 92136 BIOMETRY: ICD-10-PCS | Mod: 26,S$PBB,LT, | Performed by: OPHTHALMOLOGY

## 2021-03-08 PROCEDURE — 92004 COMPRE OPH EXAM NEW PT 1/>: CPT | Mod: S$PBB,,, | Performed by: OPHTHALMOLOGY

## 2021-03-08 PROCEDURE — 99999 PR PBB SHADOW E&M-EST. PATIENT-LVL III: CPT | Mod: PBBFAC,,, | Performed by: OPHTHALMOLOGY

## 2021-03-08 PROCEDURE — 92136 OPHTHALMIC BIOMETRY: CPT | Mod: PBBFAC,PO | Performed by: OPHTHALMOLOGY

## 2021-03-08 PROCEDURE — 92004 PR EYE EXAM, NEW PATIENT,COMPREHESV: ICD-10-PCS | Mod: S$PBB,,, | Performed by: OPHTHALMOLOGY

## 2021-03-08 PROCEDURE — 99213 OFFICE O/P EST LOW 20 MIN: CPT | Mod: PBBFAC,PO,25 | Performed by: OPHTHALMOLOGY

## 2021-03-08 RX ORDER — DUREZOL 0.5 MG/ML
1 EMULSION OPHTHALMIC 4 TIMES DAILY
Qty: 5 ML | Refills: 1 | Status: SHIPPED | OUTPATIENT
Start: 2021-04-27 | End: 2021-05-27

## 2021-03-08 RX ORDER — OFLOXACIN 3 MG/ML
1 SOLUTION/ DROPS OPHTHALMIC 4 TIMES DAILY
Qty: 5 ML | Refills: 1 | Status: SHIPPED | OUTPATIENT
Start: 2021-04-24 | End: 2021-05-04

## 2021-03-08 RX ORDER — NEPAFENAC 3 MG/ML
1 SUSPENSION/ DROPS OPHTHALMIC DAILY
Qty: 3 ML | Refills: 1 | Status: SHIPPED | OUTPATIENT
Start: 2021-04-24 | End: 2021-05-24

## 2021-03-18 ENCOUNTER — PATIENT MESSAGE (OUTPATIENT)
Dept: RESEARCH | Facility: HOSPITAL | Age: 81
End: 2021-03-18

## 2021-03-22 RX ORDER — DILTIAZEM HYDROCHLORIDE 180 MG/1
180 CAPSULE, COATED, EXTENDED RELEASE ORAL DAILY
Qty: 180 CAPSULE | Refills: 1 | Status: SHIPPED | OUTPATIENT
Start: 2021-03-22 | End: 2021-03-23 | Stop reason: SDUPTHER

## 2021-03-23 ENCOUNTER — TELEPHONE (OUTPATIENT)
Dept: OPHTHALMOLOGY | Facility: CLINIC | Age: 81
End: 2021-03-23

## 2021-03-23 DIAGNOSIS — H25.11 NS (NUCLEAR SCLEROSIS), RIGHT: Primary | ICD-10-CM

## 2021-03-23 DIAGNOSIS — Z13.9 SCREENING PROCEDURE: ICD-10-CM

## 2021-03-23 DIAGNOSIS — H25.12 NUCLEAR SCLEROTIC CATARACT OF LEFT EYE: ICD-10-CM

## 2021-03-23 RX ORDER — DILTIAZEM HYDROCHLORIDE 180 MG/1
360 CAPSULE, COATED, EXTENDED RELEASE ORAL DAILY
Qty: 180 CAPSULE | Refills: 1 | Status: SHIPPED | OUTPATIENT
Start: 2021-03-23 | End: 2021-09-14 | Stop reason: SDUPTHER

## 2021-03-26 ENCOUNTER — PATIENT MESSAGE (OUTPATIENT)
Dept: RESEARCH | Facility: HOSPITAL | Age: 81
End: 2021-03-26

## 2021-04-06 ENCOUNTER — TELEPHONE (OUTPATIENT)
Dept: OPHTHALMOLOGY | Facility: CLINIC | Age: 81
End: 2021-04-06

## 2021-04-06 DIAGNOSIS — Z13.9 SCREENING PROCEDURE: ICD-10-CM

## 2021-04-06 DIAGNOSIS — H25.11 NS (NUCLEAR SCLEROSIS), RIGHT: Primary | ICD-10-CM

## 2021-04-23 ENCOUNTER — TELEPHONE (OUTPATIENT)
Dept: OPTOMETRY | Facility: CLINIC | Age: 81
End: 2021-04-23

## 2021-04-24 ENCOUNTER — LAB VISIT (OUTPATIENT)
Dept: INTERNAL MEDICINE | Facility: CLINIC | Age: 81
End: 2021-04-24
Payer: MEDICARE

## 2021-04-24 DIAGNOSIS — Z13.9 SCREENING PROCEDURE: ICD-10-CM

## 2021-04-24 PROCEDURE — U0003 INFECTIOUS AGENT DETECTION BY NUCLEIC ACID (DNA OR RNA); SEVERE ACUTE RESPIRATORY SYNDROME CORONAVIRUS 2 (SARS-COV-2) (CORONAVIRUS DISEASE [COVID-19]), AMPLIFIED PROBE TECHNIQUE, MAKING USE OF HIGH THROUGHPUT TECHNOLOGIES AS DESCRIBED BY CMS-2020-01-R: HCPCS | Performed by: OPHTHALMOLOGY

## 2021-04-24 PROCEDURE — U0005 INFEC AGEN DETEC AMPLI PROBE: HCPCS | Performed by: OPHTHALMOLOGY

## 2021-04-25 LAB — SARS-COV-2 RNA RESP QL NAA+PROBE: NOT DETECTED

## 2021-04-26 ENCOUNTER — TELEPHONE (OUTPATIENT)
Dept: OPHTHALMOLOGY | Facility: CLINIC | Age: 81
End: 2021-04-26

## 2021-04-27 ENCOUNTER — ANESTHESIA EVENT (OUTPATIENT)
Dept: SURGERY | Facility: OTHER | Age: 81
End: 2021-04-27
Payer: MEDICARE

## 2021-04-27 ENCOUNTER — ANESTHESIA (OUTPATIENT)
Dept: SURGERY | Facility: OTHER | Age: 81
End: 2021-04-27
Payer: MEDICARE

## 2021-04-27 ENCOUNTER — HOSPITAL ENCOUNTER (OUTPATIENT)
Facility: OTHER | Age: 81
Discharge: HOME | End: 2021-04-27
Attending: OPHTHALMOLOGY | Admitting: OPHTHALMOLOGY
Payer: MEDICARE

## 2021-04-27 VITALS
HEART RATE: 59 BPM | SYSTOLIC BLOOD PRESSURE: 126 MMHG | DIASTOLIC BLOOD PRESSURE: 72 MMHG | WEIGHT: 212 LBS | TEMPERATURE: 98 F | HEIGHT: 75 IN | BODY MASS INDEX: 26.36 KG/M2 | OXYGEN SATURATION: 98 % | RESPIRATION RATE: 18 BRPM

## 2021-04-27 DIAGNOSIS — H25.12 NUCLEAR SCLEROTIC CATARACT OF LEFT EYE: Primary | ICD-10-CM

## 2021-04-27 PROCEDURE — 63600175 PHARM REV CODE 636 W HCPCS: Performed by: OPHTHALMOLOGY

## 2021-04-27 PROCEDURE — 71000015 HC POSTOP RECOV 1ST HR: Performed by: OPHTHALMOLOGY

## 2021-04-27 PROCEDURE — 63600175 PHARM REV CODE 636 W HCPCS: Performed by: NURSE ANESTHETIST, CERTIFIED REGISTERED

## 2021-04-27 PROCEDURE — 36000706: Performed by: OPHTHALMOLOGY

## 2021-04-27 PROCEDURE — 37000009 HC ANESTHESIA EA ADD 15 MINS: Performed by: OPHTHALMOLOGY

## 2021-04-27 PROCEDURE — 66984 XCAPSL CTRC RMVL W/O ECP: CPT | Mod: LT,,, | Performed by: OPHTHALMOLOGY

## 2021-04-27 PROCEDURE — V2632 POST CHMBR INTRAOCULAR LENS: HCPCS | Performed by: OPHTHALMOLOGY

## 2021-04-27 PROCEDURE — 25000003 PHARM REV CODE 250: Performed by: OPHTHALMOLOGY

## 2021-04-27 PROCEDURE — 37000008 HC ANESTHESIA 1ST 15 MINUTES: Performed by: OPHTHALMOLOGY

## 2021-04-27 PROCEDURE — 36000707: Performed by: OPHTHALMOLOGY

## 2021-04-27 PROCEDURE — 66984 PR REMOVAL, CATARACT, W/INSRT INTRAOC LENS, W/O ENDO CYCLO: ICD-10-PCS | Mod: LT,,, | Performed by: OPHTHALMOLOGY

## 2021-04-27 DEVICE — LENS 23.5: Type: IMPLANTABLE DEVICE | Site: EYE | Status: FUNCTIONAL

## 2021-04-27 RX ORDER — PHENYLEPHRINE HYDROCHLORIDE 25 MG/ML
1 SOLUTION/ DROPS OPHTHALMIC
Status: COMPLETED | OUTPATIENT
Start: 2021-04-27 | End: 2021-04-27

## 2021-04-27 RX ORDER — ACETAMINOPHEN 325 MG/1
650 TABLET ORAL EVERY 4 HOURS PRN
Status: DISCONTINUED | OUTPATIENT
Start: 2021-04-27 | End: 2021-04-27 | Stop reason: HOSPADM

## 2021-04-27 RX ORDER — OFLOXACIN 3 MG/ML
1 SOLUTION/ DROPS OPHTHALMIC
Status: DISCONTINUED | OUTPATIENT
Start: 2021-04-27 | End: 2021-04-27 | Stop reason: HOSPADM

## 2021-04-27 RX ORDER — TROPICAMIDE 10 MG/ML
1 SOLUTION/ DROPS OPHTHALMIC
Status: COMPLETED | OUTPATIENT
Start: 2021-04-27 | End: 2021-04-27

## 2021-04-27 RX ORDER — PREDNISOLONE ACETATE 10 MG/ML
SUSPENSION/ DROPS OPHTHALMIC
Status: DISCONTINUED | OUTPATIENT
Start: 2021-04-27 | End: 2021-04-27 | Stop reason: HOSPADM

## 2021-04-27 RX ORDER — LIDOCAINE HYDROCHLORIDE 40 MG/ML
INJECTION, SOLUTION RETROBULBAR
Status: DISCONTINUED | OUTPATIENT
Start: 2021-04-27 | End: 2021-04-27 | Stop reason: HOSPADM

## 2021-04-27 RX ORDER — CYCLOPENTOLATE HYDROCHLORIDE 10 MG/ML
1 SOLUTION/ DROPS OPHTHALMIC
Status: DISCONTINUED | OUTPATIENT
Start: 2021-04-27 | End: 2021-04-27 | Stop reason: HOSPADM

## 2021-04-27 RX ORDER — HYDROCODONE BITARTRATE AND ACETAMINOPHEN 5; 325 MG/1; MG/1
1 TABLET ORAL EVERY 4 HOURS PRN
Status: DISCONTINUED | OUTPATIENT
Start: 2021-04-27 | End: 2021-04-27 | Stop reason: HOSPADM

## 2021-04-27 RX ORDER — MIDAZOLAM HYDROCHLORIDE 1 MG/ML
INJECTION INTRAMUSCULAR; INTRAVENOUS
Status: DISCONTINUED | OUTPATIENT
Start: 2021-04-27 | End: 2021-04-27

## 2021-04-27 RX ORDER — EPINEPHRINE 1 MG/ML
INJECTION, SOLUTION INTRACARDIAC; INTRAMUSCULAR; INTRAVENOUS; SUBCUTANEOUS
Status: DISCONTINUED | OUTPATIENT
Start: 2021-04-27 | End: 2021-04-27 | Stop reason: HOSPADM

## 2021-04-27 RX ORDER — SODIUM CHLORIDE 0.9 % (FLUSH) 0.9 %
10 SYRINGE (ML) INJECTION
Status: DISCONTINUED | OUTPATIENT
Start: 2021-04-27 | End: 2021-04-27 | Stop reason: HOSPADM

## 2021-04-27 RX ORDER — TETRACAINE HYDROCHLORIDE 5 MG/ML
SOLUTION OPHTHALMIC
Status: DISCONTINUED | OUTPATIENT
Start: 2021-04-27 | End: 2021-04-27 | Stop reason: HOSPADM

## 2021-04-27 RX ORDER — TETRACAINE HYDROCHLORIDE 5 MG/ML
1 SOLUTION OPHTHALMIC
Status: COMPLETED | OUTPATIENT
Start: 2021-04-27 | End: 2021-04-27

## 2021-04-27 RX ADMIN — TROPICAMIDE 1 DROP: 10 SOLUTION/ DROPS OPHTHALMIC at 09:04

## 2021-04-27 RX ADMIN — OFLOXACIN 1 DROP: 3 SOLUTION OPHTHALMIC at 09:04

## 2021-04-27 RX ADMIN — MIDAZOLAM HYDROCHLORIDE 2 MG: 1 INJECTION, SOLUTION INTRAMUSCULAR; INTRAVENOUS at 11:04

## 2021-04-27 RX ADMIN — TETRACAINE HYDROCHLORIDE 1 DROP: 5 SOLUTION OPHTHALMIC at 09:04

## 2021-04-27 RX ADMIN — PHENYLEPHRINE HYDROCHLORIDE 1 DROP: 25 SOLUTION/ DROPS OPHTHALMIC at 09:04

## 2021-04-27 RX ADMIN — CYCLOPENTOLATE HYDROCHLORIDE 1 DROP: 10 SOLUTION/ DROPS OPHTHALMIC at 09:04

## 2021-04-28 ENCOUNTER — OFFICE VISIT (OUTPATIENT)
Dept: OPHTHALMOLOGY | Facility: CLINIC | Age: 81
End: 2021-04-28
Payer: MEDICARE

## 2021-04-28 VITALS — HEART RATE: 65 BPM | SYSTOLIC BLOOD PRESSURE: 131 MMHG | DIASTOLIC BLOOD PRESSURE: 76 MMHG

## 2021-04-28 DIAGNOSIS — Z98.890 POST-OPERATIVE STATE: Primary | ICD-10-CM

## 2021-04-28 DIAGNOSIS — H40.1131 PRIMARY OPEN ANGLE GLAUCOMA OF BOTH EYES, MILD STAGE: ICD-10-CM

## 2021-04-28 PROCEDURE — 99024 POSTOP FOLLOW-UP VISIT: CPT | Mod: POP,,, | Performed by: OPHTHALMOLOGY

## 2021-04-28 PROCEDURE — 99999 PR PBB SHADOW E&M-EST. PATIENT-LVL III: ICD-10-PCS | Mod: PBBFAC,,, | Performed by: OPHTHALMOLOGY

## 2021-04-28 PROCEDURE — 99213 OFFICE O/P EST LOW 20 MIN: CPT | Mod: PBBFAC,PO | Performed by: OPHTHALMOLOGY

## 2021-04-28 PROCEDURE — 99999 PR PBB SHADOW E&M-EST. PATIENT-LVL III: CPT | Mod: PBBFAC,,, | Performed by: OPHTHALMOLOGY

## 2021-04-28 PROCEDURE — 99024 PR POST-OP FOLLOW-UP VISIT: ICD-10-PCS | Mod: POP,,, | Performed by: OPHTHALMOLOGY

## 2021-05-03 ENCOUNTER — TELEPHONE (OUTPATIENT)
Dept: OPHTHALMOLOGY | Facility: CLINIC | Age: 81
End: 2021-05-03

## 2021-05-04 ENCOUNTER — TELEPHONE (OUTPATIENT)
Dept: OPTOMETRY | Facility: CLINIC | Age: 81
End: 2021-05-04

## 2021-05-05 ENCOUNTER — OFFICE VISIT (OUTPATIENT)
Dept: OPHTHALMOLOGY | Facility: CLINIC | Age: 81
End: 2021-05-05
Payer: MEDICARE

## 2021-05-05 DIAGNOSIS — Z98.890 POST-OPERATIVE STATE: Primary | ICD-10-CM

## 2021-05-05 DIAGNOSIS — H40.1131 PRIMARY OPEN ANGLE GLAUCOMA OF BOTH EYES, MILD STAGE: ICD-10-CM

## 2021-05-05 DIAGNOSIS — H25.11 NS (NUCLEAR SCLEROSIS), RIGHT: ICD-10-CM

## 2021-05-05 PROCEDURE — 99024 PR POST-OP FOLLOW-UP VISIT: ICD-10-PCS | Mod: POP,,, | Performed by: OPHTHALMOLOGY

## 2021-05-05 PROCEDURE — 92136 OPHTHALMIC BIOMETRY: CPT | Mod: PBBFAC,PO | Performed by: OPHTHALMOLOGY

## 2021-05-05 PROCEDURE — 92136 PR OPHTHAL BIOMETRY,INTRAOC LENS POW CALC: ICD-10-PCS | Mod: 26,S$PBB,RT, | Performed by: OPHTHALMOLOGY

## 2021-05-05 PROCEDURE — 99999 PR PBB SHADOW E&M-EST. PATIENT-LVL III: CPT | Mod: PBBFAC,,, | Performed by: OPHTHALMOLOGY

## 2021-05-05 PROCEDURE — 99999 PR PBB SHADOW E&M-EST. PATIENT-LVL III: ICD-10-PCS | Mod: PBBFAC,,, | Performed by: OPHTHALMOLOGY

## 2021-05-05 PROCEDURE — 99213 OFFICE O/P EST LOW 20 MIN: CPT | Mod: PBBFAC,PO,25 | Performed by: OPHTHALMOLOGY

## 2021-05-05 PROCEDURE — 92136 OPHTHALMIC BIOMETRY: CPT | Mod: 26,S$PBB,RT, | Performed by: OPHTHALMOLOGY

## 2021-05-05 PROCEDURE — 99024 POSTOP FOLLOW-UP VISIT: CPT | Mod: POP,,, | Performed by: OPHTHALMOLOGY

## 2021-05-07 ENCOUNTER — TELEPHONE (OUTPATIENT)
Dept: OPHTHALMOLOGY | Facility: CLINIC | Age: 81
End: 2021-05-07

## 2021-05-08 ENCOUNTER — LAB VISIT (OUTPATIENT)
Dept: INTERNAL MEDICINE | Facility: CLINIC | Age: 81
End: 2021-05-08
Payer: MEDICARE

## 2021-05-08 DIAGNOSIS — Z13.9 SCREENING PROCEDURE: ICD-10-CM

## 2021-05-08 PROCEDURE — U0005 INFEC AGEN DETEC AMPLI PROBE: HCPCS | Performed by: OPHTHALMOLOGY

## 2021-05-08 PROCEDURE — U0003 INFECTIOUS AGENT DETECTION BY NUCLEIC ACID (DNA OR RNA); SEVERE ACUTE RESPIRATORY SYNDROME CORONAVIRUS 2 (SARS-COV-2) (CORONAVIRUS DISEASE [COVID-19]), AMPLIFIED PROBE TECHNIQUE, MAKING USE OF HIGH THROUGHPUT TECHNOLOGIES AS DESCRIBED BY CMS-2020-01-R: HCPCS | Performed by: OPHTHALMOLOGY

## 2021-05-09 LAB — SARS-COV-2 RNA RESP QL NAA+PROBE: NOT DETECTED

## 2021-05-10 ENCOUNTER — TELEPHONE (OUTPATIENT)
Dept: OPHTHALMOLOGY | Facility: CLINIC | Age: 81
End: 2021-05-10

## 2021-05-11 ENCOUNTER — ANESTHESIA (OUTPATIENT)
Dept: SURGERY | Facility: OTHER | Age: 81
End: 2021-05-11
Payer: MEDICARE

## 2021-05-11 ENCOUNTER — HOSPITAL ENCOUNTER (OUTPATIENT)
Facility: OTHER | Age: 81
Discharge: HOME | End: 2021-05-11
Attending: OPHTHALMOLOGY | Admitting: OPHTHALMOLOGY
Payer: MEDICARE

## 2021-05-11 ENCOUNTER — ANESTHESIA EVENT (OUTPATIENT)
Dept: SURGERY | Facility: OTHER | Age: 81
End: 2021-05-11
Payer: MEDICARE

## 2021-05-11 VITALS
BODY MASS INDEX: 26.36 KG/M2 | HEART RATE: 61 BPM | RESPIRATION RATE: 18 BRPM | DIASTOLIC BLOOD PRESSURE: 81 MMHG | OXYGEN SATURATION: 97 % | HEIGHT: 75 IN | SYSTOLIC BLOOD PRESSURE: 128 MMHG | WEIGHT: 212 LBS | TEMPERATURE: 98 F

## 2021-05-11 DIAGNOSIS — H25.11 NUCLEAR SCLEROTIC CATARACT OF RIGHT EYE: Primary | ICD-10-CM

## 2021-05-11 PROCEDURE — 66984 XCAPSL CTRC RMVL W/O ECP: CPT | Mod: 79,RT,, | Performed by: OPHTHALMOLOGY

## 2021-05-11 PROCEDURE — 71000015 HC POSTOP RECOV 1ST HR: Performed by: OPHTHALMOLOGY

## 2021-05-11 PROCEDURE — 37000008 HC ANESTHESIA 1ST 15 MINUTES: Performed by: OPHTHALMOLOGY

## 2021-05-11 PROCEDURE — 66984 PR REMOVAL, CATARACT, W/INSRT INTRAOC LENS, W/O ENDO CYCLO: ICD-10-PCS | Mod: 79,RT,, | Performed by: OPHTHALMOLOGY

## 2021-05-11 PROCEDURE — 37000009 HC ANESTHESIA EA ADD 15 MINS: Performed by: OPHTHALMOLOGY

## 2021-05-11 PROCEDURE — 25000003 PHARM REV CODE 250: Performed by: OPHTHALMOLOGY

## 2021-05-11 PROCEDURE — 63600175 PHARM REV CODE 636 W HCPCS: Performed by: OPHTHALMOLOGY

## 2021-05-11 PROCEDURE — V2632 POST CHMBR INTRAOCULAR LENS: HCPCS | Performed by: OPHTHALMOLOGY

## 2021-05-11 PROCEDURE — 36000707: Performed by: OPHTHALMOLOGY

## 2021-05-11 PROCEDURE — 36000706: Performed by: OPHTHALMOLOGY

## 2021-05-11 PROCEDURE — 63600175 PHARM REV CODE 636 W HCPCS: Performed by: REGISTERED NURSE

## 2021-05-11 DEVICE — LENS 22.0: Type: IMPLANTABLE DEVICE | Site: EYE | Status: FUNCTIONAL

## 2021-05-11 RX ORDER — ACETAMINOPHEN 325 MG/1
650 TABLET ORAL EVERY 4 HOURS PRN
Status: CANCELLED | OUTPATIENT
Start: 2021-05-11

## 2021-05-11 RX ORDER — LIDOCAINE HYDROCHLORIDE 40 MG/ML
INJECTION, SOLUTION RETROBULBAR
Status: DISCONTINUED | OUTPATIENT
Start: 2021-05-11 | End: 2021-05-11 | Stop reason: HOSPADM

## 2021-05-11 RX ORDER — TETRACAINE HYDROCHLORIDE 5 MG/ML
1 SOLUTION OPHTHALMIC
Status: COMPLETED | OUTPATIENT
Start: 2021-05-11 | End: 2021-05-11

## 2021-05-11 RX ORDER — EPINEPHRINE 1 MG/ML
INJECTION, SOLUTION INTRACARDIAC; INTRAMUSCULAR; INTRAVENOUS; SUBCUTANEOUS
Status: DISCONTINUED | OUTPATIENT
Start: 2021-05-11 | End: 2021-05-11 | Stop reason: HOSPADM

## 2021-05-11 RX ORDER — CYCLOPENTOLATE HYDROCHLORIDE 10 MG/ML
1 SOLUTION/ DROPS OPHTHALMIC
Status: DISCONTINUED | OUTPATIENT
Start: 2021-05-11 | End: 2021-05-11 | Stop reason: HOSPADM

## 2021-05-11 RX ORDER — PHENYLEPHRINE HYDROCHLORIDE 25 MG/ML
1 SOLUTION/ DROPS OPHTHALMIC
Status: COMPLETED | OUTPATIENT
Start: 2021-05-11 | End: 2021-05-11

## 2021-05-11 RX ORDER — PREDNISOLONE ACETATE 10 MG/ML
SUSPENSION/ DROPS OPHTHALMIC
Status: DISCONTINUED | OUTPATIENT
Start: 2021-05-11 | End: 2021-05-11 | Stop reason: HOSPADM

## 2021-05-11 RX ORDER — OFLOXACIN 3 MG/ML
1 SOLUTION/ DROPS OPHTHALMIC
Status: DISCONTINUED | OUTPATIENT
Start: 2021-05-11 | End: 2021-05-11 | Stop reason: HOSPADM

## 2021-05-11 RX ORDER — TROPICAMIDE 10 MG/ML
1 SOLUTION/ DROPS OPHTHALMIC
Status: COMPLETED | OUTPATIENT
Start: 2021-05-11 | End: 2021-05-11

## 2021-05-11 RX ORDER — HYDROCODONE BITARTRATE AND ACETAMINOPHEN 5; 325 MG/1; MG/1
1 TABLET ORAL EVERY 4 HOURS PRN
Status: CANCELLED | OUTPATIENT
Start: 2021-05-11

## 2021-05-11 RX ORDER — SODIUM CHLORIDE 0.9 % (FLUSH) 0.9 %
10 SYRINGE (ML) INJECTION
Status: DISCONTINUED | OUTPATIENT
Start: 2021-05-11 | End: 2021-05-11 | Stop reason: HOSPADM

## 2021-05-11 RX ORDER — MIDAZOLAM HYDROCHLORIDE 1 MG/ML
INJECTION INTRAMUSCULAR; INTRAVENOUS
Status: DISCONTINUED | OUTPATIENT
Start: 2021-05-11 | End: 2021-05-11

## 2021-05-11 RX ADMIN — PHENYLEPHRINE HYDROCHLORIDE 1 DROP: 25 SOLUTION/ DROPS OPHTHALMIC at 11:05

## 2021-05-11 RX ADMIN — TETRACAINE HYDROCHLORIDE 1 DROP: 5 SOLUTION OPHTHALMIC at 11:05

## 2021-05-11 RX ADMIN — TROPICAMIDE 1 DROP: 10 SOLUTION/ DROPS OPHTHALMIC at 11:05

## 2021-05-11 RX ADMIN — OFLOXACIN 1 DROP: 3 SOLUTION OPHTHALMIC at 11:05

## 2021-05-11 RX ADMIN — CYCLOPENTOLATE HYDROCHLORIDE 1 DROP: 10 SOLUTION/ DROPS OPHTHALMIC at 11:05

## 2021-05-11 RX ADMIN — MIDAZOLAM HYDROCHLORIDE 2 MG: 1 INJECTION, SOLUTION INTRAMUSCULAR; INTRAVENOUS at 02:05

## 2021-05-12 ENCOUNTER — OFFICE VISIT (OUTPATIENT)
Dept: OPHTHALMOLOGY | Facility: CLINIC | Age: 81
End: 2021-05-12
Payer: MEDICARE

## 2021-05-12 VITALS — SYSTOLIC BLOOD PRESSURE: 131 MMHG | DIASTOLIC BLOOD PRESSURE: 80 MMHG | HEART RATE: 44 BPM

## 2021-05-12 DIAGNOSIS — Z98.890 POST-OPERATIVE STATE: Primary | ICD-10-CM

## 2021-05-12 PROCEDURE — 99213 OFFICE O/P EST LOW 20 MIN: CPT | Mod: PBBFAC,PO | Performed by: OPHTHALMOLOGY

## 2021-05-12 PROCEDURE — 99999 PR PBB SHADOW E&M-EST. PATIENT-LVL III: CPT | Mod: PBBFAC,,, | Performed by: OPHTHALMOLOGY

## 2021-05-12 PROCEDURE — 99024 PR POST-OP FOLLOW-UP VISIT: ICD-10-PCS | Mod: POP,,, | Performed by: OPHTHALMOLOGY

## 2021-05-12 PROCEDURE — 99999 PR PBB SHADOW E&M-EST. PATIENT-LVL III: ICD-10-PCS | Mod: PBBFAC,,, | Performed by: OPHTHALMOLOGY

## 2021-05-12 PROCEDURE — 99024 POSTOP FOLLOW-UP VISIT: CPT | Mod: POP,,, | Performed by: OPHTHALMOLOGY

## 2021-05-19 ENCOUNTER — OFFICE VISIT (OUTPATIENT)
Dept: OPHTHALMOLOGY | Facility: CLINIC | Age: 81
End: 2021-05-19
Payer: MEDICARE

## 2021-05-19 DIAGNOSIS — Z98.890 POST-OPERATIVE STATE: Primary | ICD-10-CM

## 2021-05-19 DIAGNOSIS — H40.1131 PRIMARY OPEN ANGLE GLAUCOMA OF BOTH EYES, MILD STAGE: ICD-10-CM

## 2021-05-19 PROCEDURE — 99024 POSTOP FOLLOW-UP VISIT: CPT | Mod: POP,,, | Performed by: OPHTHALMOLOGY

## 2021-05-19 PROCEDURE — 99024 PR POST-OP FOLLOW-UP VISIT: ICD-10-PCS | Mod: POP,,, | Performed by: OPHTHALMOLOGY

## 2021-05-19 PROCEDURE — 99999 PR PBB SHADOW E&M-EST. PATIENT-LVL III: ICD-10-PCS | Mod: PBBFAC,,, | Performed by: OPHTHALMOLOGY

## 2021-05-19 PROCEDURE — 99213 OFFICE O/P EST LOW 20 MIN: CPT | Mod: PBBFAC,PO | Performed by: OPHTHALMOLOGY

## 2021-05-19 PROCEDURE — 99999 PR PBB SHADOW E&M-EST. PATIENT-LVL III: CPT | Mod: PBBFAC,,, | Performed by: OPHTHALMOLOGY

## 2021-06-09 ENCOUNTER — OFFICE VISIT (OUTPATIENT)
Dept: OPHTHALMOLOGY | Facility: CLINIC | Age: 81
End: 2021-06-09
Payer: MEDICARE

## 2021-06-09 DIAGNOSIS — H40.1131 PRIMARY OPEN ANGLE GLAUCOMA OF BOTH EYES, MILD STAGE: ICD-10-CM

## 2021-06-09 DIAGNOSIS — Z98.890 POST-OPERATIVE STATE: Primary | ICD-10-CM

## 2021-06-09 PROCEDURE — 99024 POSTOP FOLLOW-UP VISIT: CPT | Mod: POP,,, | Performed by: OPHTHALMOLOGY

## 2021-06-09 PROCEDURE — 99999 PR PBB SHADOW E&M-EST. PATIENT-LVL III: ICD-10-PCS | Mod: PBBFAC,,, | Performed by: OPHTHALMOLOGY

## 2021-06-09 PROCEDURE — 99999 PR PBB SHADOW E&M-EST. PATIENT-LVL III: CPT | Mod: PBBFAC,,, | Performed by: OPHTHALMOLOGY

## 2021-06-09 PROCEDURE — 99024 PR POST-OP FOLLOW-UP VISIT: ICD-10-PCS | Mod: POP,,, | Performed by: OPHTHALMOLOGY

## 2021-06-09 PROCEDURE — 99213 OFFICE O/P EST LOW 20 MIN: CPT | Mod: PBBFAC,PO | Performed by: OPHTHALMOLOGY

## 2021-06-18 ENCOUNTER — OFFICE VISIT (OUTPATIENT)
Dept: INTERNAL MEDICINE | Facility: CLINIC | Age: 81
End: 2021-06-18
Payer: MEDICARE

## 2021-06-18 VITALS
HEIGHT: 75 IN | SYSTOLIC BLOOD PRESSURE: 124 MMHG | BODY MASS INDEX: 27.03 KG/M2 | WEIGHT: 217.38 LBS | HEART RATE: 67 BPM | OXYGEN SATURATION: 97 % | DIASTOLIC BLOOD PRESSURE: 78 MMHG

## 2021-06-18 DIAGNOSIS — R09.82 PND (POST-NASAL DRIP): ICD-10-CM

## 2021-06-18 DIAGNOSIS — H40.1131 PRIMARY OPEN ANGLE GLAUCOMA OF BOTH EYES, MILD STAGE: ICD-10-CM

## 2021-06-18 DIAGNOSIS — J32.9 SINUSITIS, UNSPECIFIED CHRONICITY, UNSPECIFIED LOCATION: Primary | ICD-10-CM

## 2021-06-18 DIAGNOSIS — R05.8 ALLERGIC COUGH: ICD-10-CM

## 2021-06-18 PROCEDURE — 99214 OFFICE O/P EST MOD 30 MIN: CPT | Mod: S$PBB,,, | Performed by: INTERNAL MEDICINE

## 2021-06-18 PROCEDURE — 99213 OFFICE O/P EST LOW 20 MIN: CPT | Mod: PBBFAC | Performed by: INTERNAL MEDICINE

## 2021-06-18 PROCEDURE — 99999 PR PBB SHADOW E&M-EST. PATIENT-LVL III: CPT | Mod: PBBFAC,,, | Performed by: INTERNAL MEDICINE

## 2021-06-18 PROCEDURE — 99214 PR OFFICE/OUTPT VISIT, EST, LEVL IV, 30-39 MIN: ICD-10-PCS | Mod: S$PBB,,, | Performed by: INTERNAL MEDICINE

## 2021-06-18 PROCEDURE — 99999 PR PBB SHADOW E&M-EST. PATIENT-LVL III: ICD-10-PCS | Mod: PBBFAC,,, | Performed by: INTERNAL MEDICINE

## 2021-06-18 RX ORDER — LATANOPROST 50 UG/ML
SOLUTION/ DROPS OPHTHALMIC
Qty: 7.5 ML | Refills: 3 | Status: SHIPPED | OUTPATIENT
Start: 2021-06-18 | End: 2022-04-25 | Stop reason: SDUPTHER

## 2021-06-19 RX ORDER — HYDROCHLOROTHIAZIDE 25 MG/1
TABLET ORAL
Qty: 90 TABLET | Refills: 1 | Status: SHIPPED | OUTPATIENT
Start: 2021-06-19 | End: 2022-01-06

## 2021-06-19 RX ORDER — DOXAZOSIN 4 MG/1
TABLET ORAL
Qty: 90 TABLET | Refills: 1 | Status: SHIPPED | OUTPATIENT
Start: 2021-06-19 | End: 2021-12-31

## 2021-09-14 RX ORDER — DILTIAZEM HYDROCHLORIDE 180 MG/1
CAPSULE, COATED, EXTENDED RELEASE ORAL
Qty: 180 CAPSULE | Refills: 1 | OUTPATIENT
Start: 2021-09-14

## 2021-10-05 ENCOUNTER — PES CALL (OUTPATIENT)
Dept: ADMINISTRATIVE | Facility: CLINIC | Age: 81
End: 2021-10-05

## 2021-10-21 ENCOUNTER — IMMUNIZATION (OUTPATIENT)
Dept: INTERNAL MEDICINE | Facility: CLINIC | Age: 81
End: 2021-10-21
Payer: MEDICARE

## 2021-10-21 ENCOUNTER — OFFICE VISIT (OUTPATIENT)
Dept: INTERNAL MEDICINE | Facility: CLINIC | Age: 81
End: 2021-10-21
Payer: MEDICARE

## 2021-10-21 ENCOUNTER — LAB VISIT (OUTPATIENT)
Dept: LAB | Facility: HOSPITAL | Age: 81
End: 2021-10-21
Attending: INTERNAL MEDICINE
Payer: MEDICARE

## 2021-10-21 VITALS
OXYGEN SATURATION: 98 % | SYSTOLIC BLOOD PRESSURE: 120 MMHG | DIASTOLIC BLOOD PRESSURE: 76 MMHG | HEART RATE: 87 BPM | BODY MASS INDEX: 27.1 KG/M2 | HEIGHT: 75 IN | WEIGHT: 218 LBS

## 2021-10-21 DIAGNOSIS — J30.9 ALLERGIC RHINITIS, UNSPECIFIED SEASONALITY, UNSPECIFIED TRIGGER: ICD-10-CM

## 2021-10-21 DIAGNOSIS — Z12.5 ENCOUNTER FOR SCREENING FOR MALIGNANT NEOPLASM OF PROSTATE: ICD-10-CM

## 2021-10-21 DIAGNOSIS — I10 ESSENTIAL HYPERTENSION: ICD-10-CM

## 2021-10-21 DIAGNOSIS — N40.0 BENIGN NON-NODULAR PROSTATIC HYPERPLASIA WITHOUT LOWER URINARY TRACT SYMPTOMS: ICD-10-CM

## 2021-10-21 DIAGNOSIS — Z00.00 ANNUAL PHYSICAL EXAM: ICD-10-CM

## 2021-10-21 DIAGNOSIS — E78.5 HYPERLIPIDEMIA, UNSPECIFIED HYPERLIPIDEMIA TYPE: ICD-10-CM

## 2021-10-21 DIAGNOSIS — K21.9 GERD WITHOUT ESOPHAGITIS: ICD-10-CM

## 2021-10-21 DIAGNOSIS — Z00.00 ANNUAL PHYSICAL EXAM: Primary | ICD-10-CM

## 2021-10-21 LAB
ALBUMIN SERPL BCP-MCNC: 4.3 G/DL (ref 3.5–5.2)
ALP SERPL-CCNC: 60 U/L (ref 55–135)
ALT SERPL W/O P-5'-P-CCNC: 29 U/L (ref 10–44)
ANION GAP SERPL CALC-SCNC: 12 MMOL/L (ref 8–16)
AST SERPL-CCNC: 31 U/L (ref 10–40)
BASOPHILS # BLD AUTO: 0.04 K/UL (ref 0–0.2)
BASOPHILS NFR BLD: 1 % (ref 0–1.9)
BILIRUB SERPL-MCNC: 0.9 MG/DL (ref 0.1–1)
BUN SERPL-MCNC: 19 MG/DL (ref 8–23)
CALCIUM SERPL-MCNC: 10.7 MG/DL (ref 8.7–10.5)
CHLORIDE SERPL-SCNC: 104 MMOL/L (ref 95–110)
CHOLEST SERPL-MCNC: 134 MG/DL (ref 120–199)
CHOLEST/HDLC SERPL: 2.9 {RATIO} (ref 2–5)
CO2 SERPL-SCNC: 24 MMOL/L (ref 23–29)
COMPLEXED PSA SERPL-MCNC: 1.2 NG/ML (ref 0–4)
CREAT SERPL-MCNC: 1.1 MG/DL (ref 0.5–1.4)
DIFFERENTIAL METHOD: ABNORMAL
EOSINOPHIL # BLD AUTO: 0.1 K/UL (ref 0–0.5)
EOSINOPHIL NFR BLD: 1.8 % (ref 0–8)
ERYTHROCYTE [DISTWIDTH] IN BLOOD BY AUTOMATED COUNT: 11.9 % (ref 11.5–14.5)
EST. GFR  (AFRICAN AMERICAN): >60 ML/MIN/1.73 M^2
EST. GFR  (NON AFRICAN AMERICAN): >60 ML/MIN/1.73 M^2
GLUCOSE SERPL-MCNC: 89 MG/DL (ref 70–110)
HCT VFR BLD AUTO: 41.9 % (ref 40–54)
HDLC SERPL-MCNC: 47 MG/DL (ref 40–75)
HDLC SERPL: 35.1 % (ref 20–50)
HGB BLD-MCNC: 13.9 G/DL (ref 14–18)
IMM GRANULOCYTES # BLD AUTO: 0.01 K/UL (ref 0–0.04)
IMM GRANULOCYTES NFR BLD AUTO: 0.3 % (ref 0–0.5)
LDLC SERPL CALC-MCNC: 77.2 MG/DL (ref 63–159)
LYMPHOCYTES # BLD AUTO: 0.7 K/UL (ref 1–4.8)
LYMPHOCYTES NFR BLD: 19.2 % (ref 18–48)
MCH RBC QN AUTO: 31.3 PG (ref 27–31)
MCHC RBC AUTO-ENTMCNC: 33.2 G/DL (ref 32–36)
MCV RBC AUTO: 94 FL (ref 82–98)
MONOCYTES # BLD AUTO: 0.4 K/UL (ref 0.3–1)
MONOCYTES NFR BLD: 10.4 % (ref 4–15)
NEUTROPHILS # BLD AUTO: 2.6 K/UL (ref 1.8–7.7)
NEUTROPHILS NFR BLD: 67.3 % (ref 38–73)
NONHDLC SERPL-MCNC: 87 MG/DL
NRBC BLD-RTO: 0 /100 WBC
PLATELET # BLD AUTO: 155 K/UL (ref 150–450)
PMV BLD AUTO: 12 FL (ref 9.2–12.9)
POTASSIUM SERPL-SCNC: 4.5 MMOL/L (ref 3.5–5.1)
PROT SERPL-MCNC: 7.1 G/DL (ref 6–8.4)
RBC # BLD AUTO: 4.44 M/UL (ref 4.6–6.2)
SODIUM SERPL-SCNC: 140 MMOL/L (ref 136–145)
TRIGL SERPL-MCNC: 49 MG/DL (ref 30–150)
TSH SERPL DL<=0.005 MIU/L-ACNC: 1.6 UIU/ML (ref 0.4–4)
WBC # BLD AUTO: 3.86 K/UL (ref 3.9–12.7)

## 2021-10-21 PROCEDURE — 99397 PER PM REEVAL EST PAT 65+ YR: CPT | Mod: S$PBB,,, | Performed by: INTERNAL MEDICINE

## 2021-10-21 PROCEDURE — 90694 VACC AIIV4 NO PRSRV 0.5ML IM: CPT | Mod: PBBFAC

## 2021-10-21 PROCEDURE — 99397 PR PREVENTIVE VISIT,EST,65 & OVER: ICD-10-PCS | Mod: S$PBB,,, | Performed by: INTERNAL MEDICINE

## 2021-10-21 PROCEDURE — 84443 ASSAY THYROID STIM HORMONE: CPT | Performed by: INTERNAL MEDICINE

## 2021-10-21 PROCEDURE — G0008 ADMIN INFLUENZA VIRUS VAC: HCPCS | Mod: PBBFAC

## 2021-10-21 PROCEDURE — 99213 OFFICE O/P EST LOW 20 MIN: CPT | Mod: PBBFAC,25 | Performed by: INTERNAL MEDICINE

## 2021-10-21 PROCEDURE — 80061 LIPID PANEL: CPT | Performed by: INTERNAL MEDICINE

## 2021-10-21 PROCEDURE — 80053 COMPREHEN METABOLIC PANEL: CPT | Performed by: INTERNAL MEDICINE

## 2021-10-21 PROCEDURE — 99999 PR PBB SHADOW E&M-EST. PATIENT-LVL III: ICD-10-PCS | Mod: PBBFAC,,, | Performed by: INTERNAL MEDICINE

## 2021-10-21 PROCEDURE — 85025 COMPLETE CBC W/AUTO DIFF WBC: CPT | Performed by: INTERNAL MEDICINE

## 2021-10-21 PROCEDURE — 99999 PR PBB SHADOW E&M-EST. PATIENT-LVL III: CPT | Mod: PBBFAC,,, | Performed by: INTERNAL MEDICINE

## 2021-10-21 PROCEDURE — 84153 ASSAY OF PSA TOTAL: CPT | Performed by: INTERNAL MEDICINE

## 2021-10-21 PROCEDURE — 36415 COLL VENOUS BLD VENIPUNCTURE: CPT | Performed by: INTERNAL MEDICINE

## 2021-10-22 ENCOUNTER — IMMUNIZATION (OUTPATIENT)
Dept: INTERNAL MEDICINE | Facility: CLINIC | Age: 81
End: 2021-10-22
Payer: MEDICARE

## 2021-10-22 DIAGNOSIS — Z23 NEED FOR VACCINATION: Primary | ICD-10-CM

## 2021-10-22 PROCEDURE — 0003A COVID-19, MRNA, LNP-S, PF, 30 MCG/0.3 ML DOSE VACCINE: CPT | Mod: CV19,PBBFAC | Performed by: INTERNAL MEDICINE

## 2021-10-22 PROCEDURE — 91300 COVID-19, MRNA, LNP-S, PF, 30 MCG/0.3 ML DOSE VACCINE: CPT | Mod: PBBFAC

## 2021-10-23 ENCOUNTER — PATIENT MESSAGE (OUTPATIENT)
Dept: INTERNAL MEDICINE | Facility: CLINIC | Age: 81
End: 2021-10-23
Payer: MEDICARE

## 2021-10-23 DIAGNOSIS — I10 ESSENTIAL HYPERTENSION: Primary | ICD-10-CM

## 2021-10-23 DIAGNOSIS — K21.9 GERD WITHOUT ESOPHAGITIS: ICD-10-CM

## 2021-10-23 DIAGNOSIS — E78.5 HYPERLIPIDEMIA, UNSPECIFIED HYPERLIPIDEMIA TYPE: ICD-10-CM

## 2021-10-23 DIAGNOSIS — N40.0 BENIGN NON-NODULAR PROSTATIC HYPERPLASIA WITHOUT LOWER URINARY TRACT SYMPTOMS: ICD-10-CM

## 2021-12-31 RX ORDER — DOXAZOSIN 4 MG/1
TABLET ORAL
Qty: 90 TABLET | Refills: 3 | Status: SHIPPED | OUTPATIENT
Start: 2021-12-31 | End: 2023-02-15 | Stop reason: SDUPTHER

## 2022-01-06 RX ORDER — HYDROCHLOROTHIAZIDE 25 MG/1
TABLET ORAL
Qty: 90 TABLET | Refills: 3 | Status: SHIPPED | OUTPATIENT
Start: 2022-01-06 | End: 2023-01-25

## 2022-01-06 NOTE — TELEPHONE ENCOUNTER
No new care gaps identified.  Powered by [email or web address] ([email or web address]) by [employer]. Reference number: [id number].   1/06/2022 3:27:11 AM CST

## 2022-01-07 NOTE — TELEPHONE ENCOUNTER
Refill Authorization Note   Tung Chau  is requesting a refill authorization.  Brief Assessment and Rationale for Refill:  Approve     Medication Therapy Plan:       Medication Reconciliation Completed: No   Comments:   --->Care Gap information included below if applicable.   Orders Placed This Encounter    hydroCHLOROthiazide (HYDRODIURIL) 25 MG tablet      Requested Prescriptions   Signed Prescriptions Disp Refills    hydroCHLOROthiazide (HYDRODIURIL) 25 MG tablet 90 tablet 3     Sig: TAKE 1 TABLET(25 MG) BY MOUTH EVERY DAY       Cardiovascular: Diuretics - Thiazide Passed - 1/6/2022 10:13 PM        Passed - Patient is at least 18 years old        Passed - Last BP in normal range within 360 days     BP Readings from Last 1 Encounters:   10/21/21 120/76               Passed - Valid encounter within last 15 months     Recent Visits  Date Type Provider Dept   10/21/21 Office Visit Antonio Killian MD [facility]   06/18/21 Office Visit Antonio Killian MD [facility]   01/28/21 Office Visit Antonio Killian MD [facility]   12/10/20 Office Visit Antonio Killian MD [facility]   09/03/20 Office Visit Antonio Killian MD [facility]   08/15/20 Office Visit Antonio Killian MD [facility]   05/27/20 Office Visit Antonio Killian MD [facility]   04/21/20 Office Visit Antonio Killian MD [facility]   02/18/20 Office Visit Antonio Killian MD [facility]   Showing recent visits within past 720 days and meeting all other requirements  Future Appointments  No visits were found meeting these conditions.  Showing future appointments within next 150 days and meeting all other requirements      Future Appointments              [facility] [place]    [facility] [place]                Passed - Cr is 1.39 or below and within 360 days     Lab  Results   Component Value Date    CREATININE 1.1 10/21/2021    CREATININE 1.1 12/15/2020    CREATININE 1.1 08/19/2020              Passed - K in normal range and within 360 days     Potassium   Date Value Ref Range Status   10/21/2021 4.5 3.5 - 5.1 mmol/L Final   09/03/2020 4.5 3.5 - 5.1 mmol/L Final   08/19/2020 3.4 (L) 3.5 - 5.1 mmol/L Final              Passed - Na is between 130 and 148 and within 360 days     Sodium   Date Value Ref Range Status   10/21/2021 140 136 - 145 mmol/L Final   08/19/2020 136 136 - 145 mmol/L Final   08/15/2020 135 (L) 136 - 145 mmol/L Final              Passed - eGFR within 360 days     Lab Results   Component Value Date    EGFRNONAA >60.0 10/21/2021    EGFRNONAA >60.0 12/15/2020    EGFRNONAA >60 08/19/2020                    Appointments  past 12m or future 3m with PCP    Date Provider   Last Visit   10/21/2021 Antonio Killian MD   Next Visit   4/25/2022 Antonio Killian MD   ED visits in past 90 days: 0     Note composed:10:15 PM 01/06/2022

## 2022-03-04 NOTE — TELEPHONE ENCOUNTER
No new care gaps identified.  Powered by [employer] by [employer]. Reference number: [id number].   3/04/2022 3:26:15 AM CST

## 2022-03-11 RX ORDER — BENAZEPRIL HYDROCHLORIDE 10 MG/1
TABLET ORAL
Qty: 90 TABLET | Refills: 2 | OUTPATIENT
Start: 2022-03-11

## 2022-03-11 NOTE — TELEPHONE ENCOUNTER
Kristi DC. Request already responded to by other means (e.g. phone or fax)   Refill Authorization Note   Tung Chau  is requesting a refill authorization.  Brief Assessment and Rationale for Refill:  Approve  Defer  Medication Therapy Plan:       Medication Reconciliation Completed:  No      Comments:   Pended Medication(s)       Requested Prescriptions     Pending Prescriptions Disp Refills    atorvastatin (LIPITOR) 10 MG tablet [Pharmacy Med Name: ATORVASTATIN 10MG TABLETS] 90 tablet 3     Sig: TAKE 1 TABLET(10 MG) BY MOUTH EVERY DAY     Refused Prescriptions Disp Refills    benazepriL (LOTENSIN) 10 MG tablet [Pharmacy Med Name: BENAZEPRIL 10MG TABLETS] 90 tablet 2     Sig: TAKE 1 TABLET(10 MG) BY MOUTH EVERY DAY          Note composed:11:12 AM 03/11/2022

## 2022-03-14 RX ORDER — ATORVASTATIN CALCIUM 10 MG/1
TABLET, FILM COATED ORAL
Qty: 90 TABLET | Refills: 3 | OUTPATIENT
Start: 2022-03-14

## 2022-03-14 RX ORDER — BENAZEPRIL HYDROCHLORIDE 10 MG/1
10 TABLET ORAL DAILY
Qty: 90 TABLET | Refills: 2 | Status: SHIPPED | OUTPATIENT
Start: 2022-03-14 | End: 2022-12-10

## 2022-03-14 NOTE — TELEPHONE ENCOUNTER
I have reviewed the assessment below with changes.     Refill Authorization Note   Tung Chau  is requesting a refill authorization.  Brief Assessment and Rationale for Refill:  Approve     Medication Therapy Plan:  qdc atorvastatin, approve benazepril (was refused previously in error)    Medication Reconciliation Completed: No   Comments:   --->Care Gap information included below if applicable.       Requested Prescriptions   Pending Prescriptions Disp Refills    benazepriL (LOTENSIN) 10 MG tablet 90 tablet 2     Sig: Take 1 tablet (10 mg total) by mouth once daily.       There is no refill protocol information for this order      Refused Prescriptions Disp Refills    benazepriL (LOTENSIN) 10 MG tablet [Pharmacy Med Name: BENAZEPRIL 10MG TABLETS] 90 tablet 2     Sig: TAKE 1 TABLET(10 MG) BY MOUTH EVERY DAY       Cardiovascular:  ACE Inhibitors Passed - 3/11/2022 11:16 AM        Passed - Patient is at least 18 years old        Passed - Last BP in normal range within 360 days     BP Readings from Last 1 Encounters:   10/21/21 120/76               Passed - Valid encounter within last 15 months     Recent Visits  Date Type Provider Dept   10/21/21 Office Visit Antonio Killian MD [facility]   06/18/21 Office Visit Antonio Killian MD [facility]   01/28/21 Office Visit Antonio Killian MD [facility]   12/10/20 Office Visit Antonio Killian MD [facility]   09/03/20 Office Visit Antonio Killian MD [facility]   08/15/20 Office Visit Antonio Killian MD [facility]   05/27/20 Office Visit Antonio Killian MD [facility]   04/21/20 Office Visit Antonio Killian MD [facility]   Showing recent visits within past 720 days and meeting all other requirements  Future Appointments  No visits were found meeting these conditions.  Showing future appointments within next 150 days and meeting all other requirements      Future Appointments               In 1 month LAB, [facility]    In 1 month Antonio Killian [facility]                Passed - Cr is 1.39 or below and within 360 days     Lab Results   Component Value Date    CREATININE 1.1 10/21/2021    CREATININE 1.1 12/15/2020    CREATININE 1.1 08/19/2020              Passed - K is 5.2 or below and within 360 days     Potassium   Date Value Ref Range Status   10/21/2021 4.5 3.5 - 5.1 mmol/L Final   09/03/2020 4.5 3.5 - 5.1 mmol/L Final   08/19/2020 3.4 (L) 3.5 - 5.1 mmol/L Final              Passed - eGFR within 360 days     Lab Results   Component Value Date    EGFRNONAA >60.0 10/21/2021    EGFRNONAA >60.0 12/15/2020    EGFRNONAA >60 08/19/2020                  atorvastatin (LIPITOR) 10 MG tablet [Pharmacy Med Name: ATORVASTATIN 10MG TABLETS] 90 tablet 3     Sig: TAKE 1 TABLET(10 MG) BY MOUTH EVERY DAY       Cardiovascular:  Antilipid - Statins Passed - 3/14/2022 12:17 PM        Passed - Patient is at least 18 years old        Passed - Valid encounter within last 15 months     Recent Visits  Date Type Provider Dept   10/21/21 Office Visit Antonio Killian MD [facility]   06/18/21 Office Visit Antonio Killian MD [facility]   01/28/21 Office Visit Antonio Killian MD [facility]   12/10/20 Office Visit Antonio Killian MD [facility]   09/03/20 Office Visit Antonio Killian MD [facility]   08/15/20 Office Visit Antonio Killian MD [facility]   05/27/20 Office Visit Antonio Killian MD [facility]   04/21/20 Office Visit Antonio Killian MD [facility]   Showing recent visits within past 720 days and meeting all other requirements  Future Appointments  No visits were found meeting these conditions.  Showing future appointments within next 150 days and meeting all other requirements      Future Appointments              In 1 month LAB, [facility]    In 1 month Antonio Killian MD [facility] [facility]                Passed - ALT is 131 or below and within 360 days     ALT   Date Value Ref Range Status   10/21/2021 29 10 - 44 U/L Final   08/19/2020 22 10 - 44 U/L Final   08/15/2020 28 10 - 44 U/L Final              Passed - AST is 119 or below and within 360 days     AST   Date Value Ref Range Status   10/21/2021 31 10 - 40 U/L Final   08/19/2020 21 10 - 40 U/L Final   08/15/2020 24 10 - 40 U/L Final              Passed - Total Cholesterol within 360 days     Lab Results   Component Value Date    CHOL 134 10/21/2021    CHOL 148 08/15/2020    CHOL 164 02/18/2020              Passed - LDL within 360 days     LDL Cholesterol   Date Value Ref Range Status   10/21/2021 77.2 63.0 - 159.0 mg/dL Final     Comment:     The National Cholesterol Education Program (NCEP) has set the  following guidelines (reference values) for LDL Cholesterol:  Optimal.......................<130 mg/dL  Borderline High...............130-159 mg/dL  High..........................160-189 mg/dL  Very High.....................>190 mg/dL              Passed - HDL within 360 days     HDL   Date Value Ref Range Status   10/21/2021 47 40 - 75 mg/dL Final     Comment:     The National Cholesterol Education Program (NCEP) has set the  following guidelines (reference values) for HDL Cholesterol:  Low...............<40 mg/dL  Optimal...........>60 mg/dL              Passed - Triglycerides within 360 days     Lab Results   Component Value Date    TRIG 49 10/21/2021    TRIG 62 08/15/2020    TRIG 68 02/18/2020                  Appointments  past 12m or future 3m with PCP    Date Provider   Last Visit   10/21/2021 Antonio Killian MD   Next Visit   3/6/2022 Antonio Killian MD   ED visits in past 90 days: 0     Note composed:1:40 PM 03/14/2022         Pended Medication(s)   Requested Prescriptions     Pending Prescriptions Disp Refills    benazepriL  (LOTENSIN) 10 MG tablet 90 tablet 2     Sig: Take 1 tablet (10 mg total) by mouth once daily.     Refused Prescriptions Disp Refills    benazepriL (LOTENSIN) 10 MG tablet [Pharmacy Med Name: BENAZEPRIL 10MG TABLETS] 90 tablet 2     Sig: TAKE 1 TABLET(10 MG) BY MOUTH EVERY DAY     Refused By: IVAN BARBOSA     Reason for Refusal: Request already responded to by other means (e.g. phone or fax)    atorvastatin (LIPITOR) 10 MG tablet [Pharmacy Med Name: ATORVASTATIN 10MG TABLETS] 90 tablet 3     Sig: TAKE 1 TABLET(10 MG) BY MOUTH EVERY DAY        Duplicate Pended Encounter(s)/ Last Prescribed Details:    Outpatient Medication Detail     Disp Refills Start End ALEXANDER   atorvastatin (LIPITOR) 10 MG tablet 90 tablet 3 3/8/2022  No   Sig - Route: Take 1 tablet (10 mg total) by mouth once daily. - Oral   Sent to pharmacy as: atorvastatin (LIPITOR) 10 MG tablet   Class: Normal   Order: [id number]   Date/Time Signed: 3/8/2022 17:04       E-Prescribing Status: Receipt confirmed by pharmacy (3/8/2022  5:04 PM CST)            Note composed:1:40 PM 03/14/2022

## 2022-04-19 ENCOUNTER — LAB VISIT (OUTPATIENT)
Dept: LAB | Facility: HOSPITAL | Age: 82
End: 2022-04-19
Attending: INTERNAL MEDICINE
Payer: MEDICARE

## 2022-04-19 DIAGNOSIS — N40.0 BENIGN NON-NODULAR PROSTATIC HYPERPLASIA WITHOUT LOWER URINARY TRACT SYMPTOMS: ICD-10-CM

## 2022-04-19 DIAGNOSIS — K21.9 GERD WITHOUT ESOPHAGITIS: ICD-10-CM

## 2022-04-19 DIAGNOSIS — E78.5 HYPERLIPIDEMIA, UNSPECIFIED HYPERLIPIDEMIA TYPE: ICD-10-CM

## 2022-04-19 DIAGNOSIS — I10 ESSENTIAL HYPERTENSION: ICD-10-CM

## 2022-04-19 LAB
ALBUMIN SERPL BCP-MCNC: 4.5 G/DL (ref 3.5–5.2)
ALP SERPL-CCNC: 68 U/L (ref 55–135)
ALT SERPL W/O P-5'-P-CCNC: 36 U/L (ref 10–44)
ANION GAP SERPL CALC-SCNC: 8 MMOL/L (ref 8–16)
AST SERPL-CCNC: 42 U/L (ref 10–40)
BASOPHILS # BLD AUTO: 0.04 K/UL (ref 0–0.2)
BASOPHILS NFR BLD: 0.9 % (ref 0–1.9)
BILIRUB SERPL-MCNC: 1 MG/DL (ref 0.1–1)
BUN SERPL-MCNC: 17 MG/DL (ref 8–23)
CALCIUM SERPL-MCNC: 9.9 MG/DL (ref 8.7–10.5)
CHLORIDE SERPL-SCNC: 101 MMOL/L (ref 95–110)
CHOLEST SERPL-MCNC: 132 MG/DL (ref 120–199)
CHOLEST/HDLC SERPL: 2.8 {RATIO} (ref 2–5)
CO2 SERPL-SCNC: 28 MMOL/L (ref 23–29)
CREAT SERPL-MCNC: 1.1 MG/DL (ref 0.5–1.4)
DIFFERENTIAL METHOD: ABNORMAL
EOSINOPHIL # BLD AUTO: 0.1 K/UL (ref 0–0.5)
EOSINOPHIL NFR BLD: 2.3 % (ref 0–8)
ERYTHROCYTE [DISTWIDTH] IN BLOOD BY AUTOMATED COUNT: 11.9 % (ref 11.5–14.5)
EST. GFR  (AFRICAN AMERICAN): >60 ML/MIN/1.73 M^2
EST. GFR  (NON AFRICAN AMERICAN): >60 ML/MIN/1.73 M^2
GLUCOSE SERPL-MCNC: 101 MG/DL (ref 70–110)
HCT VFR BLD AUTO: 43.5 % (ref 40–54)
HDLC SERPL-MCNC: 47 MG/DL (ref 40–75)
HDLC SERPL: 35.6 % (ref 20–50)
HGB BLD-MCNC: 14.8 G/DL (ref 14–18)
IMM GRANULOCYTES # BLD AUTO: 0.01 K/UL (ref 0–0.04)
IMM GRANULOCYTES NFR BLD AUTO: 0.2 % (ref 0–0.5)
LDLC SERPL CALC-MCNC: 73.6 MG/DL (ref 63–159)
LYMPHOCYTES # BLD AUTO: 1.1 K/UL (ref 1–4.8)
LYMPHOCYTES NFR BLD: 24.8 % (ref 18–48)
MCH RBC QN AUTO: 31.9 PG (ref 27–31)
MCHC RBC AUTO-ENTMCNC: 34 G/DL (ref 32–36)
MCV RBC AUTO: 94 FL (ref 82–98)
MONOCYTES # BLD AUTO: 0.4 K/UL (ref 0.3–1)
MONOCYTES NFR BLD: 8.3 % (ref 4–15)
NEUTROPHILS # BLD AUTO: 2.8 K/UL (ref 1.8–7.7)
NEUTROPHILS NFR BLD: 63.5 % (ref 38–73)
NONHDLC SERPL-MCNC: 85 MG/DL
NRBC BLD-RTO: 0 /100 WBC
PLATELET # BLD AUTO: 164 K/UL (ref 150–450)
PMV BLD AUTO: 12.4 FL (ref 9.2–12.9)
POTASSIUM SERPL-SCNC: 4.2 MMOL/L (ref 3.5–5.1)
PROT SERPL-MCNC: 7.7 G/DL (ref 6–8.4)
RBC # BLD AUTO: 4.64 M/UL (ref 4.6–6.2)
SODIUM SERPL-SCNC: 137 MMOL/L (ref 136–145)
TRIGL SERPL-MCNC: 57 MG/DL (ref 30–150)
WBC # BLD AUTO: 4.35 K/UL (ref 3.9–12.7)

## 2022-04-19 PROCEDURE — 36415 COLL VENOUS BLD VENIPUNCTURE: CPT | Performed by: INTERNAL MEDICINE

## 2022-04-19 PROCEDURE — 80061 LIPID PANEL: CPT | Performed by: INTERNAL MEDICINE

## 2022-04-19 PROCEDURE — 80053 COMPREHEN METABOLIC PANEL: CPT | Performed by: INTERNAL MEDICINE

## 2022-04-19 PROCEDURE — 85025 COMPLETE CBC W/AUTO DIFF WBC: CPT | Performed by: INTERNAL MEDICINE

## 2022-04-24 NOTE — PROGRESS NOTES
MEDICAL HISTORY:   Hypertension.  Hyperlipidemia.  Gastroesophageal reflux disease.  Chronic rhinitis.  BPH.  Left bundle-branch block.  Adenomatous colon polyp in the year 2000 and in August 2015.  Right inguinal hernia repair.  Epididymal cyst.  Fractured elbow and wrist.  Lung surgery at age 42 to remove an empyema.     SOCIAL HISTORY:  Tobacco and alcohol use - none.  .  Exercises by walking, weight lifting, and working on his farm.       MEDICATIONS:   Atorvastatin 10 mg.  Benazepril 10 mg.  Diltiazem 360 mg.  Doxazosin 4 mg.  Hydrochlorothiazide 25 mg.    Component      Latest Ref Rng & Units 4/19/2022   WBC      3.90 - 12.70 K/uL 4.35   RBC      4.60 - 6.20 M/uL 4.64   Hemoglobin      14.0 - 18.0 g/dL 14.8   Hematocrit      40.0 - 54.0 % 43.5   MCV      82 - 98 fL 94   MCH      27.0 - 31.0 pg 31.9 (H)   MCHC      32.0 - 36.0 g/dL 34.0   RDW      11.5 - 14.5 % 11.9   Platelets      150 - 450 K/uL 164   MPV      9.2 - 12.9 fL 12.4   Immature Granulocytes      0.0 - 0.5 % 0.2   Gran # (ANC)      1.8 - 7.7 K/uL 2.8   Immature Grans (Abs)      0.00 - 0.04 K/uL 0.01   Lymph #      1.0 - 4.8 K/uL 1.1   Mono #      0.3 - 1.0 K/uL 0.4   Eos #      0.0 - 0.5 K/uL 0.1   Baso #      0.00 - 0.20 K/uL 0.04   nRBC      0 /100 WBC 0   Gran %      38.0 - 73.0 % 63.5   Lymph %      18.0 - 48.0 % 24.8   Mono %      4.0 - 15.0 % 8.3   Eosinophil %      0.0 - 8.0 % 2.3   Basophil %      0.0 - 1.9 % 0.9   Differential Method       Automated   Sodium      136 - 145 mmol/L 137   Potassium      3.5 - 5.1 mmol/L 4.2   Chloride      95 - 110 mmol/L 101   CO2      23 - 29 mmol/L 28   Glucose      70 - 110 mg/dL 101   BUN      8 - 23 mg/dL 17   Creatinine      0.5 - 1.4 mg/dL 1.1   Calcium      8.7 - 10.5 mg/dL 9.9   PROTEIN TOTAL      6.0 - 8.4 g/dL 7.7   Albumin      3.5 - 5.2 g/dL 4.5   BILIRUBIN TOTAL      0.1 - 1.0 mg/dL 1.0   Alkaline Phosphatase      55 - 135 U/L 68   AST      10 - 40 U/L 42 (H)   ALT      10 - 44 U/L 36    Anion Gap      8 - 16 mmol/L 8   eGFR if African American      >60 mL/min/1.73 m:2 >60.0   eGFR if non African American      >60 mL/min/1.73 m:2 >60.0   Cholesterol      120 - 199 mg/dL 132   Triglycerides      30 - 150 mg/dL 57   HDL      40 - 75 mg/dL 47   LDL Cholesterol External      63.0 - 159.0 mg/dL 73.6   HDL/Cholesterol Ratio      20.0 - 50.0 % 35.6   Total Cholesterol/HDL Ratio      2.0 - 5.0 2.8   Non-HDL Cholesterol      mg/dL 85       81-year-old male  Presents for routine follow-up visit  Overall in general he feels well.  He walks on a regular basis without difficulty    Review of symptoms  Negative for chest pain, palpitations, shortness of breath, abdominal pain.  Regular bowel function.  No difficulty urinating, nocturia x1.  No arthralgias headaches.  No problems with indigestion heartburn and presently no problems with allergies      Examination  Weight 216 lb  Pulse 60  Blood pressure 128/70  Chest clear breath sounds  Heart regular rate rhythm with 2/6 systolic murmur at the apex  Abdominal exam soft nontender no hepatosplenomegaly abdominal masses  Pulses 2+ carotid pulses no bruits, 2+ pedal pulses    Impression  Hypertension  Hyperlipidemia  BPH doing well  Allergic rhinitis clinically stable

## 2022-04-25 ENCOUNTER — OFFICE VISIT (OUTPATIENT)
Dept: INTERNAL MEDICINE | Facility: CLINIC | Age: 82
End: 2022-04-25
Payer: MEDICARE

## 2022-04-25 VITALS
OXYGEN SATURATION: 99 % | HEIGHT: 75 IN | HEART RATE: 68 BPM | BODY MASS INDEX: 26.86 KG/M2 | DIASTOLIC BLOOD PRESSURE: 70 MMHG | WEIGHT: 216 LBS | SYSTOLIC BLOOD PRESSURE: 118 MMHG

## 2022-04-25 DIAGNOSIS — I10 ESSENTIAL HYPERTENSION: Primary | ICD-10-CM

## 2022-04-25 DIAGNOSIS — E78.5 HYPERLIPIDEMIA, UNSPECIFIED HYPERLIPIDEMIA TYPE: ICD-10-CM

## 2022-04-25 DIAGNOSIS — R79.89 ELEVATED LIVER FUNCTION TESTS: ICD-10-CM

## 2022-04-25 DIAGNOSIS — R01.1 CARDIAC MURMUR: ICD-10-CM

## 2022-04-25 PROCEDURE — 99214 PR OFFICE/OUTPT VISIT, EST, LEVL IV, 30-39 MIN: ICD-10-PCS | Mod: S$PBB,,, | Performed by: INTERNAL MEDICINE

## 2022-04-25 PROCEDURE — 99213 OFFICE O/P EST LOW 20 MIN: CPT | Mod: PBBFAC | Performed by: INTERNAL MEDICINE

## 2022-04-25 PROCEDURE — 99999 PR PBB SHADOW E&M-EST. PATIENT-LVL III: CPT | Mod: PBBFAC,,, | Performed by: INTERNAL MEDICINE

## 2022-04-25 PROCEDURE — 99214 OFFICE O/P EST MOD 30 MIN: CPT | Mod: S$PBB,,, | Performed by: INTERNAL MEDICINE

## 2022-04-25 PROCEDURE — 99999 PR PBB SHADOW E&M-EST. PATIENT-LVL III: ICD-10-PCS | Mod: PBBFAC,,, | Performed by: INTERNAL MEDICINE

## 2022-04-27 ENCOUNTER — PATIENT MESSAGE (OUTPATIENT)
Dept: INTERNAL MEDICINE | Facility: CLINIC | Age: 82
End: 2022-04-27
Payer: MEDICARE

## 2022-04-27 ENCOUNTER — HOSPITAL ENCOUNTER (OUTPATIENT)
Dept: CARDIOLOGY | Facility: HOSPITAL | Age: 82
Discharge: HOME | End: 2022-04-27
Attending: INTERNAL MEDICINE
Payer: MEDICARE

## 2022-04-27 ENCOUNTER — DOCUMENTATION ONLY (OUTPATIENT)
Dept: INTERNAL MEDICINE | Facility: CLINIC | Age: 82
End: 2022-04-27
Payer: MEDICARE

## 2022-04-27 VITALS
HEIGHT: 75 IN | SYSTOLIC BLOOD PRESSURE: 120 MMHG | BODY MASS INDEX: 26.86 KG/M2 | DIASTOLIC BLOOD PRESSURE: 70 MMHG | HEART RATE: 70 BPM | WEIGHT: 216 LBS

## 2022-04-27 DIAGNOSIS — I77.810 ASCENDING AORTA DILATATION: ICD-10-CM

## 2022-04-27 DIAGNOSIS — R01.1 CARDIAC MURMUR: ICD-10-CM

## 2022-04-27 DIAGNOSIS — I34.0 MITRAL VALVE INSUFFICIENCY, UNSPECIFIED ETIOLOGY: Primary | ICD-10-CM

## 2022-04-27 LAB
ASCENDING AORTA: 4.45 CM
AV INDEX (PROSTH): 0.74
AV MEAN GRADIENT: 5 MMHG
AV PEAK GRADIENT: 11 MMHG
AV VALVE AREA: 3.93 CM2
AV VELOCITY RATIO: 0.76
BSA FOR ECHO PROCEDURE: 2.28 M2
CV ECHO LV RWT: 0.31 CM
DOP CALC AO PEAK VEL: 1.68 M/S
DOP CALC AO VTI: 34.83 CM
DOP CALC LVOT AREA: 5.3 CM2
DOP CALC LVOT DIAMETER: 2.6 CM
DOP CALC LVOT PEAK VEL: 1.28 M/S
DOP CALC LVOT STROKE VOLUME: 136.86 CM3
DOP CALCLVOT PEAK VEL VTI: 25.79 CM
E WAVE DECELERATION TIME: 294.4 MSEC
E/A RATIO: 0.77
E/E' RATIO: 7 M/S
ECHO LV POSTERIOR WALL: 0.75 CM (ref 0.6–1.1)
EJECTION FRACTION: 65 %
FRACTIONAL SHORTENING: 36 % (ref 28–44)
INTERVENTRICULAR SEPTUM: 0.75 CM (ref 0.6–1.1)
IVRT: 91.34 MSEC
LA MAJOR: 5.41 CM
LA MINOR: 5.16 CM
LA WIDTH: 4.39 CM
LEFT ATRIUM SIZE: 3.65 CM
LEFT ATRIUM VOLUME INDEX MOD: 39.1 ML/M2
LEFT ATRIUM VOLUME INDEX: 31.7 ML/M2
LEFT ATRIUM VOLUME MOD: 88.71 CM3
LEFT ATRIUM VOLUME: 71.94 CM3
LEFT INTERNAL DIMENSION IN SYSTOLE: 3.11 CM (ref 2.1–4)
LEFT VENTRICLE DIASTOLIC VOLUME INDEX: 48.44 ML/M2
LEFT VENTRICLE DIASTOLIC VOLUME: 109.95 ML
LEFT VENTRICLE MASS INDEX: 52 G/M2
LEFT VENTRICLE SYSTOLIC VOLUME INDEX: 16.8 ML/M2
LEFT VENTRICLE SYSTOLIC VOLUME: 38.16 ML
LEFT VENTRICULAR INTERNAL DIMENSION IN DIASTOLE: 4.85 CM (ref 3.5–6)
LEFT VENTRICULAR MASS: 118.71 G
LV LATERAL E/E' RATIO: 6.3 M/S
LV SEPTAL E/E' RATIO: 7.88 M/S
MV A" WAVE DURATION": 19.7 MSEC
MV PEAK A VEL: 0.82 M/S
MV PEAK E VEL: 0.63 M/S
MV STENOSIS PRESSURE HALF TIME: 85.38 MS
MV VALVE AREA P 1/2 METHOD: 2.58 CM2
PISA TR MAX VEL: 2.28 M/S
PULM VEIN S/D RATIO: 0.93
PV PEAK D VEL: 0.41 M/S
PV PEAK S VEL: 0.38 M/S
RA MAJOR: 4.19 CM
RA PRESSURE: 3 MMHG
RA WIDTH: 3.45 CM
RIGHT VENTRICULAR END-DIASTOLIC DIMENSION: 4.14 CM
RV TISSUE DOPPLER FREE WALL SYSTOLIC VELOCITY 1 (APICAL 4 CHAMBER VIEW): 16.49 CM/S
SINUS: 3.87 CM
STJ: 3.69 CM
TDI LATERAL: 0.1 M/S
TDI SEPTAL: 0.08 M/S
TDI: 0.09 M/S
TR MAX PG: 21 MMHG
TRICUSPID ANNULAR PLANE SYSTOLIC EXCURSION: 3.03 CM
TV REST PULMONARY ARTERY PRESSURE: 24 MMHG

## 2022-04-27 PROCEDURE — 93306 TTE W/DOPPLER COMPLETE: CPT | Mod: 26,,, | Performed by: INTERNAL MEDICINE

## 2022-04-27 PROCEDURE — 93306 ECHO (CUPID ONLY): ICD-10-PCS | Mod: 26,,, | Performed by: INTERNAL MEDICINE

## 2022-04-27 PROCEDURE — 93306 TTE W/DOPPLER COMPLETE: CPT

## 2022-04-27 NOTE — PROGRESS NOTES
Internal medicine note    2D echo reveal moderate mitral regurgitation and there was the degree of ascending aortic dilatation at 4.5 cm    The echo was done because of cardiac murmur which be explained by the mitral regurgitation.    Will put in referral to Cardiology for further evaluation, in particular the ascending aortic dilatation new determine if any further evaluation is needed or any adjustments of medication

## 2022-05-03 ENCOUNTER — PATIENT OUTREACH (OUTPATIENT)
Dept: ADMINISTRATIVE | Facility: OTHER | Age: 82
End: 2022-05-03
Payer: MEDICARE

## 2022-05-03 NOTE — PROGRESS NOTES
Requested updates within Care Everywhere.  Patient's chart was reviewed for overdue YVONNE topics.  Health maintenance:updated  Immunizations:reconciled   Legacy:   Media:  Orders placed:  Tasked appts:  Labs Linked:  Upcoming appt:

## 2022-05-04 ENCOUNTER — LAB VISIT (OUTPATIENT)
Dept: LAB | Facility: HOSPITAL | Age: 82
End: 2022-05-04
Attending: INTERNAL MEDICINE
Payer: MEDICARE

## 2022-05-04 ENCOUNTER — OFFICE VISIT (OUTPATIENT)
Dept: CARDIOLOGY | Facility: CLINIC | Age: 82
End: 2022-05-04
Payer: MEDICARE

## 2022-05-04 VITALS
WEIGHT: 212.5 LBS | BODY MASS INDEX: 26.42 KG/M2 | HEIGHT: 75 IN | HEART RATE: 69 BPM | SYSTOLIC BLOOD PRESSURE: 162 MMHG | DIASTOLIC BLOOD PRESSURE: 76 MMHG

## 2022-05-04 DIAGNOSIS — I70.0 AORTIC ATHEROSCLEROSIS: ICD-10-CM

## 2022-05-04 DIAGNOSIS — I34.0 MILD MITRAL REGURGITATION: ICD-10-CM

## 2022-05-04 DIAGNOSIS — I77.810 ASCENDING AORTA DILATATION: ICD-10-CM

## 2022-05-04 DIAGNOSIS — E78.5 HYPERLIPIDEMIA, UNSPECIFIED HYPERLIPIDEMIA TYPE: ICD-10-CM

## 2022-05-04 DIAGNOSIS — R79.89 ELEVATED LIVER FUNCTION TESTS: ICD-10-CM

## 2022-05-04 DIAGNOSIS — I10 ESSENTIAL HYPERTENSION: Primary | Chronic | ICD-10-CM

## 2022-05-04 DIAGNOSIS — I34.0 MITRAL VALVE INSUFFICIENCY, UNSPECIFIED ETIOLOGY: ICD-10-CM

## 2022-05-04 LAB
ALT SERPL W/O P-5'-P-CCNC: 21 U/L (ref 10–44)
AST SERPL-CCNC: 20 U/L (ref 10–40)

## 2022-05-04 PROCEDURE — 99204 PR OFFICE/OUTPT VISIT, NEW, LEVL IV, 45-59 MIN: ICD-10-PCS | Mod: S$PBB,GC,, | Performed by: INTERNAL MEDICINE

## 2022-05-04 PROCEDURE — 99204 OFFICE O/P NEW MOD 45 MIN: CPT | Mod: S$PBB,GC,, | Performed by: INTERNAL MEDICINE

## 2022-05-04 PROCEDURE — 99999 PR PBB SHADOW E&M-EST. PATIENT-LVL IV: ICD-10-PCS | Mod: PBBFAC,GC,, | Performed by: INTERNAL MEDICINE

## 2022-05-04 PROCEDURE — 99214 OFFICE O/P EST MOD 30 MIN: CPT | Mod: PBBFAC | Performed by: INTERNAL MEDICINE

## 2022-05-04 PROCEDURE — 84450 TRANSFERASE (AST) (SGOT): CPT | Performed by: INTERNAL MEDICINE

## 2022-05-04 PROCEDURE — 36415 COLL VENOUS BLD VENIPUNCTURE: CPT | Performed by: INTERNAL MEDICINE

## 2022-05-04 PROCEDURE — 99999 PR PBB SHADOW E&M-EST. PATIENT-LVL IV: CPT | Mod: PBBFAC,GC,, | Performed by: INTERNAL MEDICINE

## 2022-05-04 PROCEDURE — 84460 ALANINE AMINO (ALT) (SGPT): CPT | Performed by: INTERNAL MEDICINE

## 2022-05-04 NOTE — PROGRESS NOTES
I have personally seen and examined the patient. I reviewed the notes, assessments, and/or procedures performed by Dr Josue, I concur with her/his documentation of Mr Chau. Mild MR on echo. No symptoms. Continue ACEi. Ascending aorta difficult to visualize, but appears <4cm on my measurement. Repeat echo in 1y.

## 2022-05-04 NOTE — PROGRESS NOTES
PCP - Antonio Killian MD  Subjective:     Tung Chau is a 81 y.o. y.o. male    Problems  HTN  HLD, LDL 73  GERD  BPH    Referred by PCP for mitral regurgitation and ascending aorta dilation. Reports he is very active. He can walk up to 9 miles per day. He works for [employer] and the [employer]. 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 Denies CP, SOB, palpitations, lower extremity edema, or syncope.     ---------------  Cardiac Studies ---------------    TTE 4-2022  · The left ventricle is normal in size with normal systolic function. The estimated ejection fraction is 65%.  · Normal right ventricular size with normal right ventricular systolic function  · Normal left ventricular diastolic function.  · Mild aortic regurgitation.  · Mild-to-moderate mitral regurgitation.  · Normal central venous pressure (3 mmHg). The estimated PA systolic pressure is 24 mmHg.  · The ascending aorta is moderately dilated measuring 4.5 cm.    8-2020 ECG: bifascicular block    History:     Social History     Tobacco Use    Smoking status: Never Smoker    Smokeless tobacco: Never Used   Substance Use Topics    Alcohol use: No     Family History   Problem Relation Age of Onset    Stroke Father     Hypertension Sister     Cancer Sister         breast    Cancer Paternal Uncle 60        stomach CA    Colon polyps Paternal Uncle     Stomach cancer Paternal Uncle 56    Amblyopia Neg Hx     Blindness Neg Hx     Cataracts Neg Hx     Diabetes Neg Hx     Glaucoma Neg Hx     Macular degeneration Neg Hx     Retinal detachment Neg Hx     Strabismus Neg Hx     Thyroid disease Neg Hx     Colon cancer Neg Hx     Cirrhosis Neg Hx     Celiac disease Neg Hx     Crohn's disease Neg Hx     Esophageal cancer Neg Hx     Inflammatory bowel disease Neg Hx     Irritable bowel syndrome Neg Hx     Liver cancer Neg Hx     Liver disease Neg Hx     Rectal cancer Neg Hx     Ulcerative colitis Neg Hx      Jl's disease Neg Hx     Lymphoma Neg Hx     Tuberculosis Neg Hx     Scleroderma Neg Hx        Meds:     Review of patient's allergies indicates:   Allergen Reactions    Penicillins      Stomach cramps       Current Outpatient Medications:     atorvastatin (LIPITOR) 10 MG tablet, Take 1 tablet (10 mg total) by mouth once daily., Disp: 90 tablet, Rfl: 3    azelastine (ASTELIN) 137 mcg (0.1 %) nasal spray, USE 2 SPRAYS IEN ONCE D, Disp: , Rfl: 4    benazepriL (LOTENSIN) 10 MG tablet, Take 1 tablet (10 mg total) by mouth once daily., Disp: 90 tablet, Rfl: 2    diclofenac (VOLTAREN) 75 MG EC tablet, TAKE 1 TABLET(75 MG) BY MOUTH TWICE DAILY FOR 7 DAYS, Disp: 14 tablet, Rfl: 0    diltiaZEM (CARDIZEM CD) 180 MG 24 hr capsule, TAKE 2 CAPSULES(360 MG) BY MOUTH EVERY DAY, Disp: 180 capsule, Rfl: 1    doxazosin (CARDURA) 4 MG tablet, TAKE 1 TABLET(4 MG) BY MOUTH EVERY NIGHT, Disp: 90 tablet, Rfl: 3    hydroCHLOROthiazide (HYDRODIURIL) 25 MG tablet, TAKE 1 TABLET(25 MG) BY MOUTH EVERY DAY, Disp: 90 tablet, Rfl: 3    latanoprost 0.005 % ophthalmic solution, INSTILL 1 DROP IN BOTH EYES EVERY DAY, Disp: 7.5 mL, Rfl: 3    LINZESS 72 mcg Cap capsule, TAKE 1 CAPSULE(72 MCG) BY MOUTH BEFORE BREAKFAST, Disp: 30 capsule, Rfl: 5    Constitution: Negative for fever or chills. Negative for weight loss or gain.   HENT: Negative for sore throat or headaches. Negative for rhinorrhea.  Eyes: Negative for blurred or double vision.   Cardiovascular: See above  Pulmonary: Negative for SOB. Negative for cough.   Gastrointestinal: Negative for abdominal pain. Negative for nausea/ vomiting. Negative for diarrhea.   : Negative for dysuria.   Neurological: Negative for focal weakness or sensory changes.    Objective:     Vitals:    05/04/22 0828   BP: (!) 162/76   Pulse: 69       General: NAD. AAO.  HENT: No scleral icterus. Extraocular movements intact.  Neck: No JVD  Cardiovascular: RRR  Respiratory: CTAB. No increased work of  breathing.  Extremities: Warm. No edema.  Skin: no ulceration or wounds present    Labs:     Lab Results   Component Value Date     04/19/2022    K 4.2 04/19/2022     04/19/2022    CO2 28 04/19/2022    BUN 17 04/19/2022    CREATININE 1.1 04/19/2022    ANIONGAP 8 04/19/2022     No results found for: HGBA1C  Lab Results   Component Value Date    BNP <10 09/25/2018    BNP <10 06/23/2013    BNP 45 05/25/2010       Lab Results   Component Value Date    WBC 4.35 04/19/2022    HGB 14.8 04/19/2022    HCT 43.5 04/19/2022     04/19/2022    GRAN 2.8 04/19/2022    GRAN 63.5 04/19/2022     Lab Results   Component Value Date    CHOL 132 04/19/2022    HDL 47 04/19/2022    LDLCALC 73.6 04/19/2022    TRIG 57 04/19/2022       Lab Results   Component Value Date     04/19/2022    K 4.2 04/19/2022     04/19/2022    CO2 28 04/19/2022    BUN 17 04/19/2022    CREATININE 1.1 04/19/2022    ANIONGAP 8 04/19/2022     No results found for: HGBA1C  Lab Results   Component Value Date    BNP <10 09/25/2018    BNP <10 06/23/2013    BNP 45 05/25/2010    Lab Results   Component Value Date    WBC 4.35 04/19/2022    HGB 14.8 04/19/2022    HCT 43.5 04/19/2022     04/19/2022    GRAN 2.8 04/19/2022    GRAN 63.5 04/19/2022     Lab Results   Component Value Date    CHOL 132 04/19/2022    HDL 47 04/19/2022    LDLCALC 73.6 04/19/2022    TRIG 57 04/19/2022            Assessment & Plan:      Mild MR: structurally normal MV. No CHF symptoms  o Continue to monitor  o Afterload reduction     Ascending aortic dilation: trileaflet valve. 4.5 cm  o Continue to monitor with annual echocardiogram  o BP control     HTN: uncontrolled in clinic. Reports BP generally better controlled  o Goal BP <130/80  o Will defer management to Dr. Killian      RTC 1 year    Issa Josue M.D.  Pager #: [phone]  [facility] [place] [facility] [place]Y-6

## 2022-05-20 ENCOUNTER — OFFICE VISIT (OUTPATIENT)
Dept: URGENT CARE | Facility: CLINIC | Age: 82
End: 2022-05-20
Payer: MEDICARE

## 2022-05-20 VITALS
RESPIRATION RATE: 18 BRPM | TEMPERATURE: 97 F | HEIGHT: 75 IN | HEART RATE: 53 BPM | OXYGEN SATURATION: 96 % | WEIGHT: 210 LBS | DIASTOLIC BLOOD PRESSURE: 76 MMHG | BODY MASS INDEX: 26.11 KG/M2 | SYSTOLIC BLOOD PRESSURE: 144 MMHG

## 2022-05-20 DIAGNOSIS — S63.502A SPRAIN OF LEFT WRIST, INITIAL ENCOUNTER: ICD-10-CM

## 2022-05-20 DIAGNOSIS — S69.92XA INJURY OF LEFT WRIST, INITIAL ENCOUNTER: ICD-10-CM

## 2022-05-20 DIAGNOSIS — S62.115A CLOSED NONDISPLACED FRACTURE OF TRIQUETRUM OF LEFT WRIST, INITIAL ENCOUNTER: Primary | ICD-10-CM

## 2022-05-20 DIAGNOSIS — M25.532 LEFT WRIST PAIN: ICD-10-CM

## 2022-05-20 PROCEDURE — 73110 X-RAY EXAM OF WRIST: CPT | Mod: FY,LT,S$GLB, | Performed by: RADIOLOGY

## 2022-05-20 PROCEDURE — 99214 OFFICE O/P EST MOD 30 MIN: CPT | Mod: S$GLB,,, | Performed by: PHYSICIAN ASSISTANT

## 2022-05-20 PROCEDURE — 99214 PR OFFICE/OUTPT VISIT, EST, LEVL IV, 30-39 MIN: ICD-10-PCS | Mod: S$GLB,,, | Performed by: PHYSICIAN ASSISTANT

## 2022-05-20 PROCEDURE — 73110 XR WRIST COMPLETE 3 VIEWS LEFT: ICD-10-PCS | Mod: FY,LT,S$GLB, | Performed by: RADIOLOGY

## 2022-05-21 NOTE — PROGRESS NOTES
"Subjective:       Patient ID: Tung Chau is a 81 y.o. male.    Vitals:  height is 6' 3" (1.905 m) and weight is 95.3 kg (210 lb). His temporal temperature is 97.4 °F (36.3 °C). His blood pressure is 144/76 (abnormal) and his pulse is 53 (abnormal). His respiration is 18 and oxygen saturation is 96%.     Chief Complaint: Wrist Injury      81-year-old male with a history of hypertension, hyperlipidemia, GERD, cataract, left bundle branch block, and mitral valve insufficiency who presents to urgent care clinic for evaluation.  He states that he fell 2 weeks ago at [facility] and injured his left wrist.  At that time he did not seek evaluation.  He treated conservatively with home treatments.  He continues to have some residual wrist pain that worsens when he extends it or put weight on it.  He has requested x-rays for evaluation.  Pain is rated as 0/10 today.  No open wound, loss of sensation, weakness, or abnormal joint movement.    Medical assistant notes:  Patient states that he fell a couple weeks ago. He just wants to check up and see if his wrist and hand is alright. He got an xray for it before but he wants to come back and make sure everything is alright. He states that he still cant putmuch pressure on it    Wrist Injury   The incident occurred more than 1 week ago. Incident location: at [facility]. The injury mechanism was a fall. The pain is present in the left hand and left wrist. The pain is at a severity of 0/10. The patient is experiencing no pain. Pertinent negatives include no chest pain, numbness or tingling. Exacerbated by: pressure. He has tried nothing for the symptoms. The treatment provided no relief.       Constitution: Negative for activity change, chills, sweating, fatigue, fever and generalized weakness.   HENT: Negative for ear pain, hearing loss, facial swelling, congestion, postnasal drip, sinus pain, sinus pressure, sore throat, trouble swallowing and voice change.    Neck: Negative for neck " pain, neck stiffness and painful lymph nodes.   Cardiovascular: Negative for chest pain, leg swelling, palpitations, sob on exertion and passing out.   Eyes: Negative for eye discharge, eye pain, eye redness, photophobia, vision loss, double vision, blurred vision and eyelid swelling.   Respiratory: Negative for chest tightness, cough, sputum production, COPD, shortness of breath, wheezing and asthma.    Gastrointestinal: Negative for abdominal pain, nausea, vomiting, diarrhea, bright red blood in stool, dark colored stools, rectal bleeding, heartburn and bowel incontinence.   Genitourinary: Negative for dysuria, frequency, urgency, urine decreased, flank pain, bladder incontinence, hematuria and history of kidney stones.   Musculoskeletal: Positive for trauma, joint pain and joint swelling. Negative for abnormal ROM of joint, muscle cramps and muscle ache.   Skin: Negative for color change, pale, rash and wound.   Allergic/Immunologic: Negative for seasonal allergies, asthma and immunocompromised state.   Neurological: Negative for dizziness, history of vertigo, light-headedness, passing out, facial drooping, speech difficulty, coordination disturbances, loss of balance, headaches, disorientation, altered mental status, loss of consciousness, numbness, tingling and seizures.   Hematologic/Lymphatic: Negative for swollen lymph nodes, easy bruising/bleeding and trouble clotting. Does not bruise/bleed easily.   Psychiatric/Behavioral: Negative for altered mental status and disorientation.       Past Medical History:   Diagnosis Date    Cataract     Chronic constipation     GERD (gastroesophageal reflux disease)     Glaucoma     Hyperlipidemia     Hypertension     LBBB (left bundle branch block)        Objective:      Physical Exam   Constitutional: He appears well-developed. He is cooperative.  Non-toxic appearance. He does not appear ill. No distress.   HENT:   Head: Normocephalic and atraumatic.   Ears:    Right Ear: Hearing, external ear and ear canal normal.   Left Ear: Hearing, external ear and ear canal normal.   Nose: Nose normal.   Eyes: Conjunctivae and EOM are normal. Pupils are equal, round, and reactive to light. Right eye exhibits no discharge. Left eye exhibits no discharge. Right eye exhibits normal extraocular motion. Left eye exhibits normal extraocular motion. Extraocular movement intact vision grossly intact gaze aligned appropriately   Neck: Phonation normal. Neck supple.   Cardiovascular: Normal rate and regular rhythm.   Pulmonary/Chest: Effort normal. No accessory muscle usage. No respiratory distress. He has no wheezes.   Abdominal: Normal appearance. He exhibits no distension.   Musculoskeletal:         General: Tenderness present. No swelling, deformity or signs of injury.        Hands:       Right lower leg: No edema.      Left lower leg: No edema.      Comments: Moves all extremities with normal tone, strength, and ROM.    Gait normal.    Full strength in ROM of all hand and wrist finger joints.  There is mild tenderness to palpation at left ulnar dorsal aspect and with wrist extension.   Neurological: no focal deficit. He is alert and at baseline. He has normal motor skills. He displays no weakness, facial symmetry, normal reflexes and no dysarthria. No cranial nerve deficit or sensory deficit. He exhibits normal muscle tone. Gait and coordination normal. Coordination normal.   Skin: Skin is warm, dry, intact, not diaphoretic and no rash. Capillary refill takes less than 2 seconds.   Psychiatric: His speech is normal and behavior is normal. Mood, affect and thought content normal.   Nursing note and vitals reviewed.    XR WRIST COMPLETE 3 VIEWS LEFT    Result Date: 5/20/2022  EXAMINATION: XR WRIST COMPLETE 3 VIEWS LEFT CLINICAL HISTORY: Unspecified injury of left wrist, hand and finger(s), initial encounter TECHNIQUE: PA, lateral, and oblique views of the left wrist were performed.  COMPARISON: None FINDINGS: Overall alignment is within normal limits.  Slightly irregular ossific density along the dorsal aspect of the wrist seen on the lateral view.  No dislocation or destructive osseous process.  Baseline minimal DJD.  No subcutaneous emphysema or radiodense retained foreign body.     Dorsal wrist slight osseous irregularity which could reflect sequela of recent or remote triquetral fracture.  Correlate point tenderness at this region. Electronically signed by: Colton Larson MD Date:    05/20/2022 Time:    19:38    Echo    Result Date: 4/27/2022  · The left ventricle is normal in size with normal systolic function. The estimated ejection fraction is 65%. · Normal right ventricular size with normal right ventricular systolic function · Normal left ventricular diastolic function. · Mild aortic regurgitation. · Mild-to-moderate mitral regurgitation. · Normal central venous pressure (3 mmHg). The estimated PA systolic pressure is 24 mmHg. · The ascending aorta is moderately dilated measuring 4.5 cm.          Assessment:       1. Closed nondisplaced fracture of triquetrum of left wrist, initial encounter    2. Injury of left wrist, initial encounter    3. Left wrist pain    4. Sprain of left wrist, initial encounter        On exam, patient is nontoxic appearing and vitals are stable.  Patient is essentially neurovascularly intact on exam.  Xrays showed remote triquetral fracture.  Diagnostic testing results were independently reviewed and interpreted, which were discussed in depth with patient.  Patient was prescribed wrist brace and recommended OTC treatments for their symptoms. Patient was treated conservatively with activity modification, OTC pain reliever, muscle stretches, and RICE therapy. Patient was referred to orthopedics for evaluation. If symptoms do not improve/worsens, patient was referred back to PCP for continued outpatient workup and management.       Patient was instructed to return  for re-evaluation for any worsening or change in current symptoms. Strict ED versus clinic precautions given in depth. Discharge and follow-up instructions given verbally/printed with the patient who expressed understanding and willingness to comply with my recommendations.  Patient verbalized understanding and agreed with the entirety of plan of care.    Note dictated with voice recognition software, please excuse any grammatical errors.      Plan:         Closed nondisplaced fracture of triquetrum of left wrist, initial encounter  -     Ambulatory referral/consult to Orthopedics    Injury of left wrist, initial encounter  -     XR WRIST COMPLETE 3 VIEWS LEFT; Future; Expected date: 05/20/2022  -     WRIST BRACE FOR HOME USE  -     Ambulatory referral/consult to Orthopedics    Left wrist pain  -     XR WRIST COMPLETE 3 VIEWS LEFT; Future; Expected date: 05/20/2022  -     WRIST BRACE FOR HOME USE  -     Ambulatory referral/consult to Orthopedics    Sprain of left wrist, initial encounter  -     WRIST BRACE FOR HOME USE  -     Ambulatory referral/consult to Orthopedics              Additional MDM:     Heart Failure Score:   COPD = No     Patient Instructions     PLEASE READ YOUR DISCHARGE INSTRUCTIONS ENTIRELY AS IT CONTAINS IMPORTANT INFORMATION.  - OTC Tylenol/anti-inflammatory as needed for pain (see below). These medications can be obtained over the counter at any local pharmacy without requiring a prescription.   OTC ORAL medications for pain reliever or fever reducing:  - Acetaminophen (tylenol) or Ibuprofen (advil,motrin) as directed as needed for fever/pain. Avoid tylenol if you have a history of liver disease or allergic reactions. Do not take ibuprofen if you have a history of allergic reactions, stomach bleeding ulcers, kidney disease, or if you take blood thinners.  -The patient was advised that NSAID-type medications have two very important potential side effects: gastrointestinal irritation including  hemorrhage and renal injuries. patient was asked to take the medication with food and to stop if patient experiences any GI upset. I asked patient to call for vomiting, abdominal pain or black/bloody stools. The patient expresses understanding of these issues and all questions were answered.    OTC TOPICAL meds for pain reliever :  -You can apply OTC diclofenac or Volatren Gel 2-3 times daily to muscle or joints.  -You can also apply OTC lidocaine with menthol ointment 2-3 daily to muscle or joints.  -Please let one absorb before using the other. Do not use both at the same time as it may decrease efficacy. Please stop using if you develop any skin rash or irritation.  -Please wash your hands after application of these topical products.   - can continue wrist brace, splint or wrap as needed to help with your symptoms.  - continue heat (muscle) /ice (bone/joint) compression, rice therapy, and muscle stretches.   - Radiographs and all diagnostic testing reviewed with patient  - if no improvement or worsening symptoms, recommend follow-up with * ortho and PCP for further evaluation.  Please call the number below to set up appointment; if a referral has been placed.    -You must understand that you've received an Urgent Care treatment only and that you may be released before all your medical problems are known or treated. You, the patient, will arrange for follow up care as instructed. Please arrange follow up with your primary medical clinic within 2-5 days if your signs and symptoms have not resolved or worsen.   - Follow up with your PCP or specialty clinic as directed.  You can call [phone] or [phone] to schedule an appointment with the appropriate provider.    - If your condition worsens or fails to improve we recommend that you receive another evaluation at the emergency room immediately or contact your primary medical clinic to discuss your concerns in next 2-5 days.  Strict ER versus clinic  precautions given.      RED FLAGS/WARNING SYMPTOMS DISCUSSED WITH PATIENT THAT WOULD WARRANT EMERGENT MEDICAL ATTENTION. Patient aware and verbalized understanding.      RICE     Rest an injury, elevate it, and use ice and compression as directed.   RICE stands for rest, ice, compression, and elevation. These can limit pain and swelling after an injury. RICE may be recommended to help treat fractures, sprains, strains, and bruises or bumps.   Home care  The following explain the details of RICE:  · Rest. Limit the use of the injured body part. This helps prevent further damage to the body part and gives it time to heal. In some cases, you may need a sling, brace, splint, or cast to help keep the body part still until it has healed.  · Ice. Applying ice right after an injury helps relieve pain and swelling. Wrap a bag of ice in a thin towel. Then, place it over the injured area. Do this for 10 to 15 minutes every 3 to 4 hours. Continue for the next 1 to 3 days or until your symptoms improve. Never put ice directly on your skin or ice an area longer than 15 minutes at a time.  · Compression. Putting pressure on an injury helps reduce swelling and provides support. Wrap the injured area firmly with an elastic bandage/wrap. Make sure not to wrap the bandage too tightly or you will cut off blood flow to the injured area. If your bandage loosens, rewrap it.  · Elevation. Keeping an injury raised above the level of your heart reduces swelling, pain, and throbbing. For instance, if you have a broken leg, it may help to rest your leg on several pillows when sitting or lying down. Try to keep the injured area elevated for at least 2 to 3 hours per day.  Follow-up care  Follow up with your healthcare provider, or as advised.  When to seek medical advice  Call your healthcare provider right away if any of these occur:  · Fever of 100.4°F (38°C) or higher, or as directed by your healthcare provider  · Increased pain or swelling  in the injured body part  · Injured body part becomes cold, blue, numb, or tingly  · Signs of infection. These include warmth in the skin, redness, drainage, or bad smell coming from the injured body part.  Date Last Reviewed: 1/18/2016 © 2000-2017 [employer]. [address] [place] [address]. All rights reserved. This information is not intended as a substitute for professional medical care. Always follow your healthcare professional's instructions.

## 2022-05-21 NOTE — PATIENT INSTRUCTIONS
PLEASE READ YOUR DISCHARGE INSTRUCTIONS ENTIRELY AS IT CONTAINS IMPORTANT INFORMATION.  - OTC Tylenol/anti-inflammatory as needed for pain (see below). These medications can be obtained over the counter at any local pharmacy without requiring a prescription.   OTC ORAL medications for pain reliever or fever reducing:  - Acetaminophen (tylenol) or Ibuprofen (advil,motrin) as directed as needed for fever/pain. Avoid tylenol if you have a history of liver disease or allergic reactions. Do not take ibuprofen if you have a history of allergic reactions, stomach bleeding ulcers, kidney disease, or if you take blood thinners.  -The patient was advised that NSAID-type medications have two very important potential side effects: gastrointestinal irritation including hemorrhage and renal injuries. patient was asked to take the medication with food and to stop if patient experiences any GI upset. I asked patient to call for vomiting, abdominal pain or black/bloody stools. The patient expresses understanding of these issues and all questions were answered.    OTC TOPICAL meds for pain reliever :  -You can apply OTC diclofenac or Volatren Gel 2-3 times daily to muscle or joints.  -You can also apply OTC lidocaine with menthol ointment 2-3 daily to muscle or joints.  -Please let one absorb before using the other. Do not use both at the same time as it may decrease efficacy. Please stop using if you develop any skin rash or irritation.  -Please wash your hands after application of these topical products.   - can continue wrist brace, splint or wrap as needed to help with your symptoms.  - continue heat (muscle) /ice (bone/joint) compression, rice therapy, and muscle stretches.   - Radiographs and all diagnostic testing reviewed with patient  - if no improvement or worsening symptoms, recommend follow-up with * ortho and PCP for further evaluation.  Please call the number below to set up appointment; if a referral has been  placed.    -You must understand that you've received an Urgent Care treatment only and that you may be released before all your medical problems are known or treated. You, the patient, will arrange for follow up care as instructed. Please arrange follow up with your primary medical clinic within 2-5 days if your signs and symptoms have not resolved or worsen.   - Follow up with your PCP or specialty clinic as directed.  You can call [phone] or [phone] to schedule an appointment with the appropriate provider.    - If your condition worsens or fails to improve we recommend that you receive another evaluation at the emergency room immediately or contact your primary medical clinic to discuss your concerns in next 2-5 days.  Strict ER versus clinic precautions given.      RED FLAGS/WARNING SYMPTOMS DISCUSSED WITH PATIENT THAT WOULD WARRANT EMERGENT MEDICAL ATTENTION. Patient aware and verbalized understanding.      RICE     Rest an injury, elevate it, and use ice and compression as directed.   RICE stands for rest, ice, compression, and elevation. These can limit pain and swelling after an injury. RICE may be recommended to help treat fractures, sprains, strains, and bruises or bumps.   Home care  The following explain the details of RICE:  Rest. Limit the use of the injured body part. This helps prevent further damage to the body part and gives it time to heal. In some cases, you may need a sling, brace, splint, or cast to help keep the body part still until it has healed.  Ice. Applying ice right after an injury helps relieve pain and swelling. Wrap a bag of ice in a thin towel. Then, place it over the injured area. Do this for 10 to 15 minutes every 3 to 4 hours. Continue for the next 1 to 3 days or until your symptoms improve. Never put ice directly on your skin or ice an area longer than 15 minutes at a time.  Compression. Putting pressure on an injury helps reduce swelling and provides support. Wrap  the injured area firmly with an elastic bandage/wrap. Make sure not to wrap the bandage too tightly or you will cut off blood flow to the injured area. If your bandage loosens, rewrap it.  Elevation. Keeping an injury raised above the level of your heart reduces swelling, pain, and throbbing. For instance, if you have a broken leg, it may help to rest your leg on several pillows when sitting or lying down. Try to keep the injured area elevated for at least 2 to 3 hours per day.  Follow-up care  Follow up with your healthcare provider, or as advised.  When to seek medical advice  Call your healthcare provider right away if any of these occur:  Fever of 100.4°F (38°C) or higher, or as directed by your healthcare provider  Increased pain or swelling in the injured body part  Injured body part becomes cold, blue, numb, or tingly  Signs of infection. These include warmth in the skin, redness, drainage, or bad smell coming from the injured body part.  Date Last Reviewed: 1/18/2016 © 2000-2017 [employer]. [address] [place] [address], [place] [address]. All rights reserved. This information is not intended as a substitute for professional medical care. Always follow your healthcare professional's instructions.

## 2022-06-01 DIAGNOSIS — M25.532 LEFT WRIST PAIN: Primary | ICD-10-CM

## 2022-06-08 ENCOUNTER — OFFICE VISIT (OUTPATIENT)
Dept: URGENT CARE | Facility: CLINIC | Age: 82
End: 2022-06-08
Payer: MEDICARE

## 2022-06-08 VITALS
HEART RATE: 71 BPM | BODY MASS INDEX: 26.11 KG/M2 | RESPIRATION RATE: 18 BRPM | SYSTOLIC BLOOD PRESSURE: 129 MMHG | OXYGEN SATURATION: 97 % | HEIGHT: 75 IN | DIASTOLIC BLOOD PRESSURE: 89 MMHG | TEMPERATURE: 100 F | WEIGHT: 210 LBS

## 2022-06-08 DIAGNOSIS — U07.1 COVID: ICD-10-CM

## 2022-06-08 DIAGNOSIS — Z11.52 ENCOUNTER FOR SCREENING LABORATORY TESTING FOR COVID-19 VIRUS: Primary | ICD-10-CM

## 2022-06-08 LAB
CTP QC/QA: YES
SARS-COV-2 RDRP RESP QL NAA+PROBE: POSITIVE

## 2022-06-08 PROCEDURE — U0002: ICD-10-PCS | Mod: QW,CR,S$GLB,

## 2022-06-08 PROCEDURE — 99214 OFFICE O/P EST MOD 30 MIN: CPT | Mod: CR,S$GLB,,

## 2022-06-08 PROCEDURE — 99214 PR OFFICE/OUTPT VISIT, EST, LEVL IV, 30-39 MIN: ICD-10-PCS | Mod: CR,S$GLB,,

## 2022-06-08 PROCEDURE — U0002 COVID-19 LAB TEST NON-CDC: HCPCS | Mod: QW,CR,S$GLB,

## 2022-06-08 NOTE — PROGRESS NOTES
"Subjective:       Patient ID: Tung Chau is a 81 y.o. male.    Vitals:  height is 6' 3" (1.905 m) and weight is 95.3 kg (210 lb). His oral temperature is 99.6 °F (37.6 °C). His blood pressure is 129/89 and his pulse is 71. His respiration is 18 and oxygen saturation is 97%.     Chief Complaint: Sinus Problem    Sinus Problem  This is a new problem. The current episode started in the past 7 days (4 days ago). The problem has been gradually worsening since onset. There has been no fever. His pain is at a severity of 9/10. The pain is moderate. Associated symptoms include congestion, coughing, headaches, sinus pressure and sneezing. Pertinent negatives include no chills, ear pain, neck pain, shortness of breath or sore throat. Treatments tried: zyrtec. The treatment provided mild relief.       Constitution: Negative for chills.   HENT: Positive for congestion and sinus pressure. Negative for ear pain and sore throat.         Nasal congestion and sinus pressure since Yesterday.    Neck: Negative for neck pain and neck stiffness.   Cardiovascular: Negative.  Negative for chest pain, palpitations, sob on exertion and passing out.   Respiratory: Positive for cough. Negative for sputum production, shortness of breath, wheezing and asthma.         Non productive dry cough.    Gastrointestinal: Negative.  Negative for abdominal pain, nausea, vomiting, constipation and diarrhea.   Endocrine: hair loss.   Musculoskeletal: Negative for pain and muscle ache.   Skin: Negative for rash and hives.   Allergic/Immunologic: Positive for sneezing. Negative for asthma and hives.   Neurological: Positive for headaches. Negative for dizziness, light-headedness, passing out, history of migraines and numbness.        Reports frontal HA.        Objective:      Physical Exam   Constitutional: He is oriented to person, place, and time. He appears well-developed. He is cooperative.  Non-toxic appearance. He does not appear ill. No distress. "   HENT:   Head: Normocephalic and atraumatic.   Ears:   Right Ear: Hearing, tympanic membrane, external ear and ear canal normal.   Left Ear: Hearing, tympanic membrane, external ear and ear canal normal.   Nose: Nose normal. No mucosal edema, rhinorrhea or nasal deformity. No epistaxis. Right sinus exhibits no maxillary sinus tenderness and no frontal sinus tenderness. Left sinus exhibits no maxillary sinus tenderness and no frontal sinus tenderness.   Mouth/Throat: Uvula is midline, oropharynx is clear and moist and mucous membranes are normal. No trismus in the jaw. Normal dentition. No uvula swelling. No oropharyngeal exudate, posterior oropharyngeal edema, posterior oropharyngeal erythema, tonsillar abscesses or cobblestoning. Tonsils are 1+ on the right. Tonsils are 1+ on the left. No tonsillar exudate.   Eyes: Conjunctivae and lids are normal. No scleral icterus.   Neck: Trachea normal and phonation normal. Neck supple. No edema present. No erythema present. No neck rigidity present.   Cardiovascular: Normal rate, regular rhythm, normal heart sounds and normal pulses.   Pulmonary/Chest: Effort normal and breath sounds normal. No accessory muscle usage or stridor. No apnea, no tachypnea and no bradypnea. No respiratory distress. He has no decreased breath sounds. He has no wheezes. He has no rhonchi. He has no rales.   Abdominal: Normal appearance.   Musculoskeletal: Normal range of motion.         General: No deformity. Normal range of motion.   Neurological: He is alert and oriented to person, place, and time. He exhibits normal muscle tone. Coordination normal.   Skin: Skin is warm, dry, intact, not diaphoretic and not pale.   Psychiatric: His speech is normal and behavior is normal. Judgment and thought content normal.   Nursing note and vitals reviewed.        Results for orders placed or performed in visit on 06/08/22   POCT COVID-19 Rapid Screening   Result Value Ref Range    POC Rapid COVID Positive  (A) Negative     Acceptable Yes      Assessment:       1. Encounter for screening laboratory testing for COVID-19 virus    2. COVID          Plan:        Discussed positive COVID test results with PT. PT should Isolate for 5 days after symptoms start and then wear a mask for 5 days after when around people. Discussed is if condition worsens or fails to improve that PT receive another evaluation at the ER immediately or contact your PCP to discuss concerns or return here. Reviewed was for development of SOB, CP, lightheadedness, dizziness, PT should go to ED. Also addressed is the use of Zyrtec, for congestion and sinus pressure. Also reviewed was the use of Flonase and to use as directed by medication label directs. Also discussed was rest and fluids as well as Tylenol or ibuprofen can also be used as directed for pain or fever. Warm salt water gargles discussed as well. Discussed COVID risk score and PT medication making PT unable to take Paxlovid PT will have an [facility] infusion referral sent. PT verbalized understanding and agreed with plan and diagnosis. PT ambulatory from clinic NAD.     Encounter for screening laboratory testing for COVID-19 virus  -     POCT COVID-19 Rapid Screening    COVID  -     Ambulatory referral/consult to [facility] Infusion    Other orders  -     COVID-19 Home Symptom Monitoring  - Duration (days): 14           Medical Decision Making:   Urgent Care Management:  COVID risk 4

## 2022-06-08 NOTE — PATIENT INSTRUCTIONS
COVID  If your condition worsens or fails to improve we recommend that you receive another evaluation at the ER immediately or contact your PCP to discuss your concerns or return here. You must understand that you've received an urgent care treatment only and that you may be released before all your medical problems are known or treated. -  Flonase (fluticasone) is a nasal spray which is available over the counter and may help with your symptoms   -  Zyrtec D, Claritin D or allegra D can also help with symptoms of congestion and drainage.   -  If you just have drainage you can take plain Zyrtec, Claritin or Allegra    -  Rest and fluids are also important.   -  Tylenol or ibuprofen can also be used as directed for pain unless you have an allergy to them or medical condition such as stomach ulcers, kidney or liver disease or blood thinners etc for which you should not be taking these type of medications.    -Take tessalon as prescribed.      Your test was POSITIVE for COVID-19 (coronavirus).       Please isolate yourself at home.  You may leave home and/or return to work once the following conditions are met:    If you were not hospitalized and are not moderately to severely immunocompromised:   More than 5 days since symptoms first appeared AND  More than 24 hours fever free without medications AND  Symptoms are improving  Continue to wear a mask around others for 5 additional days.    If you were hospitalized OR are moderately to severely immunocompromised:  More than 20 days since symptoms first appeared  More than 24 hours fever free without medications  Symptoms have improved    If you had no symptoms but tested positive:  More than 5 days since the date of the first positive test (20 days if moderately to severely immunocompromised). If you develop symptoms, then use the guidelines above.  Continue to wear a mask around others for 5 additional days.      Contact Tracing    As one of the next steps, you will  receive a call or text from the [place] Department of Health ([facility]) COVID-19 Tracing Team. See the contact information below so you know not to ignore the health departments call. It is important that you contact them back immediately so they can help.      Contact Tracer Number:  [phone]  Caller ID for most carriers: [place] Dept Health     What is contact tracing?  Contact tracing is a process that helps identify everyone who has been in close contact with an infected person. Contact tracers let those people know they may have been exposed and guide them on next steps. Confidentiality is important for everyone; no one will be told who may have exposed them to the virus.  Your involvement is important. The more we know about where and how this virus is spreading, the better chance we have at stopping it from spreading further.  What does exposure mean?  Exposure means you have been within 6 feet for more than 15 minutes with a person who has or had COVID-19.  What kind of questions do the contact tracers ask?  A contact tracer will confirm your basic contact information including name, address, phone number, and next of kin, as well as asking about any symptoms you may have had. Theyll also ask you how you think you may have gotten sick, such as places where you may have been exposed to the virus, and people you were with. Those names will never be shared with anyone outside of that call, and will only be used to help trace and stop the spread of the virus.   I have privacy concerns. How will the state use my information?  Your privacy about your health is important. All calls are completed using call centers that use the appropriate health privacy protection measures (HIPAA compliance), meaning that your patient information is safe. No one will ever ask you any questions related to immigration status. Your health comes first.   Do I have to participate?  You do not have to participate, but we strongly  encourage you to. Contact tracing can help us catch and control new outbreaks as theyre developing to keep your friends and family safe.   What if I dont hear from anyone?  If you dont receive a call within 24 hours, you can call the number above right away to inquire about your status. That line is open from 8:00 am - 8:00 p.m., 7 days a week.  Contact tracing saves lives! Together, we have the power to beat this virus and keep our loved ones and neighbors safe.    For more information see CDC link below.      https://www.cdc.gov/coronavirus/2019-ncov/hcp/guidance-prevent-spread.html#precautions        Sources:  [facility], [facility] and [facility]           Sincerely,     Adam Stephens NP

## 2022-06-08 NOTE — LETTER
[address] [place] [address]  Phone: [phone]  Fax: [phone]          Return to Work/School    Patient: Tung Chau  YOB: 1940   Date: 06/08/2022     To Whom It May Concern:     Tung Chau was in contact with/seen in my office on 06/08/2022. COVID-19 is present in our communities across the [facility]. There is limited testing for COVID at this time, so not all patients can be tested. In this situation, your employee meets the following criteria:     Tung Chau has met the criteria for COVID-19 testing and has a POSITIVE result. He can return to work once they are asymptomatic for 24 hours without the use of fever reducing medications AND at least five days from the start of symptoms (or from the first positive result if they have no symptoms). May return 06/13/2022     If you have any questions or concerns, or if I can be of further assistance, please do not hesitate to contact me.     Sincerely,    Adam Stephens NP

## 2022-06-09 ENCOUNTER — INFUSION (OUTPATIENT)
Dept: INFECTIOUS DISEASES | Facility: HOSPITAL | Age: 82
End: 2022-06-09
Attending: INTERNAL MEDICINE
Payer: MEDICARE

## 2022-06-09 VITALS
RESPIRATION RATE: 16 BRPM | TEMPERATURE: 98 F | SYSTOLIC BLOOD PRESSURE: 142 MMHG | HEART RATE: 55 BPM | HEIGHT: 75 IN | OXYGEN SATURATION: 98 % | BODY MASS INDEX: 27.35 KG/M2 | DIASTOLIC BLOOD PRESSURE: 69 MMHG | WEIGHT: 220 LBS

## 2022-06-09 DIAGNOSIS — U07.1 COVID-19: Primary | ICD-10-CM

## 2022-06-09 PROCEDURE — M0222 HC IV INJECTION, BEBTELOVIMAB, INCL POST ADMIN MONIT: HCPCS | Performed by: INTERNAL MEDICINE

## 2022-06-09 PROCEDURE — 63600175 PHARM REV CODE 636 W HCPCS: Performed by: INTERNAL MEDICINE

## 2022-06-09 RX ORDER — EPINEPHRINE 0.3 MG/.3ML
0.3 INJECTION SUBCUTANEOUS
Status: ACTIVE | OUTPATIENT
Start: 2022-06-09 | End: 2022-06-12

## 2022-06-09 RX ORDER — ONDANSETRON 4 MG/1
4 TABLET, ORALLY DISINTEGRATING ORAL
Status: ACTIVE | OUTPATIENT
Start: 2022-06-09 | End: 2022-06-10

## 2022-06-09 RX ORDER — DIPHENHYDRAMINE HYDROCHLORIDE 50 MG/ML
25 INJECTION INTRAMUSCULAR; INTRAVENOUS
Status: ACTIVE | OUTPATIENT
Start: 2022-06-09 | End: 2022-06-10

## 2022-06-09 RX ORDER — BEBTELOVIMAB 87.5 MG/ML
175 INJECTION, SOLUTION INTRAVENOUS
Status: COMPLETED | OUTPATIENT
Start: 2022-06-09 | End: 2022-06-09

## 2022-06-09 RX ORDER — ACETAMINOPHEN 325 MG/1
650 TABLET ORAL
Status: ACTIVE | OUTPATIENT
Start: 2022-06-09 | End: 2022-06-10

## 2022-06-09 RX ORDER — ALBUTEROL SULFATE 90 UG/1
2 AEROSOL, METERED RESPIRATORY (INHALATION)
Status: ACTIVE | OUTPATIENT
Start: 2022-06-09 | End: 2022-06-12

## 2022-06-09 RX ADMIN — BEBTELOVIMAB 175 MG: 87.5 INJECTION, SOLUTION INTRAVENOUS at 01:06

## 2022-06-09 NOTE — PROGRESS NOTES
Patient remains with no signs of complications noted. Patient received Bebtelovimab IV Injection according to FDA recommendations and [facility] SOP without complications noted and left with mask in place. Drug fact sheet provided. Pt discharged home. Ambulatory. Remains AAox3. No distress noted. RR even and unlabored.

## 2022-06-09 NOTE — PROGRESS NOTES
Patient arrives for Bebtelovimab IV Injection. Ambulatory. Pt AAox3. No distress noted. RR even and unlabored.     Symptoms and onset date:  Sneezing coughing 06/07/22    Tested COVID + on 06/08/22

## 2022-06-17 ENCOUNTER — TELEPHONE (OUTPATIENT)
Dept: ORTHOPEDICS | Facility: CLINIC | Age: 82
End: 2022-06-17
Payer: MEDICARE

## 2022-06-17 ENCOUNTER — OFFICE VISIT (OUTPATIENT)
Dept: URGENT CARE | Facility: CLINIC | Age: 82
End: 2022-06-17
Payer: MEDICARE

## 2022-06-17 ENCOUNTER — HOSPITAL ENCOUNTER (OUTPATIENT)
Dept: RADIOLOGY | Facility: HOSPITAL | Age: 82
Discharge: HOME | End: 2022-06-17
Attending: PHYSICIAN ASSISTANT
Payer: MEDICARE

## 2022-06-17 VITALS
DIASTOLIC BLOOD PRESSURE: 78 MMHG | OXYGEN SATURATION: 97 % | SYSTOLIC BLOOD PRESSURE: 138 MMHG | HEART RATE: 77 BPM | HEIGHT: 75 IN | RESPIRATION RATE: 18 BRPM | BODY MASS INDEX: 26.11 KG/M2 | WEIGHT: 210 LBS | TEMPERATURE: 97 F

## 2022-06-17 DIAGNOSIS — M25.532 LEFT WRIST PAIN: ICD-10-CM

## 2022-06-17 DIAGNOSIS — Z91.09 ENVIRONMENTAL ALLERGIES: ICD-10-CM

## 2022-06-17 DIAGNOSIS — U09.9 POST-ACUTE COVID-19 SYNDROME: ICD-10-CM

## 2022-06-17 DIAGNOSIS — J06.9 UPPER RESPIRATORY TRACT INFECTION, UNSPECIFIED TYPE: Primary | ICD-10-CM

## 2022-06-17 PROCEDURE — 73110 X-RAY EXAM OF WRIST: CPT | Mod: 26,LT,, | Performed by: RADIOLOGY

## 2022-06-17 PROCEDURE — 73110 XR WRIST COMPLETE 3 VIEWS LEFT: ICD-10-PCS | Mod: 26,LT,, | Performed by: RADIOLOGY

## 2022-06-17 PROCEDURE — 73110 X-RAY EXAM OF WRIST: CPT | Mod: TC,PO,LT

## 2022-06-17 PROCEDURE — 99213 PR OFFICE/OUTPT VISIT, EST, LEVL III, 20-29 MIN: ICD-10-PCS | Mod: S$GLB,,, | Performed by: NURSE PRACTITIONER

## 2022-06-17 PROCEDURE — 99213 OFFICE O/P EST LOW 20 MIN: CPT | Mod: S$GLB,,, | Performed by: NURSE PRACTITIONER

## 2022-06-17 NOTE — PROGRESS NOTES
"Subjective:       Patient ID: Tung Chau is a 81 y.o. male.    Vitals:  height is 6' 3" (1.905 m) and weight is 95.3 kg (210 lb). His temperature is 97.4 °F (36.3 °C). His blood pressure is 138/78 and his pulse is 77. His respiration is 18 and oxygen saturation is 97%.     Chief Complaint: Nasal Congestion    82 y/o male presents to  today with complaint of post nasal drip, sinus pressure, and nasal congestion x2 weeks. Pt tested positive for covid approximately 9 days ago. He also has h/o seasonal/environmental allergies. Taking zyretc, mucinex, and flonase with relief. Denies fever. Denies chest pain, SOB, and wheeze. Denies nausea, vomiting, and diarrhea.     Sinusitis  This is a recurrent problem. The current episode started 1 to 4 weeks ago. The problem is unchanged. There has been no fever. His pain is at a severity of 0/10. He is experiencing no pain. Associated symptoms include congestion and sinus pressure. Pertinent negatives include no chills, coughing, diaphoresis, ear pain, headaches, hoarse voice, neck pain, shortness of breath, sneezing, sore throat or swollen glands. Past treatments include spray decongestants and antibiotics. The treatment provided mild relief.       Constitution: Negative for chills and sweating.   HENT: Positive for congestion, postnasal drip and sinus pressure. Negative for ear pain and sore throat.    Neck: Negative for neck pain.   Respiratory: Negative for cough and shortness of breath.    Allergic/Immunologic: Negative for sneezing.   Neurological: Negative for headaches.       Objective:      Physical Exam   Constitutional: He is oriented to person, place, and time.  Non-toxic appearance. He does not appear ill. No distress.   HENT:   Head: Normocephalic.   Nose: Congestion present.   Mouth/Throat: Mucous membranes are moist. No oropharyngeal exudate or posterior oropharyngeal erythema. Oropharynx is clear.   Eyes: Right eye exhibits no discharge. Left eye exhibits no " discharge. No scleral icterus. periorbital hyperpigmentation   Neck: No neck rigidity present.   Cardiovascular: Normal rate, regular rhythm and normal heart sounds.   Pulmonary/Chest: Effort normal and breath sounds normal.   Abdominal: Normal appearance.   Musculoskeletal: Normal range of motion.         General: Normal range of motion.      Cervical back: He exhibits no tenderness.   Lymphadenopathy:     He has no cervical adenopathy.   Neurological: He is alert and oriented to person, place, and time.   Skin: Skin is warm and dry.   Psychiatric: His behavior is normal. Mood normal.   Nursing note and vitals reviewed.        Assessment:       1. Upper respiratory tract infection, unspecified type    2. Environmental allergies    3. Post-acute COVID-19 syndrome          Plan:         Upper respiratory tract infection, unspecified type    Environmental allergies    Post-acute COVID-19 syndrome      Patient Instructions   Oral fluids  Rest  Blow nose often         4

## 2022-06-17 NOTE — TELEPHONE ENCOUNTER
Called both of the numbers on patients chart to reschedule him, he didn't answer one so I left a message and tried to call the other and his wife answered and said he wasn't home right now.

## 2022-06-21 ENCOUNTER — OFFICE VISIT (OUTPATIENT)
Dept: ORTHOPEDICS | Facility: CLINIC | Age: 82
End: 2022-06-21
Payer: MEDICARE

## 2022-06-21 DIAGNOSIS — S62.112A CLOSED CHIP FRACTURE OF TRIQUETRUM OF LEFT WRIST, INITIAL ENCOUNTER: Primary | ICD-10-CM

## 2022-06-21 PROCEDURE — 99203 PR OFFICE/OUTPT VISIT, NEW, LEVL III, 30-44 MIN: ICD-10-PCS | Mod: S$PBB,,, | Performed by: PHYSICIAN ASSISTANT

## 2022-06-21 PROCEDURE — 99212 OFFICE O/P EST SF 10 MIN: CPT | Mod: PBBFAC | Performed by: PHYSICIAN ASSISTANT

## 2022-06-21 PROCEDURE — 99203 OFFICE O/P NEW LOW 30 MIN: CPT | Mod: S$PBB,,, | Performed by: PHYSICIAN ASSISTANT

## 2022-06-21 PROCEDURE — 99999 PR PBB SHADOW E&M-EST. PATIENT-LVL II: CPT | Mod: PBBFAC,,, | Performed by: PHYSICIAN ASSISTANT

## 2022-06-21 PROCEDURE — 99999 PR PBB SHADOW E&M-EST. PATIENT-LVL II: ICD-10-PCS | Mod: PBBFAC,,, | Performed by: PHYSICIAN ASSISTANT

## 2022-06-21 NOTE — PROGRESS NOTES
[facility]  History & Physical  Orthopedics    SUBJECTIVE:      Chief Complaint: Left wrist    Referring Provider: Janie Lopez Dr. is the supervising physician for this encounter/patient    History of Present Illness:  Patient is a 81 y.o. right hand dominant male who presents today with complaints of left wrist injury occurred 8 weeks ago when he fell in [place]. He was seen 2 weeks later at urgent care and diagnosed with triquetral avulsion fracture, given a brace. He has continued to work through this and is seen today to just make sure he is ok to keep working. He reports 0/10 pain, has full motion of the hand/wrist and is full weight bearing without any pain.     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    Onset of symptoms/DOI was 8 weeks.    Symptoms are aggravated by none.    Symptoms are alleviated by no issues.    Symptoms consist of none.    The patient rates their pain as a 0/10.    Attempted treatment(s) and/or interventions include activity modifications, rest, immobilization.     The patient denies any fevers, chills, N/V, D/C and presents for evaluation.       Past Medical History:   Diagnosis Date    Cataract     Chronic constipation     GERD (gastroesophageal reflux disease)     Glaucoma     Hyperlipidemia     Hypertension     LBBB (left bundle branch block)      Past Surgical History:   Procedure Laterality Date    CATARACT EXTRACTION W/  INTRAOCULAR LENS IMPLANT Left 04/27/2021    Dr. Ram    CATARACT EXTRACTION W/  INTRAOCULAR LENS IMPLANT Left 4/27/2021    Procedure: EXTRACTION, CATARACT, WITH IOL INSERTION;  Surgeon: Harjeet Ram MD;  Location: [facility] OR;  Service: Ophthalmology;  Laterality: Left;    CATARACT EXTRACTION W/  INTRAOCULAR LENS IMPLANT Right 5/11/2021    Procedure: EXTRACTION, CATARACT, WITH IOL INSERTION;  Surgeon: Harjeet Ram MD;  Location: [facility] OR;  Service: Ophthalmology;   Laterality: Right;    COLONOSCOPY  4/28/2008    hemorrhoids    COLONOSCOPY N/A 5/15/2017    Procedure: COLONOSCOPY;  Surgeon: Dandy Cifuentes MD;  Location: [facility] ([address] [place] [address]);  Service: Endoscopy;  Laterality: N/A;    COLONOSCOPY N/A 5/25/2020    Procedure: COLONOSCOPY;  Surgeon: Dandy Cifuentes MD;  Location: [address]);  Service: Endoscopy;  Laterality: N/A;  Chronic history of constipation please use constipation bowel prep  Recommend MiraLax 17 g in 12 oz water once daily starting 5 days prior to colonoscopy.  Recommend full liquid diet starting 2 days prior to colonoscopy clear liquid diet the day before the colonoscopy in s    ESOPHAGOGASTRODUODENOSCOPY  2/1/2010    ESOPHAGOGASTRODUODENOSCOPY N/A 5/25/2020    Procedure: EGD (ESOPHAGOGASTRODUODENOSCOPY);  Surgeon: Dandy Cifunetes MD;  Location: [address]);  Service: Endoscopy;  Laterality: N/A;    INGUINAL HERNIA REPAIR Right      Review of patient's allergies indicates:   Allergen Reactions    Penicillins      Stomach cramps     Social History     Social History Narrative    Not on file     Family History   Problem Relation Age of Onset    Stroke Father     Hypertension Sister     Cancer Sister         breast    Cancer Paternal Uncle 60        stomach CA    Colon polyps Paternal Uncle     Stomach cancer Paternal Uncle 56    Amblyopia Neg Hx     Blindness Neg Hx     Cataracts Neg Hx     Diabetes Neg Hx     Glaucoma Neg Hx     Macular degeneration Neg Hx     Retinal detachment Neg Hx     Strabismus Neg Hx     Thyroid disease Neg Hx     Colon cancer Neg Hx     Cirrhosis Neg Hx     Celiac disease Neg Hx     Crohn's disease Neg Hx     Esophageal cancer Neg Hx     Inflammatory bowel disease Neg Hx     Irritable bowel syndrome Neg Hx     Liver cancer Neg Hx     Liver disease Neg Hx     Rectal cancer Neg Hx     Ulcerative colitis Neg Hx     Jl's disease Neg Hx     Lymphoma Neg Hx     Tuberculosis Neg  Hx     Scleroderma Neg Hx          Current Outpatient Medications:     atorvastatin (LIPITOR) 10 MG tablet, Take 1 tablet (10 mg total) by mouth once daily., Disp: 90 tablet, Rfl: 3    azelastine (ASTELIN) 137 mcg (0.1 %) nasal spray, USE 2 SPRAYS IEN ONCE D, Disp: , Rfl: 4    benazepriL (LOTENSIN) 10 MG tablet, Take 1 tablet (10 mg total) by mouth once daily., Disp: 90 tablet, Rfl: 2    diclofenac (VOLTAREN) 75 MG EC tablet, TAKE 1 TABLET(75 MG) BY MOUTH TWICE DAILY FOR 7 DAYS, Disp: 14 tablet, Rfl: 0    diltiaZEM (CARDIZEM CD) 180 MG 24 hr capsule, TAKE 2 CAPSULES(360 MG) BY MOUTH EVERY DAY, Disp: 180 capsule, Rfl: 1    doxazosin (CARDURA) 4 MG tablet, TAKE 1 TABLET(4 MG) BY MOUTH EVERY NIGHT, Disp: 90 tablet, Rfl: 3    hydroCHLOROthiazide (HYDRODIURIL) 25 MG tablet, TAKE 1 TABLET(25 MG) BY MOUTH EVERY DAY, Disp: 90 tablet, Rfl: 3    latanoprost 0.005 % ophthalmic solution, INSTILL 1 DROP IN BOTH EYES EVERY DAY, Disp: 7.5 mL, Rfl: 3    LINZESS 72 mcg Cap capsule, TAKE 1 CAPSULE(72 MCG) BY MOUTH BEFORE BREAKFAST, Disp: 30 capsule, Rfl: 5      Review of Systems:  Constitutional: no fever or chills  Eyes: no visual changes  ENT: no nasal congestion or sore throat  Respiratory: no cough or shortness of breath  Cardiovascular: no chest pain  Gastrointestinal: no nausea or vomiting, tolerating diet  Musculoskeletal: none    OBJECTIVE:      Vital Signs (Most Recent):  There were no vitals filed for this visit.  There is no height or weight on file to calculate BMI.      Physical Exam:  Constitutional: The patient appears well-developed and well-nourished. No distress.   Skin: No lesions appreciated  Head: Normocephalic and atraumatic.   Nose: Nose normal.   Ears: No deformities seen  Eyes: Conjunctivae and EOM are normal.   Neck: No tracheal deviation present.   Cardiovascular: Normal rate and intact distal pulses.    Pulmonary/Chest: Effort normal. No respiratory distress.   Abdominal: There is no guarding.    Neurological: The patient is alert.   Psychiatric: The patient has a normal mood and affect.     Left Hand/Wrist Examination:    Observation/Inspection:  Swelling  none    Deformity  none  Discoloration  none     Scars   none    Atrophy  none    HAND/WRIST EXAMINATION:  Finkelstein's Test   Neg  WHAT Test    Neg  Snuff box tenderness   Neg  Huitron's Test    Neg  Hook of Hamate Tenderness  Neg  CMC grind    Neg  Circumduction test   Neg  NTTP on exam, most notable NTTP over the carpal rows    Neurovascular Exam:  Digits WWP, brisk CR < 3s throughout  NVI motor/LTS to M/R/U nerves, radial pulse 2+    ROM hand full, painless    ROM wrist full, painless    ROM elbow full, painless    Abdomen not guarded  Respirations nonlabored  Perfusion intact    Diagnostic Results:     Imaging - I independently viewed the patient's imaging as well as the radiology report.      FINDINGS:  Wrist complete three views left: There is a comminuted triquetral fractures suspected on the lateral view.  A CT of the wrist could be helpful.      ASSESSMENT/PLAN:      81 y.o. yo male with Left wrist triquetral fracture, now 8 weeks from injury, asymptomatic    Plan: The patient and I had a thorough discussion today.  We discussed the working diagnosis as well as several other potential alternative diagnoses.  Xrays reviewed in detail. He has no restrictions, activity as tolerated. May RTC on prn basis.    Should the patient's symptoms worsen, persist, or fail to improve they should return for reevaluation and I would be happy to see them back anytime.           Please do not hesitate to reach out to us via email, phone, or MyChart with any questions, concerns, or feedback.

## 2022-07-25 ENCOUNTER — PES CALL (OUTPATIENT)
Dept: ADMINISTRATIVE | Facility: CLINIC | Age: 82
End: 2022-07-25
Payer: MEDICARE

## 2022-08-09 ENCOUNTER — OFFICE VISIT (OUTPATIENT)
Dept: OPTOMETRY | Facility: CLINIC | Age: 82
End: 2022-08-09
Payer: MEDICARE

## 2022-08-09 DIAGNOSIS — Z96.1 PSEUDOPHAKIA OF BOTH EYES: ICD-10-CM

## 2022-08-09 DIAGNOSIS — H40.1131 PRIMARY OPEN ANGLE GLAUCOMA OF BOTH EYES, MILD STAGE: Primary | ICD-10-CM

## 2022-08-09 PROCEDURE — 99212 OFFICE O/P EST SF 10 MIN: CPT | Mod: PBBFAC,PO | Performed by: OPTOMETRIST

## 2022-08-09 PROCEDURE — 92014 PR EYE EXAM, EST PATIENT,COMPREHESV: ICD-10-PCS | Mod: S$PBB,,, | Performed by: OPTOMETRIST

## 2022-08-09 PROCEDURE — 99999 PR PBB SHADOW E&M-EST. PATIENT-LVL II: ICD-10-PCS | Mod: PBBFAC,,, | Performed by: OPTOMETRIST

## 2022-08-09 PROCEDURE — 99999 PR PBB SHADOW E&M-EST. PATIENT-LVL II: CPT | Mod: PBBFAC,,, | Performed by: OPTOMETRIST

## 2022-08-09 PROCEDURE — 92014 COMPRE OPH EXAM EST PT 1/>: CPT | Mod: S$PBB,,, | Performed by: OPTOMETRIST

## 2022-08-09 NOTE — PROGRESS NOTES
HPI     83 Y/o male is here for routine eye exam with no C/o about ocular health   No changes in vision  Pt denies pain and discomfort   No f/f    Eye med: Latanoprost OU QHS     Last edited by Harjeet Irving, OD on 8/9/2022 12:21 PM. (History)            Assessment /Plan     For exam results, see Encounter Report.    Primary open angle glaucoma of both eyes, mild stage    Pseudophakia of both eyes      1. IOP lower after cataract removal and stable, nerve eval stable, cont Latanaprost qhs OU, Prev HVf and OCT stable, prev gonio open. Probably low tension. ,Mild glaucoma based on OCT changes. Pachy normal. Initial Field did clean up at repeat. Pretreat IOP at 20. Family history of glaucoma. RTC 6 mos IOP ck.with HVF(24-2)kavita std and OCT of rnfl.   2. Monitor condition. Patient to report any changes. RTC 1 year recheck.

## 2022-10-01 RX ORDER — DILTIAZEM HYDROCHLORIDE 180 MG/1
360 CAPSULE, COATED, EXTENDED RELEASE ORAL DAILY
Qty: 180 CAPSULE | Refills: 1 | Status: SHIPPED | OUTPATIENT
Start: 2022-10-01 | End: 2023-04-10 | Stop reason: SDUPTHER

## 2022-10-01 NOTE — TELEPHONE ENCOUNTER
No new care gaps identified.  [facility] Embedded Care Gaps. Reference number: [id number]. 10/01/2022   3:25:44 AM RIGOT

## 2022-10-01 NOTE — TELEPHONE ENCOUNTER
Refill Decision Note   Tung Chau  is requesting a refill authorization.  Brief Assessment and Rationale for Refill:  Approve     Medication Therapy Plan:       Medication Reconciliation Completed: No   Comments:     No Care Gaps recommended.     Note composed:3:15 PM 10/01/2022

## 2022-12-02 ENCOUNTER — OFFICE VISIT (OUTPATIENT)
Dept: URGENT CARE | Facility: CLINIC | Age: 82
End: 2022-12-02
Payer: MEDICARE

## 2022-12-02 VITALS
TEMPERATURE: 98 F | SYSTOLIC BLOOD PRESSURE: 151 MMHG | WEIGHT: 210 LBS | HEART RATE: 81 BPM | BODY MASS INDEX: 26.11 KG/M2 | DIASTOLIC BLOOD PRESSURE: 79 MMHG | HEIGHT: 75 IN | OXYGEN SATURATION: 97 % | RESPIRATION RATE: 18 BRPM

## 2022-12-02 DIAGNOSIS — R09.82 POST-NASAL DRIP: ICD-10-CM

## 2022-12-02 DIAGNOSIS — R09.81 NASAL CONGESTION: Primary | ICD-10-CM

## 2022-12-02 LAB
CTP QC/QA: YES
CTP QC/QA: YES
POC MOLECULAR INFLUENZA A AGN: NEGATIVE
POC MOLECULAR INFLUENZA B AGN: NEGATIVE
SARS-COV-2 AG RESP QL IA.RAPID: NEGATIVE

## 2022-12-02 PROCEDURE — 99213 OFFICE O/P EST LOW 20 MIN: CPT | Mod: S$GLB,,, | Performed by: INTERNAL MEDICINE

## 2022-12-02 PROCEDURE — 87811 SARS CORONAVIRUS 2 ANTIGEN POCT, MANUAL READ: ICD-10-PCS | Mod: QW,CR,S$GLB, | Performed by: INTERNAL MEDICINE

## 2022-12-02 PROCEDURE — 87502 INFLUENZA DNA AMP PROBE: CPT | Mod: QW,S$GLB,, | Performed by: INTERNAL MEDICINE

## 2022-12-02 PROCEDURE — 87502 POCT INFLUENZA A/B MOLECULAR: ICD-10-PCS | Mod: QW,S$GLB,, | Performed by: INTERNAL MEDICINE

## 2022-12-02 PROCEDURE — 99213 PR OFFICE/OUTPT VISIT, EST, LEVL III, 20-29 MIN: ICD-10-PCS | Mod: S$GLB,,, | Performed by: INTERNAL MEDICINE

## 2022-12-02 PROCEDURE — 87811 SARS-COV-2 COVID19 W/OPTIC: CPT | Mod: QW,CR,S$GLB, | Performed by: INTERNAL MEDICINE

## 2022-12-02 RX ORDER — FLUTICASONE PROPIONATE 50 MCG
1 SPRAY, SUSPENSION (ML) NASAL DAILY
Qty: 11.1 ML | Refills: 0 | Status: SHIPPED | OUTPATIENT
Start: 2022-12-02

## 2022-12-02 RX ORDER — CETIRIZINE HYDROCHLORIDE 10 MG/1
10 TABLET ORAL DAILY
Qty: 30 TABLET | Refills: 11 | Status: SHIPPED | OUTPATIENT
Start: 2022-12-02 | End: 2023-12-02

## 2022-12-03 NOTE — PROGRESS NOTES
"Subjective:       Patient ID: Tung Chau is a 82 y.o. male.    Vitals:  height is 6' 3" (1.905 m) and weight is 95.3 kg (210 lb). His temperature is 98 °F (36.7 °C). His blood pressure is 151/79 (abnormal) and his pulse is 81. His respiration is 18 and oxygen saturation is 97%.     Chief Complaint: Sinus Problem    This is a 82 y.o. male who presents today with a chief complaint of nasal congestion and post nasal drip x 7 days. Treated with robitussin. Pt states that his symptoms are improving.       Sinus Problem  This is a new problem. The current episode started in the past 7 days. The problem has been gradually improving since onset. There has been no fever. His pain is at a severity of 0/10. He is experiencing no pain. Associated symptoms include congestion. Pertinent negatives include no coughing, headaches, hoarse voice or neck pain. Treatments tried: robitussin. The treatment provided mild relief.     HENT:  Positive for congestion.    Neck: Negative for neck pain.   Respiratory:  Negative for cough.    Skin:  Negative for erythema.   Neurological:  Negative for headaches.     Objective:      Physical Exam   Constitutional: He is oriented to person, place, and time. He appears well-developed. No distress.   HENT:   Head: Normocephalic and atraumatic.   Ears:   Right Ear: External ear normal.   Left Ear: External ear normal.   Nose: Congestion present.      Comments: cobblestoning  Mouth/Throat: Oropharynx is clear and moist. No oropharyngeal exudate.   Eyes: Conjunctivae and EOM are normal. Pupils are equal, round, and reactive to light. Right eye exhibits no discharge. Left eye exhibits no discharge. No scleral icterus.   Neck: Neck supple.   Cardiovascular: Normal rate, regular rhythm and normal heart sounds.   No murmur heard.Exam reveals no gallop and no friction rub.   Pulmonary/Chest: Effort normal. No respiratory distress. He has no wheezes. He has no rales.   Abdominal: Bowel sounds are normal. " He exhibits no distension. Soft.   Lymphadenopathy:     He has no cervical adenopathy.   Neurological: He is alert and oriented to person, place, and time.   Skin: Skin is warm, dry, not diaphoretic and no rash. Capillary refill takes less than 2 seconds. No erythema   Psychiatric: His behavior is normal.   Nursing note and vitals reviewed.      Assessment:       1. Nasal congestion          Plan:         Nasal congestion  -     POCT Influenza A/B MOLECULAR  -     SARS Coronavirus 2 Antigen, POCT Manual Read    Post-nasal drip  -     fluticasone propionate (FLONASE) 50 mcg/actuation nasal spray; 1 spray (50 mcg total) by Each Nostril route once daily.  Dispense: 11.1 mL; Refill: 0  -     cetirizine (ZYRTEC) 10 MG tablet; Take 1 tablet (10 mg total) by mouth once daily.  Dispense: 30 tablet; Refill: 11

## 2023-03-10 ENCOUNTER — OFFICE VISIT (OUTPATIENT)
Dept: INTERNAL MEDICINE | Facility: CLINIC | Age: 83
End: 2023-03-10
Payer: MEDICARE

## 2023-03-10 ENCOUNTER — LAB VISIT (OUTPATIENT)
Dept: LAB | Facility: HOSPITAL | Age: 83
End: 2023-03-10
Attending: INTERNAL MEDICINE
Payer: MEDICARE

## 2023-03-10 VITALS
HEIGHT: 75 IN | DIASTOLIC BLOOD PRESSURE: 72 MMHG | WEIGHT: 212.5 LBS | BODY MASS INDEX: 26.42 KG/M2 | SYSTOLIC BLOOD PRESSURE: 136 MMHG | HEART RATE: 80 BPM

## 2023-03-10 DIAGNOSIS — E78.5 HYPERLIPIDEMIA, UNSPECIFIED HYPERLIPIDEMIA TYPE: ICD-10-CM

## 2023-03-10 DIAGNOSIS — I10 ESSENTIAL HYPERTENSION: ICD-10-CM

## 2023-03-10 DIAGNOSIS — R35.1 BENIGN PROSTATIC HYPERPLASIA WITH NOCTURIA: ICD-10-CM

## 2023-03-10 DIAGNOSIS — N40.1 BENIGN PROSTATIC HYPERPLASIA WITH NOCTURIA: ICD-10-CM

## 2023-03-10 DIAGNOSIS — Z12.5 PROSTATE CANCER SCREENING: ICD-10-CM

## 2023-03-10 DIAGNOSIS — Z00.00 ANNUAL PHYSICAL EXAM: ICD-10-CM

## 2023-03-10 DIAGNOSIS — I70.0 ATHEROSCLEROSIS OF AORTA: ICD-10-CM

## 2023-03-10 DIAGNOSIS — Z86.010 HISTORY OF COLON POLYPS: ICD-10-CM

## 2023-03-10 DIAGNOSIS — Z00.00 ANNUAL PHYSICAL EXAM: Primary | ICD-10-CM

## 2023-03-10 LAB
ALBUMIN SERPL BCP-MCNC: 4.4 G/DL (ref 3.5–5.2)
ALP SERPL-CCNC: 70 U/L (ref 55–135)
ALT SERPL W/O P-5'-P-CCNC: 23 U/L (ref 10–44)
ANION GAP SERPL CALC-SCNC: 8 MMOL/L (ref 8–16)
AST SERPL-CCNC: 21 U/L (ref 10–40)
BASOPHILS # BLD AUTO: 0.04 K/UL (ref 0–0.2)
BASOPHILS NFR BLD: 0.9 % (ref 0–1.9)
BILIRUB SERPL-MCNC: 0.8 MG/DL (ref 0.1–1)
BUN SERPL-MCNC: 13 MG/DL (ref 8–23)
CALCIUM SERPL-MCNC: 10.1 MG/DL (ref 8.7–10.5)
CHLORIDE SERPL-SCNC: 105 MMOL/L (ref 95–110)
CHOLEST SERPL-MCNC: 135 MG/DL (ref 120–199)
CHOLEST/HDLC SERPL: 2.9 {RATIO} (ref 2–5)
CO2 SERPL-SCNC: 25 MMOL/L (ref 23–29)
COMPLEXED PSA SERPL-MCNC: 1.1 NG/ML (ref 0–4)
CREAT SERPL-MCNC: 1.1 MG/DL (ref 0.5–1.4)
DIFFERENTIAL METHOD: ABNORMAL
EOSINOPHIL # BLD AUTO: 0.1 K/UL (ref 0–0.5)
EOSINOPHIL NFR BLD: 1.8 % (ref 0–8)
ERYTHROCYTE [DISTWIDTH] IN BLOOD BY AUTOMATED COUNT: 12.2 % (ref 11.5–14.5)
EST. GFR  (NO RACE VARIABLE): >60 ML/MIN/1.73 M^2
GLUCOSE SERPL-MCNC: 95 MG/DL (ref 70–110)
HCT VFR BLD AUTO: 44.5 % (ref 40–54)
HDLC SERPL-MCNC: 46 MG/DL (ref 40–75)
HDLC SERPL: 34.1 % (ref 20–50)
HGB BLD-MCNC: 14.8 G/DL (ref 14–18)
IMM GRANULOCYTES # BLD AUTO: 0.01 K/UL (ref 0–0.04)
IMM GRANULOCYTES NFR BLD AUTO: 0.2 % (ref 0–0.5)
LDLC SERPL CALC-MCNC: 76.4 MG/DL (ref 63–159)
LYMPHOCYTES # BLD AUTO: 0.9 K/UL (ref 1–4.8)
LYMPHOCYTES NFR BLD: 19.8 % (ref 18–48)
MCH RBC QN AUTO: 31.8 PG (ref 27–31)
MCHC RBC AUTO-ENTMCNC: 33.3 G/DL (ref 32–36)
MCV RBC AUTO: 96 FL (ref 82–98)
MONOCYTES # BLD AUTO: 0.3 K/UL (ref 0.3–1)
MONOCYTES NFR BLD: 7.1 % (ref 4–15)
NEUTROPHILS # BLD AUTO: 3.2 K/UL (ref 1.8–7.7)
NEUTROPHILS NFR BLD: 70.2 % (ref 38–73)
NONHDLC SERPL-MCNC: 89 MG/DL
NRBC BLD-RTO: 0 /100 WBC
PLATELET # BLD AUTO: 145 K/UL (ref 150–450)
PMV BLD AUTO: 12.3 FL (ref 9.2–12.9)
POTASSIUM SERPL-SCNC: 4.4 MMOL/L (ref 3.5–5.1)
PROT SERPL-MCNC: 7.2 G/DL (ref 6–8.4)
RBC # BLD AUTO: 4.66 M/UL (ref 4.6–6.2)
SODIUM SERPL-SCNC: 138 MMOL/L (ref 136–145)
TRIGL SERPL-MCNC: 63 MG/DL (ref 30–150)
TSH SERPL DL<=0.005 MIU/L-ACNC: 2.1 UIU/ML (ref 0.4–4)
WBC # BLD AUTO: 4.5 K/UL (ref 3.9–12.7)

## 2023-03-10 PROCEDURE — 99213 OFFICE O/P EST LOW 20 MIN: CPT | Mod: PBBFAC | Performed by: INTERNAL MEDICINE

## 2023-03-10 PROCEDURE — 84153 ASSAY OF PSA TOTAL: CPT | Performed by: INTERNAL MEDICINE

## 2023-03-10 PROCEDURE — 99397 PER PM REEVAL EST PAT 65+ YR: CPT | Mod: S$PBB,GZ,, | Performed by: INTERNAL MEDICINE

## 2023-03-10 PROCEDURE — 84443 ASSAY THYROID STIM HORMONE: CPT | Performed by: INTERNAL MEDICINE

## 2023-03-10 PROCEDURE — 99999 PR PBB SHADOW E&M-EST. PATIENT-LVL III: ICD-10-PCS | Mod: PBBFAC,,, | Performed by: INTERNAL MEDICINE

## 2023-03-10 PROCEDURE — 80061 LIPID PANEL: CPT | Performed by: INTERNAL MEDICINE

## 2023-03-10 PROCEDURE — 99397 PR PREVENTIVE VISIT,EST,65 & OVER: ICD-10-PCS | Mod: S$PBB,GZ,, | Performed by: INTERNAL MEDICINE

## 2023-03-10 PROCEDURE — 99999 PR PBB SHADOW E&M-EST. PATIENT-LVL III: CPT | Mod: PBBFAC,,, | Performed by: INTERNAL MEDICINE

## 2023-03-10 PROCEDURE — 80053 COMPREHEN METABOLIC PANEL: CPT | Performed by: INTERNAL MEDICINE

## 2023-03-10 PROCEDURE — 36415 COLL VENOUS BLD VENIPUNCTURE: CPT | Performed by: INTERNAL MEDICINE

## 2023-03-10 PROCEDURE — 85025 COMPLETE CBC W/AUTO DIFF WBC: CPT | Performed by: INTERNAL MEDICINE

## 2023-03-10 NOTE — PROGRESS NOTES
MEDICAL HISTORY:   Hypertension.  Hyperlipidemia.  Gastroesophageal reflux disease.  Chronic rhinitis.  BPH.  Left bundle-branch block.  Adenomatous colon polyp in the year  and in 2015.  Right inguinal hernia repair.  Epididymal cyst.  Fractured elbow and wrist.  Lung surgery at age 42 to remove an empyema.    Aortic atherosclerosis on imaging      SOCIAL HISTORY:  Tobacco and alcohol use - none.  .  Exercises by walking, weight lifting, and working on his farm.  He volunteers regularly at the [employer].     FAMILY HISTORY:  Father is , complications from a fracture.  Mother is , breast cancer and stroke.  One sister is alive, doing well, but history of breast cancer and hypertension.     SCREENING:  Last colonoscopy in 2017   Benign polyp    colonoscopy May 2020, hyperplastic polyp        MEDICATIONS:   Atorvastatin 10 mg.  Benazepril 10 mg.  Diltiazem 360 mg.  Doxazosin 4 mg.  Hydrochlorothiazide 25 mg.         82-year-old  male  Presents for an annual routine visit   He actually has no particular complaints.  He feels well.  He is very busy.  He volunteers the [employer].  He volunteers at the [employer].  He is physically active.  He usually walks 20,000 steps a day     Review of symptoms  Negative for chest pain, palpitations, shortness of breath, abdominal pain.  Reports no heartburn ingestion.  Has no headaches arthralgias.  Regular bowel function.  No difficulty urinating, nocturia x2     In reviewed the chart he is seeing Optometry in August.  Was diagnosed with COVID in  in received day monoclonal antibody infusion.  There was no complications     Examination   Weight 212 lb  Pulse 80   Blood pressure 136/70   HEENT exam no abnormal findings   Neck no thyromegaly no masses   Chest clear breath sounds good effort   Heart regular rate rhythm, no murmurs or gallops   Abdominal exam nontender, soft, no  hepatosplenomegaly abdominal masses  Pulses 2+ carotid pulses no bruits 2+ pedal pulses  Extremities no edema  Lymph gland palpable adenopathy  Skin no gross abnormal findings   Musculoskeletal no gross abnormal findings       Impression   General exam   Hypertension  Hyperlipidemia  BPH with nocturia  History of colon polyps  Prostate cancer screening   Aorta atherosclerosis based on imaging     Plan   Routine labs  Continue with attention a proper diet physical activity and maintaining control of blood pressure and lipid status

## 2023-03-11 ENCOUNTER — PATIENT MESSAGE (OUTPATIENT)
Dept: INTERNAL MEDICINE | Facility: CLINIC | Age: 83
End: 2023-03-11
Payer: MEDICARE

## 2023-03-11 ENCOUNTER — TELEPHONE (OUTPATIENT)
Dept: INTERNAL MEDICINE | Facility: CLINIC | Age: 83
End: 2023-03-11
Payer: MEDICARE

## 2023-03-11 DIAGNOSIS — D69.6 THROMBOCYTOPENIA: Primary | ICD-10-CM

## 2023-03-11 NOTE — TELEPHONE ENCOUNTER
While on the line w/pt, he stated he rec'd a message from PCP stating he will call him back on Monday.    ----- Message from Sybil Damico sent at 3/11/2023  8:28 AM CST -----  Contact: [phone]  Patient is returning a phone call.  Who left a message for the patient:  Dr. Killian?  Does patient know what this is regarding:  test result  Would you like a call back, or a response through your [facility] portal?:   call back  Comments:  Patient is at work and missed provider call on a Saturday

## 2023-05-07 ENCOUNTER — OFFICE VISIT (OUTPATIENT)
Dept: URGENT CARE | Facility: CLINIC | Age: 83
End: 2023-05-07
Payer: OTHER MISCELLANEOUS

## 2023-05-07 VITALS
HEART RATE: 70 BPM | SYSTOLIC BLOOD PRESSURE: 151 MMHG | BODY MASS INDEX: 26.36 KG/M2 | RESPIRATION RATE: 18 BRPM | TEMPERATURE: 98 F | HEIGHT: 75 IN | DIASTOLIC BLOOD PRESSURE: 80 MMHG | WEIGHT: 212 LBS | OXYGEN SATURATION: 98 %

## 2023-05-07 DIAGNOSIS — Z02.6 ENCOUNTER RELATED TO WORKER'S COMPENSATION CLAIM: Primary | ICD-10-CM

## 2023-05-07 DIAGNOSIS — S30.1XXA CONTUSION OF FLANK AND BACK: ICD-10-CM

## 2023-05-07 DIAGNOSIS — S20.229A CONTUSION OF FLANK AND BACK: ICD-10-CM

## 2023-05-07 DIAGNOSIS — M62.838 MUSCLE SPASM: ICD-10-CM

## 2023-05-07 LAB
CTP QC/QA: YES
POC 5 PANEL DRUG SCREEN: NEGATIVE

## 2023-05-07 PROCEDURE — 72100 XR LUMBAR SPINE AP AND LATERAL: ICD-10-PCS | Mod: FY,S$GLB,, | Performed by: STUDENT IN AN ORGANIZED HEALTH CARE EDUCATION/TRAINING PROGRAM

## 2023-05-07 PROCEDURE — 99204 OFFICE O/P NEW MOD 45 MIN: CPT | Mod: S$GLB,,, | Performed by: FAMILY MEDICINE

## 2023-05-07 PROCEDURE — 99204 PR OFFICE/OUTPT VISIT, NEW, LEVL IV, 45-59 MIN: ICD-10-PCS | Mod: S$GLB,,, | Performed by: FAMILY MEDICINE

## 2023-05-07 PROCEDURE — 80305 DRUG TEST PRSMV DIR OPT OBS: CPT | Mod: S$GLB,,, | Performed by: FAMILY MEDICINE

## 2023-05-07 PROCEDURE — 80305 POCT RAPID DRUG SCREEN 5 PANEL: ICD-10-PCS | Mod: S$GLB,,, | Performed by: FAMILY MEDICINE

## 2023-05-07 PROCEDURE — 72100 X-RAY EXAM L-S SPINE 2/3 VWS: CPT | Mod: FY,S$GLB,, | Performed by: STUDENT IN AN ORGANIZED HEALTH CARE EDUCATION/TRAINING PROGRAM

## 2023-05-07 RX ORDER — BACLOFEN 10 MG/1
10 TABLET ORAL NIGHTLY PRN
Qty: 10 TABLET | Refills: 0 | Status: SHIPPED | OUTPATIENT
Start: 2023-05-07 | End: 2023-05-07

## 2023-05-07 RX ORDER — IBUPROFEN 600 MG/1
600 TABLET ORAL EVERY 6 HOURS PRN
Qty: 30 TABLET | Refills: 1 | Status: SHIPPED | OUTPATIENT
Start: 2023-05-07

## 2023-05-07 NOTE — PROGRESS NOTES
Subjective:      Patient ID: Tung Chau is a 82 y.o. male.    Chief Complaint: Back Pain    This is a 82 y.o. male who presents today with a chief complaint of  mid level back pain. Pt state he was pushing a wheelchair at work and stop and fell against a wall.    Back Pain  This is a new problem. The current episode started today. The problem occurs constantly. The problem is unchanged. The pain is present in the costovertebral angle. The quality of the pain is described as aching. The pain does not radiate. The pain is at a severity of 3/10. The patient is experiencing no pain. The pain is The same all the time. Stiffness is present All day. Pertinent negatives include no abdominal pain, bladder incontinence, bowel incontinence, chest pain, dysuria, fever, headaches, leg pain, numbness, paresis, paresthesias, pelvic pain, perianal numbness, tingling or weakness. He has tried nothing for the symptoms. The treatment provided no relief.     Constitution: Negative for fever.   Cardiovascular:  Negative for chest pain.   Gastrointestinal:  Negative for abdominal pain and bowel incontinence.   Genitourinary:  Negative for dysuria, bladder incontinence and pelvic pain.   Musculoskeletal:  Positive for back pain.   Skin:  Positive for erythema.   Neurological:  Negative for headaches and numbness.   Objective:     Physical Exam  Vitals and nursing note reviewed.   Constitutional:       General: He is not in acute distress.     Appearance: Normal appearance. He is not toxic-appearing.   HENT:      Head: Normocephalic and atraumatic.      Right Ear: There is no impacted cerumen.      Left Ear: There is no impacted cerumen.      Nose: No congestion or rhinorrhea.      Mouth/Throat:      Pharynx: No oropharyngeal exudate or posterior oropharyngeal erythema.   Cardiovascular:      Pulses: Normal pulses.      Heart sounds: Normal heart sounds.   Pulmonary:      Effort: Pulmonary effort is normal. No respiratory distress.       Breath sounds: No stridor.   Musculoskeletal:         General: Tenderness and signs of injury present.      Cervical back: Normal range of motion and neck supple.        Back:       Comments: Area of erythema and tenderness where pt flank fell against the wall. Will turn into ecchymosis later.   Skin:     General: Skin is warm and dry.      Capillary Refill: Capillary refill takes less than 2 seconds.      Findings: Bruising, erythema and lesion present.   Neurological:      General: No focal deficit present.      Mental Status: He is alert. Mental status is at baseline. He is disoriented.      Assessment:  Plan:       1. Encounter related to worker's compensation claim  - X-Ray Lumbar Spine AP And Lateral; Future  - POCT Rapid Drug Screen 5 Panel    2. Muscle spasm  - ibuprofen (ADVIL,MOTRIN) 600 MG tablet; Take 1 tablet (600 mg total) by mouth every 6 (six) hours as needed for Pain.  Dispense: 30 tablet; Refill: 1   All results discussed with pt and wife.  Will f[facility]u in [facility] tomorrow 5-8-23.

## 2023-05-07 NOTE — LETTER
[facility]  [address]  [place] [address]  Phone: [phone]  Fax: [phone]  [facility]: [id number]    Pt Name: Tung Chau  Injury Date: 05/07/2023   Employee ID:  Date of First Treatment: 05/07/2023   Company: [id number][Retired--7/1/2005      Appointment Time: 12:20 PM Arrived: 12:00p   Provider: Glenna Gonsalez MD Time Out:1:53pm     Office Treatment:   1. Encounter related to worker's compensation claim    2. Muscle spasm      Medications Ordered This Encounter   Medications    ibuprofen (ADVIL,MOTRIN) 600 MG tablet                 Return Appointment: Visit date 5-8-23 [facility]

## 2023-05-08 ENCOUNTER — OFFICE VISIT (OUTPATIENT)
Dept: URGENT CARE | Facility: CLINIC | Age: 83
End: 2023-05-08
Payer: OTHER MISCELLANEOUS

## 2023-05-08 VITALS
TEMPERATURE: 99 F | WEIGHT: 225 LBS | SYSTOLIC BLOOD PRESSURE: 160 MMHG | HEART RATE: 67 BPM | HEIGHT: 73 IN | DIASTOLIC BLOOD PRESSURE: 75 MMHG | BODY MASS INDEX: 29.82 KG/M2 | RESPIRATION RATE: 18 BRPM | OXYGEN SATURATION: 97 %

## 2023-05-08 DIAGNOSIS — S30.1XXA CONTUSION OF FLANK AND BACK: Primary | ICD-10-CM

## 2023-05-08 DIAGNOSIS — M54.2 NECK PAIN: ICD-10-CM

## 2023-05-08 DIAGNOSIS — M62.838 MUSCLE SPASM: ICD-10-CM

## 2023-05-08 DIAGNOSIS — Z02.6 ENCOUNTER RELATED TO WORKER'S COMPENSATION CLAIM: ICD-10-CM

## 2023-05-08 DIAGNOSIS — S20.229A CONTUSION OF FLANK AND BACK: Primary | ICD-10-CM

## 2023-05-08 DIAGNOSIS — S20.412A ABRASION OF LEFT SIDE OF BACK, INITIAL ENCOUNTER: ICD-10-CM

## 2023-05-08 PROCEDURE — 99213 OFFICE O/P EST LOW 20 MIN: CPT | Mod: S$GLB,,, | Performed by: NURSE PRACTITIONER

## 2023-05-08 PROCEDURE — 99213 PR OFFICE/OUTPT VISIT, EST, LEVL III, 20-29 MIN: ICD-10-PCS | Mod: S$GLB,,, | Performed by: NURSE PRACTITIONER

## 2023-05-08 NOTE — LETTER
[facility]  [address] [address]  Phone: [phone]  Fax: [phone]  [facility]: [id number]    Pt Name: Tung Chau  Injury Date: 05/07/2023   Employee ID: [id number] Date of First Treatment: 05/08/2023   Company: Retired--7/1/2005      Appointment Time:  Arrived: 4:45 PM    Provider: Elizabeth Lantigua NP Time Out: 5:49 PM      Office Treatment:   1. Contusion of flank and back    2. Muscle spasm    3. Neck pain    4. Abrasion of left side of back, initial encounter    5. Encounter related to worker's compensation claim          Patient Instructions: Attention not to aggravate affected area, Daily home exercises/warm soaks      Restrictions:  (No heavy lifting/pushing or pulling over 20lbs.)     Return Appointment: 5/15/2023 at 9:45 Am DOMINICK

## 2023-05-08 NOTE — PROGRESS NOTES
Subjective:      Patient ID: Tung Chau is a 82 y.o. male.    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  Yesterday he was pushing an empty wheelchair when someone cut in front of him causing him to lose his balance and fall against a corner of a wall.  Reports that he strained his neck when he fell against the corner of the wall as well as hurting his middle and lower back.  He went to the urgent Care and had x-rays of his back which were negative.  I have reviewed the urgent care notes and the x-ray report.  He has been taking ibuprofen 600 mg for the pain with relief.  Says he is feeling better today than yesterday.   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  He averages walking 18,000 steps per day.  No previous neck or back problems.  MWT    Back Pain  Pertinent negatives include no abdominal pain, bladder incontinence, bowel incontinence or numbness.   Neck: Positive for neck pain.   Gastrointestinal:  Negative for abdominal pain, nausea, vomiting and bowel incontinence.   Genitourinary:  Negative for bladder incontinence.   Musculoskeletal:  Positive for back pain, muscle cramps and muscle ache. Negative for pain, trauma, joint pain, joint swelling, abnormal ROM of joint and pain with walking.   Skin:  Negative for erythema.   Neurological:  Negative for numbness and tingling.   Objective:     Physical Exam  Vitals and nursing  note reviewed.   Constitutional:       General: He is not in acute distress.     Appearance: Normal appearance.   HENT:      Right Ear: External ear normal.      Left Ear: External ear normal.   Eyes:      Conjunctiva/sclera: Conjunctivae normal.   Cardiovascular:      Rate and Rhythm: Normal rate and regular rhythm.      Pulses: Normal pulses.      Heart sounds: Normal heart sounds.   Pulmonary:      Effort: Pulmonary effort is normal.      Breath sounds: Normal breath sounds.   Musculoskeletal:         General: Tenderness present. No swelling.      Cervical back: Tenderness present. No swelling. Decreased range of motion.      Thoracic back: Signs of trauma, spasms and tenderness present. No swelling. Normal range of motion.      Lumbar back: Spasms and tenderness present. No swelling or deformity. Normal range of motion. Negative right straight leg raise test and negative left straight leg raise test.        Back:       Comments: Superficial abrasion noted to left thoracic area.  No swelling or erythema.  Tenderness to palpation.  Pain to left side of neck with lateral rotation.  Pain to thoracic and lumbar back with full range of motion.  Neurovascular intact distally upper extremities and lower extremities.   Skin:     Findings: Bruising present. No erythema.   Neurological:      General: No focal deficit present.      Mental Status: He is alert and oriented to person, place, and time.   Psychiatric:         Mood and Affect: Mood normal.         Behavior: Behavior normal.         Thought Content: Thought content normal.         Judgment: Judgment normal.      Assessment:      1. Contusion of flank and back    2. Muscle spasm    3. Neck pain    4. Abrasion of left side of back, initial encounter    5. Encounter related to worker's compensation claim      Plan:   X-Ray Lumbar Spine AP And Lateral    Result Date: 5/7/2023  EXAMINATION: XR LUMBAR SPINE AP AND LATERAL CLINICAL HISTORY: Encounter for examination for  insurance purposes TECHNIQUE: AP, lateral and spot images were performed of the lumbar spine. COMPARISON: None FINDINGS: Alignment: Alignment is maintained. Vertebrae: Vertebral body heights are relatively well maintained.  No suspicious appearing lytic or blastic lesions. Discs and facets: Disc heights are maintained.  Multilevel facet arthropathy. Miscellaneous: Aortic atherosclerosis.     No acute fracture. Electronically signed by: Jey Bergman MD Date:    05/07/2023 Time:    13:22    I have reviewed the lumbar spine x-rays from the urgent care visit which showed no acute feature, no fractures.  Mr. Ac has ibuprofen at home which he may take regularly for the next few days and then as needed.  He also may take over-the-counter Tylenol if needed.  He will start to apply heat and do gentle stretching exercises as discussed.       Patient Instructions: Attention not to aggravate affected area, Daily home exercises/warm soaks   Restrictions:  (No heavy lifting/pushing or pulling over 20lbs.)  Follow up in about 1 week (around 5/15/2023).

## 2023-05-15 ENCOUNTER — OFFICE VISIT (OUTPATIENT)
Dept: URGENT CARE | Facility: CLINIC | Age: 83
End: 2023-05-15
Payer: OTHER MISCELLANEOUS

## 2023-05-15 VITALS
TEMPERATURE: 98 F | OXYGEN SATURATION: 98 % | SYSTOLIC BLOOD PRESSURE: 119 MMHG | BODY MASS INDEX: 27.98 KG/M2 | HEIGHT: 75 IN | RESPIRATION RATE: 18 BRPM | WEIGHT: 225 LBS | DIASTOLIC BLOOD PRESSURE: 75 MMHG | HEART RATE: 90 BPM

## 2023-05-15 DIAGNOSIS — M79.18 MUSCULOSKELETAL PAIN: ICD-10-CM

## 2023-05-15 DIAGNOSIS — Z02.6 ENCOUNTER RELATED TO WORKER'S COMPENSATION CLAIM: Primary | ICD-10-CM

## 2023-05-15 DIAGNOSIS — S39.012D STRAIN OF LUMBAR REGION, SUBSEQUENT ENCOUNTER: ICD-10-CM

## 2023-05-15 PROCEDURE — 99213 PR OFFICE/OUTPT VISIT, EST, LEVL III, 20-29 MIN: ICD-10-PCS | Mod: S$GLB,,, | Performed by: FAMILY MEDICINE

## 2023-05-15 PROCEDURE — 99213 OFFICE O/P EST LOW 20 MIN: CPT | Mod: S$GLB,,, | Performed by: FAMILY MEDICINE

## 2023-05-15 NOTE — PROGRESS NOTES
Subjective:      Patient ID: Tung Chau is a 82 y.o. male.    Chief Complaint: Back Pain (upper)    Patient's place of employment - Retired  Patient's job title - Supervisor  Date of Injury - 5/7/23  Body part injured - Upper back  Current work status per last visit - No activity  Improved, same, or worse - Little better  Pain Scale right now (1-10) - 5      82 y/o male her efor follow up of back injury, states back is better but does not feel ready to go to work, since it is still feeling stiff and having pain, he is taking ASA alternate with Ibuprofen  Feels ome muscle spasms, no paresthesias      Back Pain  Pertinent negatives include no abdominal pain, bladder incontinence, bowel incontinence or numbness.     Neck: Positive for neck pain.   Gastrointestinal:  Negative for abdominal pain, nausea, vomiting and bowel incontinence.   Genitourinary:  Negative for bladder incontinence.   Musculoskeletal:  Positive for back pain, muscle cramps and muscle ache. Negative for pain, trauma, joint pain, joint swelling, abnormal ROM of joint and pain with walking.   Skin:  Negative for erythema.   Neurological:  Negative for numbness and tingling.   Objective:     Physical Exam  Vitals and nursing note reviewed.   Constitutional:       General: He is not in acute distress.     Appearance: Normal appearance. He is well-developed. He is not ill-appearing, toxic-appearing or diaphoretic.   HENT:      Head: Normocephalic and atraumatic.      Jaw: No trismus.      Right Ear: Hearing, tympanic membrane, ear canal and external ear normal.      Left Ear: Hearing, tympanic membrane, ear canal and external ear normal.      Nose: Nose normal. No nasal deformity, mucosal edema or rhinorrhea.      Right Sinus: No maxillary sinus tenderness or frontal sinus tenderness.      Left Sinus: No maxillary sinus tenderness or frontal sinus tenderness.      Mouth/Throat:      Mouth: Mucous membranes are moist.      Dentition: Normal  dentition.      Pharynx: Oropharynx is clear. Uvula midline. No posterior oropharyngeal erythema or uvula swelling.   Eyes:      General: Lids are normal. No scleral icterus.        Right eye: No discharge.         Left eye: No discharge.      Conjunctiva/sclera: Conjunctivae normal.      Comments: Sclera clear bilat   Neck:      Trachea: Trachea and phonation normal.   Cardiovascular:      Rate and Rhythm: Normal rate and regular rhythm.      Pulses: Normal pulses.      Heart sounds: Normal heart sounds.   Pulmonary:      Effort: Pulmonary effort is normal. No respiratory distress.      Breath sounds: Normal breath sounds.   Abdominal:      General: Bowel sounds are normal. There is no distension.      Palpations: Abdomen is soft. There is no mass or pulsatile mass.      Tenderness: There is no abdominal tenderness.   Musculoskeletal:         General: No deformity. Normal range of motion.      Cervical back: Full passive range of motion without pain, normal range of motion and neck supple.      Comments: BACK: no abrasion noted  Flexion, extension with slight increase tenderness  SLRE negative  UE: FROM  LLM  Normal   Skin:     General: Skin is warm and dry.      Coloration: Skin is not pale.      Findings: No erythema.   Neurological:      Mental Status: He is alert and oriented to person, place, and time.      Motor: No abnormal muscle tone.      Coordination: Coordination normal.   Psychiatric:         Speech: Speech normal.         Behavior: Behavior normal. Behavior is cooperative.         Thought Content: Thought content normal.         Judgment: Judgment normal.      Assessment:      1. Encounter related to worker's compensation claim    2. Musculoskeletal pain    3. Strain of lumbar region, subsequent encounter      Plan:      Pt advised to continue Ibuprofen    Back exercises    Modified duty if tolearted    RTC in one week            No follow-ups on file.

## 2023-05-15 NOTE — LETTER
[facility] - [facility]  [address] [address]  Phone: [phone]  Fax: [phone]  [facility]: [id number]    Pt Name: Tung Chau  Injury Date: 05/07/2023   Employee ID: [id number] Date of Treatment: 05/15/2023   Company: Retired--7/1/2005      Appointment Time: 09:45 AM Arrived: 9:28 AM    Provider: Inocente Tavares MD Time Out: 10:26 AM      Office Treatment:   1. Encounter related to worker's compensation claim    2. Musculoskeletal pain    3. Strain of lumbar region, subsequent encounter               Restrictions: No lifting/pushing/pulling more than 25 lbs     Return Appointment: 5/22/2023 at 9:45 AM MELANY

## 2023-05-22 ENCOUNTER — OFFICE VISIT (OUTPATIENT)
Dept: URGENT CARE | Facility: CLINIC | Age: 83
End: 2023-05-22
Payer: OTHER MISCELLANEOUS

## 2023-05-22 VITALS
OXYGEN SATURATION: 98 % | HEART RATE: 74 BPM | WEIGHT: 225 LBS | RESPIRATION RATE: 18 BRPM | HEIGHT: 75 IN | BODY MASS INDEX: 27.98 KG/M2 | SYSTOLIC BLOOD PRESSURE: 134 MMHG | DIASTOLIC BLOOD PRESSURE: 74 MMHG

## 2023-05-22 DIAGNOSIS — S16.1XXD STRAIN OF NECK MUSCLE, SUBSEQUENT ENCOUNTER: ICD-10-CM

## 2023-05-22 DIAGNOSIS — Z02.6 ENCOUNTER RELATED TO WORKER'S COMPENSATION CLAIM: ICD-10-CM

## 2023-05-22 DIAGNOSIS — M25.511 ACUTE PAIN OF RIGHT SHOULDER: ICD-10-CM

## 2023-05-22 DIAGNOSIS — S39.012D BACK STRAIN, SUBSEQUENT ENCOUNTER: Primary | ICD-10-CM

## 2023-05-22 PROCEDURE — 99214 PR OFFICE/OUTPT VISIT, EST, LEVL IV, 30-39 MIN: ICD-10-PCS | Mod: S$GLB,,, | Performed by: STUDENT IN AN ORGANIZED HEALTH CARE EDUCATION/TRAINING PROGRAM

## 2023-05-22 PROCEDURE — 99214 OFFICE O/P EST MOD 30 MIN: CPT | Mod: S$GLB,,, | Performed by: STUDENT IN AN ORGANIZED HEALTH CARE EDUCATION/TRAINING PROGRAM

## 2023-05-22 NOTE — LETTER
[facility] - [facility]ational [facility]  [address] [address]  Phone: [phone]  Fax: [phone]  [facility]: [id number]    Pt Name: Tung Chau  Injury Date: 05/07/2023   Employee ID: [id number] Date of Treatment: 05/22/2023   Company: Retired--7/1/2005      Appointment Time: 09:45 AM Arrived: 9:16 AM    Provider: Lizette Thomas MD Time Out: 10:24 AM      Office Treatment:   1. Back strain, subsequent encounter    2. Encounter related to worker's compensation claim    3. Strain of neck muscle, subsequent encounter    4. Acute pain of right shoulder          Patient Instructions: Attention not to aggravate affected area      Restrictions: Regular Duty     Return Appointment: 6/5/2023 at 9:45 AM MELANY

## 2023-05-22 NOTE — PROGRESS NOTES
Subjective:      Patient ID: Tung Chau is a 82 y.o. male.    Chief Complaint: Back Pain    Patient's place of employment - [employer]- Retired  Patient's job title - Supervisor  Date of Injury - 05-07-23  Body part injured - Upper Back, RT Shoulder, Neck  Current work status per last visit - Modified Duty if Tolerated  Improved, same, or worse - Improved  Pain Scale right now (1-10) -  3/10    See MA note above. Manuel NICOLAS note:  Mr. Chau is here to follow up on his bilateral mid back, left neck, right shoulder pain which are all all improving. He has been doing calisthenics in the pool at his gym and says it's helping his pain. Went back to work two days ago. He is able to perform his regular job duties. Not taking any medications.     He had a severe sharp pain in his right shoulder muscle this morning when he woke up. It lasted for about an hour and resolved on it's own without medication or topical treatments. He has been asymptomatic since then and maintains that he does not have any pain in his right shoulder at this time.     Back Pain  Pertinent negatives include no numbness.   Neck: Positive for neck pain.   Musculoskeletal:  Positive for back pain and muscle ache. Negative for pain, trauma, joint pain, joint swelling, abnormal ROM of joint, pain with walking and muscle cramps.   Skin:  Negative for erythema.   Neurological:  Negative for numbness and tingling.   Objective:     Physical Exam  Vitals and nursing note reviewed.   Constitutional:       General: He is not in acute distress.     Appearance: Normal appearance. He is not ill-appearing.   HENT:      Head: Normocephalic.   Eyes:      Conjunctiva/sclera: Conjunctivae normal.   Neck:     Pulmonary:      Effort: No respiratory distress.   Musculoskeletal:      Right shoulder: No tenderness or bony tenderness. Normal range of motion. Normal strength.      Cervical back: No tenderness. Pain with movement (tightness on the left with  rotation to the left) and muscular tenderness (left trapezius) present. Decreased range of motion (baseline reduced ROM in all planes, most notably neck extension).      Thoracic back: No lacerations, spasms, tenderness or bony tenderness. Normal range of motion.      Lumbar back: No lacerations, spasms, tenderness or bony tenderness. Normal range of motion.        Back:       Comments: Tightness reported in mid/low back bilaterally with all ROM   Skin:     Findings: No erythema.   Neurological:      Mental Status: He is alert and oriented to person, place, and time.      GCS: GCS eye subscore is 4. GCS verbal subscore is 5. GCS motor subscore is 6.   Psychiatric:         Attention and Perception: Attention normal.         Mood and Affect: Mood normal.         Behavior: Behavior normal.      Assessment:      1. Back strain, subsequent encounter    2. Encounter related to worker's compensation claim    3. Strain of neck muscle, subsequent encounter    4. Acute pain of right shoulder      Plan:     Mr. Chau is a very friendly and pleasant 81 yo male who is healing well and as expected from his msk pain after a partial fall. We discussed PT to help with his reported tightness, but he says he'd like to continue doing the water calisthenics twice weekly for now. He will return in 2 weeks with a status update, sooner if needed. He says he is able to and would like to continue performing his regular job duties.        Patient Instructions: Attention not to aggravate affected area   Restrictions: Regular Duty  Follow up in about 2 weeks (around 6/5/2023).    I spent a total of 30 minutes on the day of the visit.   This includes face to face time and non-face to face time preparing to see the patient (eg, review of tests, prior records/notes), obtaining and/or reviewing separately obtained history, documenting clinical information in the electronic or other health record, independently interpreting results and  communicating results to the patient.

## 2023-06-05 ENCOUNTER — TELEPHONE (OUTPATIENT)
Dept: FAMILY MEDICINE | Facility: CLINIC | Age: 83
End: 2023-06-05
Payer: MEDICARE

## 2023-06-05 ENCOUNTER — OFFICE VISIT (OUTPATIENT)
Dept: URGENT CARE | Facility: CLINIC | Age: 83
End: 2023-06-05
Payer: OTHER MISCELLANEOUS

## 2023-06-05 VITALS
DIASTOLIC BLOOD PRESSURE: 74 MMHG | HEIGHT: 75 IN | OXYGEN SATURATION: 96 % | RESPIRATION RATE: 18 BRPM | HEART RATE: 64 BPM | BODY MASS INDEX: 27.98 KG/M2 | SYSTOLIC BLOOD PRESSURE: 130 MMHG | WEIGHT: 225 LBS | TEMPERATURE: 98 F

## 2023-06-05 DIAGNOSIS — S39.012D BACK STRAIN, SUBSEQUENT ENCOUNTER: Primary | ICD-10-CM

## 2023-06-05 DIAGNOSIS — Z02.6 ENCOUNTER RELATED TO WORKER'S COMPENSATION CLAIM: ICD-10-CM

## 2023-06-05 PROCEDURE — 99213 PR OFFICE/OUTPT VISIT, EST, LEVL III, 20-29 MIN: ICD-10-PCS | Mod: S$GLB,,, | Performed by: STUDENT IN AN ORGANIZED HEALTH CARE EDUCATION/TRAINING PROGRAM

## 2023-06-05 PROCEDURE — 99213 OFFICE O/P EST LOW 20 MIN: CPT | Mod: S$GLB,,, | Performed by: STUDENT IN AN ORGANIZED HEALTH CARE EDUCATION/TRAINING PROGRAM

## 2023-06-05 NOTE — LETTER
[facility] - Occupational Health  [address] [address]  Phone: [phone]  Fax: [phone]  [facility]: [id number]    Pt Name: Tung Chau  Injury Date: 05/07/2023   Employee ID: [id number] Date of Treatment: 06/05/2023   Company: Retired--7/1/2005      Appointment Time: 09:45 AM Arrived: 9:10 AM    Provider: Lizette Thomas MD Time Out: 10:04 AM      Office Treatment:   1. Back strain, subsequent encounter    2. Encounter related to worker's compensation claim          Patient Instructions: Daily home exercises/warm soaks      Restrictions: Regular Duty, Discharged from Occupational Health     Return As needed. DOMINICK

## 2023-06-05 NOTE — PROGRESS NOTES
Subjective:      Patient ID: Tung Chau is a 82 y.o. male.    Chief Complaint: Back Pain    Patient's place of employment - [employer]  Patient's job title -    Date of Injury - 5/7/23  Body part injured - back  Current work status per last visit - full time employed  Improved, same, or worse - improved  Pain Scale right now (1-10) -  2    See MA note above. Begin MD note:  Mr. Chau says he is much improved and feeling great. He has continued with his water aerobics which helped. He has not required medication for pain. 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 He says he has been performing this task without difficulty. Feels he can continue working unrestricted. No new complaints or concerns.      Neck: Positive for neck pain.   Musculoskeletal:  Positive for back pain and muscle ache. Negative for pain, trauma, joint pain, joint swelling, abnormal ROM of joint, pain with walking and muscle cramps.   Skin:  Negative for erythema.   Neurological:  Negative for numbness and tingling.   Objective:     Physical Exam  Vitals and nursing note reviewed.   Constitutional:       General: He is not in acute distress.     Appearance: He is not ill-appearing.   HENT:      Head: Normocephalic.   Eyes:      Conjunctiva/sclera: Conjunctivae normal.   Pulmonary:      Effort: No respiratory distress.   Musculoskeletal:      Lumbar back: No swelling, deformity, spasms or tenderness. Normal range of motion. Negative right straight leg raise test and negative left straight leg raise test.        Back:       Comments: Full unrestricted ROM, non tender. Area of minimal tightness highlighted above, smaller area of discomfort that previous exams   Skin:     General: Skin is warm and dry.      Findings: No erythema.   Neurological:      Mental Status: He is alert and oriented to person, place, and time.   Psychiatric:         Attention and Perception: Attention  normal.         Mood and Affect: Mood normal.         Behavior: Behavior normal.      Assessment:      1. Back strain, subsequent encounter    2. Encounter related to worker's compensation claim      Plan:     Mr. Ac is feeling well and much improved.  He is able to perform his job duties are restricted.  I expect that he will continue to improve and no longer needs scheduled follow-up.  The patient is agreeable and would like to follow-up if/as needed.  Has been discharged from [facility] and released to full duty without restrictions.       Patient Instructions: Daily home exercises/warm soaks   Restrictions: Regular Duty, Discharged from Occupational Health  Follow up if symptoms worsen or fail to improve.

## 2023-06-06 ENCOUNTER — TELEPHONE (OUTPATIENT)
Dept: FAMILY MEDICINE | Facility: CLINIC | Age: 83
End: 2023-06-06
Payer: MEDICARE

## 2023-06-06 NOTE — TELEPHONE ENCOUNTER
Spoke with the Patient to see if he wanted to be scheduled for an EAWV appointment.  Patient stated will call back a another time once he decides.

## 2023-06-06 NOTE — TELEPHONE ENCOUNTER
----- Message from Jaun Jordan MA sent at 6/5/2023 10:15 AM CDT -----  Regarding: Call the Patient back on tomorrow(TUESDAY)  Patient is interested requested a call back on tomorrow(Tuesday).

## 2023-06-13 ENCOUNTER — LAB VISIT (OUTPATIENT)
Dept: LAB | Facility: HOSPITAL | Age: 83
End: 2023-06-13
Attending: INTERNAL MEDICINE
Payer: MEDICARE

## 2023-06-13 DIAGNOSIS — D69.6 THROMBOCYTOPENIA: ICD-10-CM

## 2023-06-13 LAB
BASOPHILS # BLD AUTO: 0.06 K/UL (ref 0–0.2)
BASOPHILS NFR BLD: 1 % (ref 0–1.9)
DIFFERENTIAL METHOD: ABNORMAL
EOSINOPHIL # BLD AUTO: 0.4 K/UL (ref 0–0.5)
EOSINOPHIL NFR BLD: 6.4 % (ref 0–8)
ERYTHROCYTE [DISTWIDTH] IN BLOOD BY AUTOMATED COUNT: 12.2 % (ref 11.5–14.5)
HCT VFR BLD AUTO: 42.2 % (ref 40–54)
HGB BLD-MCNC: 14.2 G/DL (ref 14–18)
IMM GRANULOCYTES # BLD AUTO: 0.01 K/UL (ref 0–0.04)
IMM GRANULOCYTES NFR BLD AUTO: 0.2 % (ref 0–0.5)
LYMPHOCYTES # BLD AUTO: 1 K/UL (ref 1–4.8)
LYMPHOCYTES NFR BLD: 16.5 % (ref 18–48)
MCH RBC QN AUTO: 31.6 PG (ref 27–31)
MCHC RBC AUTO-ENTMCNC: 33.6 G/DL (ref 32–36)
MCV RBC AUTO: 94 FL (ref 82–98)
MONOCYTES # BLD AUTO: 0.5 K/UL (ref 0.3–1)
MONOCYTES NFR BLD: 7.7 % (ref 4–15)
NEUTROPHILS # BLD AUTO: 4.2 K/UL (ref 1.8–7.7)
NEUTROPHILS NFR BLD: 68.2 % (ref 38–73)
NRBC BLD-RTO: 0 /100 WBC
PLATELET # BLD AUTO: 195 K/UL (ref 150–450)
PMV BLD AUTO: 11.6 FL (ref 9.2–12.9)
RBC # BLD AUTO: 4.49 M/UL (ref 4.6–6.2)
WBC # BLD AUTO: 6.11 K/UL (ref 3.9–12.7)

## 2023-06-13 PROCEDURE — 85025 COMPLETE CBC W/AUTO DIFF WBC: CPT | Performed by: INTERNAL MEDICINE

## 2023-06-13 PROCEDURE — 36415 COLL VENOUS BLD VENIPUNCTURE: CPT | Performed by: INTERNAL MEDICINE

## 2023-06-21 ENCOUNTER — OFFICE VISIT (OUTPATIENT)
Dept: URGENT CARE | Facility: CLINIC | Age: 83
End: 2023-06-21
Payer: OTHER MISCELLANEOUS

## 2023-06-21 VITALS
WEIGHT: 225 LBS | HEIGHT: 75 IN | BODY MASS INDEX: 27.98 KG/M2 | RESPIRATION RATE: 16 BRPM | SYSTOLIC BLOOD PRESSURE: 151 MMHG | DIASTOLIC BLOOD PRESSURE: 83 MMHG | OXYGEN SATURATION: 97 % | HEART RATE: 68 BPM

## 2023-06-21 DIAGNOSIS — S29.012D STRAIN OF MID-BACK, SUBSEQUENT ENCOUNTER: Primary | ICD-10-CM

## 2023-06-21 DIAGNOSIS — Z02.6 ENCOUNTER RELATED TO WORKER'S COMPENSATION CLAIM: ICD-10-CM

## 2023-06-21 PROCEDURE — 99214 PR OFFICE/OUTPT VISIT, EST, LEVL IV, 30-39 MIN: ICD-10-PCS | Mod: S$GLB,,, | Performed by: STUDENT IN AN ORGANIZED HEALTH CARE EDUCATION/TRAINING PROGRAM

## 2023-06-21 PROCEDURE — 99214 OFFICE O/P EST MOD 30 MIN: CPT | Mod: S$GLB,,, | Performed by: STUDENT IN AN ORGANIZED HEALTH CARE EDUCATION/TRAINING PROGRAM

## 2023-06-21 NOTE — LETTER
[facility]  [address] [address]  Phone: [phone]  Fax: [phone]  [facility]: [id number]    Pt Name: Tung Chau  Injury Date: 05/07/2023   Employee ID: [id number] Date of First Treatment: 06/21/2023   Company:   [email or web address]      Appointment Time: 09:20 AM Arrived: 9:00am   Provider: Lizette Thomas MD Time Out: 11:05am     Office Treatment:   1. Strain of mid-back, subsequent encounter    2. Encounter related to worker's compensation claim          Patient Instructions: Attention not to aggravate affected area    Restrictions: Regular Duty     Return Appointment: 7/12/2023 at 9:30am

## 2023-06-21 NOTE — PROGRESS NOTES
Subjective:      Patient ID: Tung Chau is a 82 y.o. male.    Chief Complaint: Back Pain    Patient's place of employment - [employer]  Patient's job title -    Date of Injury - 5/7/2023  Body part injured - Back  Current work status per last visit - Regular   Improved, same, or worse - improving  Pain Scale right now (1-10) -  0; more like achy    See MA note above. Manuel NICOLAS note:  Mr. Chau returns to clinic due to increased achiness in his right mid back. He admits to possibly overdoing it with his water sports where he lifting floating devices and works with the pulley resistance bands. He says he has not required the use of any medication for pain but was worried that he may have pneumonia due to the right sided pain and also concern about the muscles in that area. He denies cough, SOB, fever, or chills.      Musculoskeletal:  Negative for trauma, abnormal ROM of joint and pain with walking.   Skin:  Negative for erythema.   Neurological:  Negative for tingling.   Objective:     Physical Exam  Vitals and nursing note reviewed.   Constitutional:       General: He is not in acute distress.     Appearance: Normal appearance. He is not ill-appearing.   HENT:      Head: Normocephalic.   Eyes:      Conjunctiva/sclera: Conjunctivae normal.   Pulmonary:      Effort: Pulmonary effort is normal. No respiratory distress.      Breath sounds: Normal breath sounds. No wheezing, rhonchi or rales.   Musculoskeletal:      Thoracic back: Tenderness present. No spasms or bony tenderness. Normal range of motion.      Lumbar back: No tenderness or bony tenderness. Normal range of motion.        Back:       Comments: Area of concern over right latissimus dorsi. Nontender with palpation. Noticed most with rotation with to the left, also noticed with rotation to the right, side bending, flexion, and full abduction of right upper extremity. Full ROM despite discomfort.   Skin:     General: Skin is warm and  dry.      Findings: No erythema.   Neurological:      Mental Status: He is alert and oriented to person, place, and time.      GCS: GCS eye subscore is 4. GCS verbal subscore is 5. GCS motor subscore is 6.   Psychiatric:         Attention and Perception: Attention normal.         Mood and Affect: Mood normal.         Behavior: Behavior normal.      Assessment:      1. Strain of mid-back, subsequent encounter    2. Encounter related to worker's compensation claim      Plan:     Normal breath sounds and no symptoms of respiratory illness. Reassurance provided regarding this. Exam and hx still consistent with back strain. Discussed water aerobics and refrain from using any resistance for now. Ok to continue pool activities, no more than 45 mins per day. He is able to perform his job duties as an  unrestricted and will continue working at regular duty.  Referral to physical therapy was placed today to address his ongoing back strain symptoms.  He will follow-up here in 3 weeks, sooner if needed.  Plan to space out his visits to monthly follow-up once he is established with physical therapy unless closer visits are indicated. Mr. Chau was happy with the plan of care stating all of his questions/concerns were addressed to his satisfaction during the encounter.        Patient Instructions: Attention not to aggravate affected area   Restrictions: Regular Duty  Follow up in about 3 weeks (around 7/12/2023).    I spent a total of 30 minutes on the day of the visit. This includes face to face time and non-face to face time preparing to see the patient (eg, review of tests, prior records/notes), obtaining and/or reviewing separately obtained history, and documenting clinical information in the electronic or other health record.

## 2023-07-12 ENCOUNTER — OFFICE VISIT (OUTPATIENT)
Dept: URGENT CARE | Facility: CLINIC | Age: 83
End: 2023-07-12
Payer: OTHER MISCELLANEOUS

## 2023-07-12 VITALS
SYSTOLIC BLOOD PRESSURE: 122 MMHG | HEIGHT: 75 IN | DIASTOLIC BLOOD PRESSURE: 77 MMHG | BODY MASS INDEX: 27.98 KG/M2 | HEART RATE: 70 BPM | RESPIRATION RATE: 16 BRPM | WEIGHT: 225 LBS | OXYGEN SATURATION: 97 %

## 2023-07-12 DIAGNOSIS — S29.012D STRAIN OF MID-BACK, SUBSEQUENT ENCOUNTER: Primary | ICD-10-CM

## 2023-07-12 DIAGNOSIS — Z02.6 ENCOUNTER RELATED TO WORKER'S COMPENSATION CLAIM: ICD-10-CM

## 2023-07-12 PROCEDURE — 99213 PR OFFICE/OUTPT VISIT, EST, LEVL III, 20-29 MIN: ICD-10-PCS | Mod: S$GLB,,,

## 2023-07-12 PROCEDURE — 99213 OFFICE O/P EST LOW 20 MIN: CPT | Mod: S$GLB,,,

## 2023-07-12 NOTE — LETTER
[facility] - Occupational Health  [address] [address]  Phone: [phone]  Fax: [phone]  [facility]: [id number]    Pt Name: Tung Chau  Injury Date: 05/07/2023   Employee ID: [id number] Date of Treatment: 07/12/2023   Company: Retired--7/1/2005      Appointment Time: 09:15 AM Arrived: 9:00 AM    Provider: Julian Soni, DO Time Out:10:47 AM     Office Treatment:   1. Strain of mid-back, subsequent encounter    2. Encounter related to worker's compensation claim               Restrictions: Regular Duty     Return Appointment: 8/11/23 at 9:15 AM MELANY

## 2023-07-12 NOTE — PROGRESS NOTES
Subjective:      Patient ID: Tung Chau is a 82 y.o. male.    Chief Complaint: Back Pain    See MA note.  He continues to experience intermittent achy sensation to the right lower posterior thoracic area.  Most pronounced 1st thing in the morning but tends to work itself out during the day.  He does have some difficulty lifting some medium weight items.  Physical therapy order has been placed and he is pending call back at this time.    Patient's place of employment - [employer]   Patient's job title -    Date of Injury - 5/7/2023  Body part injured - Back  Current work status per last visit - Regular  Improved, same, or worse - Improved  Pain Scale right now (1-10) -  0/10; achy      Constitution: Negative.   HENT: Negative.     Neck: neck negative.   Cardiovascular: Negative.    Eyes: Negative.    Respiratory: Negative.     Gastrointestinal: Negative.    Endocrine: negative.   Genitourinary: Negative.    Musculoskeletal:  Negative for trauma, abnormal ROM of joint and pain with walking.   Skin: Negative.  Negative for erythema.   Neurological: Negative.  Negative for tingling.   Objective:     Physical Exam  Vitals and nursing note reviewed.   Constitutional:       General: He is not in acute distress.     Appearance: Normal appearance.   HENT:      Head: Normocephalic.   Eyes:      Conjunctiva/sclera: Conjunctivae normal.   Musculoskeletal:      Right shoulder: No swelling or deformity. Normal range of motion.      Left shoulder: No swelling or deformity. Normal range of motion.      Cervical back: No pain with movement.      Thoracic back: No swelling, deformity, lacerations, spasms, tenderness or bony tenderness. Normal range of motion.        Back:       Comments: There is a scar noted to the left lateral lower chest wall area.  He states this was from when he was 6 years of age due to pneumonia at that time and he has this residual scar.  He describes discomfort to the right lower lateral  posterior thoracic area both on palpation during range of motion assessment.  Otherwise he has full range of motion of his shoulders and thoracic spine.  Movement does elicit some discomfort to this area but not pain.   Skin:     General: Skin is warm.      Capillary Refill: Capillary refill takes less than 2 seconds.      Findings: No erythema.   Neurological:      General: No focal deficit present.      Mental Status: He is alert and oriented to person, place, and time.      Gait: Gait is intact.   Psychiatric:         Attention and Perception: Attention normal.         Mood and Affect: Mood and affect normal.         Speech: Speech normal.         Behavior: Behavior normal. Behavior is cooperative.      Assessment:      1. Strain of mid-back, subsequent encounter    2. Encounter related to worker's compensation claim      Plan:   Functionally, he appears to be doing well despite some discomfort to the lower thoracic region.  He believes optimizing a home exercise program will be all that is needed to help in his recovery process and a little bit of time.  I advised that I will contact her coordination team to try to help expedite scheduling of his therapy.  Otherwise we will have him follow up in approximately 1 month for re-evaluation.    I spent a total of 20 minutes on the day of the visit.This includes face to face time and non-face to face time preparing to see the patient (eg, review of tests), obtaining and/or reviewing separately obtained history, documenting clinical information in the electronic or other health record, independently interpreting results and communicating results to the patient/family/caregiver, or care coordinator.            Restrictions: Regular Duty  No follow-ups on file.

## 2023-07-18 RX ORDER — BENAZEPRIL HYDROCHLORIDE 10 MG/1
TABLET ORAL
Qty: 90 TABLET | Refills: 2 | Status: SHIPPED | OUTPATIENT
Start: 2023-07-18 | End: 2024-01-30

## 2023-07-18 NOTE — TELEPHONE ENCOUNTER
No care due was identified.  Health system Embedded Care Due Messages. Reference number: [id number].   7/18/2023 7:16:43 AM CDT

## 2023-07-18 NOTE — TELEPHONE ENCOUNTER
Refill Decision Note   Tung Larbronwyn  is requesting a refill authorization.  Brief Assessment and Rationale for Refill:  Approve     Medication Therapy Plan:         Comments:     Note composed:7:59 AM 07/18/2023

## 2023-07-19 ENCOUNTER — CLINICAL SUPPORT (OUTPATIENT)
Dept: REHABILITATION | Facility: HOSPITAL | Age: 83
End: 2023-07-19
Payer: OTHER MISCELLANEOUS

## 2023-07-19 DIAGNOSIS — S29.012D STRAIN OF MID-BACK, SUBSEQUENT ENCOUNTER: ICD-10-CM

## 2023-07-19 PROBLEM — S29.012A STRAIN OF MID-BACK: Status: ACTIVE | Noted: 2023-07-19

## 2023-07-19 PROCEDURE — 97140 MANUAL THERAPY 1/> REGIONS: CPT

## 2023-07-19 PROCEDURE — 97162 PT EVAL MOD COMPLEX 30 MIN: CPT

## 2023-07-19 PROCEDURE — 97110 THERAPEUTIC EXERCISES: CPT

## 2023-07-19 NOTE — PROGRESS NOTES
[facility]   Physical Therapy Workers' Compensation Initial Evaluation      Name: Tung Chau  Clinic Number: [id number]    Therapy Diagnosis:   Encounter Diagnosis   Name Primary?    Strain of mid-back, subsequent encounter      Physician: Lizette Thomas MD    Physician Orders: PT Eval and Treat   Medical Diagnosis from Referral: S29.012D (ICD-10-CM) - Strain of mid-back, subsequent encounter   Evaluation Date: 7/19/2023  Authorization Period Expiration: 12/30/2023  Plan of Care Expiration: 9/19/2023  Visit # / Visits authorized: 1/ 12    Foto: #1/3 on 7/19/2023  Time In: 1:15pm  Time Out: 2:50pm  Total Appointment Time (timed & untimed codes): 95 minutes    Precautions: Standard    Subjective     Date of injury: 5/22/2023  History of current condition - Patient was pushing a wheelchair at work, someone cut in front of him and he swerved to avoid running into the person, which resulted in his back hitting the wall and him falling to the ground.      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, pushing and pulling)   Job demands: 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     Current work restrictions: walks 10-12,000 steps per day working for [employer] pushing people in wheelchairs, volunteers doing gardening at the [employer] - all part time   Previous work status: patient was lifting #30 weight in gym,   Current work status: patient had missed work for about 2 weeks and then returned to doing the same thing he was doing before; however, he is not pushing wheelchairs anymore - patient is not trying to go back to pushing wheelchairs at work     Imaging: Lumbar, Impression: No acute fracture.    Social History: lives with wife     Pain:  Current 4/10, worst 5/10, best 2/10   Location: mid back   Description: intermittent, aching and tightness in the muscles    Aggravating Factors: lifting heavy objects   Easing Factors: heating pad, hot water after the gym     Pt's goals: Return to employment according to previous level of function    Medical History:   Past Medical History:   Diagnosis Date    Cataract     Chronic constipation     GERD (gastroesophageal reflux disease)     Glaucoma     Hyperlipidemia     Hypertension     LBBB (left bundle branch block)      Surgical History:   Tung Chau  has a past surgical history that includes Inguinal hernia repair (Right); Colonoscopy (4/28/2008); Esophagogastroduodenoscopy (2/1/2010); Colonoscopy (N/A, 5/15/2017); Esophagogastroduodenoscopy (N/A, 5/25/2020); Colonoscopy (N/A, 5/25/2020); Cataract extraction w/  intraocular lens implant (Left, 04/27/2021); Cataract extraction w/  intraocular lens implant (Left, 4/27/2021); and Cataract extraction w/  intraocular lens implant (Right, 5/11/2021).    Medications:   Tung has a current medication list which includes the following prescription(s): atorvastatin, benazepril, cetirizine, diltiazem, doxazosin, fluticasone propionate, hydrochlorothiazide, ibuprofen, and latanoprost.    Allergies:   Review of patient's allergies indicates:   Allergen Reactions    Penicillins      Stomach cramps      Objective    7/19/2023:  Observation/posture:   lack of lumbar/thoracic curvature in standing  mild R shift deviation based on shoulder positioning   Reproduction of comparable sign at B paraspinals with supine - sit - prone - stand transitional movements     Thoracic Range of Motion:    Degrees   Flexion 3 inches      Extension 2 inches      Left rotation   1 inch   Right Rotation   1 inch     Lumbar Range of Motion:    Degrees   Flexion 80     Extension 10     Left Side Bending 25   Right Side Bending 5      Lower Extremity Strength  Right LE Left LE   Knee extension: 4-/5 Knee extension: 4-/5   Hip flexion:4-/5 Hip flexion: 4-/5   Hip extension: 4/5 Hip extension: 4/5   Hip abduction:  "4/5 Hip abduction: 4/5   Ankle dorsiflexion: 4+/5 Ankle dorsiflexion: 4+/5     Joint Mobility:   Lumbar: hypomobile, painful   Thoracic: hypomobile, painful     Flexibility:   Ely's test: R = WNL ; L = WNL  Popliteal Angle: R = WNL; L = WNL    Function/balance:   Sit - stand: 13x in 30"   Bridging: minimal thoracic symptom provocation   Bilateral knees to chest: reproduces comparable sign     Functional Job Specific Testing:   Job Specific Task Job Demands Current Ability   1. Walking  10-12,000 steps/day  Impaired    2. Standing/sitting   Prolonged  Impaired    3. Lifting  >#25, occasionally  Impaired       Intake Outcome Measure for FOTO Lumbar Survey    Therapist reviewed FOTO scores   FOTO documents entered into [employer] - see Media section.    Intake Score: 12       Treatment   Total Treatment time (time-based codes) separate from Evaluation: 35 minutes    therapeutic exercises to develop strength, endurance, ROM, flexibility, posture, and core stabilization for 25 minutes:  Patient education on nature of current condition and PHYSICAL THERAPY POC  Written HOME EXERCISE PROGRAM provided, reviewed, patient demo understanding     Thoracic open book stretch, R/L LE, knees flexed, 10"x10   Seated ball roll out, 10"x10   Seated thoracic ext with OP, half foam behind back in chair, 10"x10  (Initiate) LTR, LE s on ball    manual therapy techniques: Joint mobilizations, Manual traction, Myofacial release, Soft tissue Mobilization, and Friction Massage for 10 minutes:  Thoracic PAIVMs  Decompression cupping at B paraspinal muscles   (Initiate) thoracic ext with OP    neuromuscular re-education activities to improve: Balance, Coordination, Kinesthetic, Sense, Proprioception, and Posture for 00 minutes:      therapeutic activities to improve functional performance for 00  minutes:  (Initiate) squats   (Initiate) modified dead lift #5     Home Exercises and Patient Education Provided  Education provided:   - yes    Home " Exercises Provided: yes.  Exercises were reviewed and Kai was able to demonstrate them prior to the end of the session.  Kai demonstrated good  understanding of the education provided.     Assessment     Tung is a 82 y.o. male referred to outpatient Physical Therapy with a medical diagnosis of S29.012D (ICD-10-CM) - Strain of mid-back, subsequent encounter. Pt presents with decreased ROM, muscle strength, flexibility, joint mobility. Increased pain, stiffness, soft tissue restriction. Impaired posture, joint mechanics, balance.     The patient's current job specific task deficits include the following: limitation in bending, lifting, reaching, pushing, pulling, carrying, prolonged positioning     Patient prognosis: Good.   Rehab potential: Good.     Patient will benefit from skilled outpatient Physical Therapy to address the deficits stated above and in the chart below, provide patient /family education, to maximize patient's level of independence, and to address work-related functional deficits for their job as an  at [employer].    Plan of care discussed with patient: Yes  Patient's spiritual, cultural and educational needs considered and patient is agreeable to the plan of care and goals as stated below:     Anticipated Barriers for therapy:  none    Medical Necessity is demonstrated by the following  History  Co-morbidities and personal factors that may impact the plan of care [] LOW: no personal factors / co-morbidities  [x] MODERATE: 1-2 personal factors / co-morbidities  [] HIGH: 3+ personal factors / co-morbidities    Moderate / High Support Documentation:   Co-morbidities affecting plan of care:   Past Medical History:   Diagnosis    Cataract    Chronic constipation    GERD (gastroesophageal reflux disease)    Glaucoma    Hyperlipidemia    Hypertension    LBBB (left bundle branch block)     Personal Factors:   no deficits     Examination  Body Structures and Functions, activity limitations and  participation restrictions that may impact the plan of care [] LOW: addressing 1-2 elements  [x] MODERATE: 3+ elements  [] HIGH: 4+ elements (please support below)    Moderate / High Support Documentation: Moderate      Clinical Presentation [] LOW: stable  [x] MODERATE: Evolving  [] HIGH: Unstable     Decision Making/ Complexity Score: moderate       Goals:   Short Term Goals: 2 weeks   (1) patient will be I with HOME EXERCISE PROGRAM (initial, not met)     (2) patient will obtain 15 deg of lumbar extension ACTIVE RANGE OF MOTION to facilitate improved standing tolerance, > 60 minutes (initial, not met)     (3) patient will obtain 25 deg R lumbar side bending ACTIVE RANGE OF MOTION to facilitate improved 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    Outpatient Physical Therapy 3 times weekly for 4 weeks to include the following interventions: Cervical/Lumbar Traction, Electrical Stimulation re-eval, dry needling, Gait Training, Manual Therapy, Moist Heat/ Ice, Neuromuscular Re-ed, Patient Education, Self Care, Therapeutic Activities, and Therapeutic Exercise.     Upon discharge from further skilled PT intervention it will determined if the need for a work conditioning program or Functional Capacity Evaluation is required to allow the injured worker to return to work with full potential achieved, continued improvement with body mechanics with advanced functional activities, and further  minimize future work-related injuries.     Physical therapist and physical therapy assistant(s) will met face to face to discuss patient's treatment plan and progress towards established goals. Pt will be seen by a physical therapist minimally every 6th visit or every 30 days.    Chica Headley, PT  7/19/2023       I CERTIFY THE NEED FOR THESE SERVICES FURNISHED UNDER THIS PLAN OF TREATMENT AND WHILE UNDER MY CARE     Physician's comments:           Physician's Signature: ___________________________________________________

## 2023-07-21 NOTE — PLAN OF CARE
[facility]   Physical Therapy Workers' Compensation Initial Evaluation      Name: Tung Chau  Clinic Number: [id number]    Therapy Diagnosis:   Encounter Diagnosis   Name Primary?    Strain of mid-back, subsequent encounter      Physician: Lizette Thomas MD    Physician Orders: PT Eval and Treat   Medical Diagnosis from Referral: S29.012D (ICD-10-CM) - Strain of mid-back, subsequent encounter   Evaluation Date: 7/19/2023  Authorization Period Expiration: 12/30/2023  Plan of Care Expiration: 9/19/2023  Visit # / Visits authorized: 1/ 12    Foto: #1/3 on 7/19/2023  Time In: 1:15pm  Time Out: 2:50pm  Total Appointment Time (timed & untimed codes): 95 minutes    Precautions: Standard    Subjective     Date of injury: 5/22/2023  History of current condition - Patient was pushing a wheelchair at work, someone cut in front of him and he swerved to avoid running into the person, which resulted in his back hitting the wall and him falling to the ground.      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, pushing and pulling)   Job demands: 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     Current work restrictions: walks 10-12,000 steps per day working for [employer] pushing people in wheelchairs, volunteers doing gardening at the [employer] - all part time   Previous work status: patient was lifting #30 weight in gym,   Current work status: patient had missed work for about 2 weeks and then returned to doing the same thing he was doing before; however, he is not pushing wheelchairs anymore - patient is not trying to go back to pushing wheelchairs at work     Imaging: Lumbar, Impression: No acute fracture.    Social History: lives with wife     Pain:  Current 4/10, worst 5/10, best 2/10   Location: mid back   Description: intermittent, aching and tightness in the muscles    Aggravating Factors: lifting heavy objects   Easing Factors: heating pad, hot water after the gym     Pt's goals: Return to employment according to previous level of function    Medical History:   Past Medical History:   Diagnosis Date    Cataract     Chronic constipation     GERD (gastroesophageal reflux disease)     Glaucoma     Hyperlipidemia     Hypertension     LBBB (left bundle branch block)      Surgical History:   Tung Chau  has a past surgical history that includes Inguinal hernia repair (Right); Colonoscopy (4/28/2008); Esophagogastroduodenoscopy (2/1/2010); Colonoscopy (N/A, 5/15/2017); Esophagogastroduodenoscopy (N/A, 5/25/2020); Colonoscopy (N/A, 5/25/2020); Cataract extraction w/  intraocular lens implant (Left, 04/27/2021); Cataract extraction w/  intraocular lens implant (Left, 4/27/2021); and Cataract extraction w/  intraocular lens implant (Right, 5/11/2021).    Medications:   Tung has a current medication list which includes the following prescription(s): atorvastatin, benazepril, cetirizine, diltiazem, doxazosin, fluticasone propionate, hydrochlorothiazide, ibuprofen, and latanoprost.    Allergies:   Review of patient's allergies indicates:   Allergen Reactions    Penicillins      Stomach cramps      Objective    7/19/2023:  Observation/posture:   lack of lumbar/thoracic curvature in standing  mild R shift deviation based on shoulder positioning   Reproduction of comparable sign at B paraspinals with supine - sit - prone - stand transitional movements     Thoracic Range of Motion:    Degrees   Flexion 3 inches      Extension 2 inches      Left rotation   1 inch   Right Rotation   1 inch     Lumbar Range of Motion:    Degrees   Flexion 80     Extension 10     Left Side Bending 25   Right Side Bending 5      Lower Extremity Strength  Right LE Left LE   Knee extension: 4-/5 Knee extension: 4-/5   Hip flexion:4-/5 Hip flexion: 4-/5   Hip extension: 4/5 Hip extension: 4/5   Hip abduction:  "4/5 Hip abduction: 4/5   Ankle dorsiflexion: 4+/5 Ankle dorsiflexion: 4+/5     Joint Mobility:   Lumbar: hypomobile, painful   Thoracic: hypomobile, painful     Flexibility:   Ely's test: R = WNL ; L = WNL  Popliteal Angle: R = WNL; L = WNL    Function/balance:   Sit - stand: 13x in 30"   Bridging: minimal thoracic symptom provocation   Bilateral knees to chest: reproduces comparable sign     Functional Job Specific Testing:   Job Specific Task Job Demands Current Ability   1. Walking  10-12,000 steps/day  Impaired    2. Standing/sitting   Prolonged  Impaired    3. Lifting  >#25, occasionally  Impaired       Intake Outcome Measure for FOTO Lumbar Survey    Therapist reviewed FOTO scores   FOTO documents entered into [employer] - see Media section.    Intake Score: 12       Treatment   Total Treatment time (time-based codes) separate from Evaluation: 35 minutes    therapeutic exercises to develop strength, endurance, ROM, flexibility, posture, and core stabilization for 25 minutes:  Patient education on nature of current condition and PHYSICAL THERAPY POC  Written HOME EXERCISE PROGRAM provided, reviewed, patient demo understanding     Thoracic open book stretch, R/L LE, knees flexed, 10"x10   Seated ball roll out, 10"x10   Seated thoracic ext with OP, half foam behind back in chair, 10"x10  (Initiate) LTR, LE s on ball    manual therapy techniques: Joint mobilizations, Manual traction, Myofacial release, Soft tissue Mobilization, and Friction Massage for 10 minutes:  Thoracic PAIVMs  Decompression cupping at B paraspinal muscles   (Initiate) thoracic ext with OP    neuromuscular re-education activities to improve: Balance, Coordination, Kinesthetic, Sense, Proprioception, and Posture for 00 minutes:      therapeutic activities to improve functional performance for 00  minutes:  (Initiate) squats   (Initiate) modified dead lift #5     Home Exercises and Patient Education Provided  Education provided:   - yes    Home " Exercises Provided: yes.  Exercises were reviewed and Kai was able to demonstrate them prior to the end of the session.  Kai demonstrated good  understanding of the education provided.     Assessment     Tung is a 82 y.o. male referred to outpatient Physical Therapy with a medical diagnosis of S29.012D (ICD-10-CM) - Strain of mid-back, subsequent encounter. Pt presents with decreased ROM, muscle strength, flexibility, joint mobility. Increased pain, stiffness, soft tissue restriction. Impaired posture, joint mechanics, balance.     The patient's current job specific task deficits include the following: limitation in bending, lifting, reaching, pushing, pulling, carrying, prolonged positioning     Patient prognosis: Good.   Rehab potential: Good.     Patient will benefit from skilled outpatient Physical Therapy to address the deficits stated above and in the chart below, provide patient /family education, to maximize patient's level of independence, and to address work-related functional deficits for their job as an  at [employer].    Plan of care discussed with patient: Yes  Patient's spiritual, cultural and educational needs considered and patient is agreeable to the plan of care and goals as stated below:     Anticipated Barriers for therapy: none    Medical Necessity is demonstrated by the following  History  Co-morbidities and personal factors that may impact the plan of care [] LOW: no personal factors / co-morbidities  [x] MODERATE: 1-2 personal factors / co-morbidities  [] HIGH: 3+ personal factors / co-morbidities    Moderate / High Support Documentation:   Co-morbidities affecting plan of care:   Past Medical History:   Diagnosis    Cataract    Chronic constipation    GERD (gastroesophageal reflux disease)    Glaucoma    Hyperlipidemia    Hypertension    LBBB (left bundle branch block)     Personal Factors:   no deficits     Examination  Body Structures and Functions, activity limitations and  participation restrictions that may impact the plan of care [] LOW: addressing 1-2 elements  [x] MODERATE: 3+ elements  [] HIGH: 4+ elements (please support below)    Moderate / High Support Documentation: Moderate      Clinical Presentation [] LOW: stable  [x] MODERATE: Evolving  [] HIGH: Unstable     Decision Making/ Complexity Score: moderate       Goals:   Short Term Goals: 2 weeks   (1) patient will be I with HOME EXERCISE PROGRAM (initial, not met)     (2) patient will obtain 15 deg of lumbar extension ACTIVE RANGE OF MOTION to facilitate improved standing tolerance, > 60 minutes (initial, not met)     (3) patient will obtain 25 deg R lumbar side bending ACTIVE RANGE OF MOTION to facilitate improved 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    Outpatient Physical Therapy 3 times weekly for 4 weeks to include the following interventions: Cervical/Lumbar Traction, Electrical Stimulation re-eval, dry needling, Gait Training, Manual Therapy, Moist Heat/ Ice, Neuromuscular Re-ed, Patient Education, Self Care, Therapeutic Activities, and Therapeutic Exercise.     Upon discharge from further skilled PT intervention it will determined if the need for a work conditioning program or Functional Capacity Evaluation is required to allow the injured worker to return to work with full potential achieved, continued improvement with body mechanics with advanced functional activities, and further  minimize future work-related injuries.     Physical therapist and physical therapy assistant(s) will met face to face to discuss patient's treatment plan and progress towards established goals. Pt will be seen by a physical therapist minimally every 6th visit or every 30 days.    Chica Headley, PT  7/19/2023       I CERTIFY THE NEED FOR THESE SERVICES FURNISHED UNDER THIS PLAN OF TREATMENT AND WHILE UNDER MY CARE     Physician's comments:           Physician's Signature: ___________________________________________________

## 2023-07-27 ENCOUNTER — CLINICAL SUPPORT (OUTPATIENT)
Dept: REHABILITATION | Facility: HOSPITAL | Age: 83
End: 2023-07-27
Payer: OTHER MISCELLANEOUS

## 2023-07-27 DIAGNOSIS — S29.012D STRAIN OF MID-BACK, SUBSEQUENT ENCOUNTER: Primary | ICD-10-CM

## 2023-07-27 PROCEDURE — 97530 THERAPEUTIC ACTIVITIES: CPT | Mod: CQ

## 2023-07-27 PROCEDURE — 97140 MANUAL THERAPY 1/> REGIONS: CPT | Mod: CQ

## 2023-07-27 PROCEDURE — 97110 THERAPEUTIC EXERCISES: CPT | Mod: CQ

## 2023-07-27 NOTE — PROGRESS NOTES
[facility]   Workers' Compensation Physical Therapy Treatment Note      Name: Tung Chau  Clinic Number: [id number]    Therapy Diagnosis:   Encounter Diagnosis   Name Primary?    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,  at [employer], 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, pushing and pulling)   Job demands: 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      Current work restrictions: walks 10-12,000 steps per day working for [employer] pushing people in wheelchairs, volunteers doing gardening at the [employer] - all part time   Previous work status: patient was lifting #30 weight in gym,   Current work status: patient had missed work for about 2 weeks and then returned to doing the same thing he was doing before; however, he is not pushing wheelchairs anymore - patient is not trying to go back to pushing wheelchairs at work     Subjective     Pt reports: that he has a lot of tightness in spine but does loosen up after he gets moving.   He was compliant with home exercise program.  Response to previous treatment: increased mobility   Functional change: none at this time    Pain: 2-3/10  Location: mid spine     Objective      Objective Measures updated at progress report unless specified.     Treatment       Kai received the treatments listed below:      therapeutic exercises to develop  "strength, endurance, ROM, flexibility, posture, and core stabilization for 25 minutes:  Patient education on nature of current condition and PHYSICAL THERAPY POC  Written HOME EXERCISE PROGRAM provided, reviewed, patient demo understanding   LTR, LE s on ball : 4 min  DKTC with ball x20 with 5 sec  Seated ball roll out, 10"x10   Seated thoracic ext with OP, half foam behind back in chair, 10"x10  Thoracic open book stretch, R/L LE, knees flexed, 10"x10      manual therapy techniques: Joint mobilizations, Manual traction, Myofacial release, Soft tissue Mobilization, and Friction Massage for 15 minutes:  Thoracic PAIVMs  Decompression cupping at B paraspinal muscles   (Initiate) thoracic ext with OP     neuromuscular re-education activities to improve: Balance, Coordination, Kinesthetic, Sense, Proprioception, and Posture for 00 minutes:      therapeutic activities to improve functional performance for 15 minutes:  Sit back squats: 1x10  Good mornings with #5 db: 1x10  (Initiate) modified dead lift #5       Patient Education and Home Exercises       Education provided:   - Patient was educated on all therapeutic interventions performed during today's treatment visit. Patient was educated on HEP and on all therapeutic interventions performed during today's treatment visit.     Home Exercises Provided: Patient instructed to cont prior HEP. Exercises were reviewed and Kai was able to demonstrate them prior to the end of the session.  Kai demonstrated good  understanding of the education provided. See EMR under Patient Instructions for exercises provided during therapy sessions    Assessment   Tung is a 82 y.o. male referred to outpatient Physical Therapy with a medical diagnosis of S29.012D (ICD-10-CM) - Strain of mid-back, subsequent encounter. Pt presents with decreased ROM, muscle strength, flexibility, joint mobility. Increased pain, stiffness, soft tissue restriction. Impaired posture, joint mechanics, balance. "      Patient presents with min pain but significant thoracic and lumbar spine tightness. Treatment focused on spine mobility and lumbar stabilization. It was noted that patient required increased time in order to perform therapeutic exercises. It was also noted that patient exhibits great effort when performing therapeutic exercises. Will progress as tolerated.     The patient's current job specific task deficits include the following: limitation in bending, lifting, reaching, pushing, pulling, carrying, prolonged positioning     Kai Is is making good progress towards meeting his goals.     Patient prognosis is: Good.   Rehab potential is: Good    Pt will continue to benefit from skilled Physical Therapy interventions in order to address the deficits listed in the problem list box on initial evaluation, provide education, and to address the musculoskeletal limitations and work-related functional deficits for their job as an  at [employer].    Pt's spiritual, cultural and educational needs considered and pt agreeable to plan of care and goals.     Anticipated barriers to physical therapy:  none    Goals:   Short Term Goals: 2 weeks   (1) patient will be I with HOME EXERCISE PROGRAM (initial, not met)      (2) patient will obtain 15 deg of lumbar extension ACTIVE RANGE OF MOTION to facilitate improved standing tolerance, > 60 minutes (initial, not met)      (3) patient will obtain 25 deg R lumbar side bending ACTIVE RANGE OF MOTION to facilitate improved ability to complete gardening tasks for volunteer work (initial, not met)      Long Term Goals: 4 weeks   (1) patient will walk, 12,000 steps per day to facilitate return to work according to previous level of function (initial, not met)      (2) patient will lift #30, floor - chest height x 10 repetitions with proper mechanics/alignment to facilitate improved ability to participate in gardening tasks for volunteer work (initial, not met)      (3) pt will  return to work full duty, full time (initial, not met)    Plan     Plan of care Certification: 7/19/2023 to 9/19/2023.     Outpatient Physical Therapy 3 times weekly for 4 weeks to include the following interventions: Cervical/Lumbar Traction, Electrical Stimulation re-eval, dry needling, Gait Training, Manual Therapy, Moist Heat/ Ice, Neuromuscular Re-ed, Patient Education, Self Care, Therapeutic Activities, and Therapeutic Exercise.      Upon discharge from further skilled PT intervention it will determined if the need for a work conditioning program or Functional Capacity Evaluation is required to allow the injured worker to return to work with full potential achieved, continued improvement with body mechanics with advanced functional activities, and further minimize future work-related injuries.      Physical therapist and physical therapy assistant(s) will met face to face to discuss patient's treatment plan and progress towards established goals. Pt will be seen by a physical therapist minimally every 6th visit or every 30 days.    Prashant Landeros, PTA   7/28/2023

## 2023-08-01 ENCOUNTER — CLINICAL SUPPORT (OUTPATIENT)
Dept: REHABILITATION | Facility: HOSPITAL | Age: 83
End: 2023-08-01
Payer: OTHER MISCELLANEOUS

## 2023-08-01 DIAGNOSIS — S29.012D STRAIN OF MID-BACK, SUBSEQUENT ENCOUNTER: Primary | ICD-10-CM

## 2023-08-01 PROCEDURE — 97110 THERAPEUTIC EXERCISES: CPT

## 2023-08-01 PROCEDURE — 97530 THERAPEUTIC ACTIVITIES: CPT

## 2023-08-01 PROCEDURE — 97112 NEUROMUSCULAR REEDUCATION: CPT

## 2023-08-01 PROCEDURE — 97140 MANUAL THERAPY 1/> REGIONS: CPT

## 2023-08-01 NOTE — PROGRESS NOTES
[facility]   Workers' Compensation Physical Therapy Treatment Note      Name: Tung Chau  Clinic Number: [id number]    Therapy Diagnosis:   Encounter Diagnosis   Name Primary?    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,  at [employer], 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, pushing and pulling)   Job demands: 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      Current work restrictions: walks 10-12,000 steps per day working for [employer] pushing people in wheelchairs, volunteers doing gardening at the [employer] - all part time   Previous work status: patient was lifting #30 weight in gym,   Current work status: patient had missed work for about 2 weeks and then returned to doing the same thing he was doing before; however, he is not pushing wheelchairs anymore - patient is not trying to go back to pushing wheelchairs at work     Subjective   HOME EXERCISE PROGRAM: reviewed, reports compliance   Response to previous treatment: states that his back gets really tight (R>L) after lying down but feels better when moving   Function: continued limitation in transitional movements, lifting, pushing and pulling tasks     Pain: 2-3/10  Location: mid spine     Objective      Objective Measures updated at progress report unless specified.     Treatment     therapeutic exercises to develop strength, endurance, ROM, flexibility,  "posture, and core stabilization for 45 minutes:  LTR, LE s on ball : 4 min  DKTC with ball x20 with 5 sec  Seated ball roll out, 10"x10   Seated thoracic ext with OP, half foam behind back in chair, 10"x10  Thoracic open book stretch, R/L LE, knees flexed, 10"x10   +Written HOME EXERCISE PROGRAM updated, reviewed, patient demo understanding      manual therapy techniques: Joint mobilizations, Manual traction, Myofacial release, Soft tissue Mobilization, and Friction Massage for 15 minutes:  Thoracic R side bending and extension Gr III/IV-- JM in sitting   Decompression cupping at B paraspinal muscles   Thoracic PAIVMS Gr III/IV JM in prone     neuromuscular re-education activities to improve: Balance, Coordination, Kinesthetic, Sense, Proprioception, and Posture for 10 minutes:   +Pallof press, red band, 2x10 each way     therapeutic activities to improve functional performance for 10 minutes:  Sit back squats: B HRA, chair behind patient with silver foam on chair, 3x10  Good mornings with #5 db: 1x10  (Initiate) modified dead lift #5       Patient Education and Home Exercises       Education provided:   - Patient was educated on all therapeutic interventions performed during today's treatment visit. Patient was educated on HEP and on all therapeutic interventions performed during today's treatment visit.     Home Exercises Provided: Patient instructed to cont prior HEP. Exercises were reviewed and Kai was able to demonstrate them prior to the end of the session.  Kai demonstrated good  understanding of the education provided. See EMR under Patient Instructions for exercises provided during therapy sessions    Assessment   Patient presents with pain during transitional movements from prone/supine -> sitting indicating continued need for strengthening of stabilizing musculature. He completes progression of exercises with cuing for proper mechanics and appropriate level of challenge. He continues with significant " stiffness noted at L > R thoracic spinal region and muscle segments.     The patient's current job specific task deficits include the following: limitation in bending, lifting, reaching, pushing, pulling, carrying, prolonged positioning     Kai is is making good progress towards meeting his goals.     Patient prognosis is: Good.   Rehab potential is: Good    Pt will continue to benefit from skilled Physical Therapy interventions in order to address the deficits listed in the problem list box on initial evaluation, provide education, and to address the musculoskeletal limitations and work-related functional deficits for their job as an  at [employer].    Pt's spiritual, cultural and educational needs considered and pt agreeable to plan of care and goals.     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     Outpatient Physical Therapy 3 times weekly for 4 weeks to include the following interventions: Cervical/Lumbar Traction, Electrical Stimulation re-eval, dry needling, Gait  Training, Manual Therapy, Moist Heat/ Ice, Neuromuscular Re-ed, Patient Education, Self Care, Therapeutic Activities, and Therapeutic Exercise.      Upon discharge from further skilled PT intervention it will determined if the need for a work conditioning program or Functional Capacity Evaluation is required to allow the injured worker to return to work with full potential achieved, continued improvement with body mechanics with advanced functional activities, and further minimize future work-related injuries.      Physical therapist and physical therapy assistant(s) will met face to face to discuss patient's treatment plan and progress towards established goals. Pt will be seen by a physical therapist minimally every 6th visit or every 30 days.    Chica Headley, PT   7/31/2023

## 2023-08-04 ENCOUNTER — CLINICAL SUPPORT (OUTPATIENT)
Dept: REHABILITATION | Facility: HOSPITAL | Age: 83
End: 2023-08-04
Payer: OTHER MISCELLANEOUS

## 2023-08-04 DIAGNOSIS — S29.012D STRAIN OF MID-BACK, SUBSEQUENT ENCOUNTER: Primary | ICD-10-CM

## 2023-08-04 PROCEDURE — 97110 THERAPEUTIC EXERCISES: CPT | Mod: CQ

## 2023-08-04 PROCEDURE — 97140 MANUAL THERAPY 1/> REGIONS: CPT | Mod: CQ

## 2023-08-04 PROCEDURE — 97530 THERAPEUTIC ACTIVITIES: CPT | Mod: CQ

## 2023-08-04 PROCEDURE — 97112 NEUROMUSCULAR REEDUCATION: CPT | Mod: CQ

## 2023-08-04 NOTE — PROGRESS NOTES
[facility]   Workers' Compensation Physical Therapy Treatment Note      Name: Tung Chau  Clinic Number: [id number]    Therapy Diagnosis:   Encounter Diagnosis   Name Primary?    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,  at [employer], 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, pushing and pulling)   Job demands: 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      Current work restrictions: walks 10-12,000 steps per day working for [employer] pushing people in wheelchairs, volunteers doing gardening at the [employer] - all part time   Previous work status: patient was lifting #30 weight in gym,   Current work status: patient had missed work for about 2 weeks and then returned to doing the same thing he was doing before; however, he is not pushing wheelchairs anymore - patient is not trying to go back to pushing wheelchairs at work     Subjective   HOME EXERCISE PROGRAM: reviewed, reports compliance   Response to previous treatment: states that his back gets really tight (R>L) after lying down but feels better when moving   Function: continued limitation in transitional movements, lifting, pushing and pulling tasks     Pain: 3-4/10  Location: mid spine     Objective      Objective Measures updated at progress report unless specified.     Treatment     therapeutic exercises to develop  "strength, endurance, ROM, flexibility, posture, and core stabilization for 30 minutes:  LTR, LE s on ball : 4 min  DKTC with ball x20 with 5 sec  Seated ball roll out, 10"x10   Seated thoracic ext with OP, half foam behind back in chair, 10"x10  Thoracic open book stretch, R/L LE, knees flexed, 10"x10   Written HOME EXERCISE PROGRAM updated, reviewed, patient demo understanding      manual therapy techniques: Joint mobilizations, Manual traction, Myofacial release, Soft tissue Mobilization, and Friction Massage for 25 minutes:  Thoracic R side bending and extension Gr III/IV-- JM in sitting   Decompression cupping at B paraspinal muscles   STM to paraspinal muscles  Thoracic PAIVMS Gr III/IV JM in prone     neuromuscular re-education activities to improve: Balance, Coordination, Kinesthetic, Sense, Proprioception, and Posture for 10 minutes:  Pallof press, red band, 2x10 each way     therapeutic activities to improve functional performance for 10 minutes:  Sit back squats: B HRA, chair behind patient with silver foam on chair, 3x10  Good mornings with #5 db: 1x10  (Initiate) modified dead lift #5       Patient Education and Home Exercises       Education provided:   - Patient was educated on all therapeutic interventions performed during today's treatment visit. Patient was educated on HEP and on all therapeutic interventions performed during today's treatment visit.     Home Exercises Provided: Patient instructed to cont prior HEP. Exercises were reviewed and Kai was able to demonstrate them prior to the end of the session.  Kai demonstrated good  understanding of the education provided. See EMR under Patient Instructions for exercises provided during therapy sessions    Assessment   Patient presents with pain during transitional movements from prone/supine -> sitting indicating continued need for strengthening of stabilizing musculature. It was noted that patient demonstrated significant tightness in B thoracic & " upper lumbar paraspinals. He completes progression of exercises with cuing for proper mechanics and appropriate level of challenge. He continues with significant stiffness noted at L > R thoracic spinal region and muscle segments.     The patient's current job specific task deficits include the following: limitation in bending, lifting, reaching, pushing, pulling, carrying, prolonged positioning     Kai is is making good progress towards meeting his goals.     Patient prognosis is: Good.   Rehab potential is: Good    Pt will continue to benefit from skilled Physical Therapy interventions in order to address the deficits listed in the problem list box on initial evaluation, provide education, and to address the musculoskeletal limitations and work-related functional deficits for their job as an  at [employer].    Pt's spiritual, cultural and educational needs considered and pt agreeable to plan of care and goals.     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     Outpatient  Physical Therapy 3 times weekly for 4 weeks to include the following interventions: Cervical/Lumbar Traction, Electrical Stimulation re-eval, dry needling, Gait Training, Manual Therapy, Moist Heat/ Ice, Neuromuscular Re-ed, Patient Education, Self Care, Therapeutic Activities, and Therapeutic Exercise.      Upon discharge from further skilled PT intervention it will determined if the need for a work conditioning program or Functional Capacity Evaluation is required to allow the injured worker to return to work with full potential achieved, continued improvement with body mechanics with advanced functional activities, and further minimize future work-related injuries.      Physical therapist and physical therapy assistant(s) will met face to face to discuss patient's treatment plan and progress towards established goals. Pt will be seen by a physical therapist minimally every 6th visit or every 30 days.    Prashant Landeros, PTA   8/4/2023

## 2023-08-08 ENCOUNTER — CLINICAL SUPPORT (OUTPATIENT)
Dept: REHABILITATION | Facility: HOSPITAL | Age: 83
End: 2023-08-08
Payer: OTHER MISCELLANEOUS

## 2023-08-08 DIAGNOSIS — S29.012D STRAIN OF MID-BACK, SUBSEQUENT ENCOUNTER: Primary | ICD-10-CM

## 2023-08-08 PROCEDURE — 97110 THERAPEUTIC EXERCISES: CPT | Mod: CQ

## 2023-08-08 PROCEDURE — 97530 THERAPEUTIC ACTIVITIES: CPT | Mod: CQ

## 2023-08-08 PROCEDURE — 97140 MANUAL THERAPY 1/> REGIONS: CPT | Mod: CQ

## 2023-08-08 PROCEDURE — 97112 NEUROMUSCULAR REEDUCATION: CPT | Mod: CQ

## 2023-08-08 NOTE — PROGRESS NOTES
[facility]   Workers' Compensation Physical Therapy Treatment Note      Name: Tung Chau  Clinic Number: [id number]    Therapy Diagnosis:   Encounter Diagnosis   Name Primary?    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,  at [employer], 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, pushing and pulling)   Job demands: 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      Current work restrictions: walks 10-12,000 steps per day working for [employer] pushing people in wheelchairs, volunteers doing gardening at the [employer] - all part time   Previous work status: patient was lifting #30 weight in gym,   Current work status: patient had missed work for about 2 weeks and then returned to doing the same thing he was doing before; however, he is not pushing wheelchairs anymore - patient is not trying to go back to pushing wheelchairs at work     Subjective   HOME EXERCISE PROGRAM: reviewed, reports compliance   Response to previous treatment: states that his back gets really tight (R>L) after lying down but feels better when moving   Function: continued limitation in transitional movements, lifting, pushing and pulling tasks     Pain: 3-4/10  Location: mid spine     Objective      Objective Measures updated at progress report unless specified.     Treatment     therapeutic exercises to develop  "strength, endurance, ROM, flexibility, posture, and core stabilization for 25 minutes:  LTR, LE s on ball : 4 min  DKTC with ball x20 with 5 sec  Seated ball roll out, 10"x10   Seated thoracic ext with OP, half foam behind back in chair, 10"x10  Thoracic open book stretch, R/L LE, knees flexed, 10"x10   Written HOME EXERCISE PROGRAM updated, reviewed, patient demo understanding      manual therapy techniques: Joint mobilizations, Manual traction, Myofacial release, Soft tissue Mobilization, and Friction Massage for 25 minutes:  Thoracic R side bending and extension Gr III/IV-- JM in sitting   Decompression cupping at B paraspinal muscles   STM to paraspinal muscles  Thoracic PAIVMS Gr III/IV JM in prone     neuromuscular re-education activities to improve: Balance, Coordination, Kinesthetic, Sense, Proprioception, and Posture for 10 minutes:  Pallof press, red band, 2x10 each way     therapeutic activities to improve functional performance for 10 minutes:  Sit back squats: B HRA, chair behind patient with silver foam on chair, 3x10  Good mornings with #5 db: 1x10  (Initiate) modified dead lift #5       Patient Education and Home Exercises       Education provided:   - Patient was educated on all therapeutic interventions performed during today's treatment visit. Patient was educated on HEP and on all therapeutic interventions performed during today's treatment visit.     Home Exercises Provided: Patient instructed to cont prior HEP. Exercises were reviewed and Kai was able to demonstrate them prior to the end of the session.  Kai demonstrated good  understanding of the education provided. See EMR under Patient Instructions for exercises provided during therapy sessions    Assessment     Patient endorsed decreased pain post last treatment visit as patient stated that his spine responded well to the manual treatment administered during the previous visit. Nevertheless, patient continues to present with pain during " transitional movements from prone/supine -> sitting indicating continued need for strengthening of stabilizing musculature. It was noted that patient demonstrated significant tightness in B thoracic & upper lumbar paraspinals. Hence, patient continued to receive STM and cupping to thoracic spinal region musculature.     The patient's current job specific task deficits include the following: limitation in bending, lifting, reaching, pushing, pulling, carrying, prolonged positioning     Kai is is making good progress towards meeting his goals.     Patient prognosis is: Good.   Rehab potential is: Good    Pt will continue to benefit from skilled Physical Therapy interventions in order to address the deficits listed in the problem list box on initial evaluation, provide education, and to address the musculoskeletal limitations and work-related functional deficits for their job as an  at [employer].    Pt's spiritual, cultural and educational needs considered and pt agreeable to plan of care and goals.     Anticipated barriers to physical therapy:  none    Goals:   Short Term Goals: 2 weeks   (1) patient will be I with HOME EXERCISE PROGRAM (initial, not met)      (2) patient will obtain 15 deg of lumbar extension ACTIVE RANGE OF MOTION to facilitate improved standing tolerance, > 60 minutes (initial, not met)      (3) patient will obtain 25 deg R lumbar side bending ACTIVE RANGE OF MOTION to facilitate improved ability to complete gardening tasks for volunteer work (initial, not met)      Long Term Goals: 4 weeks   (1) patient will walk, 12,000 steps per day to facilitate return to work according to previous level of function (initial, not met)      (2) patient will lift #30, floor - chest height x 10 repetitions with proper mechanics/alignment to facilitate improved ability to participate in gardening tasks for volunteer work (initial, not met)      (3) pt will return to work full duty, full time (initial, not  met)    Plan     Plan of care Certification: 7/19/2023 to 9/19/2023.     Outpatient Physical Therapy 3 times weekly for 4 weeks to include the following interventions: Cervical/Lumbar Traction, Electrical Stimulation re-eval, dry needling, Gait Training, Manual Therapy, Moist Heat/ Ice, Neuromuscular Re-ed, Patient Education, Self Care, Therapeutic Activities, and Therapeutic Exercise.      Upon discharge from further skilled PT intervention it will determined if the need for a work conditioning program or Functional Capacity Evaluation is required to allow the injured worker to return to work with full potential achieved, continued improvement with body mechanics with advanced functional activities, and further minimize future work-related injuries.      Physical therapist and physical therapy assistant(s) will met face to face to discuss patient's treatment plan and progress towards established goals. Pt will be seen by a physical therapist minimally every 6th visit or every 30 days.    Prashant Landeros, PTA   8/8/2023

## 2023-08-11 ENCOUNTER — CLINICAL SUPPORT (OUTPATIENT)
Dept: REHABILITATION | Facility: HOSPITAL | Age: 83
End: 2023-08-11
Payer: OTHER MISCELLANEOUS

## 2023-08-11 ENCOUNTER — OFFICE VISIT (OUTPATIENT)
Dept: URGENT CARE | Facility: CLINIC | Age: 83
End: 2023-08-11
Payer: OTHER MISCELLANEOUS

## 2023-08-11 VITALS
WEIGHT: 225 LBS | OXYGEN SATURATION: 97 % | HEART RATE: 96 BPM | DIASTOLIC BLOOD PRESSURE: 93 MMHG | SYSTOLIC BLOOD PRESSURE: 147 MMHG | RESPIRATION RATE: 18 BRPM | BODY MASS INDEX: 27.98 KG/M2 | HEIGHT: 75 IN

## 2023-08-11 DIAGNOSIS — S29.012D STRAIN OF MID-BACK, SUBSEQUENT ENCOUNTER: Primary | ICD-10-CM

## 2023-08-11 DIAGNOSIS — Z02.6 ENCOUNTER RELATED TO WORKER'S COMPENSATION CLAIM: ICD-10-CM

## 2023-08-11 PROCEDURE — 97140 MANUAL THERAPY 1/> REGIONS: CPT | Mod: CQ

## 2023-08-11 PROCEDURE — 97530 THERAPEUTIC ACTIVITIES: CPT | Mod: CQ

## 2023-08-11 PROCEDURE — 99213 OFFICE O/P EST LOW 20 MIN: CPT | Mod: S$GLB,,, | Performed by: STUDENT IN AN ORGANIZED HEALTH CARE EDUCATION/TRAINING PROGRAM

## 2023-08-11 PROCEDURE — 97110 THERAPEUTIC EXERCISES: CPT | Mod: CQ

## 2023-08-11 PROCEDURE — 99213 PR OFFICE/OUTPT VISIT, EST, LEVL III, 20-29 MIN: ICD-10-PCS | Mod: S$GLB,,, | Performed by: STUDENT IN AN ORGANIZED HEALTH CARE EDUCATION/TRAINING PROGRAM

## 2023-08-11 NOTE — PROGRESS NOTES
Subjective:      Patient ID: Tung Chau is a 83 y.o. male.    Chief Complaint: Back Pain    Patient's place of employment - [employer]   Patient's job title -    Date of Injury - 5/7/2023  Body part injured - Back  Current work status per last visit - Retired  Improved, same, or worse - Improved  Pain Scale right now (1-10) -  0/10; more achy than pain    See MA note above. Begin MD note:    Mr. Chau has been participating with physical therapy and says it is going well. He still has some stiffness to the right mid- back area in the morning and it decreases as he moves around and stretches. He is not taking any medications or using topical treatments for his pain. Denies new symptoms. He is still volunteering at several locations and no increase in symptoms at his jobs.         Musculoskeletal:  Positive for abnormal ROM of joint.   Skin:  Negative for erythema.     Objective:     Physical Exam  Vitals and nursing note reviewed.   Constitutional:       General: He is not in acute distress.     Appearance: Normal appearance. He is not ill-appearing.   HENT:      Head: Normocephalic.   Eyes:      Conjunctiva/sclera: Conjunctivae normal.   Pulmonary:      Effort: No respiratory distress.   Musculoskeletal:      Thoracic back: Tenderness (small area of discomfort a right mid thoracic lumbar muscles) present. No swelling, deformity, spasms or bony tenderness. Normal range of motion.        Back:       Comments: Mild discomfort to right mid back with bilateral spinal rotation and shoulder abduction and flexion.   Skin:     Findings: No erythema.   Neurological:      Mental Status: He is alert and oriented to person, place, and time.      GCS: GCS eye subscore is 4. GCS verbal subscore is 5. GCS motor subscore is 6.   Psychiatric:         Attention and Perception: Attention normal.         Mood and Affect: Mood normal.         Behavior: Behavior normal.        Assessment:      1. Strain of mid-back,  subsequent encounter    2. Encounter related to worker's compensation claim      Plan:     Patient will follow up in 1 month after completion of PT, sooner if needed. Encouraged HEP. Anticipate MMI and discharge from [facility] at follow up.        Patient Instructions: Continue Physical Therapy   Restrictions: Regular Duty  Follow up in about 1 month (around 9/11/2023).

## 2023-08-11 NOTE — PROGRESS NOTES
[facility]   Workers' Compensation Physical Therapy Treatment Note      Name: Tung Chau  Clinic Number: [id number]    Therapy Diagnosis:   Encounter Diagnosis   Name Primary?    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,  at [employer], 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, pushing and pulling)   Job demands: 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      Current work restrictions: walks 10-12,000 steps per day working for [employer] pushing people in wheelchairs, volunteers doing gardening at the [employer] - all part time   Previous work status: patient was lifting #30 weight in gym,   Current work status: patient had missed work for about 2 weeks and then returned to doing the same thing he was doing before; however, he is not pushing wheelchairs anymore - patient is not trying to go back to pushing wheelchairs at work     Subjective   HOME EXERCISE PROGRAM: reviewed, reports compliance   Response to previous treatment: states that his back gets feels much better usually about 1 hour after therapy.   Function: continued limitation in transitional movements, lifting, pushing and pulling tasks     Pain: 3-4/10  Location: mid spine     Objective      Objective Measures updated at progress report unless specified.     Treatment     therapeutic exercises to develop strength,  "endurance, ROM, flexibility, posture, and core stabilization for 25 minutes:  LTR, LE s on ball : 4 min  DKTC with ball x20 with 5 sec  Thoracic open book stretch, R/L LE, knees flexed, 10"x10   Seated ball roll out, 10"x 10  Seated thoracic ext with OP, half foam behind back in chair, 10"x 10  Written HOME EXERCISE PROGRAM updated, reviewed, patient demo understanding      manual therapy techniques: Joint mobilizations, Manual traction, Myofacial release, Soft tissue Mobilization, and Friction Massage for 20 minutes:  Thoracic R side bending and extension Gr III/IV-- JM in sitting   Decompression cupping at B paraspinal muscles   STM to paraspinal muscles  Thoracic PAIVMS Gr III/IV JM in prone     neuromuscular re-education activities to improve: Balance, Coordination, Kinesthetic, Sense, Proprioception, and Posture for 5 minutes:  Pallof press, red band, 2x10 each way     therapeutic activities to improve functional performance for 15 minutes:  Sit back squats: B HRA, chair behind patient with silver foam on chair, 3x10  Good mornings with #5 db: 1x10  dead lift with 20# kb: 1x8/side   Unilateral lift during a squat of 20 kb: 1x8/side      Patient Education and Home Exercises       Education provided:   - Patient was educated on all therapeutic interventions performed during today's treatment visit. Patient was educated on HEP and on all therapeutic interventions performed during today's treatment visit.     Home Exercises Provided: Patient instructed to cont prior HEP. Exercises were reviewed and Kai was able to demonstrate them prior to the end of the session.  Kai demonstrated good  understanding of the education provided. See EMR under Patient Instructions for exercises provided during therapy sessions    Assessment     Patient endorsed decreased pain post treatment visits as patient stated that his spine responded well to the manual treatment administered during the previous visit. Patient reported decreased " discomfort with manual therapy. Patient would benefit from continued need for strengthening of stabilizing musculature as well as exercises promoting thoracic/lumbar mobility.     The patient's current job specific task deficits include the following: limitation in bending, lifting, reaching, pushing, pulling, carrying, prolonged positioning.     Kai is making good progress towards meeting his goals.     Patient prognosis is: Good.   Rehab potential is: Good    Pt will continue to benefit from skilled Physical Therapy interventions in order to address the deficits listed in the problem list box on initial evaluation, provide education, and to address the musculoskeletal limitations and work-related functional deficits for their job as an  at [employer].    Pt's spiritual, cultural and educational needs considered and pt agreeable to plan of care and goals.     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     Outpatient Physical Therapy 3 times weekly for 4 weeks to include the  following interventions: Cervical/Lumbar Traction, Electrical Stimulation re-eval, dry needling, Gait Training, Manual Therapy, Moist Heat/ Ice, Neuromuscular Re-ed, Patient Education, Self Care, Therapeutic Activities, and Therapeutic Exercise.      Upon discharge from further skilled PT intervention it will determined if the need for a work conditioning program or Functional Capacity Evaluation is required to allow the injured worker to return to work with full potential achieved, continued improvement with body mechanics with advanced functional activities, and further minimize future work-related injuries.      Physical therapist and physical therapy assistant(s) will met face to face to discuss patient's treatment plan and progress towards established goals. Pt will be seen by a physical therapist minimally every 6th visit or every 30 days.    Prashant Landreos, PTA   8/11/2023

## 2023-08-11 NOTE — LETTER
[facility]  [address] [address]  Phone: [phone]  Fax: [phone]  [facility]: [email or web address]    Pt Name: Tung Chau  Injury Date: 05/07/2023   Employee ID: [id number] Date of Treatment: 08/11/2023   Company: Retired--7/1/2005      Appointment Time: 09:15 AM Arrived: 8:44 AM    Provider: Lizette Thomas MD Time Out:9:42 AM      Office Treatment:   1. Strain of mid-back, subsequent encounter    2. Encounter related to worker's compensation claim          Patient Instructions: Continue Physical Therapy      Restrictions: Regular Duty     Return Appointment: 9/12/2023 at 9:15 AM DOMINICK

## 2023-08-15 ENCOUNTER — CLINICAL SUPPORT (OUTPATIENT)
Dept: REHABILITATION | Facility: HOSPITAL | Age: 83
End: 2023-08-15
Payer: OTHER MISCELLANEOUS

## 2023-08-15 DIAGNOSIS — S29.012D STRAIN OF MID-BACK, SUBSEQUENT ENCOUNTER: Primary | ICD-10-CM

## 2023-08-15 PROCEDURE — 97140 MANUAL THERAPY 1/> REGIONS: CPT | Mod: CQ

## 2023-08-15 PROCEDURE — 97110 THERAPEUTIC EXERCISES: CPT | Mod: CQ

## 2023-08-15 PROCEDURE — 97530 THERAPEUTIC ACTIVITIES: CPT | Mod: CQ

## 2023-08-15 NOTE — PROGRESS NOTES
"[facility]   Workers' Compensation Physical Therapy Treatment Note      Name: Tung Chau  Clinic Number: [id number]    Therapy Diagnosis:   Encounter Diagnosis   Name Primary?    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,  at [employer], 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, pushing and pulling)   Job demands: 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      Current work restrictions: walks 10-12,000 steps per day working for [employer] pushing people in wheelchairs, volunteers doing gardening at the [employer] - all part time   Previous work status: patient was lifting #30 weight in gym,   Current work status: patient had missed work for about 2 weeks and then returned to doing the same thing he was doing before; however, he is not pushing wheelchairs anymore - patient is not trying to go back to pushing wheelchairs at work     Subjective   HOME EXERCISE PROGRAM: reviewed, reports compliance   Response to previous treatment: states that his back usually feels worse in the morning and the pain decreases once he starts moving around. Also experiences an increase in pain if he makes a "bad" move during the day.    Function: continued limitation in transitional movements, lifting, pushing and pulling tasks     Pain: 3-4/10  Location: mid spine     Objective      Objective " "Measures updated at progress report unless specified.     Treatment     therapeutic exercises to develop strength, endurance, ROM, flexibility, posture, and core stabilization for 25 minutes:  LTR, LE s on ball : 4 min  DKTC with ball x20 with 5 sec  Thoracic open book stretch, R/L LE, knees flexed, 10"x10   Seated ball roll out, 10"x 10  Seated thoracic ext with OP, half foam behind back in chair, 10"x 10  Written HOME EXERCISE PROGRAM updated, reviewed, patient demo understanding      manual therapy techniques: Joint mobilizations, Manual traction, Myofacial release, Soft tissue Mobilization, and Friction Massage for 20 minutes:  Thoracic R side bending and extension Gr III/IV-- JM in sitting   Decompression cupping at B paraspinal muscles   STM to paraspinal muscles  Thoracic PAIVMS Gr III/IV JM in prone     neuromuscular re-education activities to improve: Balance, Coordination, Kinesthetic, Sense, Proprioception, and Posture for 5 minutes:  Pallof press, red band, 2x10 each way     therapeutic activities to improve functional performance for 10 minutes:  Sit back squats: B HRA, chair behind patient with silver foam on chair, 3x10  Good mornings with #5 db: 1x10  dead lift with 20# kb: 1x8/side   Unilateral lift during a squat of 20 kb: 1x8/side      Patient Education and Home Exercises       Education provided:   - Patient was educated on all therapeutic interventions performed during today's treatment visit. Patient was educated on HEP and on all therapeutic interventions performed during today's treatment visit.     Home Exercises Provided: Patient instructed to cont prior HEP. Exercises were reviewed and Kai was able to demonstrate them prior to the end of the session.  Kai demonstrated good  understanding of the education provided. See EMR under Patient Instructions for exercises provided during therapy sessions    Assessment   Patient endorsed that the region of pain is decreasing in his back. Patient also " stated that he is tolerating the manual so much better now than during the first 2 sessions of manual. Patient would benefit from continued need for strengthening of stabilizing musculature as well as exercises promoting thoracic/lumbar mobility.     The patient's current job specific task deficits include the following: limitation in bending, lifting, reaching, pushing, pulling, carrying, prolonged positioning.     Kai is making good progress towards meeting his goals.     Patient prognosis is: Good.   Rehab potential is: Good    Pt will continue to benefit from skilled Physical Therapy interventions in order to address the deficits listed in the problem list box on initial evaluation, provide education, and to address the musculoskeletal limitations and work-related functional deficits for their job as an  at [employer].    Pt's spiritual, cultural and educational needs considered and pt agreeable to plan of care and goals.     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      Outpatient Physical Therapy 3 times weekly for 4 weeks to include the following interventions: Cervical/Lumbar Traction, Electrical Stimulation re-eval, dry needling, Gait Training, Manual Therapy, Moist Heat/ Ice, Neuromuscular Re-ed, Patient Education, Self Care, Therapeutic Activities, and Therapeutic Exercise.      Upon discharge from further skilled PT intervention it will determined if the need for a work conditioning program or Functional Capacity Evaluation is required to allow the injured worker to return to work with full potential achieved, continued improvement with body mechanics with advanced functional activities, and further minimize future work-related injuries.      Physical therapist and physical therapy assistant(s) will met face to face to discuss patient's treatment plan and progress towards established goals. Pt will be seen by a physical therapist minimally every 6th visit or every 30 days.    Prashant Landeros, PTA   8/15/2023

## 2023-08-18 ENCOUNTER — CLINICAL SUPPORT (OUTPATIENT)
Dept: REHABILITATION | Facility: HOSPITAL | Age: 83
End: 2023-08-18
Payer: OTHER MISCELLANEOUS

## 2023-08-18 DIAGNOSIS — S29.012D STRAIN OF MID-BACK, SUBSEQUENT ENCOUNTER: Primary | ICD-10-CM

## 2023-08-18 PROCEDURE — 97140 MANUAL THERAPY 1/> REGIONS: CPT | Mod: CQ

## 2023-08-18 PROCEDURE — 97530 THERAPEUTIC ACTIVITIES: CPT | Mod: CQ

## 2023-08-18 PROCEDURE — 97110 THERAPEUTIC EXERCISES: CPT | Mod: CQ

## 2023-08-18 NOTE — PROGRESS NOTES
"[facility]   Workers' Compensation Physical Therapy Treatment Note      Name: Tung Chau  Clinic Number: [id number]    Therapy Diagnosis:   Encounter Diagnosis   Name Primary?    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,  at [employer], 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, pushing and pulling)   Job demands: 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      Current work restrictions: walks 10-12,000 steps per day working for [employer] pushing people in wheelchairs, volunteers doing gardening at the [employer] - all part time   Previous work status: patient was lifting #30 weight in gym,   Current work status: patient had missed work for about 2 weeks and then returned to doing the same thing he was doing before; however, he is not pushing wheelchairs anymore - patient is not trying to go back to pushing wheelchairs at work     Subjective   HOME EXERCISE PROGRAM: reviewed, reports compliance   Response to previous treatment: states that his back usually feels worse in the morning and the pain decreases once he starts moving around. Also experiences an increase in pain if he makes a "bad" move during the day.    Function: continued limitation in transitional movements, lifting, pushing and pulling tasks     Pain: 3-4/10  Location: mid spine     Objective      Objective Measures " "updated at progress report unless specified.     Treatment     therapeutic exercises to develop strength, endurance, ROM, flexibility, posture, and core stabilization for 25 minutes:  LTR, LE s on ball: 4 min  DKTC with ball x20 with 5 sec  Thoracic open book stretch, R/L LE, knees flexed, 10"x10   Seated ball roll out, 10"x 10  Seated thoracic ext with OP, half foam behind back in chair, 10"x 10  Written HOME EXERCISE PROGRAM updated, reviewed, patient demo understanding      manual therapy techniques: Joint mobilizations, Manual traction, Myofacial release, Soft tissue Mobilization, and Friction Massage for 20 minutes:  Thoracic R side bending and extension Gr III/IV-- JM in sitting   Decompression cupping at B paraspinal muscles   STM to paraspinal muscles  Thoracic PAIVMS Gr III/IV JM in prone     neuromuscular re-education activities to improve: Balance, Coordination, Kinesthetic, Sense, Proprioception, and Posture for 5 minutes:  Pallof press, red band, 2x10 each way     therapeutic activities to improve functional performance for 10 minutes:  Sit back squats: B HRA, chair behind patient with silver foam on chair, 3x10  Good mornings with #5 db: 1x10  dead lift with 20# kb: 1x8/side   Unilateral lift during a squat of 20 kb: 1x8/side      Patient Education and Home Exercises       Education provided:   - Patient was educated on all therapeutic interventions performed during today's treatment visit. Patient was educated on HEP and on all therapeutic interventions performed during today's treatment visit.     Home Exercises Provided: Patient instructed to cont prior HEP. Exercises were reviewed and Kai was able to demonstrate them prior to the end of the session.  Kai demonstrated good  understanding of the education provided. See EMR under Patient Instructions for exercises provided during therapy sessions    Assessment     Patient continues to endorse pain in lumbar/thoracic paraspinals. Patient also notes " marked decrease in region of pain since start of care but not necessarily a decrease in pain intensity. Patient would benefit from continued need for strengthening of stabilizing musculature as well as exercises promoting thoracic/lumbar mobility.     The patient's current job specific task deficits include the following: limitation in bending, lifting, reaching, pushing, pulling, carrying, prolonged positioning.     Kai is making good progress towards meeting his goals.     Patient prognosis is: Good.   Rehab potential is: Good    Pt will continue to benefit from skilled Physical Therapy interventions in order to address the deficits listed in the problem list box on initial evaluation, provide education, and to address the musculoskeletal limitations and work-related functional deficits for their job as an  at [employer].    Pt's spiritual, cultural and educational needs considered and pt agreeable to plan of care and goals.     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      Outpatient Physical Therapy 3 times weekly for 4 weeks to include the following interventions: Cervical/Lumbar Traction, Electrical Stimulation re-eval, dry needling, Gait Training, Manual Therapy, Moist Heat/ Ice, Neuromuscular Re-ed, Patient Education, Self Care, Therapeutic Activities, and Therapeutic Exercise.      Upon discharge from further skilled PT intervention it will determined if the need for a work conditioning program or Functional Capacity Evaluation is required to allow the injured worker to return to work with full potential achieved, continued improvement with body mechanics with advanced functional activities, and further minimize future work-related injuries.      Physical therapist and physical therapy assistant(s) will met face to face to discuss patient's treatment plan and progress towards established goals. Pt will be seen by a physical therapist minimally every 6th visit or every 30 days.    Prashant Landeros, PTA   8/18/2023

## 2023-08-22 ENCOUNTER — CLINICAL SUPPORT (OUTPATIENT)
Dept: REHABILITATION | Facility: HOSPITAL | Age: 83
End: 2023-08-22
Payer: OTHER MISCELLANEOUS

## 2023-08-22 DIAGNOSIS — S29.012D STRAIN OF MID-BACK, SUBSEQUENT ENCOUNTER: Primary | ICD-10-CM

## 2023-08-22 PROCEDURE — 97530 THERAPEUTIC ACTIVITIES: CPT

## 2023-08-22 PROCEDURE — 97112 NEUROMUSCULAR REEDUCATION: CPT

## 2023-08-22 PROCEDURE — 97140 MANUAL THERAPY 1/> REGIONS: CPT

## 2023-08-22 PROCEDURE — 97110 THERAPEUTIC EXERCISES: CPT

## 2023-08-22 NOTE — PROGRESS NOTES
[facility]   Workers' Compensation Physical Therapy Progress Note      Name: Tung Chau  Clinic Number: [id number]    Therapy Diagnosis:   Encounter Diagnosis   Name Primary?    Strain of mid-back, subsequent encounter Yes     Physician: Lizette Thomas MD    Visit Date: 8/22/2023    Physician Orders: PT Eval and Treat   Medical Diagnosis from Referral: S29.012D (ICD-10-CM) - Strain of mid-back, subsequent encounter   Evaluation Date: 7/19/2023  Authorization Period Expiration: 12/30/2023  Plan of Care Expiration: 10/22/2023  Visit # / Visits authorized: 8/ 12  PTA Visit #: 5/5     Foto: #1/3 on 7/19/2023  Foto: #2/3 on 8/4/2023  Foto: #3/3 on 8/22/2023 - closed    Time In: 8:56am  Time Out: 10:21am  Total Appointment Time: 85 minutes      Precautions: Standard    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,  at [employer], volunteer  at the park (lifting mulch, pushing and pulling)   Job demands: 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      Current work restrictions: walks 10-12,000 steps per day working for [employer] pushing people in wheelchairs, volunteers doing gardening at [employer] - all part time   Previous work status: patient was lifting #30 weight in gym,   Current work status: patient had missed work for about 2 weeks and then returned to doing the same thing he was doing before; however, he is not pushing wheelchairs anymore - patient is not trying to go back to pushing wheelchairs at work     Subjective   HOME EXERCISE PROGRAM: reviewed, reports compliance   Response to previous treatment: pain only occurs during transitional movements     Function: continued limitation in transitional movements, lifting, pushing and pulling tasks     Pain: 3-4/10  Location: mid spine     Objective    8/22/2023:  Observation/posture:   lack of lumbar/thoracic curvature in standing  mild R shift deviation based on shoulder positioning  "  Reproduction of comparable sign at B paraspinals with supine - sit - prone - stand transitional movements      Thoracic Range of Motion:     Degrees   Flexion 4 inches       Extension 2 inches       Left rotation    1.5 inch   Right Rotation    2 inch      Lumbar Range of Motion:     Degrees   Flexion 80      Extension 19      Left Side Bending 25   Right Side Bending 11      Lower Extremity Strength  Right LE Left LE   Knee extension: 4-/5 Knee extension: 4-/5   Hip flexion:4-/5 Hip flexion: 4-/5   Hip extension: 4/5 Hip extension: 4/5   Hip abduction: 4/5 Hip abduction: 4/5   Ankle dorsiflexion: 4+/5 Ankle dorsiflexion: 4+/5      Joint Mobility:   Lumbar: hypomobile, painful   Thoracic: hypomobile, painful      Flexibility:   Ely's test: R = WNL ; L = WNL  Popliteal Angle: R = WNL; L = WNL     Function/balance:   Sit - stand: 13x in 30"   Bridging: minimal thoracic symptom provocation   Bilateral knees to chest: reproduces comparable sign      Functional Job Specific Testing:   Job Specific Task Job Demands Current Ability   1. Walking  10-12,000 steps/day  Impaired    2. Standing/sitting   Prolonged  Impaired    3. Lifting  >#25, occasionally  Impaired       Intake Outcome Measure for FOTO Lumbar Survey     Therapist reviewed FOTO scores   FOTO documents entered into [employer] - see Media section.     Intake Score: 12        Treatment     therapeutic exercises to develop strength, endurance, ROM, flexibility, posture, and core stabilization for 42 minutes:  LTR, LE s on ball: 4 min  DKTC with ball x20 with 5 sec  Thoracic open book stretch in SL, R/L LE, knees flexed, 10"x10   Seated ball roll out, 10"x 10  Seated thoracic ext with OP, half foam behind back in chair, 10"x 10  Updated PHYSICAL THERAPY POC  (Initiate) supine snow chava stretch      manual therapy techniques: Joint mobilizations, Manual traction, Myofacial release, Soft tissue Mobilization, and Friction Massage for 20 minutes:  Thoracic R side bending " and extension Gr III/IV-- JM in sitting   Decompression cupping at B paraspinal muscles   STM to paraspinal muscles  Thoracic PAIVMS Gr III/IV JM in prone     neuromuscular re-education activities to improve: Balance, Coordination, Kinesthetic, Sense, Proprioception, and Posture for 8 minutes:  Pallof press, light red tubing, 3x10 each way     therapeutic activities to improve functional performance for 15 minutes:  Sit back squats: B HRA, chair behind patient with silver foam on chair, 3x10  Good mornings with #5 db: 1x10  dead lift with 20# kb: 1x8/side   Unilateral lift during a squat of 20 kb: 1x8/side      Patient Education and Home Exercises       Education provided:   - Patient was educated on all therapeutic interventions performed during today's treatment visit. Patient was educated on HEP and on all therapeutic interventions performed during today's treatment visit.     Home Exercises Provided: Patient instructed to cont prior HEP. Exercises were reviewed and Kai was able to demonstrate them prior to the end of the session.  Kai demonstrated good  understanding of the education provided. See EMR under Patient Instructions for exercises provided during therapy sessions    Assessment   Patient continues with L>R thoracic tightness, which impacts reports symptoms. He requires continued emphasis on segmental core activation and facet unlocking to minimize symptoms during transitional movements.     The patient's current job specific task deficits include the following: limitation in bending, lifting, reaching, pushing, pulling, carrying, prolonged positioning.     Kai is making good progress towards meeting his goals.     Patient prognosis is: Good.   Rehab potential is: Good    Pt will continue to benefit from skilled Physical Therapy interventions in order to address the deficits listed in the problem list box on initial evaluation, provide education, and to address the musculoskeletal limitations and  work-related functional deficits for their job as an  at [employer].    Pt's spiritual, cultural and educational needs considered and pt agreeable to plan of care and goals.     Anticipated barriers to physical therapy:  none    Goals:   Short Term Goals: 2 weeks   (1) patient will be I with HOME EXERCISE PROGRAM (progressing, not met, 8/22/2023)      (2) patient will obtain 15 deg of lumbar extension ACTIVE RANGE OF MOTION to facilitate improved standing tolerance, > 60 minutes (progressing, not met, 8/22/2023)      (3) patient will obtain 25 deg R lumbar side bending ACTIVE RANGE OF MOTION to facilitate improved ability to complete gardening tasks for volunteer work (progressing, not met, 8/22/2023)      Long Term Goals: 4 weeks   (1) patient will walk, 12,000 steps per day to facilitate return to work according to previous level of function (progressing, not met, 8/22/2023)      (2) patient will lift #30, floor - chest height x 10 repetitions with proper mechanics/alignment to facilitate improved ability to participate in gardening tasks for volunteer work (ongoing, not met, 8/22/2023)      (3) pt will return to work full duty, full time (progressing, not met, 8/22/2023)    Previous Short Term Goals Status: progressing   New Short Term Goals Status: current remain appropriate   Long Term Goal Status:   progressing   Reasons for Recertification of Therapy: update PT POC    Plan     Plan of care Certification: 7/19/2023 to 9/19/2023.     Outpatient Physical Therapy 3 times weekly for 4 weeks to include the following interventions: Cervical/Lumbar Traction, Electrical Stimulation re-eval, dry needling, Gait Training, Manual Therapy, Moist Heat/ Ice, Neuromuscular Re-ed, Patient Education, Self Care, Therapeutic Activities, and Therapeutic Exercise.      Upon discharge from further skilled PT intervention it will determined if the need for a work conditioning program or Functional Capacity Evaluation is  required to allow the injured worker to return to work with full potential achieved, continued improvement with body mechanics with advanced functional activities, and further minimize future work-related injuries.      Physical therapist and physical therapy assistant(s) will met face to face to discuss patient's treatment plan and progress towards established goals. Pt will be seen by a physical therapist minimally every 6th visit or every 30 days.    Chica Headley, PT   8/22/2023    I CERTIFY THE NEED FOR THESE SERVICES FURNISHED UNDER THIS PLAN OF TREATMENT AND WHILE UNDER MY CARE     Physician's comments:           Physician's Signature: ___________________________________________________

## 2023-08-24 NOTE — PLAN OF CARE
[facility]   Workers' Compensation Physical Therapy Progress Note      Name: Tung Chau  Clinic Number: [id number]    Therapy Diagnosis:   Encounter Diagnosis   Name Primary?    Strain of mid-back, subsequent encounter Yes     Physician: Lizette Thomas MD    Visit Date: 8/22/2023    Physician Orders: PT Eval and Treat   Medical Diagnosis from Referral: S29.012D (ICD-10-CM) - Strain of mid-back, subsequent encounter   Evaluation Date: 7/19/2023  Authorization Period Expiration: 12/30/2023  Plan of Care Expiration: 10/22/2023  Visit # / Visits authorized: 8/ 12  PTA Visit #: 5/5     Foto: #1/3 on 7/19/2023  Foto: #2/3 on 8/4/2023  Foto: #3/3 on 8/22/2023 - closed    Time In: 8:56am  Time Out: 10:21am  Total Appointment Time: 85 minutes      Precautions: Standard    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,  at [employer], volunteer  at the park (lifting mulch, pushing and pulling)   Job demands: 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      Current work restrictions: walks 10-12,000 steps per day working for [employer] pushing people in wheelchairs, volunteers doing gardening at the [employer] - all part time   Previous work status: patient was lifting #30 weight in gym,   Current work status: patient had missed work for about 2 weeks and then returned to doing the same thing he was doing before; however, he is not pushing wheelchairs anymore - patient is not trying to go back to pushing wheelchairs at work     Subjective   HOME EXERCISE PROGRAM: reviewed, reports compliance   Response to previous treatment: pain only occurs during transitional movements     Function: continued limitation in transitional movements, lifting, pushing and pulling tasks     Pain: 3-4/10  Location: mid spine     Objective    8/22/2023:  Observation/posture:   lack of lumbar/thoracic curvature in standing  mild R shift deviation based on shoulder positioning  "  Reproduction of comparable sign at B paraspinals with supine - sit - prone - stand transitional movements      Thoracic Range of Motion:     Degrees   Flexion 4 inches       Extension 2 inches       Left rotation    1.5 inch   Right Rotation    2 inch      Lumbar Range of Motion:     Degrees   Flexion 80      Extension 19      Left Side Bending 25   Right Side Bending 11      Lower Extremity Strength  Right LE Left LE   Knee extension: 4-/5 Knee extension: 4-/5   Hip flexion:4-/5 Hip flexion: 4-/5   Hip extension: 4/5 Hip extension: 4/5   Hip abduction: 4/5 Hip abduction: 4/5   Ankle dorsiflexion: 4+/5 Ankle dorsiflexion: 4+/5      Joint Mobility:   Lumbar: hypomobile, painful   Thoracic: hypomobile, painful      Flexibility:   Ely's test: R = WNL ; L = WNL  Popliteal Angle: R = WNL; L = WNL     Function/balance:   Sit - stand: 13x in 30"   Bridging: minimal thoracic symptom provocation   Bilateral knees to chest: reproduces comparable sign      Functional Job Specific Testing:   Job Specific Task Job Demands Current Ability   1. Walking  10-12,000 steps/day  Impaired    2. Standing/sitting   Prolonged  Impaired    3. Lifting  >#25, occasionally  Impaired       Intake Outcome Measure for FOTO Lumbar Survey     Therapist reviewed FOTO scores   FOTO documents entered into [employer] - see Media section.     Intake Score: 12        Treatment     therapeutic exercises to develop strength, endurance, ROM, flexibility, posture, and core stabilization for 42 minutes:  LTR, LE s on ball: 4 min  DKTC with ball x20 with 5 sec  Thoracic open book stretch in SL, R/L LE, knees flexed, 10"x10   Seated ball roll out, 10"x 10  Seated thoracic ext with OP, half foam behind back in chair, 10"x 10  Updated PHYSICAL THERAPY POC  (Initiate) supine snow chava stretch      manual therapy techniques: Joint mobilizations, Manual traction, Myofacial release, Soft tissue Mobilization, and Friction Massage for 20 minutes:  Thoracic R side bending " and extension Gr III/IV-- JM in sitting   Decompression cupping at B paraspinal muscles   STM to paraspinal muscles  Thoracic PAIVMS Gr III/IV JM in prone     neuromuscular re-education activities to improve: Balance, Coordination, Kinesthetic, Sense, Proprioception, and Posture for 8 minutes:  Pallof press, light red tubing, 3x10 each way     therapeutic activities to improve functional performance for 15 minutes:  Sit back squats: B HRA, chair behind patient with silver foam on chair, 3x10  Good mornings with #5 db: 1x10  dead lift with 20# kb: 1x8/side   Unilateral lift during a squat of 20 kb: 1x8/side      Patient Education and Home Exercises       Education provided:   - Patient was educated on all therapeutic interventions performed during today's treatment visit. Patient was educated on HEP and on all therapeutic interventions performed during today's treatment visit.     Home Exercises Provided: Patient instructed to cont prior HEP. Exercises were reviewed and Kai was able to demonstrate them prior to the end of the session.  Kai demonstrated good  understanding of the education provided. See EMR under Patient Instructions for exercises provided during therapy sessions    Assessment   Patient continues with L>R thoracic tightness, which impacts reports symptoms. He requires continued emphasis on segmental core activation and facet unlocking to minimize symptoms during transitional movements.     The patient's current job specific task deficits include the following: limitation in bending, lifting, reaching, pushing, pulling, carrying, prolonged positioning.     Kai is making good progress towards meeting his goals.     Patient prognosis is: Good.   Rehab potential is: Good    Pt will continue to benefit from skilled Physical Therapy interventions in order to address the deficits listed in the problem list box on initial evaluation, provide education, and to address the musculoskeletal limitations and  work-related functional deficits for their job as an  at [employer].    Pt's spiritual, cultural and educational needs considered and pt agreeable to plan of care and goals.     Anticipated barriers to physical therapy: none    Goals:   Short Term Goals: 2 weeks   (1) patient will be I with HOME EXERCISE PROGRAM (progressing, not met, 8/22/2023)      (2) patient will obtain 15 deg of lumbar extension ACTIVE RANGE OF MOTION to facilitate improved standing tolerance, > 60 minutes (progressing, not met, 8/22/2023)      (3) patient will obtain 25 deg R lumbar side bending ACTIVE RANGE OF MOTION to facilitate improved ability to complete gardening tasks for volunteer work (progressing, not met, 8/22/2023)      Long Term Goals: 4 weeks   (1) patient will walk, 12,000 steps per day to facilitate return to work according to previous level of function (progressing, not met, 8/22/2023)      (2) patient will lift #30, floor - chest height x 10 repetitions with proper mechanics/alignment to facilitate improved ability to participate in gardening tasks for volunteer work (ongoing, not met, 8/22/2023)      (3) pt will return to work full duty, full time (progressing, not met, 8/22/2023)    Previous Short Term Goals Status: progressing   New Short Term Goals Status: current remain appropriate   Long Term Goal Status:   progressing   Reasons for Recertification of Therapy: update PT POC    Plan     Plan of care Certification: 7/19/2023 to 9/19/2023.     Outpatient Physical Therapy 3 times weekly for 4 weeks to include the following interventions: Cervical/Lumbar Traction, Electrical Stimulation re-eval, dry needling, Gait Training, Manual Therapy, Moist Heat/ Ice, Neuromuscular Re-ed, Patient Education, Self Care, Therapeutic Activities, and Therapeutic Exercise.      Upon discharge from further skilled PT intervention it will determined if the need for a work conditioning program or Functional Capacity Evaluation is  required to allow the injured worker to return to work with full potential achieved, continued improvement with body mechanics with advanced functional activities, and further minimize future work-related injuries.      Physical therapist and physical therapy assistant(s) will met face to face to discuss patient's treatment plan and progress towards established goals. Pt will be seen by a physical therapist minimally every 6th visit or every 30 days.    Chica Headley, PT   8/22/2023    I CERTIFY THE NEED FOR THESE SERVICES FURNISHED UNDER THIS PLAN OF TREATMENT AND WHILE UNDER MY CARE     Physician's comments:           Physician's Signature: ___________________________________________________

## 2023-08-25 ENCOUNTER — CLINICAL SUPPORT (OUTPATIENT)
Dept: REHABILITATION | Facility: HOSPITAL | Age: 83
End: 2023-08-25
Payer: OTHER MISCELLANEOUS

## 2023-08-25 DIAGNOSIS — S29.012D STRAIN OF MID-BACK, SUBSEQUENT ENCOUNTER: Primary | ICD-10-CM

## 2023-08-25 PROCEDURE — 97530 THERAPEUTIC ACTIVITIES: CPT | Mod: CQ

## 2023-08-25 PROCEDURE — 97110 THERAPEUTIC EXERCISES: CPT | Mod: CQ

## 2023-08-25 PROCEDURE — 97112 NEUROMUSCULAR REEDUCATION: CPT | Mod: CQ

## 2023-08-25 PROCEDURE — 97140 MANUAL THERAPY 1/> REGIONS: CPT | Mod: CQ

## 2023-08-29 ENCOUNTER — CLINICAL SUPPORT (OUTPATIENT)
Dept: REHABILITATION | Facility: HOSPITAL | Age: 83
End: 2023-08-29
Payer: OTHER MISCELLANEOUS

## 2023-08-29 DIAGNOSIS — S29.012D STRAIN OF MID-BACK, SUBSEQUENT ENCOUNTER: Primary | ICD-10-CM

## 2023-08-29 PROCEDURE — 97530 THERAPEUTIC ACTIVITIES: CPT | Mod: CQ

## 2023-08-29 PROCEDURE — 97140 MANUAL THERAPY 1/> REGIONS: CPT | Mod: CQ

## 2023-08-29 PROCEDURE — 97112 NEUROMUSCULAR REEDUCATION: CPT | Mod: CQ

## 2023-08-29 PROCEDURE — 97110 THERAPEUTIC EXERCISES: CPT | Mod: CQ

## 2023-08-29 NOTE — PROGRESS NOTES
[facility]   Workers' Compensation Physical Therapy Progress Note      Name: Tung Chau  Clinic Number: [id number]    Therapy Diagnosis:   Encounter Diagnosis   Name Primary?    Strain of mid-back, subsequent encounter Yes       Physician: Lizette Thomas MD    Visit Date: 8/29/2023    Physician Orders: PT Eval and Treat   Medical Diagnosis from Referral: S29.012D (ICD-10-CM) - Strain of mid-back, subsequent encounter   Evaluation Date: 7/19/2023  Authorization Period Expiration: 12/30/2023  Plan of Care Expiration: 10/22/2023  Visit # / Visits authorized: 11/ 12  PTA Visit #: 2/5     Foto: #1/3 on 7/19/2023  Foto: #2/3 on 8/4/2023  Foto: #3/3 on 8/22/2023 - closed    Time In: 8:05 pm  Time Out: 9:00 pm  Total Appointment Time: 55 minutes      Precautions: Standard    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,  at [employer], volunteer  at the park (lifting mulch, pushing and pulling)   Job demands: 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      Current work restrictions: walks 10-12,000 steps per day working for [employer] pushing people in wheelchairs, volunteers doing gardening at the [employer] - all part time   Previous work status: patient was lifting #30 weight in gym,   Current work status: patient had missed work for about 2 weeks and then returned to doing the same thing he was doing before; however, he is not pushing wheelchairs anymore - patient is not trying to go back to pushing wheelchairs at work     Subjective   HOME EXERCISE PROGRAM: reviewed, reports compliance   Response to previous treatment: pain only occurs during transitional movements     Function: continued limitation in transitional movements, lifting, pushing and pulling tasks     Pain: 1/10  Location: mid spine     Objective      Objective Measures updated at progress report unless specified.     Treatment     therapeutic exercises to develop strength, endurance,  "ROM, flexibility, posture, and core stabilization for 22 minutes:  LTR, LE s on ball: 4 min  DKTC with ball x20 with 5 sec  Thoracic open book stretch in SL, R/L LE, knees flexed, 10"x10   Seated ball roll out, 10"x 10  Seated thoracic ext with OP, half foam behind back in chair, 10"x 10  Updated PHYSICAL THERAPY POC  supine snow chava stretch      manual therapy techniques: Joint mobilizations, Manual traction, Myofacial release, Soft tissue Mobilization, and Friction Massage for 13 minutes:  Thoracic R side bending and extension Gr III/IV-- JM in sitting   Decompression cupping at B paraspinal muscles   STM to paraspinal muscles  Thoracic PAIVMS Gr III/IV JM in prone     neuromuscular re-education activities to improve: Balance, Coordination, Kinesthetic, Sense, Proprioception, and Posture for 9 minutes:  Pallof press, light red tubing, 3x10 each way     therapeutic activities to improve functional performance for 16 minutes:  Sit back squats: B HRA, chair behind patient with silver foam on chair, 3x10  Good mornings with #6 db: 2x10  dead lift with 20# kb: 1x8/side   Unilateral lift during a squat of 20 kb: 2x10/side      Patient Education and Home Exercises       Education provided:   - Patient was educated on all therapeutic interventions performed during today's treatment visit. Patient was educated on HEP and on all therapeutic interventions performed during today's treatment visit.     Home Exercises Provided: Patient instructed to cont prior HEP. Exercises were reviewed and Kai was able to demonstrate them prior to the end of the session.  Kai demonstrated good  understanding of the education provided. See EMR under Patient Instructions for exercises provided during therapy sessions    Assessment   Patient endorses reduced region of pain as well as significantly reduced intensity of pain when receiving manual therapy. Patient requires continued emphasis on segmental core activation and facet unlocking to " minimize symptoms during transitional movements.     The patient's current job specific task deficits include the following: limitation in bending, lifting, reaching, pushing, pulling, carrying, prolonged positioning.     Kai is making good progress towards meeting his goals.     Patient prognosis is: Good.   Rehab potential is: Good    Pt will continue to benefit from skilled Physical Therapy interventions in order to address the deficits listed in the problem list box on initial evaluation, provide education, and to address the musculoskeletal limitations and work-related functional deficits for their job as an  at [employer].    Pt's spiritual, cultural and educational needs considered and pt agreeable to plan of care and goals.     Anticipated barriers to physical therapy:  none    Goals:   Short Term Goals: 2 weeks   (1) patient will be I with HOME EXERCISE PROGRAM (progressing, not met, 8/22/2023)      (2) patient will obtain 15 deg of lumbar extension ACTIVE RANGE OF MOTION to facilitate improved standing tolerance, > 60 minutes (progressing, not met, 8/22/2023)      (3) patient will obtain 25 deg R lumbar side bending ACTIVE RANGE OF MOTION to facilitate improved ability to complete gardening tasks for volunteer work (progressing, not met, 8/22/2023)      Long Term Goals: 4 weeks   (1) patient will walk, 12,000 steps per day to facilitate return to work according to previous level of function (progressing, not met, 8/22/2023)      (2) patient will lift #30, floor - chest height x 10 repetitions with proper mechanics/alignment to facilitate improved ability to participate in gardening tasks for volunteer work (ongoing, not met, 8/22/2023)      (3) pt will return to work full duty, full time (progressing, not met, 8/22/2023)    Previous Short Term Goals Status: progressing   New Short Term Goals Status: current remain appropriate   Long Term Goal Status:   progressing   Reasons for Recertification of  Therapy: update PT POC    Plan     Plan of care Certification: 7/19/2023 to 9/19/2023.     Outpatient Physical Therapy 3 times weekly for 4 weeks to include the following interventions: Cervical/Lumbar Traction, Electrical Stimulation re-eval, dry needling, Gait Training, Manual Therapy, Moist Heat/ Ice, Neuromuscular Re-ed, Patient Education, Self Care, Therapeutic Activities, and Therapeutic Exercise.      Upon discharge from further skilled PT intervention it will determined if the need for a work conditioning program or Functional Capacity Evaluation is required to allow the injured worker to return to work with full potential achieved, continued improvement with body mechanics with advanced functional activities, and further minimize future work-related injuries.      Physical therapist and physical therapy assistant(s) will met face to face to discuss patient's treatment plan and progress towards established goals. Pt will be seen by a physical therapist minimally every 6th visit or every 30 days.    Prashant Landeros, PTA   8/29/2023

## 2023-08-31 ENCOUNTER — CLINICAL SUPPORT (OUTPATIENT)
Dept: REHABILITATION | Facility: HOSPITAL | Age: 83
End: 2023-08-31
Payer: OTHER MISCELLANEOUS

## 2023-08-31 DIAGNOSIS — S29.012D STRAIN OF MID-BACK, SUBSEQUENT ENCOUNTER: Primary | ICD-10-CM

## 2023-08-31 PROBLEM — S29.012A STRAIN OF MID-BACK: Status: RESOLVED | Noted: 2023-07-19 | Resolved: 2023-08-31

## 2023-08-31 PROCEDURE — 97140 MANUAL THERAPY 1/> REGIONS: CPT

## 2023-08-31 PROCEDURE — 97110 THERAPEUTIC EXERCISES: CPT

## 2023-08-31 PROCEDURE — 97112 NEUROMUSCULAR REEDUCATION: CPT

## 2023-08-31 PROCEDURE — 97530 THERAPEUTIC ACTIVITIES: CPT

## 2023-08-31 NOTE — PROGRESS NOTES
[facility]   Workers' Compensation Physical Therapy Discharge Note      Name: Tung Chau  Clinic Number: [id number]    Therapy Diagnosis:   Encounter Diagnosis   Name Primary?    Strain of mid-back, subsequent encounter Yes       Physician: Lizette Thomas MD    Visit Date: 8/31/2023    Physician Orders: PT Eval and Treat   Medical Diagnosis from Referral: S29.012D (ICD-10-CM) - Strain of mid-back, subsequent encounter   Evaluation Date: 7/19/2023  Authorization Period Expiration: 12/30/2023  Plan of Care Expiration: 10/22/2023  Visit # / Visits authorized: 12/ 12  PTA Visit #: 2/5     Foto: #1/3 on 7/19/2023  Foto: #2/3 on 8/4/2023  Foto: #3/3 on 8/22/2023 - closed    Time In: 2:27pm  Time Out: 3:45pm  Total Appointment Time: 78 minutes      Precautions: Standard    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,  at [employer], volunteer  at the park (lifting mulch, pushing and pulling)   Job demands: 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      Current work restrictions: walks 10-12,000 steps per day working for [employer] pushing people in wheelchairs, volunteers doing gardening at the [employer] - all part time   Previous work status: patient was lifting #30 weight in gym,   Current work status: patient had missed work for about 2 weeks and then returned to doing the same thing he was doing before; however, he is not pushing wheelchairs anymore - patient is not trying to go back to pushing wheelchairs at work     Subjective   HOME EXERCISE PROGRAM: reviewed, reports compliance   Response to previous treatment: pain only occurs during transitional movements  and is now minimal since having PHYSICAL THERAPY and also swimming. Patient feels like his pain will continue to improve over time.  Function: continued limitation in transitional movements, but able to lift up to #20 in the gym again     Pain: 1/10  Location: mid spine     Objective   "  8/31/2023:  Observation/posture:   lack of lumbar/thoracic curvature in standing  mild R shift deviation based on shoulder positioning   Reproduction of comparable sign at B paraspinals with supine - sit - prone - stand transitional movements      Thoracic Range of Motion:     Degrees   Flexion 4 inches       Extension 2 inches       Left rotation    1.5 inch   Right Rotation    2 inch      Lumbar Range of Motion:     Degrees   Flexion 80      Extension 19      Left Side Bending 25   Right Side Bending 11      Lower Extremity Strength  Right LE Left LE   Knee extension: 4/5 Knee extension: 4/5   Hip flexion:4-/5 Hip flexion: 4-/5   Hip extension: 4/5 Hip extension: 4+/5   Hip abduction: 4/5 Hip abduction: 4/5   Ankle dorsiflexion: 4+/5 Ankle dorsiflexion: 5/5      Joint Mobility:   Thoracic: hypomobile     Function/balance:   Sit - stand: 13x in 30"   Bridging: minimal thoracic symptom provocation   Bilateral knees to chest: reproduces comparable sign      Functional Job Specific Testing:   Job Specific Task Job Demands Current Ability   1. Walking  10-12,000 steps/day  Returned to previous level of function   2. Standing/sitting   Prolonged  Returned to previous level of function    3. Lifting  >#25, occasionally  Lifts #20 at gym       Intake Outcome Measure for FOTO Lumbar Survey     Therapist reviewed FOTO scores   FOTO documents entered into [employer] - see Media section.     Intake Score: 12         Treatment     therapeutic exercises to develop strength, endurance, ROM, flexibility, posture, and core stabilization for 45 minutes:  LTR, LE s on ball: 4 min  DKTC with ball x20 with 5 sec  Thoracic open book stretch in SL, R/L LE, knees flexed, 10"x10   Seated ball roll out, 10"x 10  Seated thoracic ext with OP, half foam behind back in chair, 10"x 10  Updated PHYSICAL THERAPY POC  supine snow chava stretch      manual therapy techniques: Joint mobilizations, Manual traction, Myofacial release, Soft tissue " Mobilization, and Friction Massage for 10 minutes:  Thoracic R side bending and extension Gr III/IV-- JM in sitting   Decompression cupping at B paraspinal muscles   STM to paraspinal muscles  Thoracic PAIVMS Gr III/IV JM in prone     neuromuscular re-education activities to improve: Balance, Coordination, Kinesthetic, Sense, Proprioception, and Posture for 9 minutes:  Pallof press, light red tubing, 3x10 each way     therapeutic activities to improve functional performance for 14 minutes:  Sit back squats: B HRA, chair behind patient with silver foam on chair, 3x10  Good mornings with #6 db: 2x10  dead lift with 20# kb: 1x8/side   Unilateral lift during a squat of 20 kb: 2x10/side    Patient Education and Home Exercises       Education provided:   - Patient was educated on all therapeutic interventions performed during today's treatment visit. Patient was educated on HEP and on all therapeutic interventions performed during today's treatment visit.     Home Exercises Provided: Patient instructed to cont prior HEP. Exercises were reviewed and Kai was able to demonstrate them prior to the end of the session.  Kai demonstrated good  understanding of the education provided. See EMR under Patient Instructions for exercises provided during therapy sessions    Assessment   Patient endorses reduced region of pain as well as significantly reduced intensity of pain with skilled services. His LE MMT measures have improved since onset of PHYSICAL THERAPY POC and tolerance for both prolonged positioning and transitional movements has improved. Patient is being discharged to HOME EXERCISE PROGRAM. He has returned to the gym.     Patient has improved tolerance for bending, lifting, reaching, pushing, pulling, carrying, prolonged positioning.     Kai is making good progress towards meeting his goals.     Patient prognosis is: Good.   Rehab potential is: Good    Pt will continue to benefit from skilled Physical Therapy  interventions in order to address the deficits listed in the problem list box on initial evaluation, provide education, and to address the musculoskeletal limitations and work-related functional deficits for their job as an  at [employer].    Pt's spiritual, cultural and educational needs considered and pt agreeable to plan of care and goals.     Anticipated barriers to physical therapy:  none    Goals:   Short Term Goals: 2 weeks   (1) patient will be I with HOME EXERCISE PROGRAM (nearly met, 8/31/2023)      (2) patient will obtain 15 deg of lumbar extension ACTIVE RANGE OF MOTION to facilitate improved standing tolerance, > 60 minutes (partially met, 8/22/2023)      (3) patient will obtain 25 deg R lumbar side bending ACTIVE RANGE OF MOTION to facilitate improved ability to complete gardening tasks for volunteer work (partially met, 8/31/2023)      Long Term Goals: 4 weeks   (1) patient will walk, 12,000 steps per day to facilitate return to work according to previous level of function (met, 8/31/2023)      (2) patient will lift #30, floor - chest height x 10 repetitions with proper mechanics/alignment to facilitate improved ability to participate in gardening tasks for volunteer work (nearly met, 8/31/2023)      (3) pt will return to work full duty, full time (progressing, nearly met, 8/31/2023)    Plan     Plan of care Certification: 7/19/2023 to 9/19/2023.       Patient is being discharged to HOME EXERCISE PROGRAM.      Physical therapist and physical therapy assistant(s) will met face to face to discuss patient's treatment plan and progress towards established goals. Pt will be seen by a physical therapist minimally every 6th visit or every 30 days.    Chica Headley, PT   8/30/2023    I CERTIFY THE NEED FOR THESE SERVICES FURNISHED UNDER THIS PLAN OF TREATMENT AND WHILE UNDER MY CARE     Physician's comments:           Physician's Signature: ___________________________________________________

## 2023-09-01 NOTE — PLAN OF CARE
[facility]   Workers' Compensation Physical Therapy Discharge Note      Name: Tung Chau  Clinic Number: [id number]    Therapy Diagnosis:   Encounter Diagnosis   Name Primary?    Strain of mid-back, subsequent encounter Yes       Physician: Lizette Thomas MD    Visit Date: 8/31/2023    Physician Orders: PT Eval and Treat   Medical Diagnosis from Referral: S29.012D (ICD-10-CM) - Strain of mid-back, subsequent encounter   Evaluation Date: 7/19/2023  Authorization Period Expiration: 12/30/2023  Plan of Care Expiration: 10/22/2023  Visit # / Visits authorized: 12/ 12  PTA Visit #: 2/5     Foto: #1/3 on 7/19/2023  Foto: #2/3 on 8/4/2023  Foto: #3/3 on 8/22/2023 - closed    Time In: 2:27pm  Time Out: 3:45pm  Total Appointment Time: 78 minutes      Precautions: Standard    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,  at [employer], volunteer  at the park (lifting mulch, pushing and pulling)   Job demands: 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      Current work restrictions: walks 10-12,000 steps per day working for [employer] pushing people in wheelchairs, volunteers doing gardening at the [employer] - all part time   Previous work status: patient was lifting #30 weight in gym,   Current work status: patient had missed work for about 2 weeks and then returned to doing the same thing he was doing before; however, he is not pushing wheelchairs anymore - patient is not trying to go back to pushing wheelchairs at work     Subjective   HOME EXERCISE PROGRAM: reviewed, reports compliance   Response to previous treatment: pain only occurs during transitional movements  and is now minimal since having PHYSICAL THERAPY and also swimming. Patient feels like his pain will continue to improve over time.  Function: continued limitation in transitional movements, but able to lift up to #20 in the gym again     Pain: 1/10  Location: mid spine     Objective   "  8/31/2023:  Observation/posture:   lack of lumbar/thoracic curvature in standing  mild R shift deviation based on shoulder positioning   Reproduction of comparable sign at B paraspinals with supine - sit - prone - stand transitional movements      Thoracic Range of Motion:     Degrees   Flexion 4 inches       Extension 2 inches       Left rotation    1.5 inch   Right Rotation    2 inch      Lumbar Range of Motion:     Degrees   Flexion 80      Extension 19      Left Side Bending 25   Right Side Bending 11      Lower Extremity Strength  Right LE Left LE   Knee extension: 4/5 Knee extension: 4/5   Hip flexion:4-/5 Hip flexion: 4-/5   Hip extension: 4/5 Hip extension: 4+/5   Hip abduction: 4/5 Hip abduction: 4/5   Ankle dorsiflexion: 4+/5 Ankle dorsiflexion: 5/5      Joint Mobility:   Thoracic: hypomobile     Function/balance:   Sit - stand: 13x in 30"   Bridging: minimal thoracic symptom provocation   Bilateral knees to chest: reproduces comparable sign      Functional Job Specific Testing:   Job Specific Task Job Demands Current Ability   1. Walking  10-12,000 steps/day  Returned to previous level of function   2. Standing/sitting   Prolonged  Returned to previous level of function    3. Lifting  >#25, occasionally  Lifts #20 at gym       Intake Outcome Measure for FOTO Lumbar Survey     Therapist reviewed FOTO scores   FOTO documents entered into [employer] - see Media section.     Intake Score: 12         Treatment     therapeutic exercises to develop strength, endurance, ROM, flexibility, posture, and core stabilization for 45 minutes:  LTR, LE s on ball: 4 min  DKTC with ball x20 with 5 sec  Thoracic open book stretch in SL, R/L LE, knees flexed, 10"x10   Seated ball roll out, 10"x 10  Seated thoracic ext with OP, half foam behind back in chair, 10"x 10  Updated PHYSICAL THERAPY POC  supine snow chava stretch      manual therapy techniques: Joint mobilizations, Manual traction, Myofacial release, Soft tissue " Mobilization, and Friction Massage for 10 minutes:  Thoracic R side bending and extension Gr III/IV-- JM in sitting   Decompression cupping at B paraspinal muscles   STM to paraspinal muscles  Thoracic PAIVMS Gr III/IV JM in prone     neuromuscular re-education activities to improve: Balance, Coordination, Kinesthetic, Sense, Proprioception, and Posture for 9 minutes:  Pallof press, light red tubing, 3x10 each way     therapeutic activities to improve functional performance for 14 minutes:  Sit back squats: B HRA, chair behind patient with silver foam on chair, 3x10  Good mornings with #6 db: 2x10  dead lift with 20# kb: 1x8/side   Unilateral lift during a squat of 20 kb: 2x10/side    Patient Education and Home Exercises       Education provided:   - Patient was educated on all therapeutic interventions performed during today's treatment visit. Patient was educated on HEP and on all therapeutic interventions performed during today's treatment visit.     Home Exercises Provided: Patient instructed to cont prior HEP. Exercises were reviewed and Kai was able to demonstrate them prior to the end of the session.  Kai demonstrated good  understanding of the education provided. See EMR under Patient Instructions for exercises provided during therapy sessions    Assessment   Patient endorses reduced region of pain as well as significantly reduced intensity of pain with skilled services. His LE MMT measures have improved since onset of PHYSICAL THERAPY POC and tolerance for both prolonged positioning and transitional movements has improved. Patient is being discharged to HOME EXERCISE PROGRAM. He has returned to the gym.     Patient has improved tolerance for bending, lifting, reaching, pushing, pulling, carrying, prolonged positioning.     Kai is making good progress towards meeting his goals.     Patient prognosis is: Good.   Rehab potential is: Good    Pt will continue to benefit from skilled Physical Therapy  interventions in order to address the deficits listed in the problem list box on initial evaluation, provide education, and to address the musculoskeletal limitations and work-related functional deficits for their job as an  at [email or web address].    Pt's spiritual, cultural and educational needs considered and pt agreeable to plan of care and goals.     Anticipated barriers to physical therapy: none    Goals:   Short Term Goals: 2 weeks   (1) patient will be I with HOME EXERCISE PROGRAM (nearly met, 8/31/2023)      (2) patient will obtain 15 deg of lumbar extension ACTIVE RANGE OF MOTION to facilitate improved standing tolerance, > 60 minutes (partially met, 8/22/2023)      (3) patient will obtain 25 deg R lumbar side bending ACTIVE RANGE OF MOTION to facilitate improved ability to complete gardening tasks for volunteer work (partially met, 8/31/2023)      Long Term Goals: 4 weeks   (1) patient will walk, 12,000 steps per day to facilitate return to work according to previous level of function (met, 8/31/2023)      (2) patient will lift #30, floor - chest height x 10 repetitions with proper mechanics/alignment to facilitate improved ability to participate in gardening tasks for volunteer work (nearly met, 8/31/2023)      (3) pt will return to work full duty, full time (progressing, nearly met, 8/31/2023)    Plan     Plan of care Certification: 7/19/2023 to 9/19/2023.       Patient is being discharged to HOME EXERCISE PROGRAM.      Physical therapist and physical therapy assistant(s) will met face to face to discuss patient's treatment plan and progress towards established goals. Pt will be seen by a physical therapist minimally every 6th visit or every 30 days.    Chica Headley, PT   8/30/2023    I CERTIFY THE NEED FOR THESE SERVICES FURNISHED UNDER THIS PLAN OF TREATMENT AND WHILE UNDER MY CARE     Physician's comments:           Physician's Signature: ___________________________________________________

## 2023-09-12 ENCOUNTER — OFFICE VISIT (OUTPATIENT)
Dept: URGENT CARE | Facility: CLINIC | Age: 83
End: 2023-09-12
Payer: OTHER MISCELLANEOUS

## 2023-09-12 VITALS
WEIGHT: 225 LBS | DIASTOLIC BLOOD PRESSURE: 70 MMHG | HEIGHT: 75 IN | BODY MASS INDEX: 27.98 KG/M2 | OXYGEN SATURATION: 97 % | TEMPERATURE: 98 F | HEART RATE: 63 BPM | RESPIRATION RATE: 16 BRPM | SYSTOLIC BLOOD PRESSURE: 122 MMHG

## 2023-09-12 DIAGNOSIS — Z02.6 ENCOUNTER RELATED TO WORKER'S COMPENSATION CLAIM: ICD-10-CM

## 2023-09-12 DIAGNOSIS — S29.012D STRAIN OF MID-BACK, SUBSEQUENT ENCOUNTER: Primary | ICD-10-CM

## 2023-09-12 PROCEDURE — 99213 OFFICE O/P EST LOW 20 MIN: CPT | Mod: S$GLB,,, | Performed by: STUDENT IN AN ORGANIZED HEALTH CARE EDUCATION/TRAINING PROGRAM

## 2023-09-12 PROCEDURE — 99213 PR OFFICE/OUTPT VISIT, EST, LEVL III, 20-29 MIN: ICD-10-PCS | Mod: S$GLB,,, | Performed by: STUDENT IN AN ORGANIZED HEALTH CARE EDUCATION/TRAINING PROGRAM

## 2023-09-12 NOTE — PROGRESS NOTES
Subjective:      Patient ID: Tung Chau is a 83 y.o. male.    Chief Complaint: Back Injury    Patient's place of employment - [employer]   Patient's job title -    Date of Injury - 5/7/23  Body part injured - Back  Current work status per last visit - Retired  Improved, same, or worse - Improved  Pain Scale right now (1-10) - 0/10    See MA note above. Begin MD note:  Mr. Quinn has completed PT. States he feels much better, asymptomatic. Resumed swimming and workout at the gym. Has continued HEP. No new complaints or concerns.      Constitution: Negative for activity change and generalized weakness.   Neck: Negative for neck pain and neck stiffness.   Musculoskeletal:  Negative for pain, abnormal ROM of joint, back pain, pain with walking, muscle cramps and muscle ache.   Skin:  Negative for erythema.   Neurological:  Negative for loss of balance, numbness and tingling.   Psychiatric/Behavioral:  Negative for sleep disturbance.      Objective:     Physical Exam  Vitals and nursing note reviewed.   Constitutional:       General: He is not in acute distress.     Appearance: Normal appearance. He is not ill-appearing.   HENT:      Head: Normocephalic.   Eyes:      Conjunctiva/sclera: Conjunctivae normal.   Cardiovascular:      Rate and Rhythm: Normal rate and regular rhythm.   Pulmonary:      Effort: No respiratory distress.      Breath sounds: Normal breath sounds and air entry.   Musculoskeletal:      Thoracic back: No spasms or tenderness. Normal range of motion.   Skin:     General: Skin is warm and dry.      Findings: No erythema.   Neurological:      Mental Status: He is alert and oriented to person, place, and time.      GCS: GCS eye subscore is 4. GCS verbal subscore is 5. GCS motor subscore is 6.   Psychiatric:         Attention and Perception: Attention normal.         Mood and Affect: Mood normal.         Behavior: Behavior normal.        Assessment:      1. Strain of mid-back, subsequent encounter     2. Encounter related to worker's compensation claim      Plan:     Patient symptoms have resolved after completion of PT. Continue regular duty. Discharged from [facility].        Patient Instructions: Attention not to aggravate affected area   Restrictions: Regular Duty, Discharged from Occupational Health  Follow up if symptoms worsen or fail to improve.

## 2023-09-12 NOTE — LETTER
[facility] - Occupational Health  [address] [address]  Phone: [phone]  Fax: [phone]  [facility]: [email or web address]    Pt Name: Tung Chau  Injury Date: 05/07/2023   Employee ID: [id number] Date of Treatment: 09/12/2023   Company: Retired--7/1/2005      Appointment Time: 09:15 AM Arrived: 8:49 AM    Provider: Lizette Thomas MD Time Out: 9:52 AM      Office Treatment:   1. Strain of mid-back, subsequent encounter    2. Encounter related to worker's compensation claim          Patient Instructions: Attention not to aggravate affected area      Restrictions: Regular Duty, Discharged from Occupational Health     Return As needed. DOMINICK

## 2023-09-12 NOTE — LETTER
[facility] Occupational Health  [address] [address]  Phone: [phone]  Fax: [phone]  [facility]: [email or web address]    Pt Name: Tung Chau  Injury Date: 05/07/2023   Employee ID: [id number] Date of Treatment: 09/12/2023   Company: Retired--7/1/2005      Appointment Time: 09:15 AM Arrived: 8:49 AM    Provider: Lizette Thomas MD Time Out:***     Office Treatment:   1. Strain of mid-back, subsequent encounter    2. Encounter related to worker's compensation claim          Patient Instructions: Attention not to aggravate affected area    Restrictions: Regular Duty, Discharged from Occupational Health     Return Appointment: Visit date not found at ***

## 2023-09-13 ENCOUNTER — TELEPHONE (OUTPATIENT)
Dept: INTERNAL MEDICINE | Facility: CLINIC | Age: 83
End: 2023-09-13
Payer: MEDICARE

## 2023-09-13 RX ORDER — HYDROCHLOROTHIAZIDE 25 MG/1
25 TABLET ORAL DAILY
Qty: 90 TABLET | Refills: 1 | Status: SHIPPED | OUTPATIENT
Start: 2023-09-13 | End: 2024-03-11

## 2023-09-13 NOTE — TELEPHONE ENCOUNTER
----- Message from Russleslye Bob sent at 9/13/2023  2:09 PM CDT -----  Contact: [phone]  Requesting an RX refill or new RX.  Is this a refill or new RX:   RX name and strength (copy/paste from chart):  hydroCHLOROthiazide (HYDRODIURIL) 25 MG tablet (CLOSED OUT ) PHARMACY WANTS TO KNOW WHAT IS THE NEXT OPTION FOR HIM TO TAKE   WOULD LIKE NURSE TO CALL HIM BACK   Is this a 30 day or 90 day RX:   Pharmacy name and phone # (copy/paste from chart):    The doctors have asked that we provide their patients with the following 2 reminders -- prescription refills can take up to 72 hours, and a friendly reminder that in the future you can use your [facility] account to request refills: Y       High Dose Vitamin A Counseling: Side effects reviewed, pt to contact office should one occur.

## 2023-10-08 RX ORDER — DILTIAZEM HYDROCHLORIDE 180 MG/1
CAPSULE, COATED, EXTENDED RELEASE ORAL
Qty: 180 CAPSULE | Refills: 1 | Status: SHIPPED | OUTPATIENT
Start: 2023-10-08 | End: 2024-04-03 | Stop reason: SDUPTHER

## 2023-10-08 NOTE — TELEPHONE ENCOUNTER
No care due was identified.  [facility] Embedded Care Due Messages. Reference number: [id number].   10/08/2023 5:42:23 PM CDT

## 2023-10-09 NOTE — TELEPHONE ENCOUNTER
Refill Decision Note   Tung Larbronwyn  is requesting a refill authorization.  Brief Assessment and Rationale for Refill:  Approve     Medication Therapy Plan:         Comments:     Note composed:7:16 PM 10/08/2023

## 2023-11-16 DIAGNOSIS — H40.1131 PRIMARY OPEN ANGLE GLAUCOMA OF BOTH EYES, MILD STAGE: ICD-10-CM

## 2023-11-16 RX ORDER — LATANOPROST 50 UG/ML
1 SOLUTION/ DROPS OPHTHALMIC DAILY
Qty: 7.5 ML | Refills: 3 | Status: SHIPPED | OUTPATIENT
Start: 2023-11-16 | End: 2024-02-15 | Stop reason: SDUPTHER

## 2024-01-17 ENCOUNTER — OFFICE VISIT (OUTPATIENT)
Dept: URGENT CARE | Facility: CLINIC | Age: 84
End: 2024-01-17
Payer: MEDICARE

## 2024-01-17 VITALS
DIASTOLIC BLOOD PRESSURE: 80 MMHG | SYSTOLIC BLOOD PRESSURE: 153 MMHG | WEIGHT: 225 LBS | OXYGEN SATURATION: 99 % | BODY MASS INDEX: 27.98 KG/M2 | RESPIRATION RATE: 18 BRPM | HEIGHT: 75 IN | TEMPERATURE: 98 F | HEART RATE: 70 BPM

## 2024-01-17 DIAGNOSIS — R05.9 COUGH, UNSPECIFIED TYPE: ICD-10-CM

## 2024-01-17 DIAGNOSIS — J30.9 ALLERGIC RHINITIS, UNSPECIFIED SEASONALITY, UNSPECIFIED TRIGGER: Primary | ICD-10-CM

## 2024-01-17 PROCEDURE — 99213 OFFICE O/P EST LOW 20 MIN: CPT | Mod: S$GLB,,,

## 2024-01-17 PROCEDURE — 87502 INFLUENZA DNA AMP PROBE: CPT | Mod: QW,S$GLB,,

## 2024-01-17 PROCEDURE — 87811 SARS-COV-2 COVID19 W/OPTIC: CPT | Mod: QW,S$GLB,,

## 2024-01-17 RX ORDER — CETIRIZINE HYDROCHLORIDE 10 MG/1
10 TABLET ORAL DAILY
Qty: 30 TABLET | Refills: 0 | Status: SHIPPED | OUTPATIENT
Start: 2024-01-17 | End: 2024-02-16

## 2024-01-17 RX ORDER — FLUTICASONE PROPIONATE 50 MCG
1 SPRAY, SUSPENSION (ML) NASAL DAILY
Qty: 9.9 ML | Refills: 0 | Status: SHIPPED | OUTPATIENT
Start: 2024-01-17

## 2024-01-17 NOTE — PATIENT INSTRUCTIONS
Your COVID and flu tests today were negative.    Recommend oral antihistamine (Claritin/zyrtec) for rhinorrhea, steroid nasal spray (flonase),Tylenol (Acetaminophen) and/or Motrin (Ibuprofen) as directed for control of pain and/or fever.    For nose bleeds; recommend nasal irrigation with a saline spray/gel (AYR nasal gel) or Netti Pot like device per their directions is also recommended.  Please drink plenty of fluids.  Please get plenty of rest.  If you  smoke, please stop smoking.    Discussed prescriptions and over-the-counter medicines to help with patient's symptoms:  A steroid nose spray (flonase) can help with a stuffy nose. It can also help with drainage down the back of your throat.  An antihistamine (loratadine,zyrtec,allegra, xyzal) can help with itching, sneezing, or runny nose.  An antihistamine eye drop can help with itchy eyes.

## 2024-01-17 NOTE — PROGRESS NOTES
"Subjective:      Patient ID: Tung Chau is a 83 y.o. male.    Vitals:  height is 6' 3" (1.905 m) and weight is 102.1 kg (225 lb). His oral temperature is 98.1 °F (36.7 °C). His blood pressure is 153/80 (abnormal) and his pulse is 70. His respiration is 18 and oxygen saturation is 99%.     Chief Complaint: Sinus Problem    This is a 83 y.o. male who presents today with a chief complaint of sinus congestion.. Patient has been having nasal congestion and post nasal drip.     Provider note starts below:  Patient presents to clinic c/o nasal congestion and postnasal drip. Patient states he has seasonal allergies and believes his symptoms are consistent with allergies. However, patient states he works for the [employer] and is around many people every day. He would like testing done today to make sure he isn't contagious. He has taken no medication at home. Denies fever, chills, headache, body aches, shortness of breath, chest pain.         Sinus Problem  This is a new problem. The current episode started 1 to 4 weeks ago. The problem is unchanged. There has been no fever. His pain is at a severity of 0/10. He is experiencing no pain. Associated symptoms include congestion, coughing and sneezing. Pertinent negatives include no chills, ear pain, headaches, neck pain, shortness of breath, sinus pressure or sore throat. Past treatments include nothing. The treatment provided no relief.     Constitution: Negative for chills, fatigue and fever.   HENT:  Positive for congestion and postnasal drip. Negative for ear pain, sinus pain, sinus pressure and sore throat.    Neck: Negative for neck pain and neck stiffness.   Cardiovascular:  Negative for chest pain and palpitations.   Eyes:  Negative for eye discharge and eye itching.   Respiratory:  Positive for cough. Negative for chest tightness, shortness of breath and wheezing.    Gastrointestinal:  Negative for abdominal pain, nausea and vomiting.   Musculoskeletal:  Negative " for muscle cramps and muscle ache.   Skin:  Negative for pale and rash.   Allergic/Immunologic: Positive for seasonal allergies and sneezing. Negative for itching.   Neurological:  Negative for dizziness and headaches.      Objective:     Physical Exam   Constitutional: He is oriented to person, place, and time. normal  HENT:   Head: Normocephalic and atraumatic.   Ears:   Right Ear: Tympanic membrane normal.   Left Ear: Tympanic membrane normal.   Nose: Rhinorrhea present.   Mouth/Throat: Mucous membranes are moist. Oropharynx is clear.   Eyes: Conjunctivae are normal. Extraocular movement intact   Neck: Neck supple.   Cardiovascular: Normal rate, regular rhythm, normal heart sounds and normal pulses.   Pulmonary/Chest: Effort normal and breath sounds normal. He has no wheezes. He has no rhonchi. He has no rales.   Abdominal: Normal appearance.   Musculoskeletal: Normal range of motion.         General: Normal range of motion.   Lymphadenopathy:     He has no cervical adenopathy.   Neurological: He is alert, oriented to person, place, and time and at baseline.   Skin: Skin is warm and dry.   Psychiatric: His behavior is normal. Mood normal.   Nursing note and vitals reviewed.      Assessment:     Results for orders placed or performed in visit on 01/17/24   SARS Coronavirus 2 Antigen, POCT Manual Read   Result Value Ref Range    SARS Coronavirus 2 Antigen Negative Negative     Acceptable Yes    POCT Influenza A/B MOLECULAR   Result Value Ref Range    POC Molecular Influenza A Ag Negative Negative, Not Reported    POC Molecular Influenza B Ag Negative Negative, Not Reported     Acceptable Yes        1. Allergic rhinitis, unspecified seasonality, unspecified trigger    2. Cough, unspecified type      Plan:     Allergic rhinitis, unspecified seasonality, unspecified trigger  -     fluticasone propionate (FLONASE) 50 mcg/actuation nasal spray; 1 spray (50 mcg total) by Each Nostril route  once daily.  Dispense: 9.9 mL; Refill: 0  -     cetirizine (ZYRTEC) 10 MG tablet; Take 1 tablet (10 mg total) by mouth once daily.  Dispense: 30 tablet; Refill: 0    Cough, unspecified type  -     SARS Coronavirus 2 Antigen, POCT Manual Read  -     POCT Influenza A/B MOLECULAR      Patient Instructions   Your COVID and flu tests today were negative.    Recommend oral antihistamine (Claritin/zyrtec) for rhinorrhea, steroid nasal spray (flonase),Tylenol (Acetaminophen) and/or Motrin (Ibuprofen) as directed for control of pain and/or fever.    For nose bleeds; recommend nasal irrigation with a saline spray/gel (AYR nasal gel) or Netti Pot like device per their directions is also recommended.  Please drink plenty of fluids.  Please get plenty of rest.  If you  smoke, please stop smoking.    Discussed prescriptions and over-the-counter medicines to help with patient's symptoms:  A steroid nose spray (flonase) can help with a stuffy nose. It can also help with drainage down the back of your throat.  An antihistamine (loratadine,zyrtec,allegra, xyzal) can help with itching, sneezing, or runny nose.  An antihistamine eye drop can help with itchy eyes.

## 2024-01-27 ENCOUNTER — OFFICE VISIT (OUTPATIENT)
Dept: URGENT CARE | Facility: CLINIC | Age: 84
End: 2024-01-27
Payer: MEDICARE

## 2024-01-27 VITALS
HEIGHT: 75 IN | BODY MASS INDEX: 27.98 KG/M2 | TEMPERATURE: 97 F | WEIGHT: 225.06 LBS | RESPIRATION RATE: 18 BRPM | SYSTOLIC BLOOD PRESSURE: 109 MMHG | DIASTOLIC BLOOD PRESSURE: 67 MMHG | HEART RATE: 58 BPM | OXYGEN SATURATION: 99 %

## 2024-01-27 DIAGNOSIS — B96.89 BACTERIAL CONJUNCTIVITIS OF BOTH EYES: Primary | ICD-10-CM

## 2024-01-27 DIAGNOSIS — H10.9 BACTERIAL CONJUNCTIVITIS OF BOTH EYES: Primary | ICD-10-CM

## 2024-01-27 PROCEDURE — 99213 OFFICE O/P EST LOW 20 MIN: CPT | Mod: S$GLB,,, | Performed by: PHYSICIAN ASSISTANT

## 2024-01-27 RX ORDER — POLYMYXIN B SULFATE AND TRIMETHOPRIM 1; 10000 MG/ML; [USP'U]/ML
1 SOLUTION OPHTHALMIC EVERY 4 HOURS
Qty: 10 ML | Refills: 0 | Status: SHIPPED | OUTPATIENT
Start: 2024-01-27 | End: 2024-02-03

## 2024-01-27 NOTE — PROGRESS NOTES
"Subjective:      Patient ID: Tung Chau is a 83 y.o. male.    Vitals:  height is 6' 3" (1.905 m) and weight is 102.1 kg (225 lb 1.4 oz). His oral temperature is 97.4 °F (36.3 °C). His blood pressure is 109/67 and his pulse is 58 (abnormal). His respiration is 18 and oxygen saturation is 99%.     Chief Complaint: Eye Problem    This is a 83 y.o. male who presents today with a chief complaint of eye discharge. Patient states he was in his planting in the garden and dirty gotten in both eyes. Patient notice some discharge from both eyes.     Eye Problem   Both eyes are affected. The current episode started yesterday. There was no injury mechanism. There is No known exposure to pink eye. He Does not wear contacts. Associated symptoms include blurred vision, an eye discharge and eye redness. Pertinent negatives include no double vision, fever, itching or photophobia. He has tried nothing for the symptoms.       Constitution: Negative for chills and fever.   Eyes:  Positive for eye discharge, eye redness and blurred vision. Negative for eye trauma, foreign body in eye, eye itching, eye pain, photophobia, vision loss, double vision and eyelid swelling.   Skin:  Negative for erythema.   Neurological:  Negative for dizziness and headaches.      Past Medical History:   Diagnosis Date    Cataract     Chronic constipation     GERD (gastroesophageal reflux disease)     Glaucoma     Hyperlipidemia     Hypertension     LBBB (left bundle branch block)        Past Surgical History:   Procedure Laterality Date    CATARACT EXTRACTION W/  INTRAOCULAR LENS IMPLANT Left 04/27/2021    Dr. Ram    CATARACT EXTRACTION W/  INTRAOCULAR LENS IMPLANT Left 4/27/2021    Procedure: EXTRACTION, CATARACT, WITH IOL INSERTION;  Surgeon: Harjeet Ram MD;  Location: [facility];  Service: Ophthalmology;  Laterality: Left;    CATARACT EXTRACTION W/  INTRAOCULAR LENS IMPLANT Right 5/11/2021    Procedure: EXTRACTION, CATARACT, WITH IOL " INSERTION;  Surgeon: Harjeet Ram MD;  Location: [facility] OR;  Service: Ophthalmology;  Laterality: Right;    COLONOSCOPY  4/28/2008    hemorrhoids    COLONOSCOPY N/A 5/15/2017    Procedure: COLONOSCOPY;  Surgeon: Dandy Cifuentes MD;  [facility]  Service: Endoscopy;  Laterality: N/A;    COLONOSCOPY N/A 5/25/2020    Procedure: COLONOSCOPY;  Surgeon: Dandy Cifuentes MD;  [facility]  Service: Endoscopy;  Laterality: N/A;  Chronic history of constipation please use constipation bowel prep  Recommend MiraLax 17 g in 12 oz water once daily starting 5 days prior to colonoscopy.  Recommend full liquid diet starting 2 days prior to colonoscopy clear liquid diet the day before the colonoscopy in s    ESOPHAGOGASTRODUODENOSCOPY  2/1/2010    ESOPHAGOGASTRODUODENOSCOPY N/A 5/25/2020    Procedure: EGD (ESOPHAGOGASTRODUODENOSCOPY);  Surgeon: Dandy Cifuentes MD;  Location: [facility] ([address] [place] [address]);  Service: Endoscopy;  Laterality: N/A;    INGUINAL HERNIA REPAIR Right        Family History   Problem Relation Age of Onset    Stroke Father     Hypertension Sister     Cancer Sister         breast    Cancer Paternal Uncle 60        stomach CA    Colon polyps Paternal Uncle     Stomach cancer Paternal Uncle 56    Amblyopia Neg Hx     Blindness Neg Hx     Cataracts Neg Hx     Diabetes Neg Hx     Glaucoma Neg Hx     Macular degeneration Neg Hx     Retinal detachment Neg Hx     Strabismus Neg Hx     Thyroid disease Neg Hx     Colon cancer Neg Hx     Cirrhosis Neg Hx     Celiac disease Neg Hx     Crohn's disease Neg Hx     Esophageal cancer Neg Hx     Inflammatory bowel disease Neg Hx     Irritable bowel syndrome Neg Hx     Liver cancer Neg Hx     Liver disease Neg Hx     Rectal cancer Neg Hx     Ulcerative colitis Neg Hx     Jl's disease Neg Hx     Lymphoma Neg Hx     Tuberculosis Neg Hx     Scleroderma Neg Hx        Social History     Socioeconomic History    Marital status:     Tobacco Use    Smoking status: Never    Smokeless tobacco: Never   Substance and Sexual Activity    Alcohol use: No    Drug use: No    Sexual activity: Not Currently       Current Outpatient Medications   Medication Sig Dispense Refill    atorvastatin (LIPITOR) 10 MG tablet TAKE 1 TABLET(10 MG) BY MOUTH EVERY DAY 90 tablet 3    benazepriL (LOTENSIN) 10 MG tablet TAKE 1 TABLET(10 MG) BY MOUTH EVERY DAY 90 tablet 2    cetirizine (ZYRTEC) 10 MG tablet Take 1 tablet (10 mg total) by mouth once daily. 30 tablet 0    diltiaZEM (CARDIZEM CD) 180 MG 24 hr capsule TAKE 2 CAPSULES(360 MG) BY MOUTH EVERY  capsule 1    doxazosin (CARDURA) 4 MG tablet Take 1 tablet (4 mg total) by mouth every evening. 90 tablet 3    fluticasone propionate (FLONASE) 50 mcg/actuation nasal spray 1 spray (50 mcg total) by Each Nostril route once daily. 11.1 mL 0    fluticasone propionate (FLONASE) 50 mcg/actuation nasal spray 1 spray (50 mcg total) by Each Nostril route once daily. 9.9 mL 0    hydroCHLOROthiazide (HYDRODIURIL) 25 MG tablet Take 1 tablet (25 mg total) by mouth once daily. 90 tablet 1    ibuprofen (ADVIL,MOTRIN) 600 MG tablet Take 1 tablet (600 mg total) by mouth every 6 (six) hours as needed for Pain. 30 tablet 1    latanoprost 0.005 % ophthalmic solution Place 1 drop into both eyes once daily. 7.5 mL 3    polymyxin B sulf-trimethoprim (POLYTRIM) 10,000 unit- 1 mg/mL Drop Place 1 drop into both eyes every 4 (four) hours. for 7 days 10 mL 0     No current facility-administered medications for this visit.       Review of patient's allergies indicates:   Allergen Reactions    Penicillins      Stomach cramps       Objective:     Physical Exam   Constitutional: He is oriented to person, place, and time.  Non-toxic appearance. He does not appear ill. No distress.   HENT:   Head: Normocephalic and atraumatic.   Eyes: Right eye visual fields normal and left eye visual fields normal. Lids are normal. Pupils are equal, round, and  reactive to light. Lids are everted and swept, no foreign bodies found. No visual field deficit is present. Right eye exhibits discharge. Right eye exhibits no chemosis, no exudate and no hordeolum. No foreign body present in the right eye. Left eye exhibits discharge. Left eye exhibits no chemosis, no exudate and no hordeolum. No foreign body present in the left eye. Right conjunctiva is injected. Right conjunctiva has no hemorrhage. Left conjunctiva is injected. Left conjunctiva has no hemorrhage. No scleral icterus. Right pupil is round, reactive and not sluggish. Left pupil is round, reactive and not sluggish.   Fundoscopic exam:       The right eye shows no arteriolar narrowing, no AV nicking, no exudate, no hemorrhage and no papilledema. The right eye shows red reflex present and venous pulsations present.        The left eye shows no arteriolar narrowing, no AV nicking, no exudate, no hemorrhage and no papilledema. The left eye shows red reflex present and venous pulsations present. Extraocular movement intact vision grossly intact gaze aligned appropriately periorbital hyperpigmentation  Pulmonary/Chest: Effort normal. No respiratory distress.   Abdominal: Normal appearance.   Neurological: He is alert and oriented to person, place, and time.   Skin: Skin is warm, dry, not diaphoretic and not pale. No bruising and No erythema   Psychiatric: His behavior is normal. Mood, judgment and thought content normal.   Nursing note and vitals reviewed.    Vision Screening    Right eye Left eye Both eyes   Without correction 20/70 20/100    With correction            Assessment:     1. Bacterial conjunctivitis of both eyes        Plan:       Bacterial conjunctivitis of both eyes  -     polymyxin B sulf-trimethoprim (POLYTRIM) 10,000 unit- 1 mg/mL Drop; Place 1 drop into both eyes every 4 (four) hours. for 7 days  Dispense: 10 mL; Refill: 0    Results reviewed  I have reviewed the patient chart and pertinent past  imaging/labs.  Patient Instructions   Patient teaching-infection is easily spread to unaffected eye and to others  Wash your hands frequently to decrease the risk of transmitting to others  Avoid touching, rubbing eyes  May apply warm compresses to affected eye prn for comfort  Contagious for 24-48 after therapy begins  Return in 24-48hours if no improvement, or if conditions worsens  contact lens wear can resume when the eye is white and has no discharge for 24 hours after the completion of antibiotic therapy.   Use eye drops as prescribed, you may use artificial over the counter eye drops if you start developing dry eyes however this needs to be spaced out from your antibiotic eye drops.   If your symptoms worsen please go to the ER for evaluation.  Follow up with ophthalmologist as well

## 2024-01-27 NOTE — PATIENT INSTRUCTIONS
Patient teaching-infection is easily spread to unaffected eye and to others  Wash your hands frequently to decrease the risk of transmitting to others  Avoid touching, rubbing eyes  May apply warm compresses to affected eye prn for comfort  Contagious for 24-48 after therapy begins  Return in 24-48hours if no improvement, or if conditions worsens  contact lens wear can resume when the eye is white and has no discharge for 24 hours after the completion of antibiotic therapy.   Use eye drops as prescribed, you may use artificial over the counter eye drops if you start developing dry eyes however this needs to be spaced out from your antibiotic eye drops.   If your symptoms worsen please go to the ER for evaluation.  Follow up with ophthalmologist as well

## 2024-01-29 NOTE — PROGRESS NOTES
MEDICAL HISTORY:   Hypertension.  Hyperlipidemia.  Gastroesophageal reflux disease.  Chronic rhinitis.  BPH.  Left bundle-branch block.  Adenomatous colon polyp in the year  and in 2015.  Right inguinal hernia repair.  Epididymal cyst.  Fractured elbow and wrist.  Lung surgery at age 42 to remove an empyema.    Aortic atherosclerosis on imaging        SOCIAL HISTORY:  Tobacco and alcohol use - none.  .  Exercises by walking, weight lifting, and working on his farm.  He volunteers regularly at the [employer] and [employer].     FAMILY HISTORY:  Father is , complications from a fracture.  Mother is , breast cancer and stroke.  One sister is alive, doing well, but history of breast cancer and hypertension.     SCREENING:  Last colonoscopy in 2017   Benign polyp    colonoscopy May 2020, hyperplastic polyp        MEDICATIONS:   Atorvastatin 10 mg.  Benazepril 10 mg.  Diltiazem 360 mg.  Doxazosin 4 mg.  Hydrochlorothiazide 25 mg.      83-year-old male  Presents for routine annual visit   Overall in general he has felt well.  He continues to work at the [employer], as well as maintaining the garden at  [employer]    Recently was seen in urgent care regarding conjunctivitis.  Polymyxin eyedrops was prescribed and has an upcoming appointment with Optometry.    He was also having ongoing symptoms of nasal congestion, as well as sneezing and watery eyes at times.  He has not having rhinorrhea postnasal drip he has not blowing out mucus just feels congested.  He was presently using Flonase    Review of symptoms  Negative for chest pain, palpitations, shortness a breath, abdominal pain.  Regular bowel function.  No difficulty urinating, nocturia x2.  No indigestion heartburn presently no arthralgias    Because of the his job he is constantly walking throughout the day    Examination   Weight 216 lb   Pulse 64   Blood pressure 144/72  HEENT exam, nasal mucosa is  edematous, the conjunctiva of both eyes is erythematous  Neck no thyromegaly no masses  Chest clear breath sounds good effort  Heart regular rate rhythm no murmurs gallops  Abdominal exam nontender soft no hepatosplenomegaly abdominal masses bruits  Pulses 2+ carotid pulses no bruits, 2+ dorsalis pedal pulses  Extremities, no edema  Lymph gland, no palpable adenopathy  Genitalia, no gross abnormal findings  Musculoskeletal, no gross abnormal findings   Skin, areas of seborrheic keratosis    Impression  General exam   Hypertension  Hyperlipidemia  BPH  Chronic rhinitis  Conjunctivitis   Aortic atherosclerosis on imaging  Prostate cancer screening    Plan   Routine labs   Referral to the digital hypertension medicine program  Trial of Astelin nasal spray 2 puffs twice a day  Recommend increasing benazepril to 2 tablets daily if he is able to tolerate it will send in 20 mg dose      Addendum   Test results reviewed all look fine.  Continue as instructed.  Return visit 6 months

## 2024-01-30 ENCOUNTER — LAB VISIT (OUTPATIENT)
Dept: LAB | Facility: HOSPITAL | Age: 84
End: 2024-01-30
Attending: INTERNAL MEDICINE
Payer: MEDICARE

## 2024-01-30 ENCOUNTER — OFFICE VISIT (OUTPATIENT)
Dept: INTERNAL MEDICINE | Facility: CLINIC | Age: 84
End: 2024-01-30
Payer: MEDICARE

## 2024-01-30 VITALS
HEART RATE: 64 BPM | WEIGHT: 216.06 LBS | SYSTOLIC BLOOD PRESSURE: 142 MMHG | DIASTOLIC BLOOD PRESSURE: 70 MMHG | BODY MASS INDEX: 27 KG/M2 | OXYGEN SATURATION: 99 %

## 2024-01-30 DIAGNOSIS — H10.9 CONJUNCTIVITIS OF BOTH EYES, UNSPECIFIED CONJUNCTIVITIS TYPE: ICD-10-CM

## 2024-01-30 DIAGNOSIS — I10 PRIMARY HYPERTENSION: ICD-10-CM

## 2024-01-30 DIAGNOSIS — I70.0 ATHEROSCLEROSIS OF AORTA: ICD-10-CM

## 2024-01-30 DIAGNOSIS — Z12.5 PROSTATE CANCER SCREENING: ICD-10-CM

## 2024-01-30 DIAGNOSIS — Z00.00 ANNUAL PHYSICAL EXAM: ICD-10-CM

## 2024-01-30 DIAGNOSIS — Z00.00 ANNUAL PHYSICAL EXAM: Primary | ICD-10-CM

## 2024-01-30 DIAGNOSIS — N40.1 BENIGN PROSTATIC HYPERPLASIA WITH NOCTURIA: ICD-10-CM

## 2024-01-30 DIAGNOSIS — J31.0 CHRONIC RHINITIS: ICD-10-CM

## 2024-01-30 DIAGNOSIS — E78.5 HYPERLIPIDEMIA, UNSPECIFIED HYPERLIPIDEMIA TYPE: ICD-10-CM

## 2024-01-30 DIAGNOSIS — R35.1 BENIGN PROSTATIC HYPERPLASIA WITH NOCTURIA: ICD-10-CM

## 2024-01-30 LAB
ALBUMIN SERPL BCP-MCNC: 4.2 G/DL (ref 3.5–5.2)
ALP SERPL-CCNC: 68 U/L (ref 55–135)
ALT SERPL W/O P-5'-P-CCNC: 20 U/L (ref 10–44)
ANION GAP SERPL CALC-SCNC: 7 MMOL/L (ref 8–16)
AST SERPL-CCNC: 19 U/L (ref 10–40)
BASOPHILS # BLD AUTO: 0.04 K/UL (ref 0–0.2)
BASOPHILS NFR BLD: 0.9 % (ref 0–1.9)
BILIRUB SERPL-MCNC: 0.6 MG/DL (ref 0.1–1)
BUN SERPL-MCNC: 15 MG/DL (ref 8–23)
CALCIUM SERPL-MCNC: 10.2 MG/DL (ref 8.7–10.5)
CHLORIDE SERPL-SCNC: 104 MMOL/L (ref 95–110)
CHOLEST SERPL-MCNC: 138 MG/DL (ref 120–199)
CHOLEST/HDLC SERPL: 3.3 {RATIO} (ref 2–5)
CO2 SERPL-SCNC: 26 MMOL/L (ref 23–29)
COMPLEXED PSA SERPL-MCNC: 1.2 NG/ML (ref 0–4)
CREAT SERPL-MCNC: 1 MG/DL (ref 0.5–1.4)
DIFFERENTIAL METHOD BLD: ABNORMAL
EOSINOPHIL # BLD AUTO: 0.1 K/UL (ref 0–0.5)
EOSINOPHIL NFR BLD: 1.6 % (ref 0–8)
ERYTHROCYTE [DISTWIDTH] IN BLOOD BY AUTOMATED COUNT: 11.9 % (ref 11.5–14.5)
EST. GFR  (NO RACE VARIABLE): >60 ML/MIN/1.73 M^2
GLUCOSE SERPL-MCNC: 98 MG/DL (ref 70–110)
HCT VFR BLD AUTO: 43.2 % (ref 40–54)
HDLC SERPL-MCNC: 42 MG/DL (ref 40–75)
HDLC SERPL: 30.4 % (ref 20–50)
HGB BLD-MCNC: 14.4 G/DL (ref 14–18)
IMM GRANULOCYTES # BLD AUTO: 0.01 K/UL (ref 0–0.04)
IMM GRANULOCYTES NFR BLD AUTO: 0.2 % (ref 0–0.5)
LDLC SERPL CALC-MCNC: 79 MG/DL (ref 63–159)
LYMPHOCYTES # BLD AUTO: 0.7 K/UL (ref 1–4.8)
LYMPHOCYTES NFR BLD: 15 % (ref 18–48)
MCH RBC QN AUTO: 31.2 PG (ref 27–31)
MCHC RBC AUTO-ENTMCNC: 33.3 G/DL (ref 32–36)
MCV RBC AUTO: 94 FL (ref 82–98)
MONOCYTES # BLD AUTO: 0.3 K/UL (ref 0.3–1)
MONOCYTES NFR BLD: 7.5 % (ref 4–15)
NEUTROPHILS # BLD AUTO: 3.3 K/UL (ref 1.8–7.7)
NEUTROPHILS NFR BLD: 74.8 % (ref 38–73)
NONHDLC SERPL-MCNC: 96 MG/DL
NRBC BLD-RTO: 0 /100 WBC
PLATELET # BLD AUTO: 159 K/UL (ref 150–450)
PMV BLD AUTO: 12.2 FL (ref 9.2–12.9)
POTASSIUM SERPL-SCNC: 4.1 MMOL/L (ref 3.5–5.1)
PROT SERPL-MCNC: 7.3 G/DL (ref 6–8.4)
RBC # BLD AUTO: 4.62 M/UL (ref 4.6–6.2)
SODIUM SERPL-SCNC: 137 MMOL/L (ref 136–145)
TRIGL SERPL-MCNC: 85 MG/DL (ref 30–150)
TSH SERPL DL<=0.005 MIU/L-ACNC: 2.05 UIU/ML (ref 0.4–4)
WBC # BLD AUTO: 4.4 K/UL (ref 3.9–12.7)

## 2024-01-30 PROCEDURE — 84443 ASSAY THYROID STIM HORMONE: CPT | Performed by: INTERNAL MEDICINE

## 2024-01-30 PROCEDURE — 99999 PR PBB SHADOW E&M-EST. PATIENT-LVL III: CPT | Mod: PBBFAC,,, | Performed by: INTERNAL MEDICINE

## 2024-01-30 PROCEDURE — 84153 ASSAY OF PSA TOTAL: CPT | Mod: GA | Performed by: INTERNAL MEDICINE

## 2024-01-30 PROCEDURE — 80053 COMPREHEN METABOLIC PANEL: CPT | Performed by: INTERNAL MEDICINE

## 2024-01-30 PROCEDURE — 99213 OFFICE O/P EST LOW 20 MIN: CPT | Mod: PBBFAC | Performed by: INTERNAL MEDICINE

## 2024-01-30 PROCEDURE — 99397 PER PM REEVAL EST PAT 65+ YR: CPT | Mod: GZ,S$PBB,, | Performed by: INTERNAL MEDICINE

## 2024-01-30 PROCEDURE — 85025 COMPLETE CBC W/AUTO DIFF WBC: CPT | Performed by: INTERNAL MEDICINE

## 2024-01-30 PROCEDURE — 36415 COLL VENOUS BLD VENIPUNCTURE: CPT | Performed by: INTERNAL MEDICINE

## 2024-01-30 PROCEDURE — 80061 LIPID PANEL: CPT | Performed by: INTERNAL MEDICINE

## 2024-01-30 RX ORDER — AZELASTINE 1 MG/ML
2 SPRAY, METERED NASAL 2 TIMES DAILY
Qty: 30 ML | Refills: 0 | Status: SHIPPED | OUTPATIENT
Start: 2024-01-30

## 2024-01-30 RX ORDER — BENAZEPRIL HYDROCHLORIDE 10 MG/1
20 TABLET ORAL DAILY
Qty: 90 TABLET | Refills: 2
Start: 2024-01-30

## 2024-01-31 ENCOUNTER — PATIENT MESSAGE (OUTPATIENT)
Dept: INTERNAL MEDICINE | Facility: CLINIC | Age: 84
End: 2024-01-31
Payer: COMMERCIAL

## 2024-01-31 ENCOUNTER — OFFICE VISIT (OUTPATIENT)
Dept: OPTOMETRY | Facility: CLINIC | Age: 84
End: 2024-01-31
Payer: MEDICARE

## 2024-01-31 DIAGNOSIS — E78.5 HYPERLIPIDEMIA, UNSPECIFIED HYPERLIPIDEMIA TYPE: ICD-10-CM

## 2024-01-31 DIAGNOSIS — B30.9 VIRAL CONJUNCTIVITIS OF BOTH EYES: Primary | ICD-10-CM

## 2024-01-31 DIAGNOSIS — I10 PRIMARY HYPERTENSION: Primary | ICD-10-CM

## 2024-01-31 PROCEDURE — 99999 PR PBB SHADOW E&M-EST. PATIENT-LVL II: CPT | Mod: PBBFAC,,, | Performed by: OPTOMETRIST

## 2024-01-31 PROCEDURE — 92012 INTRM OPH EXAM EST PATIENT: CPT | Mod: S$PBB,,, | Performed by: OPTOMETRIST

## 2024-01-31 PROCEDURE — 99212 OFFICE O/P EST SF 10 MIN: CPT | Mod: PBBFAC | Performed by: OPTOMETRIST

## 2024-01-31 NOTE — PROGRESS NOTES
HPI    DLS: 8/9/2022 Dr. Irving  Patient is here due to eye infection OU. Patient states that it began last   Friday. Patient denies pain/discomfort, but reports tearing. States that   OS seems to be clearing up, but OD is still infected.   Eye Meds: Polymyxin B Sulfate and Trim OU every 4 hours   Last edited by Jayne Miguel on 1/31/2024  2:58 PM.            Assessment /Plan     For exam results, see Encounter Report.    Viral conjunctivitis of both eyes      D/C Rx gtts prescribed by urgent care. Begin tobradex gtts qid OU x 1.5 weeks, then D/C.    RTC x 2 weeks for annual eye exam

## 2024-02-06 RX ORDER — ATORVASTATIN CALCIUM 10 MG/1
10 TABLET, FILM COATED ORAL DAILY
Qty: 90 TABLET | Refills: 3 | Status: SHIPPED | OUTPATIENT
Start: 2024-02-06

## 2024-02-06 NOTE — TELEPHONE ENCOUNTER
No care due was identified.  Health [facility] Embedded Care Due Messages. Reference number: [id number].   2/05/2024 10:56:47 PM CST

## 2024-02-06 NOTE — TELEPHONE ENCOUNTER
Refill Decision Note   Tung Larbronwyn  is requesting a refill authorization.  Brief Assessment and Rationale for Refill:  Approve     Medication Therapy Plan:         Comments:     Note composed:3:18 PM 02/06/2024

## 2024-02-15 ENCOUNTER — OFFICE VISIT (OUTPATIENT)
Dept: OPTOMETRY | Facility: CLINIC | Age: 84
End: 2024-02-15
Payer: MEDICARE

## 2024-02-15 DIAGNOSIS — H40.1131 PRIMARY OPEN ANGLE GLAUCOMA OF BOTH EYES, MILD STAGE: ICD-10-CM

## 2024-02-15 DIAGNOSIS — Z96.1 PSEUDOPHAKIA OF BOTH EYES: Primary | ICD-10-CM

## 2024-02-15 PROCEDURE — 92014 COMPRE OPH EXAM EST PT 1/>: CPT | Mod: S$PBB,,, | Performed by: OPTOMETRIST

## 2024-02-15 PROCEDURE — 99999 PR PBB SHADOW E&M-EST. PATIENT-LVL II: CPT | Mod: PBBFAC,,, | Performed by: OPTOMETRIST

## 2024-02-15 PROCEDURE — 99212 OFFICE O/P EST SF 10 MIN: CPT | Mod: PBBFAC | Performed by: OPTOMETRIST

## 2024-02-15 RX ORDER — LATANOPROST 50 UG/ML
1 SOLUTION/ DROPS OPHTHALMIC DAILY
Qty: 7.5 ML | Refills: 3 | Status: SHIPPED | OUTPATIENT
Start: 2024-02-15

## 2024-02-15 NOTE — PROGRESS NOTES
HPI    Pt is here today for routine eye exam. Patient denies pain/discomfort.   Patient currently uses OTC readers only.   DLS: 1/31/2024 Dr. Phan  (-)Flashes   (-)Floaters   (-)Diplopia   (-)Headaches   (-)Itching   (+)Tearing: allergy Related  (-)Burning  (-)Dryness   (+)Photophobia  (+)Glare   (-)Blurred VA  Past Eye Sx: Cataract with IOL OU   Eye Meds:  Latanoprost OU QHS  Last edited by Sarah Phan, OD on 2/15/2024  2:23 PM.            Assessment /Plan     For exam results, see Encounter Report.    Pseudophakia of both eyes    Primary open angle glaucoma of both eyes, mild stage  -     latanoprost 0.005 % ophthalmic solution; Place 1 drop into both eyes once daily.  Dispense: 7.5 mL; Refill: 3      Previous Dr. Irving notes: IOP lower after cataract removal and stable, nerve eval stable, cont Latanaprost qhs OU, Prev HVF and OCT stable, prev gonio open. Probably low tension. Mild glaucoma based on OCT changes. Pachy normal. Initial Field did clean up at repeat. Pretreat IOP at 20. Family history of glaucoma.   Ordered repeat HVF(24-2)kavita std and OCT of rnfl today as well as refilled gtts.    2. Monitor; pt educated on condition and visual status    RTC x 6 mos for IOP check OU

## 2024-02-23 ENCOUNTER — TELEPHONE (OUTPATIENT)
Dept: OPTOMETRY | Facility: CLINIC | Age: 84
End: 2024-02-23
Payer: COMMERCIAL

## 2024-03-01 RX ORDER — DOXAZOSIN 4 MG/1
4 TABLET ORAL NIGHTLY
Qty: 90 TABLET | Refills: 3 | Status: SHIPPED | OUTPATIENT
Start: 2024-03-01

## 2024-03-01 NOTE — TELEPHONE ENCOUNTER
No care due was identified.  Health [facility] Embedded Care Due Messages. Reference number: [id number].   3/01/2024 3:25:12 AM CST

## 2024-03-01 NOTE — TELEPHONE ENCOUNTER
Refill Decision Note   Tung Chau  is requesting a refill authorization.  Brief Assessment and Rationale for Refill:  Approve     Medication Therapy Plan:         Comments:     Note composed:8:51 AM 03/01/2024             Appointments     Last Visit   1/30/2024 Antonio Killian MD   Next Visit   7/30/2024 Antonio Killian MD

## 2024-03-10 NOTE — TELEPHONE ENCOUNTER
No care due was identified.  [facility] Embedded Care Due Messages. Reference number: [id number].   3/10/2024 8:00:08 AM CDT

## 2024-03-11 RX ORDER — HYDROCHLOROTHIAZIDE 25 MG/1
25 TABLET ORAL
Qty: 90 TABLET | Refills: 1 | Status: SHIPPED | OUTPATIENT
Start: 2024-03-11

## 2024-03-11 NOTE — TELEPHONE ENCOUNTER
Refill Routing Note   Medication(s) are not appropriate for processing by [facility] for the following reason(s):        Required vitals abnormal    [facility] action(s):  Defer        Medication Therapy Plan: BP 1/30/24 (!) 142/70      Appointments  past 12m or future 3m with PCP    Date Provider   Last Visit   1/30/2024 Antonio Killian MD   Next Visit   7/30/2024 Antonio Killian MD   ED visits in past 90 days: 0        Note composed:11:26 AM 03/11/2024

## 2024-04-03 RX ORDER — DILTIAZEM HYDROCHLORIDE 180 MG/1
360 CAPSULE, COATED, EXTENDED RELEASE ORAL DAILY
Qty: 180 CAPSULE | Refills: 1 | Status: SHIPPED | OUTPATIENT
Start: 2024-04-03

## 2024-04-03 NOTE — TELEPHONE ENCOUNTER
No care due was identified.  [facility] Embedded Care Due Messages. Reference number: [id number].   4/03/2024 7:34:52 AM CDT

## 2024-04-09 ENCOUNTER — PATIENT OUTREACH (OUTPATIENT)
Dept: ADMINISTRATIVE | Facility: HOSPITAL | Age: 84
End: 2024-04-09
Payer: COMMERCIAL

## 2024-04-09 VITALS — DIASTOLIC BLOOD PRESSURE: 74 MMHG | SYSTOLIC BLOOD PRESSURE: 133 MMHG

## 2024-04-09 NOTE — PROGRESS NOTES
Health Maintenance Due   Topic Date Due    RSV Vaccine (Age 60+ and Pregnant patients) (1 - 1-dose 60+ series) Never done    COVID-19 Vaccine (4 - 2023-24 season) 09/01/2023     Triggered LINKS and reconciled immunizations. Updated Care Everywhere. Checked pt's Hypertension Digital Medicine flow sheet for an updated BP reading. BP reading of 133/74 was taken from pt's Hypertension Digital Medicine flow sheet on 4/5/2024. Chart review completed.

## 2024-04-16 ENCOUNTER — HOSPITAL ENCOUNTER (EMERGENCY)
Facility: HOSPITAL | Age: 84
Discharge: OTHER INSTITUTION | End: 2024-04-16
Attending: EMERGENCY MEDICINE
Payer: MEDICARE

## 2024-04-16 VITALS
WEIGHT: 210 LBS | RESPIRATION RATE: 18 BRPM | DIASTOLIC BLOOD PRESSURE: 64 MMHG | HEART RATE: 66 BPM | TEMPERATURE: 98 F | HEIGHT: 75 IN | SYSTOLIC BLOOD PRESSURE: 135 MMHG | OXYGEN SATURATION: 95 % | BODY MASS INDEX: 26.11 KG/M2

## 2024-04-16 DIAGNOSIS — R53.1 WEAKNESS: ICD-10-CM
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
## 2024-04-16 DIAGNOSIS — S37.091A: ICD-10-CM

## 2024-04-16 LAB
ALBUMIN SERPL BCP-MCNC: 4 G/DL (ref 3.5–5.2)
ALP SERPL-CCNC: 60 U/L (ref 55–135)
ALT SERPL W/O P-5'-P-CCNC: 18 U/L (ref 10–44)
ANION GAP SERPL CALC-SCNC: 12 MMOL/L (ref 8–16)
AST SERPL-CCNC: 20 U/L (ref 10–40)
BASOPHILS # BLD AUTO: 0.04 K/UL (ref 0–0.2)
BASOPHILS NFR BLD: 0.3 % (ref 0–1.9)
BILIRUB SERPL-MCNC: 0.4 MG/DL (ref 0.1–1)
BUN SERPL-MCNC: 27 MG/DL (ref 8–23)
CALCIUM SERPL-MCNC: 9 MG/DL (ref 8.7–10.5)
CHLORIDE SERPL-SCNC: 105 MMOL/L (ref 95–110)
CO2 SERPL-SCNC: 23 MMOL/L (ref 23–29)
CREAT SERPL-MCNC: 1.9 MG/DL (ref 0.5–1.4)
DIFFERENTIAL METHOD BLD: ABNORMAL
EOSINOPHIL # BLD AUTO: 0 K/UL (ref 0–0.5)
EOSINOPHIL NFR BLD: 0.1 % (ref 0–8)
ERYTHROCYTE [DISTWIDTH] IN BLOOD BY AUTOMATED COUNT: 12.1 % (ref 11.5–14.5)
EST. GFR  (NO RACE VARIABLE): 35 ML/MIN/1.73 M^2
GLUCOSE SERPL-MCNC: 155 MG/DL (ref 70–110)
HCT VFR BLD AUTO: 37.2 % (ref 40–54)
HGB BLD-MCNC: 12.9 G/DL (ref 14–18)
IMM GRANULOCYTES # BLD AUTO: 0.05 K/UL (ref 0–0.04)
IMM GRANULOCYTES NFR BLD AUTO: 0.4 % (ref 0–0.5)
LIPASE SERPL-CCNC: 18 U/L (ref 4–60)
LYMPHOCYTES # BLD AUTO: 0.6 K/UL (ref 1–4.8)
LYMPHOCYTES NFR BLD: 4.6 % (ref 18–48)
MAGNESIUM SERPL-MCNC: 2.1 MG/DL (ref 1.6–2.6)
MCH RBC QN AUTO: 32 PG (ref 27–31)
MCHC RBC AUTO-ENTMCNC: 34.7 G/DL (ref 32–36)
MCV RBC AUTO: 92 FL (ref 82–98)
MONOCYTES # BLD AUTO: 0.8 K/UL (ref 0.3–1)
MONOCYTES NFR BLD: 6.2 % (ref 4–15)
NEUTROPHILS # BLD AUTO: 11.2 K/UL (ref 1.8–7.7)
NEUTROPHILS NFR BLD: 88.4 % (ref 38–73)
NRBC BLD-RTO: 0 /100 WBC
PLATELET # BLD AUTO: 149 K/UL (ref 150–450)
PMV BLD AUTO: 11.2 FL (ref 9.2–12.9)
POCT GLUCOSE: 171 MG/DL (ref 70–110)
POTASSIUM SERPL-SCNC: 3.9 MMOL/L (ref 3.5–5.1)
PROT SERPL-MCNC: 6.5 G/DL (ref 6–8.4)
RBC # BLD AUTO: 4.03 M/UL (ref 4.6–6.2)
SODIUM SERPL-SCNC: 140 MMOL/L (ref 136–145)
TROPONIN I SERPL DL<=0.01 NG/ML-MCNC: <0.006 NG/ML (ref 0–0.03)
WBC # BLD AUTO: 12.64 K/UL (ref 3.9–12.7)

## 2024-04-16 PROCEDURE — 83735 ASSAY OF MAGNESIUM: CPT | Performed by: EMERGENCY MEDICINE

## 2024-04-16 PROCEDURE — 93010 ELECTROCARDIOGRAM REPORT: CPT | Mod: ,,, | Performed by: INTERNAL MEDICINE

## 2024-04-16 PROCEDURE — 96374 THER/PROPH/DIAG INJ IV PUSH: CPT

## 2024-04-16 PROCEDURE — 82962 GLUCOSE BLOOD TEST: CPT

## 2024-04-16 PROCEDURE — 80053 COMPREHEN METABOLIC PANEL: CPT | Performed by: EMERGENCY MEDICINE

## 2024-04-16 PROCEDURE — 85025 COMPLETE CBC W/AUTO DIFF WBC: CPT | Performed by: EMERGENCY MEDICINE

## 2024-04-16 PROCEDURE — 63600175 PHARM REV CODE 636 W HCPCS: Performed by: EMERGENCY MEDICINE

## 2024-04-16 PROCEDURE — 83690 ASSAY OF LIPASE: CPT | Performed by: EMERGENCY MEDICINE

## 2024-04-16 PROCEDURE — 93005 ELECTROCARDIOGRAM TRACING: CPT

## 2024-04-16 PROCEDURE — 84484 ASSAY OF TROPONIN QUANT: CPT | Performed by: EMERGENCY MEDICINE

## 2024-04-16 PROCEDURE — 96361 HYDRATE IV INFUSION ADD-ON: CPT

## 2024-04-16 PROCEDURE — 25000003 PHARM REV CODE 250: Performed by: EMERGENCY MEDICINE

## 2024-04-16 PROCEDURE — 99285 EMERGENCY DEPT VISIT HI MDM: CPT | Mod: 25

## 2024-04-16 RX ORDER — PROCHLORPERAZINE EDISYLATE 5 MG/ML
10 INJECTION INTRAMUSCULAR; INTRAVENOUS
Status: COMPLETED | OUTPATIENT
Start: 2024-04-16 | End: 2024-04-16

## 2024-04-16 RX ADMIN — PROCHLORPERAZINE EDISYLATE 10 MG: 5 INJECTION INTRAMUSCULAR; INTRAVENOUS at 12:04

## 2024-04-16 RX ADMIN — SODIUM CHLORIDE 1000 ML: 9 INJECTION, SOLUTION INTRAVENOUS at 12:04

## 2024-04-16 NOTE — ED NOTES
Pt aao x 4. Pt brought to room via wheelchair. Pt reports he has been doing a lot of yard work for the past four days. 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 Pt complaining of right arm and abdomen pain. Pt denies loc. Pts wife states when the patient arrived home, he was complaining of abdominal pain and did not mention the fall initially. Since the fall, pt began complaining of nausea, dizziness, and weakness. Pt denied any vomiting prior to arrival. In triage, pt had a blood pressure of 80s/50s. Pt connected to cardiac monitoring, automatic bp cuff, and cont pulse ox. Side rails up x 2, bed in lowest locked position. Pts wife at bedside. Pt moving all extremities spontaneously with no difficulties.

## 2024-04-16 NOTE — ED PROVIDER NOTES
"Encounter Date: 4/16/2024       History     Chief Complaint   Patient presents with    Fall     Pt arrives with complaints of falling while gardening this evening. Pt states that since the fall he has had pain in his right arm, right side, right leg, and abdominal pain. Also c/o nausea, dizziness and weakness. Denies any vomiting. Denies any LOC and hitting his head.      84 y/o M brought to the ER c/o generalized weakness, right side pain, abdominal pain, N/V. Patient states he fell several hours ago - he was in his garden and tripped, landing on his right side. Denies striking his head or LOC. Was not initially feeling bad. Subsequently began having right sided rib and abdominal pain, as well as "dizziness". When asked denies room spinning sensation, but reports lightheadedness. Notes nausea with 1 episode of nonbloody, nonbilious emesis. Denies any fever or urinary complaints. Denies constipation or diarrhea. Denies SOB, cough/congestion.       Review of patient's allergies indicates:   Allergen Reactions    Penicillins      Stomach cramps     Past Medical History:   Diagnosis Date    Cataract     Chronic constipation     GERD (gastroesophageal reflux disease)     Glaucoma     Hyperlipidemia     Hypertension     LBBB (left bundle branch block)      Past Surgical History:   Procedure Laterality Date    CATARACT EXTRACTION W/  INTRAOCULAR LENS IMPLANT Left 04/27/2021    Dr. Ram    CATARACT EXTRACTION W/  INTRAOCULAR LENS IMPLANT Left 4/27/2021    Procedure: EXTRACTION, CATARACT, WITH IOL INSERTION;  Surgeon: Harjeet Ram MD;  Location: [facility] OR;  Service: Ophthalmology;  Laterality: Left;    CATARACT EXTRACTION W/  INTRAOCULAR LENS IMPLANT Right 5/11/2021    Procedure: EXTRACTION, CATARACT, WITH IOL INSERTION;  Surgeon: Harjeet Ram MD;  Location: [facility] OR;  Service: Ophthalmology;  Laterality: Right;    COLONOSCOPY  4/28/2008    hemorrhoids    COLONOSCOPY N/A 5/15/2017    Procedure: " COLONOSCOPY;  Surgeon: Dandy Cifuentes MD;  Location: [address]);  Service: Endoscopy;  Laterality: N/A;    COLONOSCOPY N/A 5/25/2020    Procedure: COLONOSCOPY;  Surgeon: Dandy Cifuentes MD;  Location: [address]);  Service: Endoscopy;  Laterality: N/A;  Chronic history of constipation please use constipation bowel prep  Recommend MiraLax 17 g in 12 oz water once daily starting 5 days prior to colonoscopy.  Recommend full liquid diet starting 2 days prior to colonoscopy clear liquid diet the day before the colonoscopy in s    ESOPHAGOGASTRODUODENOSCOPY  2/1/2010    ESOPHAGOGASTRODUODENOSCOPY N/A 5/25/2020    Procedure: EGD (ESOPHAGOGASTRODUODENOSCOPY);  Surgeon: Dandy Cifuentes MD;  Location: [address]);  Service: Endoscopy;  Laterality: N/A;    INGUINAL HERNIA REPAIR Right      Family History   Problem Relation Name Age of Onset    Stroke Father      Hypertension Sister      Cancer Sister          breast    Cancer Paternal Uncle  60        stomach CA    Colon polyps Paternal Uncle      Stomach cancer Paternal Uncle  56    Amblyopia Neg Hx      Blindness Neg Hx      Cataracts Neg Hx      Diabetes Neg Hx      Glaucoma Neg Hx      Macular degeneration Neg Hx      Retinal detachment Neg Hx      Strabismus Neg Hx      Thyroid disease Neg Hx      Colon cancer Neg Hx      Cirrhosis Neg Hx      Celiac disease Neg Hx      Crohn's disease Neg Hx      Esophageal cancer Neg Hx      Inflammatory bowel disease Neg Hx      Irritable bowel syndrome Neg Hx      Liver cancer Neg Hx      Liver disease Neg Hx      Rectal cancer Neg Hx      Ulcerative colitis Neg Hx      Jl's disease Neg Hx      Lymphoma Neg Hx      Tuberculosis Neg Hx      Scleroderma Neg Hx       Social History     Tobacco Use    Smoking status: Never    Smokeless tobacco: Never   Substance Use Topics    Alcohol use: No    Drug use: No     Review of Systems   Constitutional:  Negative for chills and fever.   HENT:  Negative for  congestion.    Respiratory:  Negative for cough and shortness of breath.    Cardiovascular:  Positive for chest pain.   Gastrointestinal:  Positive for abdominal pain.   Musculoskeletal:  Positive for back pain.   Neurological:  Positive for light-headedness. Negative for headaches.       Physical Exam     Initial Vitals [04/16/24 0022]   BP Pulse Resp Temp SpO2   (!) 85/50 65 18 98.4 °F (36.9 °C) 99 %      MAP       --         Physical Exam    Nursing note and vitals reviewed.  Constitutional: He appears well-developed and well-nourished. No distress.   HENT:   Head: Normocephalic and atraumatic.   Eyes: Conjunctivae and EOM are normal. Pupils are equal, round, and reactive to light.   Neck: Neck supple. No tracheal deviation present.   Normal range of motion.  Cardiovascular:  Normal rate and intact distal pulses.           Pulmonary/Chest: No respiratory distress.   Abdominal: Abdomen is soft. He exhibits no distension. There is abdominal tenderness (Mild, generalized).   Musculoskeletal:         General: Tenderness (Right chest wall and right mid-low back, no point or bony TTP) present. No edema. Normal range of motion.      Cervical back: Normal range of motion and neck supple.     Neurological: He is alert and oriented to person, place, and time. He has normal strength. No cranial nerve deficit. GCS score is 15. GCS eye subscore is 4. GCS verbal subscore is 5. GCS motor subscore is 6.   Skin: Skin is warm and dry.         ED Course   Critical Care    Date/Time: 4/16/2024 2:27 AM    Performed by: Horace Gardner MD  Authorized by: Horace Gardner MD  Direct patient critical care time: 15 minutes  Additional history critical care time: 15 minutes  Ordering / reviewing critical care time: 15 minutes  Documentation critical care time: 15 minutes  Consulting other physicians critical care time: 15 minutes  Consult with family critical care time: 10 minutes  Total critical care time (exclusive of  procedural time) : 85 minutes  Critical care time was exclusive of separately billable procedures and treating other patients.  Critical care was necessary to treat or prevent imminent or life-threatening deterioration of the following conditions: trauma.  Critical care was time spent personally by me on the following activities: development of treatment plan with patient or surrogate, interpretation of cardiac output measurements, evaluation of patient's response to treatment, examination of patient, obtaining history from patient or surrogate, ordering and performing treatments and interventions, ordering and review of laboratory studies, ordering and review of radiographic studies, pulse oximetry, re-evaluation of patient's condition and review of old charts.        Labs Reviewed   CBC W/ AUTO DIFFERENTIAL - Abnormal; Notable for the following components:       Result Value    RBC 4.03 (*)     Hemoglobin 12.9 (*)     Hematocrit 37.2 (*)     MCH 32.0 (*)     Platelets 149 (*)     Gran # (ANC) 11.2 (*)     Immature Grans (Abs) 0.05 (*)     Lymph # 0.6 (*)     Gran % 88.4 (*)     Lymph % 4.6 (*)     All other components within normal limits   COMPREHENSIVE METABOLIC PANEL - Abnormal; Notable for the following components:    Glucose 155 (*)     BUN 27 (*)     Creatinine 1.9 (*)     eGFR 35 (*)     All other components within normal limits   POCT GLUCOSE - Abnormal; Notable for the following components:    POCT Glucose 171 (*)     All other components within normal limits   TROPONIN I   LIPASE   MAGNESIUM   URINALYSIS   POCT GLUCOSE MONITORING CONTINUOUS              X-Rays:   Independently Interpreted Readings:   Other Readings:  CT Head independently interpreted by me pending radiology review: No acute intracranial findings     Imaging Results               CT Chest Abdomen Pelvis Without Contrast (XPD) (Final result)  Result time 04/16/24 01:49:33      Final result by Rome Mccormack MD (04/16/24 01:49:33)                    Impression:      Large right-sided subcapsular and perinephric hematoma, as discussed above.    This report was flagged in Epic as abnormal.    The findings were reported to the ER physician at the time of dictation.      Electronically signed by: Rome Mccormack  Date:    04/16/2024  Time:    01:49               Narrative:    EXAMINATION:  CT CHEST ABDOMEN PELVIS WITHOUT CONTRAST(XPD)    CLINICAL HISTORY:  Polytrauma, blunt;    TECHNIQUE:  Low dose axial images, sagittal and coronal reformations were obtained from the thoracic inlet to the pubic symphysis intravenous contrast and oral contrast was not utilized, this diminishes the sensitivity of the examination, diminishing sensitivity for detection of acute posttraumatic injury.    COMPARISON:  CT examination of the abdomen pelvis December 17, 2020    FINDINGS:  The lungs demonstrate atelectatic change.  There is no evidence for pleural effusion and there is no evidence for pneumothorax.  There is no evidence for pericardial effusion.  The noncontrast appearance of the heart and great vessels is appropriate, evaluation is limited.  Mild aortic atherosclerotic change noted.  Coronary arterial atherosclerotic change noted.  Aortic valvular calcification noted.  Granulomas calcifications of the mediastinum noted.    Small hiatal hernia noted.  When accounting for limitations of the exam, there is no evidence for acute process of the stomach, liver, gallbladder, pancreas, spleen, or adrenal glands.    There is a large right-sided subcapsular and perinephric hematoma with hemorrhagic density stranding throughout the right retroperitoneum, and extending inferiorly.  The kidneys bilaterally demonstrate hypodensities, not optimally evaluated on this examination however correlate with the prior study and likely represent cysts.  There is no evidence for hydronephrosis or obstructive uropathy bilaterally.    The abdominal aorta appears normal in caliber,  atherosclerotic change noted otherwise not optimally evaluated on this noncontrast examination.  The urinary bladder is incompletely distended, appears unremarkable for lack of distention.  There is a small fat density left inguinal hernia without bowel involvement.  Prostate appears enlarged, and there is heterogeneity of the prostate.  The prostate measures approximately 5.2 x 6.5 cm on axial imaging.    There is no evidence for small bowel obstructive process.  The appendix is identified it does not appear inflamed.  Diverticula of the colon are noted, there is no evidence for acute diverticulitis.  There is no evidence for free intraperitoneal air.    The osseous structures demonstrate chronic change.                                       CT Head Without Contrast (Final result)  Result time 04/16/24 01:30:48      Final result by Rome Mccormack MD (04/16/24 01:30:48)                   Impression:      Chronic change noted, there is no evidence for acute intracranial process.      Electronically signed by: Rome Mccormack  Date:    04/16/2024  Time:    01:30               Narrative:    EXAMINATION:  CT HEAD WITHOUT CONTRAST    CLINICAL HISTORY:  Head trauma, minor (Age >= 65y);    TECHNIQUE:  Low dose axial images were obtained through the head.  Coronal and sagittal reformations were also performed. Contrast was not administered.    COMPARISON:  None.    FINDINGS:  The ventricular system, sulcal pattern and parenchymal attenuation characteristics are consistent with chronic change.  Involutional change and chronic appearing white matter change noted.  There is no evidence for intracranial mass, mass effect or midline shift, there is no evidence for acute intracranial hemorrhage.  Appropriate CSF spaces are seen at the skull base.    The visualized orbits appear intact.  The mastoid air cells appear well aerated.  Mild paranasal sinus mucosal thickening is noted there is a suspected small mucous retention cyst  of the left maxillary antrum.  The osseous structures appear intact.                                       X-Ray Chest AP Portable (Final result)  Result time 04/16/24 01:41:22      Final result by Dayanara Quintanilla MD (04/16/24 01:41:22)                   Impression:      Low lung volumes and linear basilar opacities can be attributed to atelectasis with underlying pneumonitis or pulmonary edema considered in the differential.    Findings of prior granulomatous disease.      Electronically signed by: Dayanara Quintanilla  Date:    04/16/2024  Time:    01:41               Narrative:    EXAMINATION:  XR CHEST AP PORTABLE    CLINICAL HISTORY:  Weakness;    TECHNIQUE:  Single frontal view of the chest was performed.    COMPARISON:  08/19/2020, 03/19/2020 chest x-rays    FINDINGS:  The lungs are not well-expanded accentuating hilar vasculature and perihilar lung markings.  There is linear basilar opacification suggesting atelectasis.  Calcified lymph nodes noted over the junito.  Underlying pneumonitis is not reliably excluded.  No lobar consolidation is seen or significant pleural effusion or pneumothorax.  Degenerative changes of the shoulders and spine noted.                                      Medications   sodium chloride 0.9% bolus 1,000 mL 1,000 mL (1,000 mLs Intravenous New Bag 4/16/24 0054)   prochlorperazine injection Soln 10 mg (10 mg Intravenous Given 4/16/24 0055)     Medical Decision Making  84 y/o M presents to the ER c/o lightheadedness, N/V, abdominal pain, right rib and low back pain      Differential: ICH, SAH, subdural, fracture, dislocation, contusion, sprain, strain, radiculopathy, diverticulitis, cholecystitis, pancreatitis, appendicitis, obstruction, UTI, pyelonephritis, kidney stone, gastroenteritis       Patient given IVF and compazine. D/W Radiologist regarding CT findings. Informed patient and family of results. D/W Dr. Doty at [facility] ER who agrees with plan to transfer ER to ER for evaluation at  [facility]. Patient and family comfortable with transfer at this time.     Problems Addressed:  Traumatic perinephric hematoma of right kidney, initial encounter: acute illness or injury with systemic symptoms that poses a threat to life or bodily functions    Amount and/or Complexity of Data Reviewed  External Data Reviewed: ECG and notes.     Details: Reviewed most recent EKG for comparison    Reviewed most recent PCP note documenting baseline medications and PMH   Labs: ordered.     Details: CBC with mild leukocytosis, mild anemia new from baseline; CMP with mild LEBRON, normal LFTs and electrolytes; troponin negative;   Radiology: ordered and independent interpretation performed. Decision-making details documented in ED Course.  ECG/medicine tests: ordered and independent interpretation performed. Decision-making details documented in ED Course.    Risk  Prescription drug management.  Decision regarding hospitalization.  Risk Details: D/W Emergency Medicine physician at [facility], reviewed presentation, labs, imaging, plan to transfer ER to ER.     Critical Care  Total time providing critical care: 85 minutes                                      Clinical Impression:  Final diagnoses:  [R53.1] Weakness  [W19.XXXA] Fall, initial encounter (Primary)  [S37.091A] Traumatic perinephric hematoma of right kidney, initial encounter          ED Disposition Condition    ED to ED Transfer Stable                Horace Gardner MD  04/16/24 0228

## 2024-04-21 LAB
OHS QRS DURATION: 140 MS
OHS QTC CALCULATION: 480 MS

## 2024-04-23 ENCOUNTER — LAB VISIT (OUTPATIENT)
Dept: LAB | Facility: HOSPITAL | Age: 84
End: 2024-04-23
Attending: INTERNAL MEDICINE
Payer: MEDICARE

## 2024-04-23 ENCOUNTER — PATIENT MESSAGE (OUTPATIENT)
Dept: INTERNAL MEDICINE | Facility: CLINIC | Age: 84
End: 2024-04-23

## 2024-04-23 ENCOUNTER — OFFICE VISIT (OUTPATIENT)
Dept: INTERNAL MEDICINE | Facility: CLINIC | Age: 84
End: 2024-04-23
Payer: MEDICARE

## 2024-04-23 VITALS
SYSTOLIC BLOOD PRESSURE: 130 MMHG | OXYGEN SATURATION: 98 % | HEIGHT: 75 IN | HEART RATE: 72 BPM | WEIGHT: 213.63 LBS | BODY MASS INDEX: 26.56 KG/M2 | DIASTOLIC BLOOD PRESSURE: 62 MMHG

## 2024-04-23 DIAGNOSIS — S36.892D TRAUMATIC RETROPERITONEAL HEMATOMA, SUBSEQUENT ENCOUNTER: Primary | ICD-10-CM

## 2024-04-23 DIAGNOSIS — S37.091D: ICD-10-CM

## 2024-04-23 DIAGNOSIS — S36.892D TRAUMATIC RETROPERITONEAL HEMATOMA, SUBSEQUENT ENCOUNTER: ICD-10-CM

## 2024-04-23 DIAGNOSIS — D64.9 ANEMIA, UNSPECIFIED TYPE: Primary | ICD-10-CM

## 2024-04-23 LAB
ANION GAP SERPL CALC-SCNC: 8 MMOL/L (ref 8–16)
BASOPHILS # BLD AUTO: 0.04 K/UL (ref 0–0.2)
BASOPHILS NFR BLD: 0.6 % (ref 0–1.9)
BUN SERPL-MCNC: 16 MG/DL (ref 8–23)
CALCIUM SERPL-MCNC: 9.8 MG/DL (ref 8.7–10.5)
CHLORIDE SERPL-SCNC: 101 MMOL/L (ref 95–110)
CO2 SERPL-SCNC: 26 MMOL/L (ref 23–29)
CREAT SERPL-MCNC: 1.1 MG/DL (ref 0.5–1.4)
DIFFERENTIAL METHOD BLD: ABNORMAL
EOSINOPHIL # BLD AUTO: 0.1 K/UL (ref 0–0.5)
EOSINOPHIL NFR BLD: 2 % (ref 0–8)
ERYTHROCYTE [DISTWIDTH] IN BLOOD BY AUTOMATED COUNT: 11.9 % (ref 11.5–14.5)
EST. GFR  (NO RACE VARIABLE): >60 ML/MIN/1.73 M^2
GLUCOSE SERPL-MCNC: 103 MG/DL (ref 70–110)
HCT VFR BLD AUTO: 31 % (ref 40–54)
HGB BLD-MCNC: 10.5 G/DL (ref 14–18)
IMM GRANULOCYTES # BLD AUTO: 0.02 K/UL (ref 0–0.04)
IMM GRANULOCYTES NFR BLD AUTO: 0.3 % (ref 0–0.5)
LYMPHOCYTES # BLD AUTO: 0.6 K/UL (ref 1–4.8)
LYMPHOCYTES NFR BLD: 9.3 % (ref 18–48)
MCH RBC QN AUTO: 32.3 PG (ref 27–31)
MCHC RBC AUTO-ENTMCNC: 33.9 G/DL (ref 32–36)
MCV RBC AUTO: 95 FL (ref 82–98)
MONOCYTES # BLD AUTO: 0.8 K/UL (ref 0.3–1)
MONOCYTES NFR BLD: 12.1 % (ref 4–15)
NEUTROPHILS # BLD AUTO: 4.9 K/UL (ref 1.8–7.7)
NEUTROPHILS NFR BLD: 75.7 % (ref 38–73)
NRBC BLD-RTO: 0 /100 WBC
PLATELET # BLD AUTO: 236 K/UL (ref 150–450)
PMV BLD AUTO: 11.5 FL (ref 9.2–12.9)
POTASSIUM SERPL-SCNC: 3.9 MMOL/L (ref 3.5–5.1)
RBC # BLD AUTO: 3.25 M/UL (ref 4.6–6.2)
SODIUM SERPL-SCNC: 135 MMOL/L (ref 136–145)
WBC # BLD AUTO: 6.43 K/UL (ref 3.9–12.7)

## 2024-04-23 PROCEDURE — 99213 OFFICE O/P EST LOW 20 MIN: CPT | Mod: PBBFAC | Performed by: INTERNAL MEDICINE

## 2024-04-23 PROCEDURE — 36415 COLL VENOUS BLD VENIPUNCTURE: CPT | Performed by: INTERNAL MEDICINE

## 2024-04-23 PROCEDURE — 80048 BASIC METABOLIC PNL TOTAL CA: CPT | Performed by: INTERNAL MEDICINE

## 2024-04-23 PROCEDURE — 99999 PR PBB SHADOW E&M-EST. PATIENT-LVL III: CPT | Mod: PBBFAC,,, | Performed by: INTERNAL MEDICINE

## 2024-04-23 PROCEDURE — 99214 OFFICE O/P EST MOD 30 MIN: CPT | Mod: S$PBB,,, | Performed by: INTERNAL MEDICINE

## 2024-04-23 PROCEDURE — 85025 COMPLETE CBC W/AUTO DIFF WBC: CPT | Performed by: INTERNAL MEDICINE

## 2024-04-23 NOTE — PROGRESS NOTES
MEDICAL HISTORY:   Hypertension.  Hyperlipidemia.  Gastroesophageal reflux disease.  Chronic rhinitis.  BPH.  Left bundle-branch block.  Adenomatous colon polyp in the year 2000 and in August 2015.  Right inguinal hernia repair.  Epididymal cyst.  Fractured elbow and wrist.  Lung surgery at age 42 to remove an empyema.    Aortic atherosclerosis on imaging           MEDICATIONS:   Atorvastatin 10 mg.  Benazepril 10 mg.  Diltiazem 360 mg.  Doxazosin 4 mg.  Hydrochlorothiazide 25 mg.COMPARISON:  CT examination of the abdomen pelvis December 17, 2020     FINDINGS:  The lungs demonstrate atelectatic change.  There is no evidence for pleural effusion and there is no evidence for pneumothorax.  There is no evidence for pericardial effusion.  The noncontrast appearance of the heart and great vessels is appropriate, evaluation is limited.  Mild aortic atherosclerotic change noted.  Coronary arterial atherosclerotic change noted.  Aortic valvular calcification noted.  Granulomas calcifications of the mediastinum noted.     Small hiatal hernia noted.  When accounting for limitations of the exam, there is no evidence for acute process of the stomach, liver, gallbladder, pancreas, spleen, or adrenal glands.     There is a large right-sided subcapsular and perinephric hematoma with hemorrhagic density stranding throughout the right retroperitoneum, and extending inferiorly.  The kidneys bilaterally demonstrate hypodensities, not optimally evaluated on this examination however correlate with the prior study and likely represent cysts.  There is no evidence for hydronephrosis or obstructive uropathy bilaterally.     The abdominal aorta appears normal in caliber, atherosclerotic change noted otherwise not optimally evaluated on this noncontrast examination.  The urinary bladder is incompletely distended, appears unremarkable for lack of distention.  There is a small fat density left inguinal hernia without bowel involvement.   Prostate appears enlarged, and there is heterogeneity of the prostate.  The prostate measures approximately 5.2 x 6.5 cm on axial imaging.     There is no evidence for small bowel obstructive process.  The appendix is identified it does not appear inflamed.  Diverticula of the colon are noted, there is no evidence for acute diverticulitis.  There is no evidence for free intraperitoneal air.     The osseous structures demonstrate chronic change.     Impression:     Large right-sided subcapsular and perinephric hematoma, as discussed above.          CT Abdomen Pelvis with Contrast    Anatomical Region Laterality Modality   Abdomen -- Computed Tomography   Pelvis -- --     Impression    1.   Large right renal subcapsular hematoma with perinephric and paranephric components including retroperitoneal hematoma as described above, which is not significantly changed in comparison with same-day CT abdomen and pelvis performed earlier at different facility.  2.   Possible ruptured right renal cyst.  3.   Mild ectasia of the ascending aorta measuring 3.9 cm in diameter.     Hospital Course: Tung Chau is a 83 year old male who presented 4/16 as a trauma transfer after tripping over a garden hose and sustaining a right perinephric hematoma. He was transferred from OSH and repeat scan at [facility] revealed stable size of hematoma. He was admitted for observation and serial CBCs revealed stable hemoglobin. Patient ambulated well with PT/OT and was determined to be stable for discharge 4/17. He was instructed to follow-up with his PCP as needed.       This is a follow-up visit.  On April 16th he was tending to the [employer] and left ear park.  He tripped over a garden pipe hose landed on his right side.  Later in the evening was feeling nausea and he felt that something was not right.  Went to the emergency room which showed a subscapular pertinent nephric hematoma.  He was transferred to [facility] tremors in his where repeat scan continue  showed the same findings.  He was minimally anemic with a did not progress.    He had minimal discomfort over the right lateral thoracic region.  That has no report of difficulty breathing chest pain or abdominal pain.  He has regular bowel function no difficulty urinating.  He is stage right now he feels very well.    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  His physical activity is just doing a lot a walking but no heavy lifting    Examination   Weight 213 lb  Pulse 80   Blood pressure 138/62   Chest clear breath sounds  Heart regular rate rhythm  Abdominal exam soft nontender no palpable masses.  It was not tender while palpate over the posterior right thoracic flank region.    Impression   Traumatic subscapular/puree nephric hematoma with retroperitoneal hematoma  Hemodynamically stable    Plan   CBC basic metabolic profile.  Depending on test results patient will be allowed to re-initiate volunteer work

## 2024-04-29 ENCOUNTER — PATIENT MESSAGE (OUTPATIENT)
Dept: INTERNAL MEDICINE | Facility: CLINIC | Age: 84
End: 2024-04-29
Payer: COMMERCIAL

## 2024-04-29 ENCOUNTER — LAB VISIT (OUTPATIENT)
Dept: LAB | Facility: HOSPITAL | Age: 84
End: 2024-04-29
Attending: INTERNAL MEDICINE
Payer: MEDICARE

## 2024-04-29 DIAGNOSIS — D64.9 ANEMIA, UNSPECIFIED TYPE: Primary | ICD-10-CM

## 2024-04-29 DIAGNOSIS — D64.9 ANEMIA, UNSPECIFIED TYPE: ICD-10-CM

## 2024-04-29 LAB
BASOPHILS # BLD AUTO: 0.04 K/UL (ref 0–0.2)
BASOPHILS NFR BLD: 0.6 % (ref 0–1.9)
DIFFERENTIAL METHOD BLD: ABNORMAL
EOSINOPHIL # BLD AUTO: 0.1 K/UL (ref 0–0.5)
EOSINOPHIL NFR BLD: 2.1 % (ref 0–8)
ERYTHROCYTE [DISTWIDTH] IN BLOOD BY AUTOMATED COUNT: 12.2 % (ref 11.5–14.5)
HCT VFR BLD AUTO: 33.5 % (ref 40–54)
HGB BLD-MCNC: 10.8 G/DL (ref 14–18)
IMM GRANULOCYTES # BLD AUTO: 0.01 K/UL (ref 0–0.04)
IMM GRANULOCYTES NFR BLD AUTO: 0.1 % (ref 0–0.5)
LYMPHOCYTES # BLD AUTO: 0.6 K/UL (ref 1–4.8)
LYMPHOCYTES NFR BLD: 9.3 % (ref 18–48)
MCH RBC QN AUTO: 31.5 PG (ref 27–31)
MCHC RBC AUTO-ENTMCNC: 32.2 G/DL (ref 32–36)
MCV RBC AUTO: 98 FL (ref 82–98)
MONOCYTES # BLD AUTO: 0.6 K/UL (ref 0.3–1)
MONOCYTES NFR BLD: 8.1 % (ref 4–15)
NEUTROPHILS # BLD AUTO: 5.4 K/UL (ref 1.8–7.7)
NEUTROPHILS NFR BLD: 79.8 % (ref 38–73)
NRBC BLD-RTO: 0 /100 WBC
PLATELET # BLD AUTO: 312 K/UL (ref 150–450)
PMV BLD AUTO: 10.8 FL (ref 9.2–12.9)
RBC # BLD AUTO: 3.43 M/UL (ref 4.6–6.2)
WBC # BLD AUTO: 6.76 K/UL (ref 3.9–12.7)

## 2024-04-29 PROCEDURE — 85025 COMPLETE CBC W/AUTO DIFF WBC: CPT | Performed by: INTERNAL MEDICINE

## 2024-04-29 PROCEDURE — 36415 COLL VENOUS BLD VENIPUNCTURE: CPT | Performed by: INTERNAL MEDICINE

## 2024-05-06 ENCOUNTER — LAB VISIT (OUTPATIENT)
Dept: LAB | Facility: HOSPITAL | Age: 84
End: 2024-05-06
Attending: INTERNAL MEDICINE
Payer: MEDICARE

## 2024-05-06 DIAGNOSIS — D64.9 ANEMIA, UNSPECIFIED TYPE: ICD-10-CM

## 2024-05-06 LAB
BASOPHILS # BLD AUTO: 0.05 K/UL (ref 0–0.2)
BASOPHILS NFR BLD: 0.8 % (ref 0–1.9)
DIFFERENTIAL METHOD BLD: ABNORMAL
EOSINOPHIL # BLD AUTO: 0.1 K/UL (ref 0–0.5)
EOSINOPHIL NFR BLD: 1.3 % (ref 0–8)
ERYTHROCYTE [DISTWIDTH] IN BLOOD BY AUTOMATED COUNT: 12.3 % (ref 11.5–14.5)
HCT VFR BLD AUTO: 34.2 % (ref 40–54)
HGB BLD-MCNC: 11 G/DL (ref 14–18)
IMM GRANULOCYTES # BLD AUTO: 0.01 K/UL (ref 0–0.04)
IMM GRANULOCYTES NFR BLD AUTO: 0.2 % (ref 0–0.5)
LYMPHOCYTES # BLD AUTO: 0.8 K/UL (ref 1–4.8)
LYMPHOCYTES NFR BLD: 12.1 % (ref 18–48)
MCH RBC QN AUTO: 31.4 PG (ref 27–31)
MCHC RBC AUTO-ENTMCNC: 32.2 G/DL (ref 32–36)
MCV RBC AUTO: 98 FL (ref 82–98)
MONOCYTES # BLD AUTO: 0.6 K/UL (ref 0.3–1)
MONOCYTES NFR BLD: 8.8 % (ref 4–15)
NEUTROPHILS # BLD AUTO: 4.9 K/UL (ref 1.8–7.7)
NEUTROPHILS NFR BLD: 76.8 % (ref 38–73)
NRBC BLD-RTO: 0 /100 WBC
PLATELET # BLD AUTO: 267 K/UL (ref 150–450)
PMV BLD AUTO: 11 FL (ref 9.2–12.9)
RBC # BLD AUTO: 3.5 M/UL (ref 4.6–6.2)
WBC # BLD AUTO: 6.36 K/UL (ref 3.9–12.7)

## 2024-05-06 PROCEDURE — 85025 COMPLETE CBC W/AUTO DIFF WBC: CPT | Performed by: INTERNAL MEDICINE

## 2024-05-06 PROCEDURE — 36415 COLL VENOUS BLD VENIPUNCTURE: CPT | Performed by: INTERNAL MEDICINE

## 2024-05-07 ENCOUNTER — PATIENT MESSAGE (OUTPATIENT)
Dept: INTERNAL MEDICINE | Facility: CLINIC | Age: 84
End: 2024-05-07
Payer: COMMERCIAL

## 2024-05-07 DIAGNOSIS — I10 PRIMARY HYPERTENSION: Primary | ICD-10-CM

## 2024-05-07 DIAGNOSIS — E78.5 HYPERLIPIDEMIA, UNSPECIFIED HYPERLIPIDEMIA TYPE: ICD-10-CM

## 2024-05-07 DIAGNOSIS — D64.9 ANEMIA, UNSPECIFIED TYPE: ICD-10-CM

## 2024-06-10 ENCOUNTER — PATIENT MESSAGE (OUTPATIENT)
Dept: INTERNAL MEDICINE | Facility: CLINIC | Age: 84
End: 2024-06-10
Payer: COMMERCIAL

## 2024-06-13 ENCOUNTER — PATIENT MESSAGE (OUTPATIENT)
Dept: INTERNAL MEDICINE | Facility: CLINIC | Age: 84
End: 2024-06-13
Payer: COMMERCIAL

## 2024-07-17 RX ORDER — BENAZEPRIL HYDROCHLORIDE 10 MG/1
20 TABLET ORAL DAILY
Qty: 90 TABLET | Refills: 2 | Status: SHIPPED | OUTPATIENT
Start: 2024-07-17

## 2024-07-17 NOTE — TELEPHONE ENCOUNTER
No care due was identified.  Health [facility] Embedded Care Due Messages. Reference number: [id number].   7/17/2024 10:16:51 AM CDT

## 2024-07-23 ENCOUNTER — LAB VISIT (OUTPATIENT)
Dept: LAB | Facility: HOSPITAL | Age: 84
End: 2024-07-23
Attending: INTERNAL MEDICINE
Payer: MEDICARE

## 2024-07-23 DIAGNOSIS — E78.5 HYPERLIPIDEMIA, UNSPECIFIED HYPERLIPIDEMIA TYPE: ICD-10-CM

## 2024-07-23 DIAGNOSIS — I10 PRIMARY HYPERTENSION: ICD-10-CM

## 2024-07-23 LAB
ALBUMIN SERPL BCP-MCNC: 4 G/DL (ref 3.5–5.2)
ALP SERPL-CCNC: 72 U/L (ref 55–135)
ALT SERPL W/O P-5'-P-CCNC: 15 U/L (ref 10–44)
ANION GAP SERPL CALC-SCNC: 10 MMOL/L (ref 8–16)
AST SERPL-CCNC: 17 U/L (ref 10–40)
BASOPHILS # BLD AUTO: 0.05 K/UL (ref 0–0.2)
BASOPHILS NFR BLD: 1 % (ref 0–1.9)
BILIRUB SERPL-MCNC: 0.6 MG/DL (ref 0.1–1)
BUN SERPL-MCNC: 17 MG/DL (ref 8–23)
CALCIUM SERPL-MCNC: 9.6 MG/DL (ref 8.7–10.5)
CHLORIDE SERPL-SCNC: 104 MMOL/L (ref 95–110)
CHOLEST SERPL-MCNC: 133 MG/DL (ref 120–199)
CHOLEST/HDLC SERPL: 3 {RATIO} (ref 2–5)
CO2 SERPL-SCNC: 24 MMOL/L (ref 23–29)
CREAT SERPL-MCNC: 1.1 MG/DL (ref 0.5–1.4)
DIFFERENTIAL METHOD BLD: NORMAL
EOSINOPHIL # BLD AUTO: 0.1 K/UL (ref 0–0.5)
EOSINOPHIL NFR BLD: 1.8 % (ref 0–8)
ERYTHROCYTE [DISTWIDTH] IN BLOOD BY AUTOMATED COUNT: 13.1 % (ref 11.5–14.5)
EST. GFR  (NO RACE VARIABLE): >60 ML/MIN/1.73 M^2
GLUCOSE SERPL-MCNC: 91 MG/DL (ref 70–110)
HCT VFR BLD AUTO: 43.7 % (ref 40–54)
HDLC SERPL-MCNC: 44 MG/DL (ref 40–75)
HDLC SERPL: 33.1 % (ref 20–50)
HGB BLD-MCNC: 14.4 G/DL (ref 14–18)
IMM GRANULOCYTES # BLD AUTO: 0.01 K/UL (ref 0–0.04)
IMM GRANULOCYTES NFR BLD AUTO: 0.2 % (ref 0–0.5)
LDLC SERPL CALC-MCNC: 73.6 MG/DL (ref 63–159)
LYMPHOCYTES # BLD AUTO: 1 K/UL (ref 1–4.8)
LYMPHOCYTES NFR BLD: 21.1 % (ref 18–48)
MCH RBC QN AUTO: 30.3 PG (ref 27–31)
MCHC RBC AUTO-ENTMCNC: 33 G/DL (ref 32–36)
MCV RBC AUTO: 92 FL (ref 82–98)
MONOCYTES # BLD AUTO: 0.4 K/UL (ref 0.3–1)
MONOCYTES NFR BLD: 8.7 % (ref 4–15)
NEUTROPHILS # BLD AUTO: 3.3 K/UL (ref 1.8–7.7)
NEUTROPHILS NFR BLD: 67.2 % (ref 38–73)
NONHDLC SERPL-MCNC: 89 MG/DL
NRBC BLD-RTO: 0 /100 WBC
PLATELET # BLD AUTO: 161 K/UL (ref 150–450)
PMV BLD AUTO: 11.9 FL (ref 9.2–12.9)
POTASSIUM SERPL-SCNC: 4 MMOL/L (ref 3.5–5.1)
PROT SERPL-MCNC: 7.2 G/DL (ref 6–8.4)
RBC # BLD AUTO: 4.75 M/UL (ref 4.6–6.2)
SODIUM SERPL-SCNC: 138 MMOL/L (ref 136–145)
TRIGL SERPL-MCNC: 77 MG/DL (ref 30–150)
WBC # BLD AUTO: 4.93 K/UL (ref 3.9–12.7)

## 2024-07-23 PROCEDURE — 80053 COMPREHEN METABOLIC PANEL: CPT | Performed by: INTERNAL MEDICINE

## 2024-07-23 PROCEDURE — 85025 COMPLETE CBC W/AUTO DIFF WBC: CPT | Performed by: INTERNAL MEDICINE

## 2024-07-23 PROCEDURE — 80061 LIPID PANEL: CPT | Performed by: INTERNAL MEDICINE

## 2024-07-23 PROCEDURE — 36415 COLL VENOUS BLD VENIPUNCTURE: CPT | Performed by: INTERNAL MEDICINE

## 2024-07-29 NOTE — PROGRESS NOTES
MEDICAL HISTORY:   Hypertension.  Hyperlipidemia.  Mitral regurgitation on 2D  Gastroesophageal reflux disease.  Chronic rhinitis.  BPH.  Left bundle-branch block.  Adenomatous colon polyp in the year 2000 and in August 2015.  Right inguinal hernia repair.  Epididymal cyst.  Fractured elbow and wrist.  Lung surgery at age 42 to remove an empyema.    Aortic atherosclerosis on imaging      MEDICATIONS:   Atorvastatin 10 mg.  Benazepril 10 mg.  Diltiazem 360 mg.  Doxazosin 4 mg.  Hydrochlorothiazide 25 mg.    Component      Latest Ref Rng 7/23/2024   WBC      3.90 - 12.70 K/uL 4.93    RBC      4.60 - 6.20 M/uL 4.75    Hemoglobin      14.0 - 18.0 g/dL 14.4    Hematocrit      40.0 - 54.0 % 43.7    MCV      82 - 98 fL 92    MCH      27.0 - 31.0 pg 30.3    MCHC      32.0 - 36.0 g/dL 33.0    RDW      11.5 - 14.5 % 13.1    Platelet Count      150 - 450 K/uL 161    MPV      9.2 - 12.9 fL 11.9    Immature Granulocytes      0.0 - 0.5 % 0.2    Gran # (ANC)      1.8 - 7.7 K/uL 3.3    Immature Grans (Abs)      0.00 - 0.04 K/uL 0.01    Lymph #      1.0 - 4.8 K/uL 1.0    Mono #      0.3 - 1.0 K/uL 0.4    Eos #      0.0 - 0.5 K/uL 0.1    Baso #      0.00 - 0.20 K/uL 0.05    nRBC      0 /100 WBC 0    Gran %      38.0 - 73.0 % 67.2    Lymph %      18.0 - 48.0 % 21.1    Mono %      4.0 - 15.0 % 8.7    Eos %      0.0 - 8.0 % 1.8    Basophil %      0.0 - 1.9 % 1.0    Differential Method Automated    Sodium      136 - 145 mmol/L 138    Potassium      3.5 - 5.1 mmol/L 4.0    Chloride      95 - 110 mmol/L 104    CO2      23 - 29 mmol/L 24    Glucose      70 - 110 mg/dL 91    BUN      8 - 23 mg/dL 17    Creatinine      0.5 - 1.4 mg/dL 1.1    Calcium      8.7 - 10.5 mg/dL 9.6    PROTEIN TOTAL      6.0 - 8.4 g/dL 7.2    Albumin      3.5 - 5.2 g/dL 4.0    BILIRUBIN TOTAL      0.1 - 1.0 mg/dL 0.6    ALP      55 - 135 U/L 72    AST      10 - 40 U/L 17    ALT      10 - 44 U/L 15    eGFR      >60 mL/min/1.73 m^2 >60.0    Anion Gap      8 - 16 mmol/L  10    Cholesterol Total      120 - 199 mg/dL 133    Triglycerides      30 - 150 mg/dL 77    HDL      40 - 75 mg/dL 44    LDL Cholesterol      63.0 - 159.0 mg/dL 73.6    HDL/Cholesterol Ratio      20.0 - 50.0 % 33.1    Total Cholesterol/HDL Ratio      2.0 - 5.0  3.0    Non-HDL Cholesterol      mg/dL 89        84-year-old male   Presents as a follow-up visit   He was back at work, volunteering with a number of projects that involves being very physically active     He reports no chest pain, palpitations, shortness for breath, abdominal pain.  He was regular bowel function sometimes he strains.  No difficulty urinating, nocturia x1 no arthralgias occasionally will have nasal congestion, takes over-the-counter Allegra    Examination   Weight 213   BMI 26.65  Pulse 64   Blood pressure 130/72   Chest clear breath sounds  Heart regular rate rhythm 1-2 over 6 holosystolic murmur  at the axillary region  Pulses 2+ carotid pulses no bruits,   extremities no edema    Impression   Hypertension  Hyperlipidemia   Mitral regurgitation  BPH  Allergic rhinitis    Plan   Follow-up 2D echo with Doppler is been 2 years   Continue with attention appropriate diet physical activity  Return visit 6 months  Abdominal exam soft nontender no hepatosplenomegaly abdominal masses

## 2024-07-30 ENCOUNTER — OFFICE VISIT (OUTPATIENT)
Dept: INTERNAL MEDICINE | Facility: CLINIC | Age: 84
End: 2024-07-30
Payer: MEDICARE

## 2024-07-30 VITALS
DIASTOLIC BLOOD PRESSURE: 80 MMHG | SYSTOLIC BLOOD PRESSURE: 136 MMHG | BODY MASS INDEX: 26.51 KG/M2 | HEIGHT: 75 IN | WEIGHT: 213.19 LBS | OXYGEN SATURATION: 98 % | HEART RATE: 65 BPM

## 2024-07-30 DIAGNOSIS — J30.9 ALLERGIC RHINITIS, UNSPECIFIED SEASONALITY, UNSPECIFIED TRIGGER: ICD-10-CM

## 2024-07-30 DIAGNOSIS — I34.0 NONRHEUMATIC MITRAL VALVE REGURGITATION: ICD-10-CM

## 2024-07-30 DIAGNOSIS — I10 PRIMARY HYPERTENSION: Primary | ICD-10-CM

## 2024-07-30 DIAGNOSIS — R35.1 BENIGN PROSTATIC HYPERPLASIA WITH NOCTURIA: ICD-10-CM

## 2024-07-30 DIAGNOSIS — Z12.5 ENCOUNTER FOR SCREENING FOR MALIGNANT NEOPLASM OF PROSTATE: ICD-10-CM

## 2024-07-30 DIAGNOSIS — E78.5 HYPERLIPIDEMIA, UNSPECIFIED HYPERLIPIDEMIA TYPE: ICD-10-CM

## 2024-07-30 DIAGNOSIS — N40.1 BENIGN PROSTATIC HYPERPLASIA WITH NOCTURIA: ICD-10-CM

## 2024-07-30 PROCEDURE — 99999 PR PBB SHADOW E&M-EST. PATIENT-LVL IV: CPT | Mod: PBBFAC,,, | Performed by: INTERNAL MEDICINE

## 2024-07-30 PROCEDURE — 99214 OFFICE O/P EST MOD 30 MIN: CPT | Mod: S$PBB,,, | Performed by: INTERNAL MEDICINE

## 2024-07-30 PROCEDURE — 99214 OFFICE O/P EST MOD 30 MIN: CPT | Mod: PBBFAC | Performed by: INTERNAL MEDICINE

## 2024-08-02 ENCOUNTER — HOSPITAL ENCOUNTER (OUTPATIENT)
Dept: CARDIOLOGY | Facility: HOSPITAL | Age: 84
Discharge: HOME | End: 2024-08-02
Attending: INTERNAL MEDICINE
Payer: MEDICARE

## 2024-08-02 VITALS
DIASTOLIC BLOOD PRESSURE: 80 MMHG | BODY MASS INDEX: 26.49 KG/M2 | WEIGHT: 213 LBS | HEART RATE: 75 BPM | SYSTOLIC BLOOD PRESSURE: 136 MMHG | HEIGHT: 75 IN

## 2024-08-02 DIAGNOSIS — I34.0 NONRHEUMATIC MITRAL VALVE REGURGITATION: ICD-10-CM

## 2024-08-02 LAB
ASCENDING AORTA: 4.01 CM
AV INDEX (PROSTH): 0.74
AV MEAN GRADIENT: 5 MMHG
AV PEAK GRADIENT: 10 MMHG
AV VALVE AREA BY VELOCITY RATIO: 3.66 CM²
AV VALVE AREA: 3.71 CM²
AV VELOCITY RATIO: 0.73
BSA FOR ECHO PROCEDURE: 2.26 M2
CV ECHO LV RWT: 0.32 CM
DOP CALC AO PEAK VEL: 1.62 M/S
DOP CALC AO VTI: 37.41 CM
DOP CALC LVOT AREA: 5 CM2
DOP CALC LVOT DIAMETER: 2.53 CM
DOP CALC LVOT PEAK VEL: 1.18 M/S
DOP CALC LVOT STROKE VOLUME: 138.93 CM3
DOP CALCLVOT PEAK VEL VTI: 27.65 CM
E WAVE DECELERATION TIME: 205.27 MSEC
E/A RATIO: 1.01
E/E' RATIO: 7.56 M/S
ECHO LV POSTERIOR WALL: 0.76 CM (ref 0.6–1.1)
EJECTION FRACTION: 63 %
FRACTIONAL SHORTENING: 35 % (ref 28–44)
INTERVENTRICULAR SEPTUM: 1.16 CM (ref 0.6–1.1)
IVRT: 98.95 MSEC
LA MAJOR: 5.04 CM
LA MINOR: 4.71 CM
LA WIDTH: 4.38 CM
LEFT ATRIUM SIZE: 3.53 CM
LEFT ATRIUM VOLUME INDEX MOD: 35.9 ML/M2
LEFT ATRIUM VOLUME INDEX: 28.4 ML/M2
LEFT ATRIUM VOLUME MOD: 80.77 CM3
LEFT ATRIUM VOLUME: 63.99 CM3
LEFT INTERNAL DIMENSION IN SYSTOLE: 3.05 CM (ref 2.1–4)
LEFT VENTRICLE DIASTOLIC VOLUME INDEX: 46.06 ML/M2
LEFT VENTRICLE DIASTOLIC VOLUME: 103.63 ML
LEFT VENTRICLE MASS INDEX: 70 G/M2
LEFT VENTRICLE SYSTOLIC VOLUME INDEX: 16.2 ML/M2
LEFT VENTRICLE SYSTOLIC VOLUME: 36.38 ML
LEFT VENTRICULAR INTERNAL DIMENSION IN DIASTOLE: 4.72 CM (ref 3.5–6)
LEFT VENTRICULAR MASS: 156.68 G
LV LATERAL E/E' RATIO: 5.67 M/S
LV SEPTAL E/E' RATIO: 11.33 M/S
MV A" WAVE DURATION": 17.41 MSEC
MV PEAK A VEL: 0.67 M/S
MV PEAK E VEL: 0.68 M/S
MV STENOSIS PRESSURE HALF TIME: 59.53 MS
MV VALVE AREA P 1/2 METHOD: 3.7 CM2
PISA TR MAX VEL: 2.36 M/S
PULM VEIN S/D RATIO: 0.84
PV PEAK D VEL: 0.38 M/S
PV PEAK S VEL: 0.32 M/S
RA MAJOR: 4.62 CM
RA PRESSURE ESTIMATED: 3 MMHG
RA WIDTH: 3.96 CM
RIGHT VENTRICLE DIASTOLIC BASEL DIMENSION: 4.2 CM
RV TB RVSP: 5 MMHG
SINUS: 4.05 CM
STJ: 3.44 CM
TDI LATERAL: 0.12 M/S
TDI SEPTAL: 0.06 M/S
TDI: 0.09 M/S
TR MAX PG: 22 MMHG
TRICUSPID ANNULAR PLANE SYSTOLIC EXCURSION: 2.91 CM
TV REST PULMONARY ARTERY PRESSURE: 25 MMHG
Z-SCORE OF LEFT VENTRICULAR DIMENSION IN END DIASTOLE: -5.46
Z-SCORE OF LEFT VENTRICULAR DIMENSION IN END SYSTOLE: -3.8

## 2024-08-02 PROCEDURE — 93306 TTE W/DOPPLER COMPLETE: CPT | Mod: 26,,, | Performed by: INTERNAL MEDICINE

## 2024-08-02 PROCEDURE — 93306 TTE W/DOPPLER COMPLETE: CPT

## 2024-08-24 ENCOUNTER — PATIENT MESSAGE (OUTPATIENT)
Dept: INTERNAL MEDICINE | Facility: CLINIC | Age: 84
End: 2024-08-24
Payer: COMMERCIAL

## 2024-09-03 ENCOUNTER — PATIENT MESSAGE (OUTPATIENT)
Dept: INTERNAL MEDICINE | Facility: CLINIC | Age: 84
End: 2024-09-03
Payer: COMMERCIAL

## 2024-09-09 RX ORDER — HYDROCHLOROTHIAZIDE 25 MG/1
25 TABLET ORAL
Qty: 90 TABLET | Refills: 3 | Status: SHIPPED | OUTPATIENT
Start: 2024-09-09

## 2024-09-09 NOTE — TELEPHONE ENCOUNTER
Refill Decision Note   Tung Larbronwyn  is requesting a refill authorization.  Brief Assessment and Rationale for Refill:  Approve     Medication Therapy Plan:         Comments:     Note composed:1:43 PM 09/09/2024

## 2024-09-09 NOTE — TELEPHONE ENCOUNTER
No care due was identified.  Health [facility] Embedded Care Due Messages. Reference number: [id number].   9/09/2024 3:24:41 AM CDT

## 2024-10-01 RX ORDER — DILTIAZEM HYDROCHLORIDE 180 MG/1
360 CAPSULE, COATED, EXTENDED RELEASE ORAL DAILY
Qty: 180 CAPSULE | Refills: 3 | Status: SHIPPED | OUTPATIENT
Start: 2024-10-01

## 2024-10-01 RX ORDER — DILTIAZEM HYDROCHLORIDE 180 MG/1
CAPSULE, COATED, EXTENDED RELEASE ORAL
Qty: 180 CAPSULE | Refills: 1 | OUTPATIENT
Start: 2024-10-01

## 2024-10-01 NOTE — TELEPHONE ENCOUNTER
No care due was identified.  Health [facility] Embedded Care Due Messages. Reference number: [id number].   10/01/2024 3:24:41 AM CDT

## 2024-10-01 NOTE — TELEPHONE ENCOUNTER
Refill Decision Note   Tung Chau  is requesting a refill authorization.  Brief Assessment and Rationale for Refill:  Quick Discontinue     Medication Therapy Plan:         Comments:     Note composed:12:36 PM 10/01/2024

## 2024-10-01 NOTE — TELEPHONE ENCOUNTER
No care due was identified.  Health [facility] Embedded Care Due Messages. Reference number: [id number].   10/01/2024 7:07:12 AM CDT

## 2024-10-01 NOTE — TELEPHONE ENCOUNTER
Refill Decision Note   Tung Larbronwyn  is requesting a refill authorization.  Brief Assessment and Rationale for Refill:  Approve     Medication Therapy Plan:        Comments:     Note composed:12:35 PM 10/01/2024

## 2024-12-04 RX ORDER — BENAZEPRIL HYDROCHLORIDE 10 MG/1
TABLET ORAL
Qty: 180 TABLET | Refills: 2 | Status: SHIPPED | OUTPATIENT
Start: 2024-12-04

## 2024-12-04 NOTE — TELEPHONE ENCOUNTER
No care due was identified.  [facility] Embedded Care Due Messages. Reference number: [id number].   12/04/2024 3:25:34 AM CST

## 2024-12-05 NOTE — TELEPHONE ENCOUNTER
Refill Decision Note   Tung Larbronwyn  is requesting a refill authorization.  Brief Assessment and Rationale for Refill:  Approve     Medication Therapy Plan:         Comments:     Note composed:6:24 PM 12/04/2024

## 2025-01-07 ENCOUNTER — TELEPHONE (OUTPATIENT)
Dept: OPTOMETRY | Facility: CLINIC | Age: 85
End: 2025-01-07
Payer: COMMERCIAL

## 2025-01-07 ENCOUNTER — TELEPHONE (OUTPATIENT)
Dept: OPHTHALMOLOGY | Facility: CLINIC | Age: 85
End: 2025-01-07
Payer: COMMERCIAL

## 2025-01-07 DIAGNOSIS — H40.1131 PRIMARY OPEN ANGLE GLAUCOMA OF BOTH EYES, MILD STAGE: ICD-10-CM

## 2025-01-07 RX ORDER — LATANOPROST 50 UG/ML
1 SOLUTION/ DROPS OPHTHALMIC DAILY
Qty: 7.5 ML | Refills: 3 | Status: SHIPPED | OUTPATIENT
Start: 2025-01-07

## 2025-01-07 NOTE — TELEPHONE ENCOUNTER
----- Message from Sarah Phan sent at 1/7/2025 11:18 AM CST -----  Regarding: Schedule  Just got a refill request for this pt and so I refilled his gtts but he will need an annual at the end of Feb or early March. 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

## 2025-01-07 NOTE — TELEPHONE ENCOUNTER
----- Message from Med Assistant Barragan sent at 1/7/2025 12:23 PM CST -----  Regarding: FW: Consult/Advisory  Contact: pt @ [phone] (home)    ----- Message -----  From: Kimberly Moody  Sent: 1/7/2025  11:38 AM CST  To: [facility] Clinical Support Staff  Subject: Consult/Advisory                                 Consult/Advisory     Name Of Caller: Tung Chau    Contact Preference: [phone] (home)      Nature of call: message is for Jayne, pt is calling to get appt rescheduled. Asking for a call back

## 2025-01-24 ENCOUNTER — LAB VISIT (OUTPATIENT)
Dept: LAB | Facility: HOSPITAL | Age: 85
End: 2025-01-24
Attending: INTERNAL MEDICINE
Payer: MEDICARE

## 2025-01-24 DIAGNOSIS — E78.5 HYPERLIPIDEMIA, UNSPECIFIED HYPERLIPIDEMIA TYPE: ICD-10-CM

## 2025-01-24 DIAGNOSIS — I34.0 NONRHEUMATIC MITRAL VALVE REGURGITATION: ICD-10-CM

## 2025-01-24 DIAGNOSIS — I10 PRIMARY HYPERTENSION: ICD-10-CM

## 2025-01-24 DIAGNOSIS — Z12.5 ENCOUNTER FOR SCREENING FOR MALIGNANT NEOPLASM OF PROSTATE: ICD-10-CM

## 2025-01-24 DIAGNOSIS — R35.1 BENIGN PROSTATIC HYPERPLASIA WITH NOCTURIA: ICD-10-CM

## 2025-01-24 DIAGNOSIS — J30.9 ALLERGIC RHINITIS, UNSPECIFIED SEASONALITY, UNSPECIFIED TRIGGER: ICD-10-CM

## 2025-01-24 DIAGNOSIS — N40.1 BENIGN PROSTATIC HYPERPLASIA WITH NOCTURIA: ICD-10-CM

## 2025-01-24 LAB
ALBUMIN SERPL BCP-MCNC: 4.1 G/DL (ref 3.5–5.2)
ALP SERPL-CCNC: 79 U/L (ref 40–150)
ALT SERPL W/O P-5'-P-CCNC: 19 U/L (ref 10–44)
ANION GAP SERPL CALC-SCNC: 9 MMOL/L (ref 8–16)
AST SERPL-CCNC: 17 U/L (ref 10–40)
BASOPHILS # BLD AUTO: 0.04 K/UL (ref 0–0.2)
BASOPHILS NFR BLD: 0.7 % (ref 0–1.9)
BILIRUB SERPL-MCNC: 0.7 MG/DL (ref 0.1–1)
BUN SERPL-MCNC: 16 MG/DL (ref 8–23)
CALCIUM SERPL-MCNC: 9.7 MG/DL (ref 8.7–10.5)
CHLORIDE SERPL-SCNC: 103 MMOL/L (ref 95–110)
CHOLEST SERPL-MCNC: 125 MG/DL (ref 120–199)
CHOLEST/HDLC SERPL: 3 {RATIO} (ref 2–5)
CO2 SERPL-SCNC: 27 MMOL/L (ref 23–29)
COMPLEXED PSA SERPL-MCNC: 1.1 NG/ML (ref 0–4)
CREAT SERPL-MCNC: 1.1 MG/DL (ref 0.5–1.4)
DIFFERENTIAL METHOD BLD: ABNORMAL
EOSINOPHIL # BLD AUTO: 0.2 K/UL (ref 0–0.5)
EOSINOPHIL NFR BLD: 2.6 % (ref 0–8)
ERYTHROCYTE [DISTWIDTH] IN BLOOD BY AUTOMATED COUNT: 11.9 % (ref 11.5–14.5)
EST. GFR  (NO RACE VARIABLE): >60 ML/MIN/1.73 M^2
GLUCOSE SERPL-MCNC: 96 MG/DL (ref 70–110)
HCT VFR BLD AUTO: 46.2 % (ref 40–54)
HDLC SERPL-MCNC: 42 MG/DL (ref 40–75)
HDLC SERPL: 33.6 % (ref 20–50)
HGB BLD-MCNC: 15.4 G/DL (ref 14–18)
IMM GRANULOCYTES # BLD AUTO: 0.02 K/UL (ref 0–0.04)
IMM GRANULOCYTES NFR BLD AUTO: 0.3 % (ref 0–0.5)
LDLC SERPL CALC-MCNC: 64.4 MG/DL (ref 63–159)
LYMPHOCYTES # BLD AUTO: 1 K/UL (ref 1–4.8)
LYMPHOCYTES NFR BLD: 17.2 % (ref 18–48)
MCH RBC QN AUTO: 31 PG (ref 27–31)
MCHC RBC AUTO-ENTMCNC: 33.3 G/DL (ref 32–36)
MCV RBC AUTO: 93 FL (ref 82–98)
MONOCYTES # BLD AUTO: 0.4 K/UL (ref 0.3–1)
MONOCYTES NFR BLD: 6.6 % (ref 4–15)
NEUTROPHILS # BLD AUTO: 4.2 K/UL (ref 1.8–7.7)
NEUTROPHILS NFR BLD: 72.6 % (ref 38–73)
NONHDLC SERPL-MCNC: 83 MG/DL
NRBC BLD-RTO: 0 /100 WBC
PLATELET # BLD AUTO: 152 K/UL (ref 150–450)
PMV BLD AUTO: 11.6 FL (ref 9.2–12.9)
POTASSIUM SERPL-SCNC: 4.5 MMOL/L (ref 3.5–5.1)
PROT SERPL-MCNC: 7.5 G/DL (ref 6–8.4)
RBC # BLD AUTO: 4.96 M/UL (ref 4.6–6.2)
SODIUM SERPL-SCNC: 139 MMOL/L (ref 136–145)
TRIGL SERPL-MCNC: 93 MG/DL (ref 30–150)
WBC # BLD AUTO: 5.76 K/UL (ref 3.9–12.7)

## 2025-01-24 PROCEDURE — 84153 ASSAY OF PSA TOTAL: CPT | Performed by: INTERNAL MEDICINE

## 2025-01-24 PROCEDURE — 85025 COMPLETE CBC W/AUTO DIFF WBC: CPT | Performed by: INTERNAL MEDICINE

## 2025-01-24 PROCEDURE — 36415 COLL VENOUS BLD VENIPUNCTURE: CPT | Performed by: INTERNAL MEDICINE

## 2025-01-24 PROCEDURE — 80053 COMPREHEN METABOLIC PANEL: CPT | Performed by: INTERNAL MEDICINE

## 2025-01-24 PROCEDURE — 80061 LIPID PANEL: CPT | Performed by: INTERNAL MEDICINE

## 2025-01-30 NOTE — PROGRESS NOTES
MEDICAL HISTORY:   Hypertension.  Hyperlipidemia.  Mitral regurgitation on 2D  Gastroesophageal reflux disease.  Chronic rhinitis.  BPH.  Left bundle-branch block.  Adenomatous colon polyp in the year  and in 2015.  Right inguinal hernia repair.  Epididymal cyst.  Fractured elbow and wrist.  Lung surgery at age 42 to remove an empyema.    Aortic atherosclerosis on imaging    SOCIAL HISTORY:  Tobacco and alcohol use - none.  .  Exercises by walking, weight lifting, and working on his farm.  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     FAMILY HISTORY:  Father is , complications from a fracture.  Mother is , breast cancer and stroke.  One sister is alive, doing well, but history of breast cancer and hypertension.     SCREENING:  Last colonoscopy in 2017   Benign polyp    colonoscopy May 2020, hyperplastic polyp        MEDICATIONS:   Atorvastatin 10 mg.  Benazepril 10 mg.  Diltiazem 360 mg.  Doxazosin 4 mg.  Hydrochlorothiazide 25 mg.    Component      Latest Ref Rng 2025   WBC      3.90 - 12.70 K/uL 5.76    RBC      4.60 - 6.20 M/uL 4.96    Hemoglobin      14.0 - 18.0 g/dL 15.4    Hematocrit      40.0 - 54.0 % 46.2    MCV      82 - 98 fL 93    MCH      27.0 - 31.0 pg 31.0    MCHC      32.0 - 36.0 g/dL 33.3    RDW      11.5 - 14.5 % 11.9    Platelet Count      150 - 450 K/uL 152    MPV      9.2 - 12.9 fL 11.6    Immature Granulocytes      0.0 - 0.5 % 0.3    Gran # (ANC)      1.8 - 7.7 K/uL 4.2    Immature Grans (Abs)      0.00 - 0.04 K/uL 0.02    Lymph #      1.0 - 4.8 K/uL 1.0    Mono #      0.3 - 1.0 K/uL 0.4    Eos #      0.0 - 0.5 K/uL 0.2    Baso #      0.00 - 0.20 K/uL 0.04    nRBC      0 /100 WBC 0    Gran %      38.0 - 73.0 % 72.6    Lymph %      18.0 - 48.0 % 17.2 (L)    Mono %      4.0 - 15.0 % 6.6    Eos %      0.0 - 8.0 % 2.6    Basophil %      0.0 - 1.9 % 0.7    Differential Method Automated    Sodium      136 - 145 mmol/L 139     Potassium      3.5 - 5.1 mmol/L 4.5    Chloride      95 - 110 mmol/L 103    CO2      23 - 29 mmol/L 27    Glucose      70 - 110 mg/dL 96    BUN      8 - 23 mg/dL 16    Creatinine      0.5 - 1.4 mg/dL 1.1    Calcium      8.7 - 10.5 mg/dL 9.7    PROTEIN TOTAL      6.0 - 8.4 g/dL 7.5    Albumin      3.5 - 5.2 g/dL 4.1    BILIRUBIN TOTAL      0.1 - 1.0 mg/dL 0.7    ALP      40 - 150 U/L 79    AST      10 - 40 U/L 17    ALT      10 - 44 U/L 19    eGFR      >60 mL/min/1.73 m^2 >60.0    Anion Gap      8 - 16 mmol/L 9    Cholesterol Total      120 - 199 mg/dL 125    Triglycerides      30 - 150 mg/dL 93    HDL      40 - 75 mg/dL 42    LDL Cholesterol      63.0 - 159.0 mg/dL 64.4    HDL/Cholesterol Ratio      20.0 - 50.0 % 33.6    Total Cholesterol/HDL Ratio      2.0 - 5.0  3.0    Non-HDL Cholesterol      mg/dL 83    Prostate Specific Antigen      0.00 - 4.00 ng/mL 1.1       Legend:  (L) Low    84-year-old male   Presents for an annual routine visit    Overall in general he feels well.  He been extremely busy lately.  He works at the [employer] that has well as [employer].  Given the current events now he has been working hard.  At times when he feels stressed that has blood pressure systolic can be above 140 but then later can get in the 120s 130s    Review of symptoms   Negative for chest pain palpitations shortness for breath or abdominal pain.  Regular bowel function.  No difficulty urinating.  No heartburn indigestion.  Currently no sinus congestion.  No arthralgias    Examination   Weight 214 lb  BMI 27.48  Pulse 60   Blood pressure by me 146/72  HEENT exam, no abnormal findings  Neck no thyromegaly no masses   Chest clear breath sounds good effort  Heart regular rate and rhythm with 2/6 systolic murmur at the base to the carotids in his the axillary region.  Abdominal exam is bowel sounds soft nontender no hepatosplenomegaly abdominal masses or bruits  Pulses 2+ carotid pulses no bruits 2+ dorsalis pedal  pulses  Extremities, no edema   Lymph gland no palpable adenopathy   Skin no gross abnormal findings   Musculoskeletal no gross abnormal findings    Impression   General exam  Hypertension   Hyperlipidemia   Mitral regurgitation  BPH  Aortic atherosclerosis on imaging    Plan   Continue with attention proper diet physical activity continue to monitor blood pressure if that has still consistently elevated can increase the dose of benazepril a possibly changed to olmesartan losartan  Return visit 6 months

## 2025-01-31 ENCOUNTER — OFFICE VISIT (OUTPATIENT)
Dept: INTERNAL MEDICINE | Facility: CLINIC | Age: 85
End: 2025-01-31
Payer: MEDICARE

## 2025-01-31 VITALS
WEIGHT: 214.06 LBS | SYSTOLIC BLOOD PRESSURE: 146 MMHG | DIASTOLIC BLOOD PRESSURE: 70 MMHG | BODY MASS INDEX: 27.47 KG/M2 | HEART RATE: 64 BPM | HEIGHT: 74 IN | OXYGEN SATURATION: 98 %

## 2025-01-31 DIAGNOSIS — R35.1 BENIGN PROSTATIC HYPERPLASIA WITH NOCTURIA: ICD-10-CM

## 2025-01-31 DIAGNOSIS — J30.9 ALLERGIC RHINITIS, UNSPECIFIED SEASONALITY, UNSPECIFIED TRIGGER: ICD-10-CM

## 2025-01-31 DIAGNOSIS — I70.0 ATHEROSCLEROSIS OF AORTA: ICD-10-CM

## 2025-01-31 DIAGNOSIS — E78.5 HYPERLIPIDEMIA, UNSPECIFIED HYPERLIPIDEMIA TYPE: ICD-10-CM

## 2025-01-31 DIAGNOSIS — I10 PRIMARY HYPERTENSION: ICD-10-CM

## 2025-01-31 DIAGNOSIS — Z00.00 ANNUAL PHYSICAL EXAM: Primary | ICD-10-CM

## 2025-01-31 DIAGNOSIS — I34.0 NONRHEUMATIC MITRAL VALVE REGURGITATION: ICD-10-CM

## 2025-01-31 DIAGNOSIS — N40.1 BENIGN PROSTATIC HYPERPLASIA WITH NOCTURIA: ICD-10-CM

## 2025-01-31 PROCEDURE — 99999 PR PBB SHADOW E&M-EST. PATIENT-LVL III: CPT | Mod: PBBFAC,,, | Performed by: INTERNAL MEDICINE

## 2025-01-31 PROCEDURE — 99213 OFFICE O/P EST LOW 20 MIN: CPT | Mod: PBBFAC | Performed by: INTERNAL MEDICINE

## 2025-01-31 PROCEDURE — 99397 PER PM REEVAL EST PAT 65+ YR: CPT | Mod: GZ,S$PBB,, | Performed by: INTERNAL MEDICINE

## 2025-02-11 RX ORDER — ATORVASTATIN CALCIUM 10 MG/1
10 TABLET, FILM COATED ORAL DAILY
Qty: 90 TABLET | Refills: 3 | Status: SHIPPED | OUTPATIENT
Start: 2025-02-11

## 2025-02-11 NOTE — TELEPHONE ENCOUNTER
Tung Chau  is requesting a refill authorization.  Brief Assessment and Rationale for Refill:  Approve     Medication Therapy Plan:         Comments:     Note composed:8:43 AM 02/11/2025

## 2025-02-11 NOTE — TELEPHONE ENCOUNTER
No care due was identified.  [facility] Embedded Care Due Messages. Reference number: [id number].   2/11/2025 8:21:30 AM CST

## 2025-02-18 ENCOUNTER — OFFICE VISIT (OUTPATIENT)
Dept: OPTOMETRY | Facility: CLINIC | Age: 85
End: 2025-02-18
Payer: MEDICARE

## 2025-02-18 DIAGNOSIS — Z96.1 PSEUDOPHAKIA OF BOTH EYES: ICD-10-CM

## 2025-02-18 DIAGNOSIS — H40.1131 PRIMARY OPEN ANGLE GLAUCOMA OF BOTH EYES, MILD STAGE: Primary | ICD-10-CM

## 2025-02-18 PROCEDURE — 99212 OFFICE O/P EST SF 10 MIN: CPT | Mod: PBBFAC | Performed by: OPTOMETRIST

## 2025-02-18 NOTE — PROGRESS NOTES
HPI    Pt is here today for routine eye exam. Denies pain/discomfort.  DLS: 2/15/2024 Dr. Phan  (-)Flashes   (-)Floaters   (-)Diplopia   (-)Headaches   (-)Itching   (-)Tearing  (-)Burning  (-)Dryness   (-)Photophobia  (-)Glare   (-)Blurred VA  Past Eye Sx: Cataract with IOL OU   Eye Meds:  Latanoprost QHS OU   Last edited by Sarah Phan, OD on 2/18/2025  3:06 PM.            Assessment /Plan     For exam results, see Encounter Report.    Primary open angle glaucoma of both eyes, mild stage  -     [place] Visual Field - OU - Extended - Both Eyes; Future; Expected date: 03/18/2025  -     OCT, Optic Nerve - OU - Both Eyes; Future; Expected date: 03/18/2025    Pseudophakia of both eyes      1. Previous Dr. Irving notes: IOP lower after cataract removal and stable, nerve eval stable, cont Latanaprost qhs OU, Prev HVF and OCT stable, prev gonio open. Probably low tension. Mild glaucoma based on OCT changes. Pachy normal. Initial Field did clean up at repeat. Pretreat IOP at 20. Family history of glaucoma.   Ordered repeat HVF(24-2)kavita std and OCT of rnfl today--> ordered for pt last year and he wanted to have it done at [facility] but pt missed call to schedule the appt   Continue Latanoprost qhs OU    2. Monitor; pt educated on condition and visual status    Continue use of OTC reading glasses prn. Monitor yearly.      RTC x 6 mos for IOP check OU

## 2025-02-21 ENCOUNTER — OFFICE VISIT (OUTPATIENT)
Dept: URGENT CARE | Facility: CLINIC | Age: 85
End: 2025-02-21
Payer: MEDICARE

## 2025-02-21 VITALS
HEIGHT: 74 IN | WEIGHT: 214 LBS | DIASTOLIC BLOOD PRESSURE: 79 MMHG | SYSTOLIC BLOOD PRESSURE: 153 MMHG | RESPIRATION RATE: 16 BRPM | BODY MASS INDEX: 27.46 KG/M2 | OXYGEN SATURATION: 97 % | TEMPERATURE: 98 F | HEART RATE: 67 BPM

## 2025-02-21 DIAGNOSIS — J06.9 VIRAL URI WITH COUGH: Primary | ICD-10-CM

## 2025-02-21 DIAGNOSIS — R05.9 COUGH, UNSPECIFIED TYPE: ICD-10-CM

## 2025-02-21 DIAGNOSIS — Z00.00 ENCOUNTER FOR MEDICARE ANNUAL WELLNESS EXAM: ICD-10-CM

## 2025-02-21 LAB
CTP QC/QA: YES
CTP QC/QA: YES
POC MOLECULAR INFLUENZA A AGN: NEGATIVE
POC MOLECULAR INFLUENZA B AGN: NEGATIVE
SARS CORONAVIRUS 2 ANTIGEN: NEGATIVE

## 2025-02-21 NOTE — PROGRESS NOTES
"Subjective:      Patient ID: Tung Chau is a 84 y.o. male.    Vitals:  height is 6' 2" (1.88 m) and weight is 97.1 kg (214 lb). His oral temperature is 98 °F (36.7 °C). His blood pressure is 153/79 (abnormal) and his pulse is 67. His respiration is 16 and oxygen saturation is 97%.     Chief Complaint: Cough    This is a 84 y.o. male who presents today with a chief complaint of cough congestion postnasal drip that started 3 days ago. Took nyquil       Cough  This is a new problem. The current episode started in the past 7 days. The problem has been unchanged. The problem occurs constantly. The cough is Productive of sputum. Associated symptoms include nasal congestion and postnasal drip. He has tried OTC cough suppressant for the symptoms.       HENT:  Positive for postnasal drip.    Respiratory:  Positive for cough.    Neurological:  Negative for disorientation and altered mental status.   Psychiatric/Behavioral:  Negative for altered mental status and disorientation.       Objective:     Physical Exam   Constitutional: He is oriented to person, place, and time. He appears well-developed. He is cooperative.  Non-toxic appearance. He does not appear ill. No distress.      Comments:Patient sits comfortably in exam chair. Answers questions in complete sentences. Does not show any signs of distress or discoloration.        HENT:   Head: Normocephalic and atraumatic.   Ears:   Right Ear: Hearing, tympanic membrane, external ear and ear canal normal. no impacted cerumen  Left Ear: Hearing, tympanic membrane, external ear and ear canal normal. no impacted cerumen  Nose: Rhinorrhea and congestion present. No mucosal edema or nasal deformity. No epistaxis. Right sinus exhibits no maxillary sinus tenderness and no frontal sinus tenderness. Left sinus exhibits no maxillary sinus tenderness and no frontal sinus tenderness.   Mouth/Throat: Uvula is midline, oropharynx is clear and moist and mucous membranes are normal. No " trismus in the jaw. Normal dentition. No uvula swelling. No oropharyngeal exudate, posterior oropharyngeal edema or posterior oropharyngeal erythema.   Eyes: Conjunctivae and lids are normal. No scleral icterus.   Neck: Trachea normal and phonation normal. Neck supple. No edema present. No erythema present. No neck rigidity present.   Cardiovascular: Normal rate, regular rhythm, normal heart sounds and normal pulses.   Pulmonary/Chest: Effort normal and breath sounds normal. No stridor. No respiratory distress. He has no decreased breath sounds. He has no wheezes. He has no rhonchi. He has no rales. He exhibits no tenderness.   Abdominal: Normal appearance.   Musculoskeletal: Normal range of motion.         General: No deformity. Normal range of motion.   Neurological: He is alert and oriented to person, place, and time. He exhibits normal muscle tone. Coordination normal.   Skin: Skin is warm, dry, intact, not diaphoretic and not pale.   Psychiatric: His speech is normal and behavior is normal. Judgment and thought content normal.   Nursing note and vitals reviewed.    Results for orders placed or performed in visit on 02/21/25   SARS Coronavirus 2 Antigen, POCT Manual Read    Collection Time: 02/21/25 11:19 AM   Result Value Ref Range    SARS Coronavirus 2 Antigen Negative Negative, Presumptive Negative     Acceptable Yes    POCT Influenza A/B MOLECULAR    Collection Time: 02/21/25 11:32 AM   Result Value Ref Range    POC Molecular Influenza A Ag Negative Negative    POC Molecular Influenza B Ag Negative Negative     Acceptable Yes        Assessment:     1. Viral URI with cough    2. Cough, unspecified type        Plan:       Viral URI with cough    Cough, unspecified type  -     POCT Influenza A/B MOLECULAR  -     SARS Coronavirus 2 Antigen, POCT Manual Read                Patient Instructions   - Rest.    - Drink plenty of fluids. Increasing your fluid intake will help loosen up  mucous.  - Viral upper respiratory infections typically run their course in 10-14 days.      CONGESTION:  - Plain Mucinex to help thin mucous. Drinking plenty of water can have the same effect.   - You can take over-the-counter claritin, zyrtec, allegra, OR xyzal as directed. These are antihistamines that can help with runny nose, nasal congestion, sneezing, and helps to dry up post-nasal drip, which usually causes sore throat and cough.   - You can use Flonase (fluticasone) nasal spray as directed for sinus congestion and postnasal drip. This is a steroid nasal spray that works locally over time to decrease the inflammation in your nose/sinuses and help with allergic symptoms. This is not an quick- relief spray like afrin, but it works well if used daily.  Discontinue if you develop nose bleed  - Use nasal saline prior to Flonase.  - Use Ocean Spray Nasal Saline 1-3 puffs each nostril every 2-3 hours then blow out onto tissue. This is to irrigate the nasal passage way to clear the sinus openings. Use until sinus problem resolved.  - You can also try NasalCrom nasal spray, which has been shown to help reduce duration of symptoms when started within 24 hours of symptom onset. Okay to use with Flonase and other allergy medications.   - A Neti Pot with sterile saline can help break up nasal congestion and give relief.     COUGH:  - You can take Delsym to help with cough.     SORE THROAT:   - Chloraseptic throat spray can help numb the throat.   - Warm salt water gargles can help with sore throat.  - Warm tea with honey can help with sore throat and cough. Honey is a natural cough suppressant.     - Rest.    - Drink plenty of fluids.    - Acetaminophen (tylenol) or Ibuprofen (advil,motrin) as directed as needed for fever/pain. Avoid tylenol if you have a history of liver disease. Do not take ibuprofen if you have a history of GI bleeding, kidney disease, or if you take blood thinners.   - Ibuprofen dosing for adults:  400 mg by mouth every 4-6 hours as needed. Max: 2400 mg/day; Info: use lowest effective dose, shortest effective treatment duration; give w/ food if GI upset occurs.  - Tylenol dosing for adults: [By mouth route, immediate-release form] Dose: 325-1000 mg by mouth every 4-6h as needed; Max: 1 g/4h and 4 g/day from all sources. [By mouth route, extended-release form] Dose: 650-1300 mg Extended Release by mouth every 8h as needed; Max: 4 g/day from all sources.     - You must understand that you have received an Urgent Care treatment only and that you may be released before all of your medical problems are known or treated.   - You, the patient, will arrange for follow up care as instructed.   - If your condition worsens or fails to improve we recommend that you receive another evaluation at the ER immediately or contact your PCP to discuss your concerns or return here.   - Follow up with your PCP or specialty clinic as directed in the next 1-2 weeks if not improved or as needed.  You can call [phone] to schedule an appointment with the appropriate provider.    If your symptoms do not improve or worsen, go to the emergency room immediately.

## 2025-02-21 NOTE — PATIENT INSTRUCTIONS
- Rest.    - Drink plenty of fluids. Increasing your fluid intake will help loosen up mucous.  - Viral upper respiratory infections typically run their course in 10-14 days.      CONGESTION:  - Plain Mucinex to help thin mucous. Drinking plenty of water can have the same effect.   - You can take over-the-counter claritin, zyrtec, allegra, OR xyzal as directed. These are antihistamines that can help with runny nose, nasal congestion, sneezing, and helps to dry up post-nasal drip, which usually causes sore throat and cough.   - You can use Flonase (fluticasone) nasal spray as directed for sinus congestion and postnasal drip. This is a steroid nasal spray that works locally over time to decrease the inflammation in your nose/sinuses and help with allergic symptoms. This is not an quick- relief spray like afrin, but it works well if used daily.  Discontinue if you develop nose bleed  - Use nasal saline prior to Flonase.  - Use Ocean Spray Nasal Saline 1-3 puffs each nostril every 2-3 hours then blow out onto tissue. This is to irrigate the nasal passage way to clear the sinus openings. Use until sinus problem resolved.  - You can also try NasalCrom nasal spray, which has been shown to help reduce duration of symptoms when started within 24 hours of symptom onset. Okay to use with Flonase and other allergy medications.   - A Neti Pot with sterile saline can help break up nasal congestion and give relief.     COUGH:  - You can take Delsym to help with cough.     SORE THROAT:   - Chloraseptic throat spray can help numb the throat.   - Warm salt water gargles can help with sore throat.  - Warm tea with honey can help with sore throat and cough. Honey is a natural cough suppressant.     - Rest.    - Drink plenty of fluids.    - Acetaminophen (tylenol) or Ibuprofen (advil,motrin) as directed as needed for fever/pain. Avoid tylenol if you have a history of liver disease. Do not take ibuprofen if you have a history of GI  bleeding, kidney disease, or if you take blood thinners.   - Ibuprofen dosing for adults: 400 mg by mouth every 4-6 hours as needed. Max: 2400 mg/day; Info: use lowest effective dose, shortest effective treatment duration; give w/ food if GI upset occurs.  - Tylenol dosing for adults: [By mouth route, immediate-release form] Dose: 325-1000 mg by mouth every 4-6h as needed; Max: 1 g/4h and 4 g/day from all sources. [By mouth route, extended-release form] Dose: 650-1300 mg Extended Release by mouth every 8h as needed; Max: 4 g/day from all sources.     - You must understand that you have received an Urgent Care treatment only and that you may be released before all of your medical problems are known or treated.   - You, the patient, will arrange for follow up care as instructed.   - If your condition worsens or fails to improve we recommend that you receive another evaluation at the ER immediately or contact your PCP to discuss your concerns or return here.   - Follow up with your PCP or specialty clinic as directed in the next 1-2 weeks if not improved or as needed.  You can call [phone] to schedule an appointment with the appropriate provider.    If your symptoms do not improve or worsen, go to the emergency room immediately.

## 2025-02-27 ENCOUNTER — OFFICE VISIT (OUTPATIENT)
Dept: URGENT CARE | Facility: CLINIC | Age: 85
End: 2025-02-27
Payer: MEDICARE

## 2025-02-27 VITALS
DIASTOLIC BLOOD PRESSURE: 64 MMHG | HEIGHT: 74 IN | RESPIRATION RATE: 20 BRPM | WEIGHT: 213.88 LBS | HEART RATE: 79 BPM | BODY MASS INDEX: 27.45 KG/M2 | TEMPERATURE: 99 F | SYSTOLIC BLOOD PRESSURE: 143 MMHG | OXYGEN SATURATION: 95 %

## 2025-02-27 DIAGNOSIS — J01.90 ACUTE BACTERIAL SINUSITIS: Primary | ICD-10-CM

## 2025-02-27 DIAGNOSIS — B96.89 ACUTE BACTERIAL SINUSITIS: Primary | ICD-10-CM

## 2025-02-27 DIAGNOSIS — R05.9 COUGH, UNSPECIFIED TYPE: ICD-10-CM

## 2025-02-27 PROCEDURE — 99213 OFFICE O/P EST LOW 20 MIN: CPT | Mod: S$GLB,,,

## 2025-02-27 RX ORDER — PROMETHAZINE HYDROCHLORIDE AND DEXTROMETHORPHAN HYDROBROMIDE 6.25; 15 MG/5ML; MG/5ML
5 SYRUP ORAL NIGHTLY PRN
Qty: 118 ML | Refills: 0 | Status: SHIPPED | OUTPATIENT
Start: 2025-02-27

## 2025-02-27 RX ORDER — DOXYCYCLINE 100 MG/1
100 CAPSULE ORAL EVERY 12 HOURS
Qty: 14 CAPSULE | Refills: 0 | Status: SHIPPED | OUTPATIENT
Start: 2025-02-27 | End: 2025-03-06

## 2025-02-27 NOTE — PATIENT INSTRUCTIONS
Acute Sinusitis  Treatment  Antibiotics to treat infection. Please take to completion.   Doxycycline 100 mg twice daily for 7 days.   Promethazine DM, 5 mLs every 4 hours or nightly as needed to help with cough. This medication can make you feel drowsy, use with caution.  Guaifenesin (Mucinex) to help thin the mucus during the day.  Tylenol (Acetaminophen) 650 mg to 1 g every 6 hours and/or Motrin (Ibuprofen) 600 to 800 mg every 6 hours as needed for pain and/or fever    Care at home  Nasal saline spray or nasal sinus rinses, such as Neilmed or Netti Pot, which you can  at your local drugstore. Please do this twice a day while having symptoms and then as needed.  Steam tenting  Please drink plenty of fluids.  Please get plenty of rest.  If you smoke, please stop smoking.  To help ease a sore throat, you can:  Use a sore throat spray.  Suck on hard candy or throat lozenges.  Gargle with warm saltwater a few times each day. Mix of 1/4 teaspoon (1.25 grams) salt in 8 ounces (240 mL) of warm water.  Use a cool mist humidifier to help you breathe easier.    Please remember that you have received care at an urgent care today. Urgent cares are not emergency rooms and are not equipped to handle life threatening emergencies and cannot rule in or out certain medical conditions and you may be released before all of your medical problems are known or treated.     Please arrange follow up with your primary care physician or speciality clinic within 2-5 days if your signs and symptoms have not resolved or worsen.     Patient can call our Referral Hotline at [phone] to make an appointment.    Please return here or go to the Emergency Department for any concerns or worsening of condition.  Signs of infection. These include a fever of 100.4°F (38°C) or higher, chills, cough, more sputum or change in color of sputum.  You are having so much trouble breathing that you can only say one or two words at a time.  You need to  sit upright at all times to be able to breathe and or cannot lie down.  You have trouble breathing when talking or sitting still.  You have a fever of 100.4°F (38°C) or higher or chills.  You have chest pain when you cough, have trouble breathing but can still talk in full sentences, or cough up blood.

## 2025-02-27 NOTE — PROGRESS NOTES
"Subjective:      Patient ID: Tung Chau is a 84 y.o. male.    Vitals:  height is 6' 2" (1.88 m) and weight is 97 kg (213 lb 13.5 oz). His oral temperature is 99 °F (37.2 °C). His blood pressure is 143/64 (abnormal) and his pulse is 79. His respiration is 20 and oxygen saturation is 95%.     Chief Complaint: Sinus Problem    Pt present with cough, runny nose, congestion. Pt was seen in [place] 1 week ago still not getting any better.  Tx include OTC mucinex.     Provider note starts below:  Patient presents to clinic for re-evaluation. States he started having cold-like symptoms over one week ago. He was seen at [facility] on 2/21 and had negative viral testing at the time. He was advised to use OTC medications to help his symptoms. Patient states he did medications as directed (Mucinex, Robitussin,) with no improvement. States his symptoms have been progressively worsening. He is complaining of sinus pain and pressure, nasal congestion, postnasal drip, sore throat, cough.     Sinus Problem  This is a recurrent problem. The current episode started 1 to 4 weeks ago. The problem is unchanged. There has been no fever. His pain is at a severity of 0/10. He is experiencing no pain. Associated symptoms include congestion, coughing, headaches, sinus pressure, sneezing and a sore throat. Pertinent negatives include no chills, ear pain, neck pain or shortness of breath. Past treatments include oral decongestants. The treatment provided no relief.     Constitution: Negative for chills and fever.   HENT:  Positive for congestion, postnasal drip, sinus pain, sinus pressure and sore throat. Negative for ear pain, ear discharge, trouble swallowing and voice change.    Neck: Negative for neck pain and neck stiffness.   Cardiovascular:  Negative for chest pain and palpitations.   Eyes:  Negative for eye pain and eye redness.   Respiratory:  Positive for cough and sputum production. Negative for chest tightness, shortness of " breath, stridor and wheezing.    Gastrointestinal:  Negative for abdominal pain, nausea and vomiting.   Musculoskeletal:  Negative for muscle cramps and muscle ache.   Skin:  Negative for pale and rash.   Allergic/Immunologic: Positive for sneezing. Negative for itching.   Neurological:  Positive for headaches. Negative for dizziness, light-headedness, passing out, disorientation and altered mental status.   Psychiatric/Behavioral:  Negative for altered mental status, disorientation and confusion.       Objective:     Physical Exam   Constitutional: He is oriented to person, place, and time.  Non-toxic appearance. He does not appear ill. No distress.   HENT:   Head: Normocephalic and atraumatic.   Ears:   Right Ear: Tympanic membrane, external ear and ear canal normal.   Left Ear: Tympanic membrane, external ear and ear canal normal.   Nose: Congestion present.   Mouth/Throat: Mucous membranes are moist. No oropharyngeal exudate or posterior oropharyngeal erythema. Oropharynx is clear.   Eyes: Conjunctivae are normal. Extraocular movement intact   Neck: Neck supple.   Cardiovascular: Normal rate, regular rhythm, normal heart sounds and normal pulses.   Pulmonary/Chest: Effort normal. He has no wheezes. He has no rhonchi.   Abdominal: Normal appearance.   Musculoskeletal: Normal range of motion.         General: Normal range of motion.   Neurological: He is alert, oriented to person, place, and time and at baseline.   Skin: Skin is warm and dry.   Psychiatric: His behavior is normal. Mood normal.   Nursing note and vitals reviewed.    Assessment:     Results reviewed from 2/21/25:  Results for orders placed or performed in visit on 02/21/25   SARS Coronavirus 2 Antigen, POCT Manual Read    Collection Time: 02/21/25 11:19 AM   Result Value Ref Range    SARS Coronavirus 2 Antigen Negative Negative, Presumptive Negative     Acceptable Yes    POCT Influenza A/B MOLECULAR    Collection Time: 02/21/25 11:32  AM   Result Value Ref Range    POC Molecular Influenza A Ag Negative Negative    POC Molecular Influenza B Ag Negative Negative     Acceptable Yes        1. Acute bacterial sinusitis    2. Cough, unspecified type        Plan:     Acute bacterial sinusitis  -     doxycycline (VIBRAMYCIN) 100 MG Cap; Take 1 capsule (100 mg total) by mouth every 12 (twelve) hours. for 7 days  Dispense: 14 capsule; Refill: 0    Cough, unspecified type  -     promethazine-dextromethorphan (PROMETHAZINE-DM) 6.25-15 mg/5 mL Syrp; Take 5 mLs by mouth nightly as needed (for cough).  Dispense: 118 mL; Refill: 0            Patient Instructions   Acute Sinusitis  Treatment  Antibiotics to treat infection. Please take to completion.   Doxycycline 100 mg twice daily for 7 days.   Promethazine DM, 5 mLs every 4 hours or nightly as needed to help with cough. This medication can make you feel drowsy, use with caution.  Guaifenesin (Mucinex) to help thin the mucus during the day.  Tylenol (Acetaminophen) 650 mg to 1 g every 6 hours and/or Motrin (Ibuprofen) 600 to 800 mg every 6 hours as needed for pain and/or fever    Care at home  Nasal saline spray or nasal sinus rinses, such as Neilmed or Netti Pot, which you can  at your local drugstore. Please do this twice a day while having symptoms and then as needed.  Steam tenting  Please drink plenty of fluids.  Please get plenty of rest.  If you smoke, please stop smoking.  To help ease a sore throat, you can:  Use a sore throat spray.  Suck on hard candy or throat lozenges.  Gargle with warm saltwater a few times each day. Mix of 1/4 teaspoon (1.25 grams) salt in 8 ounces (240 mL) of warm water.  Use a cool mist humidifier to help you breathe easier.    Please remember that you have received care at an urgent care today. Urgent cares are not emergency rooms and are not equipped to handle life threatening emergencies and cannot rule in or out certain medical conditions and you may  be released before all of your medical problems are known or treated.     Please arrange follow up with your primary care physician or speciality clinic within 2-5 days if your signs and symptoms have not resolved or worsen.     Patient can call our Referral Hotline at [phone] to make an appointment.    Please return here or go to the Emergency Department for any concerns or worsening of condition.  Signs of infection. These include a fever of 100.4°F (38°C) or higher, chills, cough, more sputum or change in color of sputum.  You are having so much trouble breathing that you can only say one or two words at a time.  You need to sit upright at all times to be able to breathe and or cannot lie down.  You have trouble breathing when talking or sitting still.  You have a fever of 100.4°F (38°C) or higher or chills.  You have chest pain when you cough, have trouble breathing but can still talk in full sentences, or cough up blood.

## 2025-02-28 RX ORDER — DOXAZOSIN 4 MG/1
4 TABLET ORAL NIGHTLY
Qty: 90 TABLET | Refills: 3 | Status: SHIPPED | OUTPATIENT
Start: 2025-02-28

## 2025-02-28 NOTE — TELEPHONE ENCOUNTER
No care due was identified.  [facility] Embedded Care Due Messages. Reference number: [id number].   2/28/2025 3:25:12 AM CST

## 2025-02-28 NOTE — TELEPHONE ENCOUNTER
Refill Decision Note   Tung Larbronwyn  is requesting a refill authorization.  Brief Assessment and Rationale for Refill:  Approve     Medication Therapy Plan:         Comments:     Note composed:9:11 AM 02/28/2025

## 2025-03-10 ENCOUNTER — CLINICAL SUPPORT (OUTPATIENT)
Dept: OPHTHALMOLOGY | Facility: CLINIC | Age: 85
End: 2025-03-10
Payer: MEDICARE

## 2025-03-10 DIAGNOSIS — H40.1131 PRIMARY OPEN ANGLE GLAUCOMA OF BOTH EYES, MILD STAGE: ICD-10-CM

## 2025-03-10 NOTE — PROGRESS NOTES
Assessment /Plan     For exam results, see Encounter Report.    Primary open angle glaucoma of both eyes, mild stage  -     OCT, Optic Nerve - OU - Both Eyes  -     Borrego Visual Field - OU - Extended - Both Eyes      OCT DONE OU          24-2 SF DONE OU     REL & FIX =  GOOD OU     COOP =      GOOD     PATIENT HAS NO ALLERGIES TO LATEX OR ADHESIVES AT THIS TIME    JTHOMAS    MRX    +.25 + .50 X 80  OD    -3.00 + 1.00 X 165   OS

## 2025-06-05 RX ORDER — BENAZEPRIL HYDROCHLORIDE 10 MG/1
TABLET ORAL
Qty: 180 TABLET | Refills: 2 | Status: SHIPPED | OUTPATIENT
Start: 2025-06-05

## 2025-07-07 DIAGNOSIS — H40.1131 PRIMARY OPEN ANGLE GLAUCOMA OF BOTH EYES, MILD STAGE: ICD-10-CM

## 2025-07-07 RX ORDER — LATANOPROST 50 UG/ML
1 SOLUTION/ DROPS OPHTHALMIC DAILY
Qty: 7.5 ML | Refills: 3 | Status: SHIPPED | OUTPATIENT
Start: 2025-07-07

## 2025-07-10 ENCOUNTER — OFFICE VISIT (OUTPATIENT)
Dept: URGENT CARE | Facility: CLINIC | Age: 85
End: 2025-07-10
Payer: MEDICARE

## 2025-07-10 VITALS
OXYGEN SATURATION: 96 % | RESPIRATION RATE: 16 BRPM | SYSTOLIC BLOOD PRESSURE: 164 MMHG | HEIGHT: 75 IN | DIASTOLIC BLOOD PRESSURE: 82 MMHG | WEIGHT: 215 LBS | HEART RATE: 92 BPM | BODY MASS INDEX: 26.73 KG/M2 | TEMPERATURE: 98 F

## 2025-07-10 DIAGNOSIS — R09.89 CHEST CONGESTION: ICD-10-CM

## 2025-07-10 DIAGNOSIS — J01.00 ACUTE NON-RECURRENT MAXILLARY SINUSITIS: Primary | ICD-10-CM

## 2025-07-10 LAB
CTP QC/QA: YES
SARS-COV+SARS-COV-2 AG RESP QL IA.RAPID: NEGATIVE

## 2025-07-10 RX ORDER — AZITHROMYCIN 250 MG/1
TABLET, FILM COATED ORAL
Qty: 6 TABLET | Refills: 0 | Status: SHIPPED | OUTPATIENT
Start: 2025-07-10 | End: 2025-07-15

## 2025-07-10 RX ORDER — AZELASTINE 1 MG/ML
2 SPRAY, METERED NASAL 2 TIMES DAILY
Qty: 30 ML | Refills: 2 | Status: SHIPPED | OUTPATIENT
Start: 2025-07-10 | End: 2025-08-09

## 2025-07-10 NOTE — PROGRESS NOTES
"Subjective:      Patient ID: Tung Chau is a 84 y.o. male.    Vitals:  height is 6' 3" (1.905 m) and weight is 97.5 kg (215 lb). His oral temperature is 98.3 °F (36.8 °C). His blood pressure is 164/82 (abnormal) and his pulse is 92. His respiration is 16 and oxygen saturation is 96%.     Chief Complaint: Sinus Problem    This is a 84 y.o. male who presents today with a chief complaint of chest congestion, sinus pain and pressure that started today. No meds were taken       Sinus Problem  This is a new problem. The current episode started today. The problem is unchanged. There has been no fever. Associated symptoms include congestion and sinus pressure. Past treatments include nothing.     HENT:  Positive for congestion and sinus pressure.       Objective:     Physical Exam   Constitutional: He is oriented to person, place, and time. He appears well-developed. He is cooperative.  Non-toxic appearance. He does not appear ill. No distress.   HENT:   Head: Normocephalic and atraumatic.   Ears:   Right Ear: Hearing, tympanic membrane, external ear and ear canal normal.   Left Ear: Hearing, tympanic membrane, external ear and ear canal normal.   Nose: Mucosal edema and rhinorrhea present. No nasal deformity. No epistaxis. Right sinus exhibits maxillary sinus tenderness. Right sinus exhibits no frontal sinus tenderness. Left sinus exhibits no maxillary sinus tenderness and no frontal sinus tenderness.   Mouth/Throat: Uvula is midline, oropharynx is clear and moist and mucous membranes are normal. No trismus in the jaw. Normal dentition. No uvula swelling. No oropharyngeal exudate, posterior oropharyngeal edema or posterior oropharyngeal erythema.   Eyes: Conjunctivae and lids are normal. No scleral icterus.   Neck: Trachea normal and phonation normal. Neck supple. No edema present. No erythema present. No neck rigidity present.   Cardiovascular: Normal rate, regular rhythm, normal heart sounds and normal pulses. "   Pulmonary/Chest: Effort normal and breath sounds normal. No respiratory distress. He has no decreased breath sounds. He has no rhonchi.   Abdominal: Normal appearance.   Musculoskeletal: Normal range of motion.         General: No deformity. Normal range of motion.   Neurological: He is alert and oriented to person, place, and time. He exhibits normal muscle tone. Coordination normal.   Skin: Skin is warm, dry, intact, not diaphoretic and not pale.   Psychiatric: His speech is normal and behavior is normal. Judgment and thought content normal.   Nursing note and vitals reviewed.      Assessment:     1. Acute non-recurrent maxillary sinusitis    2. Chest congestion        Plan:       Acute non-recurrent maxillary sinusitis  -     azelastine (ASTELIN) 137 mcg (0.1 %) nasal spray; 2 sprays (274 mcg total) by Nasal route 2 (two) times daily.  Dispense: 30 mL; Refill: 2  -     azithromycin (Z-JANAE) 250 MG tablet; Take 2 tablets by mouth on day 1; Take 1 tablet by mouth on days 2-5  Dispense: 6 tablet; Refill: 0    Chest congestion  -     SARS Coronavirus 2 Antigen, POCT Manual Read      Results for orders placed or performed in visit on 07/10/25   SARS Coronavirus 2 Antigen, POCT Manual Read    Collection Time: 07/10/25  3:53 PM   Result Value Ref Range    SARS Coronavirus 2 Antigen Negative Negative, Presumptive Negative     Acceptable Yes

## 2025-07-28 ENCOUNTER — TELEPHONE (OUTPATIENT)
Dept: INTERNAL MEDICINE | Facility: CLINIC | Age: 85
End: 2025-07-28
Payer: MEDICARE

## 2025-07-28 NOTE — TELEPHONE ENCOUNTER
Copied from CRM #[id number]. Topic: General Inquiry - Patient Advice  >> Jul 28, 2025 12:17 PM Eduardo wrote:  .1MEDICALADVICE     Patient is calling for Medical Advice regarding:Pt called in regards to getting a urine specimen  ordered with labs scheduled for tomorrow please advise    How long has patient had these symptoms:N/A    Pharmacy name and phone#:N/A    Patient wants a call back or thru [facility], provide patient's call back phone number: Callback     Comments:    Please advise patient replies from provider may take up to 48 hours.

## 2025-07-29 ENCOUNTER — TELEPHONE (OUTPATIENT)
Dept: INTERNAL MEDICINE | Facility: CLINIC | Age: 85
End: 2025-07-29
Payer: MEDICARE

## 2025-07-29 ENCOUNTER — LAB VISIT (OUTPATIENT)
Dept: LAB | Facility: HOSPITAL | Age: 85
End: 2025-07-29
Attending: INTERNAL MEDICINE
Payer: MEDICARE

## 2025-07-29 DIAGNOSIS — J30.9 ALLERGIC RHINITIS, UNSPECIFIED SEASONALITY, UNSPECIFIED TRIGGER: ICD-10-CM

## 2025-07-29 DIAGNOSIS — R35.1 BENIGN PROSTATIC HYPERPLASIA WITH NOCTURIA: ICD-10-CM

## 2025-07-29 DIAGNOSIS — I34.0 NONRHEUMATIC MITRAL VALVE REGURGITATION: ICD-10-CM

## 2025-07-29 DIAGNOSIS — I10 PRIMARY HYPERTENSION: ICD-10-CM

## 2025-07-29 DIAGNOSIS — I70.0 ATHEROSCLEROSIS OF AORTA: ICD-10-CM

## 2025-07-29 DIAGNOSIS — Z00.00 ANNUAL PHYSICAL EXAM: ICD-10-CM

## 2025-07-29 DIAGNOSIS — N40.1 BENIGN PROSTATIC HYPERPLASIA WITH NOCTURIA: ICD-10-CM

## 2025-07-29 DIAGNOSIS — E78.5 HYPERLIPIDEMIA, UNSPECIFIED HYPERLIPIDEMIA TYPE: ICD-10-CM

## 2025-07-29 LAB
ABSOLUTE EOSINOPHIL (OHS): 0.12 K/UL
ABSOLUTE MONOCYTE (OHS): 0.31 K/UL (ref 0.3–1)
ABSOLUTE NEUTROPHIL COUNT (OHS): 2.83 K/UL (ref 1.8–7.7)
ALBUMIN SERPL BCP-MCNC: 4.1 G/DL (ref 3.5–5.2)
ALP SERPL-CCNC: 88 UNIT/L (ref 40–150)
ALT SERPL W/O P-5'-P-CCNC: 21 UNIT/L (ref 0–55)
ANION GAP (OHS): 6 MMOL/L (ref 8–16)
AST SERPL-CCNC: 27 UNIT/L (ref 0–50)
BASOPHILS # BLD AUTO: 0.05 K/UL
BASOPHILS NFR BLD AUTO: 1.2 %
BILIRUB SERPL-MCNC: 0.8 MG/DL (ref 0.1–1)
BUN SERPL-MCNC: 14 MG/DL (ref 8–23)
CALCIUM SERPL-MCNC: 9.6 MG/DL (ref 8.7–10.5)
CHLORIDE SERPL-SCNC: 106 MMOL/L (ref 95–110)
CHOLEST SERPL-MCNC: 141 MG/DL (ref 120–199)
CHOLEST/HDLC SERPL: 3.4 {RATIO} (ref 2–5)
CO2 SERPL-SCNC: 27 MMOL/L (ref 23–29)
CREAT SERPL-MCNC: 1.2 MG/DL (ref 0.5–1.4)
ERYTHROCYTE [DISTWIDTH] IN BLOOD BY AUTOMATED COUNT: 12 % (ref 11.5–14.5)
GFR SERPLBLD CREATININE-BSD FMLA CKD-EPI: 60 ML/MIN/1.73/M2
GLUCOSE SERPL-MCNC: 91 MG/DL (ref 70–110)
HCT VFR BLD AUTO: 40.8 % (ref 40–54)
HDLC SERPL-MCNC: 41 MG/DL (ref 40–75)
HDLC SERPL: 29.1 % (ref 20–50)
HGB BLD-MCNC: 13.9 GM/DL (ref 14–18)
IMM GRANULOCYTES # BLD AUTO: 0.01 K/UL (ref 0–0.04)
IMM GRANULOCYTES NFR BLD AUTO: 0.2 % (ref 0–0.5)
LDLC SERPL CALC-MCNC: 82.6 MG/DL (ref 63–159)
LYMPHOCYTES # BLD AUTO: 0.83 K/UL (ref 1–4.8)
MCH RBC QN AUTO: 31.9 PG (ref 27–31)
MCHC RBC AUTO-ENTMCNC: 34.1 G/DL (ref 32–36)
MCV RBC AUTO: 94 FL (ref 82–98)
NONHDLC SERPL-MCNC: 100 MG/DL
NUCLEATED RBC (/100WBC) (OHS): 0 /100 WBC
PLATELET # BLD AUTO: 143 K/UL (ref 150–450)
PMV BLD AUTO: 11.7 FL (ref 9.2–12.9)
POTASSIUM SERPL-SCNC: 4.5 MMOL/L (ref 3.5–5.1)
PROT SERPL-MCNC: 7.3 GM/DL (ref 6–8.4)
RBC # BLD AUTO: 4.36 M/UL (ref 4.6–6.2)
RELATIVE EOSINOPHIL (OHS): 2.9 %
RELATIVE LYMPHOCYTE (OHS): 20 % (ref 18–48)
RELATIVE MONOCYTE (OHS): 7.5 % (ref 4–15)
RELATIVE NEUTROPHIL (OHS): 68.2 % (ref 38–73)
SODIUM SERPL-SCNC: 139 MMOL/L (ref 136–145)
TRIGL SERPL-MCNC: 87 MG/DL (ref 30–150)
WBC # BLD AUTO: 4.15 K/UL (ref 3.9–12.7)

## 2025-07-29 PROCEDURE — 82465 ASSAY BLD/SERUM CHOLESTEROL: CPT

## 2025-07-29 PROCEDURE — 80053 COMPREHEN METABOLIC PANEL: CPT

## 2025-07-29 PROCEDURE — 36415 COLL VENOUS BLD VENIPUNCTURE: CPT

## 2025-07-29 PROCEDURE — 85025 COMPLETE CBC W/AUTO DIFF WBC: CPT

## 2025-07-29 NOTE — PROGRESS NOTES
MEDICAL HISTORY:   Hypertension.  Hyperlipidemia.  Mitral regurgitation on 2D  Gastroesophageal reflux disease.  Chronic rhinitis.  BPH.  Left bundle-branch block.  Adenomatous colon polyp in the year 2000 and in August 2015.  Right inguinal hernia repair.  Epididymal cyst.  Fractured elbow and wrist.  Lung surgery at age 42 to remove an empyema.    Aortic atherosclerosis on imaging     SOCIAL HISTORY:  Tobacco and alcohol use - none.          MEDICATIONS:   Atorvastatin 10 mg.  Benazepril 10 mg.  Diltiazem 360 mg.  Doxazosin 4 mg.  Hydrochlorothiazide 25 mg.      Component      Latest Ref Rng 7/29/2025   WBC      3.90 - 12.70 K/uL 4.15    RBC      4.60 - 6.20 M/uL 4.36 (L)    Hemoglobin      14.0 - 18.0 gm/dL 13.9 (L)    Hematocrit      40.0 - 54.0 % 40.8    MCV      82 - 98 fL 94    MCH      27.0 - 31.0 pg 31.9 (H)    MCHC      32.0 - 36.0 g/dL 34.1    RDW      11.5 - 14.5 % 12.0    Platelet Count      150 - 450 K/uL 143 (L)    MPV      9.2 - 12.9 fL 11.7    nRBC      <=0 /100 WBC 0    Neut %      38 - 73 % 68.2    Lymph %      18 - 48 % 20.0    Mono %      4 - 15 % 7.5    Eos %      <=8 % 2.9    Basophil %      <=1.9 % 1.2    Immature Granulocytes      0.0 - 0.5 % 0.2    Gran # (ANC)      1.8 - 7.7 K/uL 2.83    Lymph #      1 - 4.8 K/uL 0.83 (L)    Mono #      0.3 - 1 K/uL 0.31    Eos #      <=0.5 K/uL 0.12    Baso #      <=0.2 K/uL 0.05    Immature Grans (Abs)      0.00 - 0.04 K/uL 0.01    Sodium      136 - 145 mmol/L 139    Potassium      3.5 - 5.1 mmol/L 4.5    Chloride      95 - 110 mmol/L 106    CO2      23 - 29 mmol/L 27    Glucose      70 - 110 mg/dL 91    BUN      8 - 23 mg/dL 14    Creatinine      0.5 - 1.4 mg/dL 1.2    Calcium      8.7 - 10.5 mg/dL 9.6    PROTEIN TOTAL      6.0 - 8.4 gm/dL 7.3    Albumin      3.5 - 5.2 g/dL 4.1    BILIRUBIN TOTAL      0.1 - 1.0 mg/dL 0.8    ALP      40 - 150 unit/L 88    AST      0 - 50 unit/L 27    ALT      0 - 55 unit/L 21    Anion Gap      8 - 16 mmol/L 6 (L)     eGFR      >60 mL/min/1.73/m2 60 (L)    Cholesterol Total      120 - 199 mg/dL 141    Triglycerides      30 - 150 mg/dL 87    HDL      40 - 75 mg/dL 41    LDL Cholesterol      63.0 - 159.0 mg/dL 82.6    HDL/Cholesterol Ratio      20.0 - 50.0 % 29.1    Total Cholesterol/HDL Ratio      2.0 - 5.0  3.4    Non-HDL Cholesterol      mg/dL 100       Legend:  (L) Low  (H) High    85-year-old male   Presents for routine visit.  Overall in general he has felt well  Still works for the [employer], [employer],: Is involved with a lot of activity and walking throughout the day    Recently was seen July 10th for upper respiratory sinus infection received a course of azithromycin is using Astelin as needed    Reports no chest pain, palpitations, shortness of breath, abdominal pain.  Regular bowel function.  No difficulty urinating, nocturia x1    Exam   Weight 213   BMI 26.73   Blood pressure 126/60   Pulse 72  Neck no thyromegaly   Chest clear breath sounds  Heart regular rate and rhythm no murmurs   Abdominal exam nontender soft no hepatosplenomegaly abdominal masses  2+ carotid pulses, right carotid bruit noted  Extremities no edema   2+ pedal pulses    Impression   Hypertension   Hyperlipidemia  Chronic rhinitis  Thrombocytopenia   Carotid bruit  Mitral regurgitation on 2D echo imaging    Plan   Continue with current medications   Repeat CBC in 6 weeks   Vascular Doppler carotids  Continue with attention appropriate diet physical activity

## 2025-07-30 ENCOUNTER — OFFICE VISIT (OUTPATIENT)
Dept: INTERNAL MEDICINE | Facility: CLINIC | Age: 85
End: 2025-07-30
Payer: MEDICARE

## 2025-07-30 VITALS
HEIGHT: 75 IN | BODY MASS INDEX: 26.59 KG/M2 | OXYGEN SATURATION: 97 % | DIASTOLIC BLOOD PRESSURE: 56 MMHG | WEIGHT: 213.88 LBS | HEART RATE: 61 BPM | SYSTOLIC BLOOD PRESSURE: 110 MMHG

## 2025-07-30 DIAGNOSIS — I10 PRIMARY HYPERTENSION: Primary | ICD-10-CM

## 2025-07-30 DIAGNOSIS — D69.6 THROMBOCYTOPENIA: ICD-10-CM

## 2025-07-30 DIAGNOSIS — R35.1 BENIGN PROSTATIC HYPERPLASIA WITH NOCTURIA: ICD-10-CM

## 2025-07-30 DIAGNOSIS — N40.1 BENIGN PROSTATIC HYPERPLASIA WITH NOCTURIA: ICD-10-CM

## 2025-07-30 DIAGNOSIS — J30.9 ALLERGIC RHINITIS, UNSPECIFIED SEASONALITY, UNSPECIFIED TRIGGER: ICD-10-CM

## 2025-07-30 DIAGNOSIS — R09.89 BRUIT OF RIGHT CAROTID ARTERY: ICD-10-CM

## 2025-07-30 DIAGNOSIS — E78.5 HYPERLIPIDEMIA, UNSPECIFIED HYPERLIPIDEMIA TYPE: ICD-10-CM

## 2025-07-30 PROCEDURE — 99214 OFFICE O/P EST MOD 30 MIN: CPT | Mod: PBBFAC | Performed by: INTERNAL MEDICINE

## 2025-07-30 PROCEDURE — 99999 PR PBB SHADOW E&M-EST. PATIENT-LVL IV: CPT | Mod: PBBFAC,,, | Performed by: INTERNAL MEDICINE

## 2025-07-30 PROCEDURE — 99214 OFFICE O/P EST MOD 30 MIN: CPT | Mod: S$PBB,,, | Performed by: INTERNAL MEDICINE

## 2025-07-30 RX ORDER — OMEPRAZOLE 20 MG/1
20 CAPSULE, DELAYED RELEASE ORAL DAILY
COMMUNITY
Start: 2025-07-21 | End: 2025-07-30

## 2025-08-05 ENCOUNTER — HOSPITAL ENCOUNTER (OUTPATIENT)
Dept: VASCULAR SURGERY | Facility: CLINIC | Age: 85
Discharge: HOME | End: 2025-08-05
Attending: INTERNAL MEDICINE
Payer: MEDICARE

## 2025-08-05 DIAGNOSIS — I65.22 LEFT CAROTID STENOSIS: Primary | ICD-10-CM

## 2025-08-05 DIAGNOSIS — R09.89 BRUIT OF RIGHT CAROTID ARTERY: ICD-10-CM

## 2025-08-05 PROCEDURE — 93880 EXTRACRANIAL BILAT STUDY: CPT | Mod: 26,S$PBB,, | Performed by: SURGERY

## 2025-08-05 PROCEDURE — 93880 EXTRACRANIAL BILAT STUDY: CPT | Mod: PBBFAC | Performed by: SURGERY

## (undated) DEVICE — CASSETTE INFINITI

## (undated) DEVICE — GLOVE BIOGEL SKINSENSE PI 7.5

## (undated) DEVICE — SYR SLIP TIP 1CC

## (undated) DEVICE — SOL IRR STRL WATER 500ML

## (undated) DEVICE — Device

## (undated) DEVICE — FRAMES EYE SHIELD ASST COLORS

## (undated) DEVICE — SHEILD & GARTERS FOX METAL EYE

## (undated) DEVICE — SOL BETADINE 5%

## (undated) DEVICE — BLADE SURG BVL ANG COAX 2.4MM